# Patient Record
Sex: MALE | Race: BLACK OR AFRICAN AMERICAN | NOT HISPANIC OR LATINO
[De-identification: names, ages, dates, MRNs, and addresses within clinical notes are randomized per-mention and may not be internally consistent; named-entity substitution may affect disease eponyms.]

---

## 2017-06-14 ENCOUNTER — APPOINTMENT (OUTPATIENT)
Dept: NEUROLOGY | Facility: CLINIC | Age: 67
End: 2017-06-14

## 2017-09-24 ENCOUNTER — INPATIENT (INPATIENT)
Facility: HOSPITAL | Age: 67
LOS: 2 days | Discharge: HOME HEALTH | DRG: 299 | End: 2017-09-27
Attending: HOSPITALIST | Admitting: HOSPITALIST
Payer: MEDICARE

## 2017-09-24 VITALS
OXYGEN SATURATION: 95 % | WEIGHT: 201.06 LBS | RESPIRATION RATE: 24 BRPM | HEART RATE: 93 BPM | SYSTOLIC BLOOD PRESSURE: 130 MMHG | TEMPERATURE: 99 F | DIASTOLIC BLOOD PRESSURE: 87 MMHG

## 2017-09-24 DIAGNOSIS — M51.34 OTHER INTERVERTEBRAL DISC DEGENERATION, THORACIC REGION: Chronic | ICD-10-CM

## 2017-09-24 DIAGNOSIS — R63.8 OTHER SYMPTOMS AND SIGNS CONCERNING FOOD AND FLUID INTAKE: ICD-10-CM

## 2017-09-24 DIAGNOSIS — I10 ESSENTIAL (PRIMARY) HYPERTENSION: ICD-10-CM

## 2017-09-24 DIAGNOSIS — I26.99 OTHER PULMONARY EMBOLISM WITHOUT ACUTE COR PULMONALE: ICD-10-CM

## 2017-09-24 DIAGNOSIS — Z98.890 OTHER SPECIFIED POSTPROCEDURAL STATES: Chronic | ICD-10-CM

## 2017-09-24 DIAGNOSIS — M50.30 OTHER CERVICAL DISC DEGENERATION, UNSPECIFIED CERVICAL REGION: ICD-10-CM

## 2017-09-24 DIAGNOSIS — Z29.9 ENCOUNTER FOR PROPHYLACTIC MEASURES, UNSPECIFIED: ICD-10-CM

## 2017-09-24 DIAGNOSIS — M51.34 OTHER INTERVERTEBRAL DISC DEGENERATION, THORACIC REGION: ICD-10-CM

## 2017-09-24 LAB
ALBUMIN SERPL ELPH-MCNC: 4.3 G/DL — SIGNIFICANT CHANGE UP (ref 3.3–5)
ALP SERPL-CCNC: 52 U/L — SIGNIFICANT CHANGE UP (ref 40–120)
ALT FLD-CCNC: 15 U/L — SIGNIFICANT CHANGE UP (ref 10–45)
ANION GAP SERPL CALC-SCNC: 13 MMOL/L — SIGNIFICANT CHANGE UP (ref 5–17)
APTT BLD: 26.9 SEC — LOW (ref 27.5–37.4)
AST SERPL-CCNC: 24 U/L — SIGNIFICANT CHANGE UP (ref 10–40)
BASOPHILS NFR BLD AUTO: 0.4 % — SIGNIFICANT CHANGE UP (ref 0–2)
BILIRUB SERPL-MCNC: 1.5 MG/DL — HIGH (ref 0.2–1.2)
BUN SERPL-MCNC: 15 MG/DL — SIGNIFICANT CHANGE UP (ref 7–23)
CALCIUM SERPL-MCNC: 9.7 MG/DL — SIGNIFICANT CHANGE UP (ref 8.4–10.5)
CHLORIDE SERPL-SCNC: 95 MMOL/L — LOW (ref 96–108)
CK MB CFR SERPL CALC: 1 NG/ML — SIGNIFICANT CHANGE UP (ref 0–6.7)
CO2 SERPL-SCNC: 28 MMOL/L — SIGNIFICANT CHANGE UP (ref 22–31)
CREAT SERPL-MCNC: 1.24 MG/DL — SIGNIFICANT CHANGE UP (ref 0.5–1.3)
EOSINOPHIL NFR BLD AUTO: 0.6 % — SIGNIFICANT CHANGE UP (ref 0–6)
GLUCOSE SERPL-MCNC: 100 MG/DL — HIGH (ref 70–99)
HCT VFR BLD CALC: 37.2 % — LOW (ref 39–50)
HGB BLD-MCNC: 12.5 G/DL — LOW (ref 13–17)
INR BLD: 1.43 — HIGH (ref 0.88–1.16)
INR BLD: 1.59 — HIGH (ref 0.88–1.16)
LYMPHOCYTES # BLD AUTO: 12.6 % — LOW (ref 13–44)
MCHC RBC-ENTMCNC: 30 PG — SIGNIFICANT CHANGE UP (ref 27–34)
MCHC RBC-ENTMCNC: 33.6 G/DL — SIGNIFICANT CHANGE UP (ref 32–36)
MCV RBC AUTO: 89.2 FL — SIGNIFICANT CHANGE UP (ref 80–100)
MONOCYTES NFR BLD AUTO: 16.4 % — HIGH (ref 2–14)
NEUTROPHILS NFR BLD AUTO: 70 % — SIGNIFICANT CHANGE UP (ref 43–77)
PLATELET # BLD AUTO: 131 K/UL — LOW (ref 150–400)
POTASSIUM SERPL-MCNC: 5.2 MMOL/L — SIGNIFICANT CHANGE UP (ref 3.5–5.3)
POTASSIUM SERPL-SCNC: 5.2 MMOL/L — SIGNIFICANT CHANGE UP (ref 3.5–5.3)
PROT SERPL-MCNC: 8.8 G/DL — HIGH (ref 6–8.3)
PROTHROM AB SERPL-ACNC: 16 SEC — HIGH (ref 9.8–12.7)
PROTHROM AB SERPL-ACNC: 17.8 SEC — HIGH (ref 9.8–12.7)
RBC # BLD: 4.17 M/UL — LOW (ref 4.2–5.8)
RBC # FLD: 12.1 % — SIGNIFICANT CHANGE UP (ref 10.3–16.9)
SODIUM SERPL-SCNC: 136 MMOL/L — SIGNIFICANT CHANGE UP (ref 135–145)
TROPONIN T SERPL-MCNC: <0.01 NG/ML — SIGNIFICANT CHANGE UP (ref 0–0.01)
WBC # BLD: 10.2 K/UL — SIGNIFICANT CHANGE UP (ref 3.8–10.5)
WBC # FLD AUTO: 10.2 K/UL — SIGNIFICANT CHANGE UP (ref 3.8–10.5)

## 2017-09-24 PROCEDURE — 71020: CPT | Mod: 26

## 2017-09-24 PROCEDURE — 71275 CT ANGIOGRAPHY CHEST: CPT | Mod: 26

## 2017-09-24 PROCEDURE — 93010 ELECTROCARDIOGRAM REPORT: CPT

## 2017-09-24 PROCEDURE — 99223 1ST HOSP IP/OBS HIGH 75: CPT | Mod: GC

## 2017-09-24 PROCEDURE — 99285 EMERGENCY DEPT VISIT HI MDM: CPT | Mod: 25

## 2017-09-24 RX ORDER — OXYCODONE HYDROCHLORIDE 5 MG/1
5 TABLET ORAL EVERY 6 HOURS
Qty: 0 | Refills: 0 | Status: DISCONTINUED | OUTPATIENT
Start: 2017-09-24 | End: 2017-09-24

## 2017-09-24 RX ORDER — OXYCODONE HYDROCHLORIDE 5 MG/1
15 TABLET ORAL EVERY 6 HOURS
Qty: 0 | Refills: 0 | Status: DISCONTINUED | OUTPATIENT
Start: 2017-09-24 | End: 2017-09-26

## 2017-09-24 RX ORDER — LISINOPRIL 2.5 MG/1
10 TABLET ORAL DAILY
Qty: 0 | Refills: 0 | Status: DISCONTINUED | OUTPATIENT
Start: 2017-09-25 | End: 2017-09-27

## 2017-09-24 RX ORDER — GABAPENTIN 400 MG/1
300 CAPSULE ORAL THREE TIMES A DAY
Qty: 0 | Refills: 0 | Status: DISCONTINUED | OUTPATIENT
Start: 2017-09-24 | End: 2017-09-27

## 2017-09-24 RX ORDER — SODIUM CHLORIDE 9 MG/ML
1000 INJECTION INTRAMUSCULAR; INTRAVENOUS; SUBCUTANEOUS ONCE
Qty: 0 | Refills: 0 | Status: COMPLETED | OUTPATIENT
Start: 2017-09-24 | End: 2017-09-24

## 2017-09-24 RX ORDER — INFLUENZA VIRUS VACCINE 15; 15; 15; 15 UG/.5ML; UG/.5ML; UG/.5ML; UG/.5ML
0.5 SUSPENSION INTRAMUSCULAR ONCE
Qty: 0 | Refills: 0 | Status: COMPLETED | OUTPATIENT
Start: 2017-09-24 | End: 2017-09-27

## 2017-09-24 RX ORDER — HEPARIN SODIUM 5000 [USP'U]/ML
INJECTION INTRAVENOUS; SUBCUTANEOUS
Qty: 25000 | Refills: 0 | Status: DISCONTINUED | OUTPATIENT
Start: 2017-09-24 | End: 2017-09-25

## 2017-09-24 RX ORDER — HEPARIN SODIUM 5000 [USP'U]/ML
7500 INJECTION INTRAVENOUS; SUBCUTANEOUS ONCE
Qty: 0 | Refills: 0 | Status: COMPLETED | OUTPATIENT
Start: 2017-09-24 | End: 2017-09-24

## 2017-09-24 RX ORDER — INFLUENZA VIRUS VACCINE 15; 15; 15; 15 UG/.5ML; UG/.5ML; UG/.5ML; UG/.5ML
0.5 SUSPENSION INTRAMUSCULAR ONCE
Qty: 0 | Refills: 0 | Status: DISCONTINUED | OUTPATIENT
Start: 2017-09-24 | End: 2017-09-24

## 2017-09-24 RX ORDER — ACETAMINOPHEN 500 MG
975 TABLET ORAL ONCE
Qty: 0 | Refills: 0 | Status: COMPLETED | OUTPATIENT
Start: 2017-09-24 | End: 2017-09-24

## 2017-09-24 RX ORDER — MORPHINE SULFATE 50 MG/1
15 CAPSULE, EXTENDED RELEASE ORAL EVERY 12 HOURS
Qty: 0 | Refills: 0 | Status: DISCONTINUED | OUTPATIENT
Start: 2017-09-24 | End: 2017-09-27

## 2017-09-24 RX ORDER — MORPHINE SULFATE 50 MG/1
4 CAPSULE, EXTENDED RELEASE ORAL ONCE
Qty: 0 | Refills: 0 | Status: DISCONTINUED | OUTPATIENT
Start: 2017-09-24 | End: 2017-09-24

## 2017-09-24 RX ADMIN — HEPARIN SODIUM 7500 UNIT(S): 5000 INJECTION INTRAVENOUS; SUBCUTANEOUS at 17:39

## 2017-09-24 RX ADMIN — OXYCODONE HYDROCHLORIDE 5 MILLIGRAM(S): 5 TABLET ORAL at 21:29

## 2017-09-24 RX ADMIN — OXYCODONE HYDROCHLORIDE 5 MILLIGRAM(S): 5 TABLET ORAL at 22:49

## 2017-09-24 RX ADMIN — MORPHINE SULFATE 4 MILLIGRAM(S): 50 CAPSULE, EXTENDED RELEASE ORAL at 16:03

## 2017-09-24 RX ADMIN — MORPHINE SULFATE 4 MILLIGRAM(S): 50 CAPSULE, EXTENDED RELEASE ORAL at 15:33

## 2017-09-24 RX ADMIN — SODIUM CHLORIDE 2000 MILLILITER(S): 9 INJECTION INTRAMUSCULAR; INTRAVENOUS; SUBCUTANEOUS at 15:33

## 2017-09-24 RX ADMIN — HEPARIN SODIUM 1700 UNIT(S)/HR: 5000 INJECTION INTRAVENOUS; SUBCUTANEOUS at 17:39

## 2017-09-24 RX ADMIN — GABAPENTIN 300 MILLIGRAM(S): 400 CAPSULE ORAL at 21:29

## 2017-09-24 RX ADMIN — Medication 975 MILLIGRAM(S): at 17:39

## 2017-09-24 NOTE — ED PROVIDER NOTE - PROGRESS NOTE DETAILS
pt with PE in lobar branch of RLL and subsegmental branch to LLL. Repeat VS HR 82, O2 sat 96%. Will start heparin drip.

## 2017-09-24 NOTE — H&P ADULT - ASSESSMENT
68 y/o M w/PMH of DDD of spine s/p cervical and thoracic spinal surgeries c/o R sided pleuritic CP since last night found to have PE to lobar branch to the right lower lobe and a subsegmental branch to the left lower lobe on CTA. 66 y/o M w/PMH of DDD of spine s/p cervical and lumbar spinal surgeries c/o R sided pleuritic CP since last night found to have PE to lobar branch to the right lower lobe and a subsegmental branch to the left lower lobe on CTA.

## 2017-09-24 NOTE — ED PROVIDER NOTE - OBJECTIVE STATEMENT
68 yo M with pmh of degenerative disc disease of spine s/p cervical and thoracic spinal surgeries c/o R sided CP since last night. Pt states pain is sharp, worse with deep breaths. Associated with dry cough, sob, palpitations and dizziness. Denies fever, chills, sweats, n/v, HA. Denies recent travel or recent injury. Non-smoker. Pt states he took his morphine and oxycodone for pain with no relief.

## 2017-09-24 NOTE — ED PROVIDER NOTE - MEDICAL DECISION MAKING DETAILS
66 yo M with pmh of degenerative disc disease of spine s/p cervical and thoracic spinal surgeries c/o R sided pleuritic CP since last night associated with sob and palpitations. O2 sat 95%, HR 120s, r/o PE.

## 2017-09-24 NOTE — H&P ADULT - ATTENDING COMMENTS
pt seen and examined; reviewed vs, labs , ekg, cxr  pt a/f acute PE at right lower lobe and a subsegmental branch to the left lower lobe; PE occuring in setting of decreased mobility 2/2 lower back pain; pt uses a walker , hx of c spine and lower back surgery   PE findings as above except pt w/ neck stiffness in setting of c spine surgery; pt also w/ mild R basilar crackles; pt also w/ 4/5 left lower motor strength and 3/5 right lower motor  a/p:   1. PE: on heparin gtt, follow up  ECHO, lower ext dopplers, PERT team recs  2. DJD spine: checked istop, restarted pt's Morphine ER and increased dose of oxycodone IR  3. monitor CBC given anemia and thrombocytopenia

## 2017-09-24 NOTE — H&P ADULT - HISTORY OF PRESENT ILLNESS
66 y/o M w/PMH of DDD of spine s/p cervical and thoracic spinal surgeries c/o R sided CP since last night. Pt states pain is sharp, worse with deep breaths and associated with dry cough, sob, palpitations, and dizziness. Denies fever, chills, sweats, n/v, HA. Denies recent travel or recent injury. Non-smoker. Pt states he took his morphine and oxycodone for pain with no relief. 66 y/o M w/PMH of DDD of spine s/p cervical and thoracic spinal surgeries c/o R sided CP since last night. Pt states pain is sharp, worse with deep breaths and associated with dry cough, sob, palpitations, and dizziness. Denies fever, chills, sweats, n/v, HA. Denies recent travel or recent injury. Non-smoker. Pt states he took his morphine and oxycodone for pain with no relief.    In ED, pt afebrile w/, /91, RR 18, O2 96%on RA. CXR neg, EKG NSR w/o ST seg or T wave abnormalities, trop neg, BNP 31. CTA s/f  lobar branch to the right lower lobe and a subsegmental branch to the left lower lobe on CTA. 68 y/o M w/PMH of HTN, DDD of spine s/p cervical and thoracic spinal surgeries (last 2015) c/o R sided CP since last night. Pt states pain is 7/10 sharp, worse with deep breaths and associated with dry cough, sob, palpitations, and dizziness. Denies fever, chills, sweats, n/v, HA. Denies recent travel or recent injury. Non-smoker. Pt states he took his morphine and oxycodone for pain with no relief. Pt noted that he is much less active since spine surgery with intermittent calf pain and leg swelling; however, LE are not currently swollen.     In ED, pt afebrile w/, /91, RR 18, O2 96%on RA. CXR neg, EKG NSR w/o ST seg or T wave abnormalities, trop neg, BNP 31. CTA s/f  lobar branch to the right lower lobe and a subsegmental branch to the left lower lobe on CTA.

## 2017-09-24 NOTE — H&P ADULT - PROBLEM SELECTOR PLAN 1
Pt w/recent spine surgery presented with pleuritic chest pain, found to have PE of lobar branch to the right lower lobe and a subsegmental branch to the left lower lobe on CTA. EKG w/no ST segment or T wave abnormalities, trop neg, BNP 31  -s/p heparin bolus 7500U in ED; now on heparin gtt 17U/hr  -f/u PTT q4-6h  -f/u echo in AM to assess for right heart strain  -f/u LE DUPLEX in AM

## 2017-09-24 NOTE — H&P ADULT - NSHPPHYSICALEXAM_GEN_ALL_CORE
VITAL SIGNS:  T(C): 36.7 (09-24-17 @ 19:32), Max: 37.5 (09-24-17 @ 15:29)  T(F): 98 (09-24-17 @ 19:32), Max: 99.5 (09-24-17 @ 15:29)  HR: 78 (09-24-17 @ 19:32) (72 - 119)  BP: 139/87 (09-24-17 @ 19:32) (115/71 - 143/91)  BP(mean): --  RR: 17 (09-24-17 @ 19:32) (17 - 24)  SpO2: 98% (09-24-17 @ 19:32) (95% - 99%)  Wt(kg): --    PHYSICAL EXAM:    Constitutional: WDWN resting comfortably in bed; NAD  Head: NC/AT  Eyes: PERRL, EOMI, anicteric sclera  ENT: no nasal discharge; uvula midline, no oropharyngeal erythema or exudates; MMM  Neck: supple; no JVD or thyromegaly  Respiratory: CTA B/L; no W/R/R, no retractions  Cardiac: +S1/S2; RRR; no M/R/G; PMI non-displaced  Gastrointestinal: soft, NT/ND; no rebound or guarding; +BSx4  Genitourinary: No rayo  Back: spine midline, no bony tenderness or step-offs; no CVAT B/L  Extremities: WWP, no clubbing or cyanosis; no peripheral edema; calf tenderness b/l  Musculoskeletal: NROM x4; no joint swelling, tenderness or erythema  Vascular: 2+ distal pulses B/L  Dermatologic: skin warm, dry and intact; no rashes, wounds, or scars  Lymphatic: no submandibular or cervical LAD  Neurologic: AAOx3; CNII-XII grossly intact; no focal deficits  Psychiatric: affect and characteristics of appearance, verbalizations, behaviors are appropriate

## 2017-09-24 NOTE — ED PROVIDER NOTE - ATTENDING CONTRIBUTION TO CARE
66 y/o m with pmh of spinal surgeries on morphine and oxycodone with right pleuritic sharp chest pain - no smoking hx, pe - lungs clear, cxr clear, concern for PE secondary to tachycardia and hypoxia - will obtain cta chest and trop and bnp.

## 2017-09-24 NOTE — H&P ADULT - PROBLEM SELECTOR PLAN 5
F: No IVF  E: Replete PRN  N: DASH diet -monitor cbc, check iron studies, vit b12 , folate if worsening

## 2017-09-24 NOTE — ED PROVIDER NOTE - PHYSICAL EXAMINATION
CONSTITUTIONAL: Well-appearing; well-nourished; in no apparent distress.   HEAD: Normocephalic; atraumatic.   EYES: PERRL; EOM intact; conjunctiva and sclera clear  ENT: normal nose; no rhinorrhea; normal pharynx with no erythema or lesions.   NECK: Supple; non-tender; no LAD  CARDIOVASCULAR: +tachycardic, Normal S1, S2; no murmurs, rubs, or gallops.   RESPIRATORY: breath sounds clear and equal bilaterally; no wheezes, rhonchi, or rales, +CP reproduced with deep breaths  GI: Soft; non-distended; non-tender; no palpable organomegaly.   MSK: FROM at all extremities, normal tone   EXT: No cyanosis or edema; N/V intact  SKIN: Normal for age and race; warm; dry; good turgor; no apparent lesions or rash.   NEURO: A & O x 3; face symmetric; grossly unremarkable.   PSYCHOLOGICAL: The patient’s mood and manner are appropriate.

## 2017-09-24 NOTE — ED ADULT NURSE NOTE - OBJECTIVE STATEMENT
Patient alert and oriented x 3 came for sudden onset of rt sternal chest pain like a sharp pain  radiating to RUQ abdomen associated with dizziness like room spinning  , sob and palpitations started at 10pm last night while resting . Pain stated is worse with movement . History of degenerated intervertebral disc . Not in distress , seen and examined by PETTY mota ,safety maintained . Will continue to monitor .

## 2017-09-24 NOTE — ED PROVIDER NOTE - PSH
Degeneration of intervertebral disc of thoracic region    H/O cervical spine surgery    History of foot surgery

## 2017-09-24 NOTE — H&P ADULT - NSHPLABSRESULTS_GEN_ALL_CORE
LABS:                         12.5   10.2  )-----------( 131      ( 24 Sep 2017 15:13 )             37.2     09-24    136  |  95<L>  |  15  ----------------------------<  100<H>  5.2   |  28  |  1.24    Ca    9.7      24 Sep 2017 15:13    TPro  8.8<H>  /  Alb  4.3  /  TBili  1.5<H>  /  DBili  x   /  AST  24  /  ALT  15  /  AlkPhos  52  09-24    PT/INR - ( 24 Sep 2017 15:13 )   PT: 16.0 sec;   INR: 1.43          PTT - ( 24 Sep 2017 15:13 )  PTT:26.9 sec    CARDIAC MARKERS ( 24 Sep 2017 15:13 )  x     / <0.01 ng/mL / 57 U/L / x     / 1.0 ng/mL      Serum Pro-Brain Natriuretic Peptide: 31 pg/mL (09-24 @ 15:13)        RADIOLOGY, EKG & ADDITIONAL TESTS: Reviewed.

## 2017-09-24 NOTE — H&P ADULT - PROBLEM SELECTOR PLAN 3
Home pain regimen:  -gabapentin 300mg PO TID  -morphine 10mg PO BID PRN  -Dilaudid 5mg PO q6h PRN Home pain regimen:  -gabapentin 300mg PO TID  -morphine 10mg PO BID PRN  -f/u PT  -Dilaudid 5mg PO q6h PRN Home pain regimen:  -c/w gabapentin 300mg PO TID  -c/w oxycodone 5mg PO q6h PRN  -hold morphine 10mg PO BID PRN  -f/u PT

## 2017-09-25 DIAGNOSIS — M51.36 OTHER INTERVERTEBRAL DISC DEGENERATION, LUMBAR REGION: ICD-10-CM

## 2017-09-25 DIAGNOSIS — D64.9 ANEMIA, UNSPECIFIED: ICD-10-CM

## 2017-09-25 DIAGNOSIS — D69.6 THROMBOCYTOPENIA, UNSPECIFIED: ICD-10-CM

## 2017-09-25 LAB
ANION GAP SERPL CALC-SCNC: 10 MMOL/L — SIGNIFICANT CHANGE UP (ref 5–17)
APTT BLD: 173.7 SEC — CRITICAL HIGH (ref 27.5–37.4)
APTT BLD: 38.6 SEC — HIGH (ref 27.5–37.4)
APTT BLD: 91.2 SEC — HIGH (ref 27.5–37.4)
BUN SERPL-MCNC: 14 MG/DL — SIGNIFICANT CHANGE UP (ref 7–23)
CALCIUM SERPL-MCNC: 9 MG/DL — SIGNIFICANT CHANGE UP (ref 8.4–10.5)
CHLORIDE SERPL-SCNC: 100 MMOL/L — SIGNIFICANT CHANGE UP (ref 96–108)
CO2 SERPL-SCNC: 27 MMOL/L — SIGNIFICANT CHANGE UP (ref 22–31)
CREAT SERPL-MCNC: 1 MG/DL — SIGNIFICANT CHANGE UP (ref 0.5–1.3)
GLUCOSE SERPL-MCNC: 100 MG/DL — HIGH (ref 70–99)
HCT VFR BLD CALC: 34.5 % — LOW (ref 39–50)
HGB BLD-MCNC: 11.3 G/DL — LOW (ref 13–17)
INR BLD: 1.51 — HIGH (ref 0.88–1.16)
MAGNESIUM SERPL-MCNC: 2.2 MG/DL — SIGNIFICANT CHANGE UP (ref 1.6–2.6)
MCHC RBC-ENTMCNC: 29.4 PG — SIGNIFICANT CHANGE UP (ref 27–34)
MCHC RBC-ENTMCNC: 32.8 G/DL — SIGNIFICANT CHANGE UP (ref 32–36)
MCV RBC AUTO: 89.8 FL — SIGNIFICANT CHANGE UP (ref 80–100)
PLATELET # BLD AUTO: 112 K/UL — LOW (ref 150–400)
POTASSIUM SERPL-MCNC: 4.4 MMOL/L — SIGNIFICANT CHANGE UP (ref 3.5–5.3)
POTASSIUM SERPL-SCNC: 4.4 MMOL/L — SIGNIFICANT CHANGE UP (ref 3.5–5.3)
PROTHROM AB SERPL-ACNC: 16.9 SEC — HIGH (ref 9.8–12.7)
RBC # BLD: 3.84 M/UL — LOW (ref 4.2–5.8)
RBC # FLD: 12 % — SIGNIFICANT CHANGE UP (ref 10.3–16.9)
SODIUM SERPL-SCNC: 137 MMOL/L — SIGNIFICANT CHANGE UP (ref 135–145)
WBC # BLD: 7.6 K/UL — SIGNIFICANT CHANGE UP (ref 3.8–10.5)
WBC # FLD AUTO: 7.6 K/UL — SIGNIFICANT CHANGE UP (ref 3.8–10.5)

## 2017-09-25 PROCEDURE — 93306 TTE W/DOPPLER COMPLETE: CPT | Mod: 26

## 2017-09-25 PROCEDURE — 93970 EXTREMITY STUDY: CPT | Mod: 26

## 2017-09-25 PROCEDURE — 99233 SBSQ HOSP IP/OBS HIGH 50: CPT | Mod: GC

## 2017-09-25 RX ORDER — POLYETHYLENE GLYCOL 3350 17 G/17G
17 POWDER, FOR SOLUTION ORAL ONCE
Qty: 0 | Refills: 0 | Status: COMPLETED | OUTPATIENT
Start: 2017-09-25 | End: 2017-09-25

## 2017-09-25 RX ORDER — APIXABAN 2.5 MG/1
10 TABLET, FILM COATED ORAL EVERY 12 HOURS
Qty: 0 | Refills: 0 | Status: DISCONTINUED | OUTPATIENT
Start: 2017-09-25 | End: 2017-09-27

## 2017-09-25 RX ORDER — LIDOCAINE 4 G/100G
1 CREAM TOPICAL DAILY
Qty: 0 | Refills: 0 | Status: DISCONTINUED | OUTPATIENT
Start: 2017-09-25 | End: 2017-09-27

## 2017-09-25 RX ORDER — APIXABAN 2.5 MG/1
2 TABLET, FILM COATED ORAL
Qty: 24 | Refills: 0 | OUTPATIENT
Start: 2017-09-25 | End: 2017-10-01

## 2017-09-25 RX ORDER — HYDROMORPHONE HYDROCHLORIDE 2 MG/ML
2 INJECTION INTRAMUSCULAR; INTRAVENOUS; SUBCUTANEOUS
Qty: 0 | Refills: 0 | Status: DISCONTINUED | OUTPATIENT
Start: 2017-09-25 | End: 2017-09-27

## 2017-09-25 RX ORDER — APIXABAN 2.5 MG/1
1 TABLET, FILM COATED ORAL
Qty: 60 | Refills: 0 | OUTPATIENT
Start: 2017-09-25 | End: 2017-10-25

## 2017-09-25 RX ORDER — HEPARIN SODIUM 5000 [USP'U]/ML
1400 INJECTION INTRAVENOUS; SUBCUTANEOUS
Qty: 25000 | Refills: 0 | Status: DISCONTINUED | OUTPATIENT
Start: 2017-09-25 | End: 2017-09-25

## 2017-09-25 RX ORDER — SENNA PLUS 8.6 MG/1
2 TABLET ORAL AT BEDTIME
Qty: 0 | Refills: 0 | Status: DISCONTINUED | OUTPATIENT
Start: 2017-09-25 | End: 2017-09-27

## 2017-09-25 RX ADMIN — SENNA PLUS 2 TABLET(S): 8.6 TABLET ORAL at 21:50

## 2017-09-25 RX ADMIN — APIXABAN 10 MILLIGRAM(S): 2.5 TABLET, FILM COATED ORAL at 10:16

## 2017-09-25 RX ADMIN — POLYETHYLENE GLYCOL 3350 17 GRAM(S): 17 POWDER, FOR SOLUTION ORAL at 10:17

## 2017-09-25 RX ADMIN — MORPHINE SULFATE 15 MILLIGRAM(S): 50 CAPSULE, EXTENDED RELEASE ORAL at 06:32

## 2017-09-25 RX ADMIN — MORPHINE SULFATE 15 MILLIGRAM(S): 50 CAPSULE, EXTENDED RELEASE ORAL at 18:18

## 2017-09-25 RX ADMIN — OXYCODONE HYDROCHLORIDE 15 MILLIGRAM(S): 5 TABLET ORAL at 00:26

## 2017-09-25 RX ADMIN — HYDROMORPHONE HYDROCHLORIDE 2 MILLIGRAM(S): 2 INJECTION INTRAMUSCULAR; INTRAVENOUS; SUBCUTANEOUS at 11:10

## 2017-09-25 RX ADMIN — LIDOCAINE 1 PATCH: 4 CREAM TOPICAL at 12:34

## 2017-09-25 RX ADMIN — GABAPENTIN 300 MILLIGRAM(S): 400 CAPSULE ORAL at 04:45

## 2017-09-25 RX ADMIN — MORPHINE SULFATE 15 MILLIGRAM(S): 50 CAPSULE, EXTENDED RELEASE ORAL at 04:44

## 2017-09-25 RX ADMIN — HYDROMORPHONE HYDROCHLORIDE 2 MILLIGRAM(S): 2 INJECTION INTRAMUSCULAR; INTRAVENOUS; SUBCUTANEOUS at 10:56

## 2017-09-25 RX ADMIN — OXYCODONE HYDROCHLORIDE 15 MILLIGRAM(S): 5 TABLET ORAL at 04:27

## 2017-09-25 RX ADMIN — APIXABAN 10 MILLIGRAM(S): 2.5 TABLET, FILM COATED ORAL at 18:19

## 2017-09-25 RX ADMIN — HEPARIN SODIUM 14 UNIT(S)/HR: 5000 INJECTION INTRAVENOUS; SUBCUTANEOUS at 01:30

## 2017-09-25 RX ADMIN — GABAPENTIN 300 MILLIGRAM(S): 400 CAPSULE ORAL at 14:46

## 2017-09-25 RX ADMIN — GABAPENTIN 300 MILLIGRAM(S): 400 CAPSULE ORAL at 21:50

## 2017-09-25 NOTE — PROGRESS NOTE ADULT - ATTENDING COMMENTS
pt seen and examined by me. case d/w housestaff. agree with VS, PE, assessment and plan as above with following additives    1- Acute pulmonary embolism- bilateral- unprovoked  f/u echo to r/o RV dilation as suggested on Ct scan  patient HD stable  c/w A/C

## 2017-09-25 NOTE — PROGRESS NOTE ADULT - PROBLEM SELECTOR PLAN 3
Home pain regimen:  -c/w gabapentin 300mg PO TID  -c/w oxycodone IR 15mg PO q6h PRN  -hold morphine 12mg PO BID PRN  -f/u PT Home pain regimen:  -c/w gabapentin 300mg PO TID  -c/w oxycodone IR 15mg PO q6h PRN  -c/w morphine 15mg PO BID PRN  -c/w Dilaudid 2mg IVP q3h PRN for severe pain  -f/u PT

## 2017-09-26 ENCOUNTER — TRANSCRIPTION ENCOUNTER (OUTPATIENT)
Age: 67
End: 2017-09-26

## 2017-09-26 LAB
ANION GAP SERPL CALC-SCNC: 12 MMOL/L — SIGNIFICANT CHANGE UP (ref 5–17)
APTT BLD: 32.2 SEC — SIGNIFICANT CHANGE UP (ref 27.5–37.4)
BUN SERPL-MCNC: 18 MG/DL — SIGNIFICANT CHANGE UP (ref 7–23)
CALCIUM SERPL-MCNC: 9.6 MG/DL — SIGNIFICANT CHANGE UP (ref 8.4–10.5)
CHLORIDE SERPL-SCNC: 99 MMOL/L — SIGNIFICANT CHANGE UP (ref 96–108)
CO2 SERPL-SCNC: 25 MMOL/L — SIGNIFICANT CHANGE UP (ref 22–31)
CREAT SERPL-MCNC: 1.15 MG/DL — SIGNIFICANT CHANGE UP (ref 0.5–1.3)
GLUCOSE SERPL-MCNC: 107 MG/DL — HIGH (ref 70–99)
HCT VFR BLD CALC: 36 % — LOW (ref 39–50)
HGB BLD-MCNC: 12.2 G/DL — LOW (ref 13–17)
INR BLD: 1.91 — HIGH (ref 0.88–1.16)
MAGNESIUM SERPL-MCNC: 2.2 MG/DL — SIGNIFICANT CHANGE UP (ref 1.6–2.6)
MCHC RBC-ENTMCNC: 30.2 PG — SIGNIFICANT CHANGE UP (ref 27–34)
MCHC RBC-ENTMCNC: 33.9 G/DL — SIGNIFICANT CHANGE UP (ref 32–36)
MCV RBC AUTO: 89.1 FL — SIGNIFICANT CHANGE UP (ref 80–100)
PHOSPHATE SERPL-MCNC: 3 MG/DL — SIGNIFICANT CHANGE UP (ref 2.5–4.5)
PLATELET # BLD AUTO: 136 K/UL — LOW (ref 150–400)
POTASSIUM SERPL-MCNC: 4.5 MMOL/L — SIGNIFICANT CHANGE UP (ref 3.5–5.3)
POTASSIUM SERPL-SCNC: 4.5 MMOL/L — SIGNIFICANT CHANGE UP (ref 3.5–5.3)
PROTHROM AB SERPL-ACNC: 21.5 SEC — HIGH (ref 9.8–12.7)
RBC # BLD: 4.04 M/UL — LOW (ref 4.2–5.8)
RBC # FLD: 11.8 % — SIGNIFICANT CHANGE UP (ref 10.3–16.9)
SODIUM SERPL-SCNC: 136 MMOL/L — SIGNIFICANT CHANGE UP (ref 135–145)
WBC # BLD: 11 K/UL — HIGH (ref 3.8–10.5)
WBC # FLD AUTO: 11 K/UL — HIGH (ref 3.8–10.5)

## 2017-09-26 PROCEDURE — 99233 SBSQ HOSP IP/OBS HIGH 50: CPT | Mod: GC

## 2017-09-26 RX ORDER — OXYCODONE HYDROCHLORIDE 5 MG/1
5 TABLET ORAL
Qty: 0 | Refills: 0 | COMMUNITY
Start: 2017-09-26

## 2017-09-26 RX ORDER — OXYCODONE HYDROCHLORIDE 5 MG/1
25 TABLET ORAL EVERY 6 HOURS
Qty: 0 | Refills: 0 | Status: DISCONTINUED | OUTPATIENT
Start: 2017-09-26 | End: 2017-09-27

## 2017-09-26 RX ORDER — OXYCODONE HYDROCHLORIDE 5 MG/1
1 TABLET ORAL
Qty: 0 | Refills: 0 | COMMUNITY

## 2017-09-26 RX ORDER — APIXABAN 2.5 MG/1
2 TABLET, FILM COATED ORAL
Qty: 35 | Refills: 0 | OUTPATIENT
Start: 2017-09-26

## 2017-09-26 RX ORDER — OXYCODONE HYDROCHLORIDE 5 MG/1
1 TABLET ORAL
Qty: 0 | Refills: 0 | COMMUNITY
Start: 2017-09-26

## 2017-09-26 RX ORDER — MORPHINE SULFATE 50 MG/1
1 CAPSULE, EXTENDED RELEASE ORAL
Qty: 0 | Refills: 0 | COMMUNITY
Start: 2017-09-26

## 2017-09-26 RX ORDER — MORPHINE SULFATE 50 MG/1
0 CAPSULE, EXTENDED RELEASE ORAL
Qty: 0 | Refills: 0 | COMMUNITY

## 2017-09-26 RX ADMIN — MORPHINE SULFATE 15 MILLIGRAM(S): 50 CAPSULE, EXTENDED RELEASE ORAL at 06:37

## 2017-09-26 RX ADMIN — APIXABAN 10 MILLIGRAM(S): 2.5 TABLET, FILM COATED ORAL at 06:07

## 2017-09-26 RX ADMIN — GABAPENTIN 300 MILLIGRAM(S): 400 CAPSULE ORAL at 13:39

## 2017-09-26 RX ADMIN — OXYCODONE HYDROCHLORIDE 15 MILLIGRAM(S): 5 TABLET ORAL at 11:20

## 2017-09-26 RX ADMIN — LIDOCAINE 1 PATCH: 4 CREAM TOPICAL at 13:39

## 2017-09-26 RX ADMIN — LIDOCAINE 1 PATCH: 4 CREAM TOPICAL at 00:00

## 2017-09-26 RX ADMIN — APIXABAN 10 MILLIGRAM(S): 2.5 TABLET, FILM COATED ORAL at 17:44

## 2017-09-26 RX ADMIN — LISINOPRIL 10 MILLIGRAM(S): 2.5 TABLET ORAL at 06:07

## 2017-09-26 RX ADMIN — MORPHINE SULFATE 15 MILLIGRAM(S): 50 CAPSULE, EXTENDED RELEASE ORAL at 17:44

## 2017-09-26 RX ADMIN — GABAPENTIN 300 MILLIGRAM(S): 400 CAPSULE ORAL at 06:07

## 2017-09-26 RX ADMIN — OXYCODONE HYDROCHLORIDE 15 MILLIGRAM(S): 5 TABLET ORAL at 10:11

## 2017-09-26 RX ADMIN — GABAPENTIN 300 MILLIGRAM(S): 400 CAPSULE ORAL at 21:31

## 2017-09-26 RX ADMIN — MORPHINE SULFATE 15 MILLIGRAM(S): 50 CAPSULE, EXTENDED RELEASE ORAL at 06:07

## 2017-09-26 RX ADMIN — MORPHINE SULFATE 15 MILLIGRAM(S): 50 CAPSULE, EXTENDED RELEASE ORAL at 18:55

## 2017-09-26 RX ADMIN — SENNA PLUS 2 TABLET(S): 8.6 TABLET ORAL at 21:31

## 2017-09-26 NOTE — DISCHARGE NOTE ADULT - CARE PLAN
Principal Discharge DX:	Other acute pulmonary embolism  Goal:	Determine cause of chest pain and provide appropriate treatment  Instructions for follow-up, activity and diet:	You presented to the hospital with right sided chest pain that was worse with breathing, which was concerning for a pulmonary embolism. We ordered a CT angiogram to assess the blood vessels in your lungs, which was significant for blood clots. We started you on IV anticoagulation medication to treat the blood clots and ordered an echocardiogram and a lower extremity ultrasound to look for any source of clots. The echocardiogram of you heart was normal; however, the lower extremity ultrasound was significant for deep vein blood clots, which could have been the source of your pulmonary embolism. Please continue the anticoagulation medication as prescribed and follow up with your primary care doctor 1-2 weeks upon discharge.  Secondary Diagnosis:	Degenerative disc disease, lumbar  Instructions for follow-up, activity and diet:	We continued you on your home pain regimen during your hospitalization.  Secondary Diagnosis:	Degenerative disc disease, cervical  Instructions for follow-up, activity and diet:	We continued you on your home pain regimen during your hospitalization.  Secondary Diagnosis:	HTN (hypertension)  Instructions for follow-up, activity and diet:	We continued you on your home medications during your hospitalization.  Secondary Diagnosis:	DVT (deep venous thrombosis)  Instructions for follow-up, activity and diet:	You were found to have blood clots in your leg, which may be due to your increase immobility since your spine surgery in 2015. Please continue physical therapy and anticoagulation medication as prescribed.  Secondary Diagnosis:	Nutrition, metabolism, and development symptoms  Instructions for follow-up, activity and diet:	Please continue a heart healthy diet and appreciate physical therapy recommendations.  Secondary Diagnosis:	Prophylactic measure  Instructions for follow-up, activity and diet:	Please continue medications as prescribed and attend all follow up appointments.

## 2017-09-26 NOTE — PHYSICAL THERAPY INITIAL EVALUATION ADULT - IMPAIRMENTS FOUND, PT EVAL
muscle strength/aerobic capacity/endurance/gait, locomotion, and balance/ergonomics and body mechanics

## 2017-09-26 NOTE — DISCHARGE NOTE ADULT - PROVIDER TOKENS
FREE:[LAST:[Smith],FIRST:[Marzena],PHONE:[phone]],FAX:[phone]    -],ADDRESS:[[address] [place] [address]]]

## 2017-09-26 NOTE — PHYSICAL THERAPY INITIAL EVALUATION ADULT - ADDITIONAL COMMENTS
Pt owns an elevator apartment but is renting it out to live with his aunt who lives in 5th floor walk up. Pt leave his rollator in the car and walks up the 5 flights with bilateral handrails. Pt states he mostly stays in the apartment with her and cooks. Pt says he was able to walk multiple blocks and climb stairs before hospital admission and sat on the rollator when he had to.

## 2017-09-26 NOTE — PROGRESS NOTE ADULT - PROBLEM SELECTOR PLAN 6
eliquis    FULL CODE  DISPO: To home pending PT evaluation eliquis    FULL CODE  DISPO: To home pending optimization of pain regimen and improvement of mobility as pt refusing KEVIN and lives in 5 floor walk up

## 2017-09-26 NOTE — DISCHARGE NOTE ADULT - ADDITIONAL INSTRUCTIONS
Please set up an appointment with Dr. Marzena Mtz for 1-2 weeks after discharge. Please contact Dr. Marzena Mtz's office at [phone] to set up a follow up appointment Oct 2nd, 3rd, or 4th for continued prescription of anticoagulation medication.

## 2017-09-26 NOTE — DISCHARGE NOTE ADULT - PATIENT PORTAL LINK FT
“You can access the Follow[facility] Patient Portal, offered by [facility], by registering with the following website: [email or web address]”

## 2017-09-26 NOTE — DISCHARGE NOTE ADULT - PLAN OF CARE
Determine cause of chest pain and provide appropriate treatment You presented to the hospital with right sided chest pain that was worse with breathing, which was concerning for a pulmonary embolism. We ordered a CT angiogram to assess the blood vessels in your lungs, which was significant for blood clots. We started you on IV anticoagulation medication to treat the blood clots and ordered an echocardiogram and a lower extremity ultrasound to look for any source of clots. The echocardiogram of you heart was normal; however, the lower extremity ultrasound was significant for deep vein blood clots, which could have been the source of your pulmonary embolism. Please continue the anticoagulation medication as prescribed and follow up with your primary care doctor 1-2 weeks upon discharge. We continued you on your home pain regimen during your hospitalization. We continued you on your home medications during your hospitalization. You were found to have blood clots in your leg, which may be due to your increase immobility since your spine surgery in 2015. Please continue physical therapy and anticoagulation medication as prescribed. Please continue a heart healthy diet and appreciate physical therapy recommendations. Please continue medications as prescribed and attend all follow up appointments.

## 2017-09-26 NOTE — DISCHARGE NOTE ADULT - MEDICATION SUMMARY - MEDICATIONS TO TAKE
I will START or STAY ON the medications listed below when I get home from the hospital:    morphine 15 mg/12 hr oral tablet, extended release  -- 1 tab(s) by mouth every 12 hours  -- Indication: For Degeneration of intervertebral disc of thoracic region    oxyCODONE 5 mg oral tablet  -- 5 tab(s) by mouth every 6 hours, As needed, Moderate Pain (4 - 6)  -- Indication: For Degeneration of intervertebral disc of thoracic region    lisinopril 10 mg oral tablet  -- 1 tab(s) by mouth once a day  -- Indication: For HTN (hypertension)    apixaban 5 mg oral tablet  -- For the first 5 days, take 2 tablets every 12 hours. Then take 1 tablet every 12 hours.  -- Indication: For Other acute pulmonary embolism    gabapentin 300 mg oral tablet  -- orally 3 times a day  -- Indication: For Degeneration of intervertebral disc of thoracic region

## 2017-09-26 NOTE — PHYSICAL THERAPY INITIAL EVALUATION ADULT - IMPAIRED TRANSFERS: SIT/STAND, REHAB EVAL
Endurance, NBOS, unsteady, right sided chest pain 7-8/10 RN Yvonne aware./decreased strength/impaired balance/pain

## 2017-09-26 NOTE — PHYSICAL THERAPY INITIAL EVALUATION ADULT - PATIENT/FAMILY/SIGNIFICANT OTHER GOALS STATEMENT, PT EVAL
Pt has been to rehab and says he doesn't want to go back, he is currently living in 5th floor walk up with his sick aunt and wants to go back to taking care of her.

## 2017-09-26 NOTE — PHYSICAL THERAPY INITIAL EVALUATION ADULT - PERTINENT HX OF CURRENT PROBLEM, REHAB EVAL
As per chart: 66 y/o M w/PMH of HTN, DDD of spine s/p cervical and thoracic spinal surgeries (last 2015) c/o R sided CP since last night. Pt states pain is 7/10 sharp, worse with deep breaths and associated with dry cough, sob, palpitations, and dizziness. Denies fever, chills, sweats, n/v, HA. Denies recent travel or recent injury. Non-smoker.

## 2017-09-26 NOTE — DISCHARGE NOTE ADULT - SECONDARY DIAGNOSIS.
Degenerative disc disease, lumbar Degenerative disc disease, cervical HTN (hypertension) DVT (deep venous thrombosis) Nutrition, metabolism, and development symptoms Prophylactic measure

## 2017-09-26 NOTE — DISCHARGE NOTE ADULT - HOSPITAL COURSE
66 y/o M w/PMH of HTN, DDD of spine s/p cervical and thoracic spinal surgeries (last 2015) c/o R sided pleuritic CP since last night. In ED, pt afebrile w/, /91, RR 18, O2 96% on RA. CXR neg, EKG NSR w/o ST seg or T wave abnormalities, trop neg, BNP 31. CTA s/f  lobar branch to the right lower lobe and a subsegmental branch to the left lower lobe on CTA. Pt given heparin bolus 7500U and started on gtt. Pt also started on home pain regimen for DDD: morphine, oxycodone IR, and gabapentin with Dilaudid added for PRN breakthrough pain. Pt started on Eliquis loading dose and d/c'd heparin gtt. Echo was not s/f right heart strain, and LE DUPLEX was s/f for left femoral vein thrombosis. PT evaluated and recommended XXXXXX. Upon discharge, pt afebrile and hemodynamically stable with pain well controlled. 68 y/o M w/PMH of HTN, DDD of spine s/p cervical and thoracic spinal surgeries (last 2015) c/o R sided pleuritic CP since last night. In ED, pt afebrile w/, /91, RR 18, O2 96% on RA. CXR neg, EKG NSR w/o ST seg or T wave abnormalities, trop neg, BNP 31. CTA s/f  lobar branch to the right lower lobe and a subsegmental branch to the left lower lobe on CTA. Pt given heparin bolus 7500U and started on gtt. Pt also started on home pain regimen for DDD: morphine, oxycodone IR, and gabapentin with Dilaudid added for PRN breakthrough pain. Pt started on Eliquis loading dose and d/c'd heparin gtt. Echo was not s/f right heart strain, and LE DUPLEX was s/f for left femoral vein thrombosis. PT evaluated and recommended KEVIN or home w/home PT. Upon discharge, pt afebrile and hemodynamically stable with pain well controlled. 68 y/o M w/PMH of HTN, DDD of spine s/p cervical and thoracic spinal surgeries (last 2015) c/o R sided pleuritic CP since last night. In ED, pt afebrile w/, /91, RR 18, O2 96% on RA. CXR neg, EKG NSR w/o ST seg or T wave abnormalities, trop neg, BNP 31. CTA s/f  lobar branch to the right lower lobe and a subsegmental branch to the left lower lobe on CTA. Pt given heparin bolus 7500U and started on gtt. Pt also started on home pain regimen for DDD: morphine, oxycodone IR, and gabapentin with Dilaudid added for PRN breakthrough pain. Oxycodone IR increased to 25mg. Pt started on Eliquis loading dose and d/c'd heparin gtt. Echo was not s/f right heart strain, and LE DUPLEX was s/f for left femoral vein thrombosis. PT evaluated and recommended KEVIN or home w/home PT. Upon discharge, pt afebrile and hemodynamically stable with pain well controlled.

## 2017-09-26 NOTE — PHYSICAL THERAPY INITIAL EVALUATION ADULT - GAIT DEVIATIONS NOTED, PT EVAL
forward flexed posture, NBOS, slightly unsteady, reporting fatigue and chest pain/increased time in double stance

## 2017-09-26 NOTE — PROGRESS NOTE ADULT - PROBLEM SELECTOR PLAN 3
Home pain regimen:  -c/w gabapentin 300mg PO TID  -c/w oxycodone IR 15mg PO q6h PRN  -c/w morphine 15mg PO BID PRN  -c/w Dilaudid 2mg IVP q3h added for PRN breakthrough/severe pain  -f/u PT Home pain regimen:  -c/w gabapentin 300mg PO TID  -c/w oxycodone IR 15mg PO q6h PRN  -c/w morphine 15mg PO BID PRN  -c/w Dilaudid 2mg IVP q3h added for PRN breakthrough/severe pain  -PT recommended KEVIN; pt lives in 5 floor walk up, likely unsafe for discharge until pain regimen is optimized Home pain regimen:  -c/w gabapentin 300mg PO TID  -increased to oxycodone IR 25mg PO q6h PRN  -c/w morphine 15mg PO BID PRN  -c/w Dilaudid 2mg IVP q3h added for PRN breakthrough/severe pain  -PT recommended KEVIN; pt lives in 5 floor walk up, likely unsafe for discharge until pain regimen is optimized

## 2017-09-27 VITALS
SYSTOLIC BLOOD PRESSURE: 115 MMHG | DIASTOLIC BLOOD PRESSURE: 73 MMHG | OXYGEN SATURATION: 97 % | TEMPERATURE: 98 F | HEART RATE: 89 BPM | RESPIRATION RATE: 15 BRPM

## 2017-09-27 PROCEDURE — 99239 HOSP IP/OBS DSCHRG MGMT >30: CPT

## 2017-09-27 RX ORDER — OXYCODONE HYDROCHLORIDE 5 MG/1
5 TABLET ORAL
Qty: 100 | Refills: 0 | OUTPATIENT
Start: 2017-09-27 | End: 2017-10-02

## 2017-09-27 RX ADMIN — MORPHINE SULFATE 15 MILLIGRAM(S): 50 CAPSULE, EXTENDED RELEASE ORAL at 17:06

## 2017-09-27 RX ADMIN — LIDOCAINE 1 PATCH: 4 CREAM TOPICAL at 14:36

## 2017-09-27 RX ADMIN — APIXABAN 10 MILLIGRAM(S): 2.5 TABLET, FILM COATED ORAL at 06:02

## 2017-09-27 RX ADMIN — OXYCODONE HYDROCHLORIDE 25 MILLIGRAM(S): 5 TABLET ORAL at 10:30

## 2017-09-27 RX ADMIN — OXYCODONE HYDROCHLORIDE 25 MILLIGRAM(S): 5 TABLET ORAL at 11:30

## 2017-09-27 RX ADMIN — GABAPENTIN 300 MILLIGRAM(S): 400 CAPSULE ORAL at 06:02

## 2017-09-27 RX ADMIN — INFLUENZA VIRUS VACCINE 0.5 MILLILITER(S): 15; 15; 15; 15 SUSPENSION INTRAMUSCULAR at 16:32

## 2017-09-27 RX ADMIN — MORPHINE SULFATE 15 MILLIGRAM(S): 50 CAPSULE, EXTENDED RELEASE ORAL at 06:02

## 2017-09-27 RX ADMIN — GABAPENTIN 300 MILLIGRAM(S): 400 CAPSULE ORAL at 14:36

## 2017-09-27 RX ADMIN — APIXABAN 10 MILLIGRAM(S): 2.5 TABLET, FILM COATED ORAL at 17:06

## 2017-09-27 RX ADMIN — LISINOPRIL 10 MILLIGRAM(S): 2.5 TABLET ORAL at 06:02

## 2017-09-27 RX ADMIN — MORPHINE SULFATE 15 MILLIGRAM(S): 50 CAPSULE, EXTENDED RELEASE ORAL at 18:10

## 2017-09-27 RX ADMIN — HYDROMORPHONE HYDROCHLORIDE 2 MILLIGRAM(S): 2 INJECTION INTRAMUSCULAR; INTRAVENOUS; SUBCUTANEOUS at 15:10

## 2017-09-27 RX ADMIN — LIDOCAINE 1 PATCH: 4 CREAM TOPICAL at 00:42

## 2017-09-27 RX ADMIN — MORPHINE SULFATE 15 MILLIGRAM(S): 50 CAPSULE, EXTENDED RELEASE ORAL at 06:32

## 2017-09-27 RX ADMIN — HYDROMORPHONE HYDROCHLORIDE 2 MILLIGRAM(S): 2 INJECTION INTRAMUSCULAR; INTRAVENOUS; SUBCUTANEOUS at 14:39

## 2017-09-27 NOTE — PROGRESS NOTE ADULT - PROBLEM SELECTOR PROBLEM 4
Degenerative disc disease, thoracic

## 2017-09-27 NOTE — PROGRESS NOTE ADULT - SUBJECTIVE AND OBJECTIVE BOX
OVERNIGHT EVENTS: MIRIAM    SUBJECTIVE / INTERVAL HPI: Patient seen and examined at bedside. Pt had no complaints this AM. Pt denied worsening CP, SOB, palpitations, f/c/n/v/d, cough, abdominal pain, worsening LE pain or edema.    VITAL SIGNS:  Vital Signs Last 24 Hrs  T(C): 36.6 (25 Sep 2017 04:41), Max: 37.5 (24 Sep 2017 15:29)  T(F): 97.8 (25 Sep 2017 04:41), Max: 99.5 (24 Sep 2017 15:29)  HR: 55 (25 Sep 2017 04:41) (55 - 119)  BP: 104/65 (25 Sep 2017 04:41) (104/65 - 143/91)  BP(mean): --  RR: 17 (25 Sep 2017 04:41) (17 - 24)  SpO2: 98% (25 Sep 2017 04:41) (95% - 99%)    PHYSICAL EXAM:  Constitutional: WDWN resting comfortably in bed; NAD  Head: NC/AT  Eyes: PERRL, EOMI, anicteric sclera  ENT: no nasal discharge; uvula midline, no oropharyngeal erythema or exudates; MMM  Neck: supple; no JVD or thyromegaly  Respiratory: CTA B/L; no W/R/R, no retractions  Cardiac: +S1/S2; RRR; no M/R/G; PMI non-displaced  Gastrointestinal: soft, NT/ND; no rebound or guarding; +BSx4  Genitourinary: No rayo  Back: spine midline, no bony tenderness or step-offs; no CVAT B/L  Extremities: WWP, no clubbing or cyanosis; no peripheral edema; calf tenderness b/l  Musculoskeletal: NROM x4; no joint swelling, tenderness or erythema  Vascular: 2+ distal pulses B/L  Dermatologic: skin warm, dry and intact; no rashes, wounds, or scars  Lymphatic: no submandibular or cervical LAD  Neurologic: AAOx3; CNII-XII grossly intact; no focal deficits    MEDICATIONS:  MEDICATIONS  (STANDING):  influenza  Vaccine (HIGH DOSE) 0.5 milliLiter(s) IntraMuscular once  lisinopril 10 milliGRAM(s) Oral daily  gabapentin 300 milliGRAM(s) Oral three times a day  morphine ER Tablet 15 milliGRAM(s) Oral every 12 hours  lidocaine   Patch 1 Patch Transdermal daily  apixaban 10 milliGRAM(s) Oral every 12 hours  senna 2 Tablet(s) Oral at bedtime  polyethylene glycol 3350 17 Gram(s) Oral once    MEDICATIONS  (PRN):  oxyCODONE    IR 15 milliGRAM(s) Oral every 6 hours PRN Moderate Pain (4 - 6)  HYDROmorphone  Injectable 2 milliGRAM(s) IV Push every 3 hours PRN Severe Pain (7 - 10)      ALLERGIES:  Allergies    No Known Allergies    Intolerances        LABS:                        11.3   7.6   )-----------( 112      ( 25 Sep 2017 06:07 )             34.5     09-25    137  |  100  |  14  ----------------------------<  100<H>  4.4   |  27  |  1.00    Ca    9.0      25 Sep 2017 06:05  Mg     2.2     09-25    TPro  8.8<H>  /  Alb  4.3  /  TBili  1.5<H>  /  DBili  x   /  AST  24  /  ALT  15  /  AlkPhos  52  09-24    PT/INR - ( 25 Sep 2017 06:08 )   PT: 16.9 sec;   INR: 1.51          PTT - ( 25 Sep 2017 06:08 )  PTT:91.2 sec    CAPILLARY BLOOD GLUCOSE          RADIOLOGY & ADDITIONAL TESTS: Reviewed.
OVERNIGHT EVENTS: MIRIAM    SUBJECTIVE / INTERVAL HPI: Patient seen and examined at bedside. Pt had no complaints this AM. Pt reported chest/back pain slightly improved. Pt denied SOB, palpitations, cough, f/c/n/v/d, abdominal pain.    VITAL SIGNS:  Vital Signs Last 24 Hrs  T(C): 36.8 (27 Sep 2017 05:31), Max: 37.6 (26 Sep 2017 08:37)  T(F): 98.3 (27 Sep 2017 05:31), Max: 99.7 (26 Sep 2017 08:37)  HR: 76 (27 Sep 2017 05:31) (75 - 90)  BP: 120/80 (27 Sep 2017 05:31) (98/62 - 128/84)  BP(mean): --  RR: 16 (27 Sep 2017 05:31) (12 - 18)  SpO2: 97% (27 Sep 2017 05:31) (97% - 98%)    PHYSICAL EXAM:  Constitutional: WDWN resting comfortably in bed; NAD  Eyes: PERRL, EOMI, anicteric sclera  ENT: no nasal discharge; uvula midline, no oropharyngeal erythema or exudates; MMM  Neck: supple; no JVD or thyromegaly  Respiratory: CTA B/L; no W/R/R, no retractions  Cardiac: +S1/S2; RRR; no M/R/G; PMI non-displaced  Gastrointestinal: soft, NT/ND; no rebound or guarding; +BSx4  Back: no CVAT B/L  Extremities: WWP, no clubbing or cyanosis; no peripheral edema; calf tenderness b/l  Musculoskeletal: NROM x4; no joint swelling, tenderness or erythema  Vascular: 2+ distal pulses B/L  Dermatologic: skin warm, dry and intact; no rashes, wounds, or scars  Lymphatic: no submandibular or cervical LAD  Neurologic: AAOx3; CNII-XII grossly intact; no focal deficits      MEDICATIONS:  MEDICATIONS  (STANDING):  influenza  Vaccine (HIGH DOSE) 0.5 milliLiter(s) IntraMuscular once  lisinopril 10 milliGRAM(s) Oral daily  gabapentin 300 milliGRAM(s) Oral three times a day  morphine ER Tablet 15 milliGRAM(s) Oral every 12 hours  lidocaine   Patch 1 Patch Transdermal daily  apixaban 10 milliGRAM(s) Oral every 12 hours  senna 2 Tablet(s) Oral at bedtime    MEDICATIONS  (PRN):  HYDROmorphone  Injectable 2 milliGRAM(s) IV Push every 3 hours PRN Severe Pain (7 - 10)  oxyCODONE    IR 25 milliGRAM(s) Oral every 6 hours PRN Moderate Pain (4 - 6)      ALLERGIES:  Allergies    No Known Allergies    Intolerances        LABS:                        12.2   11.0  )-----------( 136      ( 26 Sep 2017 06:33 )             36.0     09-26    136  |  99  |  18  ----------------------------<  107<H>  4.5   |  25  |  1.15    Ca    9.6      26 Sep 2017 06:32  Phos  3.0     09-26  Mg     2.2     09-26      PT/INR - ( 26 Sep 2017 06:33 )   PT: 21.5 sec;   INR: 1.91          PTT - ( 26 Sep 2017 06:33 )  PTT:32.2 sec    CAPILLARY BLOOD GLUCOSE          RADIOLOGY & ADDITIONAL TESTS: Reviewed.
OVERNIGHT EVENTS: MIRIAM    SUBJECTIVE / INTERVAL HPI: Patient seen and examined at bedside. Pt reported that R sided pleuritic chest pain is only slightly improved. Otherwise, pt denied any SOB, palpitations, cough, f/c/n/v/d, abdominal pain, urinary symptoms.    VITAL SIGNS:  Vital Signs Last 24 Hrs  T(C): 37 (26 Sep 2017 05:35), Max: 37 (26 Sep 2017 05:35)  T(F): 98.6 (26 Sep 2017 05:35), Max: 98.6 (26 Sep 2017 05:35)  HR: 90 (26 Sep 2017 05:35) (68 - 100)  BP: 135/87 (26 Sep 2017 05:35) (121/79 - 135/87)  BP(mean): --  RR: 135 (26 Sep 2017 05:35) (17 - 135)  SpO2: 87% (26 Sep 2017 05:35) (87% - 97%)    PHYSICAL EXAM:  Constitutional: WDWN resting comfortably in bed; NAD  Head: NC/AT  Eyes: PERRL, EOMI, anicteric sclera  ENT: no nasal discharge; uvula midline, no oropharyngeal erythema or exudates; MMM  Neck: supple; no JVD or thyromegaly  Respiratory: CTA B/L; no W/R/R, no retractions  Cardiac: +S1/S2; RRR; no M/R/G; PMI non-displaced  Gastrointestinal: soft, NT/ND; no rebound or guarding; +BSx4  Genitourinary: No raoy  Back: spine midline, no bony tenderness or step-offs; no CVAT B/L  Extremities: WWP, no clubbing or cyanosis; no peripheral edema; calf tenderness b/l  Musculoskeletal: NROM x4; no joint swelling, tenderness or erythema  Vascular: 2+ distal pulses B/L  Dermatologic: skin warm, dry and intact; no rashes, wounds, or scars  Lymphatic: no submandibular or cervical LAD  Neurologic: AAOx3; CNII-XII grossly intact; no focal deficits    MEDICATIONS:  MEDICATIONS  (STANDING):  influenza  Vaccine (HIGH DOSE) 0.5 milliLiter(s) IntraMuscular once  lisinopril 10 milliGRAM(s) Oral daily  gabapentin 300 milliGRAM(s) Oral three times a day  morphine ER Tablet 15 milliGRAM(s) Oral every 12 hours  lidocaine   Patch 1 Patch Transdermal daily  apixaban 10 milliGRAM(s) Oral every 12 hours  senna 2 Tablet(s) Oral at bedtime    MEDICATIONS  (PRN):  oxyCODONE    IR 15 milliGRAM(s) Oral every 6 hours PRN Moderate Pain (4 - 6)  HYDROmorphone  Injectable 2 milliGRAM(s) IV Push every 3 hours PRN Severe Pain (7 - 10)      ALLERGIES:  Allergies    No Known Allergies    Intolerances        LABS:                        12.2   11.0  )-----------( 136      ( 26 Sep 2017 06:33 )             36.0     09-26    136  |  99  |  18  ----------------------------<  107<H>  4.5   |  25  |  1.15    Ca    9.6      26 Sep 2017 06:32  Phos  3.0     09-26  Mg     2.2     09-26    TPro  8.8<H>  /  Alb  4.3  /  TBili  1.5<H>  /  DBili  x   /  AST  24  /  ALT  15  /  AlkPhos  52  09-24    PT/INR - ( 26 Sep 2017 06:33 )   PT: 21.5 sec;   INR: 1.91          PTT - ( 26 Sep 2017 06:33 )  PTT:32.2 sec    CAPILLARY BLOOD GLUCOSE          RADIOLOGY & ADDITIONAL TESTS: Reviewed.

## 2017-09-27 NOTE — PROGRESS NOTE ADULT - PROBLEM SELECTOR PLAN 6
eliquis    FULL CODE  DISPO: To home pending optimization of pain regimen and improvement of mobility as pt refusing KEVIN and lives in 5 floor walk up

## 2017-09-27 NOTE — PROGRESS NOTE ADULT - ASSESSMENT
66 y/o M w/PMH of DDD of spine s/p cervical and thoracic spinal surgeries c/o R sided pleuritic CP since last night found to have PE to lobar branch to the right lower lobe and a subsegmental branch to the left lower lobe on CTA.
66 y/o M w/PMH of DDD of spine s/p cervical and thoracic spinal surgeries c/o R sided pleuritic CP since last night found to have PE to lobar branch to the right lower lobe and a subsegmental branch to the left lower lobe on CTA.
68 y/o M w/PMH of DDD of spine s/p cervical and thoracic spinal surgeries c/o R sided pleuritic CP since last night found to have PE to lobar branch to the right lower lobe and a subsegmental branch to the left lower lobe on CTA.

## 2017-09-27 NOTE — PROGRESS NOTE ADULT - PROBLEM SELECTOR PLAN 3
Home pain regimen:  -c/w gabapentin 300mg PO TID  -increased to oxycodone IR 25mg PO q6h PRN  -c/w morphine 15mg PO BID PRN  -c/w Dilaudid 2mg IVP q3h added for PRN breakthrough/severe pain  -PT recommended KEVIN; pt lives in 5 floor walk up, likely unsafe for discharge until pain regimen is optimized

## 2017-09-27 NOTE — PROGRESS NOTE ADULT - PROBLEM SELECTOR PLAN 5
F: No IVF  E: Replete PRN  N: DASH diet

## 2017-09-27 NOTE — PROGRESS NOTE ADULT - PROBLEM SELECTOR PLAN 1
Pt w/recent spine surgery presented with pleuritic chest pain, found to have PE of lobar branch to the right lower lobe and a subsegmental branch to the left lower lobe on CTA. EKG w/no ST segment or T wave abnormalities, trop neg, BNP 31  -s/p heparin bolus 7500U in ED; now on heparin gtt  -started Eliquis 10mg BID  -f/u PTT q4-6h  -f/u echo in AM to assess for right heart strain  -f/u LE DUPLEX in AM
Pt w/recent spine surgery presented with pleuritic chest pain, found to have PE of lobar branch to the right lower lobe and a subsegmental branch to the left lower lobe on CTA. EKG w/no ST segment or T wave abnormalities, trop neg, BNP 31  -d/c'd heparin gtt  -c/w Eliquis 10mg BID for 7 days (9/25-10/3) and change to 5mg BID for maintenance dose  -Echo not s/f right heart strain  -LE DUPLEX s/f thrombus in left femoral vein from its proximal to distal   portion, as well as thrombus in left intramuscular calf vein.
Pt w/recent spine surgery presented with pleuritic chest pain, found to have PE of lobar branch to the right lower lobe and a subsegmental branch to the left lower lobe on CTA. EKG w/no ST segment or T wave abnormalities, trop neg, BNP 31  -d/c'd heparin gtt  -c/w Eliquis 10mg BID for 7 days and change to 5mg BID for maintenance dose  -Echo not s/f right heart strain  -LE DUPLEX s/f thrombus in left femoral vein from its proximal to distal   portion, as well as thrombus in left intramuscular calf vein.

## 2017-10-03 DIAGNOSIS — M51.36 OTHER INTERVERTEBRAL DISC DEGENERATION, LUMBAR REGION: ICD-10-CM

## 2017-10-03 DIAGNOSIS — R07.1 CHEST PAIN ON BREATHING: ICD-10-CM

## 2017-10-03 DIAGNOSIS — D64.9 ANEMIA, UNSPECIFIED: ICD-10-CM

## 2017-10-03 DIAGNOSIS — D69.6 THROMBOCYTOPENIA, UNSPECIFIED: ICD-10-CM

## 2017-10-03 DIAGNOSIS — I26.99 OTHER PULMONARY EMBOLISM WITHOUT ACUTE COR PULMONALE: ICD-10-CM

## 2017-10-03 DIAGNOSIS — R07.9 CHEST PAIN, UNSPECIFIED: ICD-10-CM

## 2017-10-03 DIAGNOSIS — M50.30 OTHER CERVICAL DISC DEGENERATION, UNSPECIFIED CERVICAL REGION: ICD-10-CM

## 2017-10-03 DIAGNOSIS — I82.412 ACUTE EMBOLISM AND THROMBOSIS OF LEFT FEMORAL VEIN: ICD-10-CM

## 2017-10-03 DIAGNOSIS — I10 ESSENTIAL (PRIMARY) HYPERTENSION: ICD-10-CM

## 2017-10-05 NOTE — ED ADULT NURSE NOTE - CAS EDN DISCHARGE ASSESSMENT
Chief Complaint   Patient presents with   • Sore Throat     Congestion       HISTORY OF PRESENT ILLNESS:   Patient is a 54-year-old male who presents with a history of sore throat for the past 3 days. He states that he feels mild pressure in his ears but denies any ear pain and difficulty hearing. He denies any drainage from his ears. He states that he did have fever 2 days ago on the 102 but is had no subsequent fevers. He denies any sinus pain, nasal congestion or cough. Patient states that his daughter had a sore throat last week which resolved after a couple days.   Patient states that he had a orthovisc knee injection 2 days ago with Dr. Brenner his orthopedic specialist from orthopedic [facility]. He states that he has had swelling to the left knee and pain since that time. He had contacted RN at orthopedic [facility] who recommended that he use ice and elevate the leg that this can be common after the third of 3 sequential injections. He states that he has had improvement over the past couple days but is concerned because persistent swelling. He states his pain is similar to pain he has had in the past. He denies any numbness or tingling.     Allergies as of 10/05/2017   • (No Known Allergies)       Current Outpatient Prescriptions   Medication Sig Dispense Refill   • Omega-3 Fatty Acids (FISH OIL PO)      • aspirin 81 MG tablet Take 81 mg by mouth daily.     • Glucosamine-Chondroit-Vit C-Mn (GLUCOSAMINE 1500 COMPLEX) Cap      • Probiotic Cap      • Multiple Vitamins-Minerals (MULTIVITAMIN ADULT) Tab      • PARoxetine HCl (PAXIL PO) Take 20 mg by mouth daily.        No current facility-administered medications for this visit.        Social History     Social History   • Marital status:      Spouse name: N/A   • Number of children: N/A   • Years of education: N/A     Occupational History   • Not on file.     Social History Main Topics   • Smoking status: Never Smoker   • Smokeless tobacco: Never Used   •  Alcohol use Yes      Comment: social   • Drug use: No   • Sexual activity: Not on file     Other Topics Concern   • Not on file     Social History Narrative   • No narrative on file       There is no problem list on file for this patient.      Past Medical History:   Diagnosis Date   • Anxiety    • Gastroesophageal reflux disease        REVIEW OF SYSTEMS:   All systems reviewed per Smart Chart and negative.    PHYSICAL EXAM:   Visit Vitals  /76   Pulse 75   Temp 97.2 °F (36.2 °C) (Tympanic)   Resp 12   Ht 6' 4\" (1.93 m) Comment: ~height per patient   Wt 102 kg   SpO2 98%   BMI 27.37 kg/m²     GENERAL:  Patient is a 54 year old male  who presents in NAD. Patient is well developed, well nourished, alert and oriented x 3.  EXAM: Patient is nontoxic in appearance and appears well-hydrated.  HEENT exam: Head is normocephalic, atraumatic. Eyes: Pupils equal, round, react to light and accommodate. Conjunctivae normal in appearance. Extraocular movements are intact. Nares patent. There is no sinus tenderness to palpation or percussion. Posterior pharynx with erythema noted. No exudates noted. Few small palpable anterior cervical nodes present, no posterior cervical adenopathy present. No supraclavicular adenopathy appreciated. Tympanic membranes are clear bilaterally with no effusion or erythema present.  Lungs are clear to auscultation anterior and posteriorly. No rales, rhonchi, or wheezing noted.  Heart: Regular rate and rhythm, no murmur noted.   Extremities: Patient with swelling of the left knee noted compared to the right. This is nontender to palpation. No erythema noted. This is not warm to touch. Patient complains of pain with flexion beyond 90° but states that that is actually improved from the past couple days. Patient with no calf tenderness noted. He has full range of motion of the left ankle and toes of left foot. Sensory exam intact to distal toe tips of both feet. He has good posterior tibial pulses  equal bilaterally.   Skin: Skin is warm and dry with no lesions or rashes noted.    Summary of Urgent Care Course:  Patient had a rapid strep performed which was negative. Results were discussed with patient.  Throat culture will be sent per patient's request.  Discussed with patient that there was no evidence of cellulitis to the left knee and recommended that he follow-up with his orthopedic specialist to discuss persistent symptoms.    DIAGNOSIS:   1. Sorethroat    2. Viral pharyngitis    3. Left knee pain        PLAN:  Rest, drink plenty of fluids.  Tylenol/Ibuprofen as needed for fever/pain.  May try salt water gargles or Cepacol throat spray.  Throat culture is sent. We will call you with the test results within 2 days.    Follow up with Primary MD in the next 2-3 days if no improvement of sore throat -sooner if worse.  Recommend contacting your orthopedic specialist today to discuss persistent left knee pain and swelling. Watch for any increased left knee pain/swelling, redness, fever, etc.  Return or go to the ER with any worsening symptoms, problems, concerns.    Supervising physician Dr. Catarino Adams     Alert and oriented to person, place and time

## 2017-10-19 PROCEDURE — 71046 X-RAY EXAM CHEST 2 VIEWS: CPT

## 2017-10-19 PROCEDURE — 93005 ELECTROCARDIOGRAM TRACING: CPT

## 2017-10-19 PROCEDURE — 86901 BLOOD TYPING SEROLOGIC RH(D): CPT

## 2017-10-19 PROCEDURE — 80053 COMPREHEN METABOLIC PANEL: CPT

## 2017-10-19 PROCEDURE — 86900 BLOOD TYPING SEROLOGIC ABO: CPT

## 2017-10-19 PROCEDURE — 82550 ASSAY OF CK (CPK): CPT

## 2017-10-19 PROCEDURE — 85025 COMPLETE CBC W/AUTO DIFF WBC: CPT

## 2017-10-19 PROCEDURE — 90662 IIV NO PRSV INCREASED AG IM: CPT

## 2017-10-19 PROCEDURE — 85730 THROMBOPLASTIN TIME PARTIAL: CPT

## 2017-10-19 PROCEDURE — 71275 CT ANGIOGRAPHY CHEST: CPT

## 2017-10-19 PROCEDURE — 97162 PT EVAL MOD COMPLEX 30 MIN: CPT

## 2017-10-19 PROCEDURE — 96374 THER/PROPH/DIAG INJ IV PUSH: CPT | Mod: XU

## 2017-10-19 PROCEDURE — 83735 ASSAY OF MAGNESIUM: CPT

## 2017-10-19 PROCEDURE — 85027 COMPLETE CBC AUTOMATED: CPT

## 2017-10-19 PROCEDURE — 83880 ASSAY OF NATRIURETIC PEPTIDE: CPT

## 2017-10-19 PROCEDURE — 82553 CREATINE MB FRACTION: CPT

## 2017-10-19 PROCEDURE — 85610 PROTHROMBIN TIME: CPT

## 2017-10-19 PROCEDURE — 93306 TTE W/DOPPLER COMPLETE: CPT

## 2017-10-19 PROCEDURE — 84100 ASSAY OF PHOSPHORUS: CPT

## 2017-10-19 PROCEDURE — 97116 GAIT TRAINING THERAPY: CPT

## 2017-10-19 PROCEDURE — 86850 RBC ANTIBODY SCREEN: CPT

## 2017-10-19 PROCEDURE — 93970 EXTREMITY STUDY: CPT

## 2017-10-19 PROCEDURE — 99285 EMERGENCY DEPT VISIT HI MDM: CPT | Mod: 25

## 2017-10-19 PROCEDURE — 80048 BASIC METABOLIC PNL TOTAL CA: CPT

## 2017-10-19 PROCEDURE — 84484 ASSAY OF TROPONIN QUANT: CPT

## 2017-10-19 PROCEDURE — 36415 COLL VENOUS BLD VENIPUNCTURE: CPT

## 2017-12-20 PROBLEM — I10 ESSENTIAL (PRIMARY) HYPERTENSION: Chronic | Status: ACTIVE | Noted: 2017-09-24

## 2018-01-12 ENCOUNTER — APPOINTMENT (OUTPATIENT)
Dept: NEUROLOGY | Facility: CLINIC | Age: 68
End: 2018-01-12
Payer: MEDICARE

## 2018-01-12 VITALS
WEIGHT: 197 LBS | BODY MASS INDEX: 25.28 KG/M2 | HEIGHT: 74 IN | TEMPERATURE: 98.1 F | HEART RATE: 77 BPM | DIASTOLIC BLOOD PRESSURE: 92 MMHG | OXYGEN SATURATION: 96 % | SYSTOLIC BLOOD PRESSURE: 144 MMHG

## 2018-01-12 PROCEDURE — 99215 OFFICE O/P EST HI 40 MIN: CPT

## 2018-02-22 ENCOUNTER — EMERGENCY (EMERGENCY)
Facility: HOSPITAL | Age: 68
LOS: 1 days | Discharge: HOME | End: 2018-02-22
Attending: EMERGENCY MEDICINE | Admitting: EMERGENCY MEDICINE
Payer: MEDICARE

## 2018-02-22 VITALS
RESPIRATION RATE: 16 BRPM | HEART RATE: 67 BPM | TEMPERATURE: 98 F | SYSTOLIC BLOOD PRESSURE: 173 MMHG | DIASTOLIC BLOOD PRESSURE: 93 MMHG | OXYGEN SATURATION: 98 %

## 2018-02-22 DIAGNOSIS — M51.34 OTHER INTERVERTEBRAL DISC DEGENERATION, THORACIC REGION: Chronic | ICD-10-CM

## 2018-02-22 DIAGNOSIS — R21 RASH AND OTHER NONSPECIFIC SKIN ERUPTION: ICD-10-CM

## 2018-02-22 DIAGNOSIS — Z79.891 LONG TERM (CURRENT) USE OF OPIATE ANALGESIC: ICD-10-CM

## 2018-02-22 DIAGNOSIS — Z98.890 OTHER SPECIFIED POSTPROCEDURAL STATES: Chronic | ICD-10-CM

## 2018-02-22 DIAGNOSIS — Z79.899 OTHER LONG TERM (CURRENT) DRUG THERAPY: ICD-10-CM

## 2018-02-22 DIAGNOSIS — L53.9 ERYTHEMATOUS CONDITION, UNSPECIFIED: ICD-10-CM

## 2018-02-22 DIAGNOSIS — L29.9 PRURITUS, UNSPECIFIED: ICD-10-CM

## 2018-02-22 PROCEDURE — 99283 EMERGENCY DEPT VISIT LOW MDM: CPT

## 2018-02-22 RX ORDER — VALACYCLOVIR 500 MG/1
1 TABLET, FILM COATED ORAL
Qty: 21 | Refills: 0 | OUTPATIENT
Start: 2018-02-22 | End: 2018-02-28

## 2018-02-22 RX ORDER — CEPHALEXIN 500 MG
1 CAPSULE ORAL
Qty: 14 | Refills: 0 | OUTPATIENT
Start: 2018-02-22 | End: 2018-02-28

## 2018-02-22 NOTE — ED PROVIDER NOTE - OBJECTIVE STATEMENT
66 yo male in the Er due to rash on his left forehead. Pt reports he noted it yesterday. Pt is concerned that rash could be due to the new pain medications that he  was prescribed, because  its a patches.   pt denies any forehead injury, denies h/o shingles in the past.   Pt reports that rash is slightly itchy.   No rash anywhere else on his body

## 2018-02-22 NOTE — ED ADULT NURSE NOTE - OBJECTIVE STATEMENT
pt was recently started on Buprenorphine ( January 23) pt c/o left forehead rash that started 2 days ago , pt denies any difficulty breathing or any difficulty swallowing will continue to monitor

## 2018-02-22 NOTE — ED ADULT TRIAGE NOTE - CHIEF COMPLAINT QUOTE
C/o of redness & swelling to L forehead, itchy.  States "I think it is a reaction to the new medicine I'm on".  On Buprenorphine transdermal.

## 2018-02-22 NOTE — ED PROVIDER NOTE - SKIN, MLM
Skin normal color for race, warm, dry and intact. No evidence of rash.  localized erythematous rash to the left forehead, slightly warm to touch, no lesions/blisters/vesicles/excoriations,

## 2018-02-22 NOTE — ED PROVIDER NOTE - MEDICAL DECISION MAKING DETAILS
68 yo male with localized erythematous rash on his left forehead x 2 days, was concerned that its the side effects of medications. no lesions/vesicles/sores/excoriations noted, rash appears to be unilateral, consistent with dermatomal  Unclear etiology of rash, but beginning of zoster suspected, vs cellulitis. Pt has an appointment with his PMD tomorrow morning. will d/c with Rx of Valtrex and keflex until he f/u with his PMD. 68 yo male with localized erythematous rash on his left forehead x 2 days, was concerned that its the side effects of medications. no lesions/vesicles/sores/excoriations noted, rash appears to be unilateral, consistent with dermatomal  distribution. Unclear etiology of rash, but beginning of zoster suspected, vs cellulitis. Pt has an appointment with his PMD tomorrow morning. will d/c with Rx of Valtrex and keflex until he f/u with his PMD.

## 2018-03-01 ENCOUNTER — EMERGENCY (EMERGENCY)
Facility: HOSPITAL | Age: 68
LOS: 1 days | Discharge: HOME | End: 2018-03-01
Attending: EMERGENCY MEDICINE | Admitting: EMERGENCY MEDICINE
Payer: MEDICARE

## 2018-03-01 VITALS
DIASTOLIC BLOOD PRESSURE: 95 MMHG | OXYGEN SATURATION: 99 % | RESPIRATION RATE: 17 BRPM | HEART RATE: 57 BPM | TEMPERATURE: 97 F | SYSTOLIC BLOOD PRESSURE: 151 MMHG

## 2018-03-01 VITALS
SYSTOLIC BLOOD PRESSURE: 145 MMHG | TEMPERATURE: 97 F | DIASTOLIC BLOOD PRESSURE: 91 MMHG | OXYGEN SATURATION: 100 % | RESPIRATION RATE: 16 BRPM | HEART RATE: 75 BPM

## 2018-03-01 DIAGNOSIS — Z79.899 OTHER LONG TERM (CURRENT) DRUG THERAPY: ICD-10-CM

## 2018-03-01 DIAGNOSIS — H53.149 VISUAL DISCOMFORT, UNSPECIFIED: ICD-10-CM

## 2018-03-01 DIAGNOSIS — M51.34 OTHER INTERVERTEBRAL DISC DEGENERATION, THORACIC REGION: Chronic | ICD-10-CM

## 2018-03-01 DIAGNOSIS — Z79.891 LONG TERM (CURRENT) USE OF OPIATE ANALGESIC: ICD-10-CM

## 2018-03-01 DIAGNOSIS — Z79.2 LONG TERM (CURRENT) USE OF ANTIBIOTICS: ICD-10-CM

## 2018-03-01 DIAGNOSIS — Z98.890 OTHER SPECIFIED POSTPROCEDURAL STATES: Chronic | ICD-10-CM

## 2018-03-01 DIAGNOSIS — R51 HEADACHE: ICD-10-CM

## 2018-03-01 DIAGNOSIS — I10 ESSENTIAL (PRIMARY) HYPERTENSION: ICD-10-CM

## 2018-03-01 LAB
ALBUMIN SERPL ELPH-MCNC: 4.3 G/DL — SIGNIFICANT CHANGE UP (ref 3.3–5)
ALP SERPL-CCNC: 44 U/L — SIGNIFICANT CHANGE UP (ref 40–120)
ALT FLD-CCNC: 12 U/L — SIGNIFICANT CHANGE UP (ref 10–45)
ANION GAP SERPL CALC-SCNC: 12 MMOL/L — SIGNIFICANT CHANGE UP (ref 5–17)
APTT BLD: 28.2 SEC — SIGNIFICANT CHANGE UP (ref 27.5–37.4)
AST SERPL-CCNC: 20 U/L — SIGNIFICANT CHANGE UP (ref 10–40)
BASOPHILS NFR BLD AUTO: 0.7 % — SIGNIFICANT CHANGE UP (ref 0–2)
BILIRUB SERPL-MCNC: 0.8 MG/DL — SIGNIFICANT CHANGE UP (ref 0.2–1.2)
BUN SERPL-MCNC: 8 MG/DL — SIGNIFICANT CHANGE UP (ref 7–23)
CALCIUM SERPL-MCNC: 9.3 MG/DL — SIGNIFICANT CHANGE UP (ref 8.4–10.5)
CHLORIDE SERPL-SCNC: 105 MMOL/L — SIGNIFICANT CHANGE UP (ref 96–108)
CO2 SERPL-SCNC: 26 MMOL/L — SIGNIFICANT CHANGE UP (ref 22–31)
CREAT SERPL-MCNC: 1.22 MG/DL — SIGNIFICANT CHANGE UP (ref 0.5–1.3)
EOSINOPHIL NFR BLD AUTO: 3.3 % — SIGNIFICANT CHANGE UP (ref 0–6)
ERYTHROCYTE [SEDIMENTATION RATE] IN BLOOD: 17 MM/HR — SIGNIFICANT CHANGE UP
GLUCOSE SERPL-MCNC: 96 MG/DL — SIGNIFICANT CHANGE UP (ref 70–99)
HCT VFR BLD CALC: 34.4 % — LOW (ref 39–50)
HGB BLD-MCNC: 11.7 G/DL — LOW (ref 13–17)
INR BLD: 1.73 — HIGH (ref 0.88–1.16)
LYMPHOCYTES # BLD AUTO: 33.7 % — SIGNIFICANT CHANGE UP (ref 13–44)
MCHC RBC-ENTMCNC: 31 PG — SIGNIFICANT CHANGE UP (ref 27–34)
MCHC RBC-ENTMCNC: 34 G/DL — SIGNIFICANT CHANGE UP (ref 32–36)
MCV RBC AUTO: 91.2 FL — SIGNIFICANT CHANGE UP (ref 80–100)
MONOCYTES NFR BLD AUTO: 10.5 % — SIGNIFICANT CHANGE UP (ref 2–14)
NEUTROPHILS NFR BLD AUTO: 51.8 % — SIGNIFICANT CHANGE UP (ref 43–77)
PLATELET # BLD AUTO: 134 K/UL — LOW (ref 150–400)
POTASSIUM SERPL-MCNC: 3.8 MMOL/L — SIGNIFICANT CHANGE UP (ref 3.5–5.3)
POTASSIUM SERPL-SCNC: 3.8 MMOL/L — SIGNIFICANT CHANGE UP (ref 3.5–5.3)
PROT SERPL-MCNC: 7.6 G/DL — SIGNIFICANT CHANGE UP (ref 6–8.3)
PROTHROM AB SERPL-ACNC: 19.4 SEC — HIGH (ref 9.8–12.7)
RBC # BLD: 3.77 M/UL — LOW (ref 4.2–5.8)
RBC # FLD: 12.2 % — SIGNIFICANT CHANGE UP (ref 10.3–16.9)
SODIUM SERPL-SCNC: 143 MMOL/L — SIGNIFICANT CHANGE UP (ref 135–145)
WBC # BLD: 4.2 K/UL — SIGNIFICANT CHANGE UP (ref 3.8–10.5)
WBC # FLD AUTO: 4.2 K/UL — SIGNIFICANT CHANGE UP (ref 3.8–10.5)

## 2018-03-01 PROCEDURE — 99284 EMERGENCY DEPT VISIT MOD MDM: CPT

## 2018-03-01 PROCEDURE — 85025 COMPLETE CBC W/AUTO DIFF WBC: CPT

## 2018-03-01 PROCEDURE — 36415 COLL VENOUS BLD VENIPUNCTURE: CPT

## 2018-03-01 PROCEDURE — 85652 RBC SED RATE AUTOMATED: CPT

## 2018-03-01 PROCEDURE — 99284 EMERGENCY DEPT VISIT MOD MDM: CPT | Mod: 25

## 2018-03-01 PROCEDURE — 70450 CT HEAD/BRAIN W/O DYE: CPT

## 2018-03-01 PROCEDURE — 80053 COMPREHEN METABOLIC PANEL: CPT

## 2018-03-01 PROCEDURE — 85730 THROMBOPLASTIN TIME PARTIAL: CPT

## 2018-03-01 PROCEDURE — 96374 THER/PROPH/DIAG INJ IV PUSH: CPT

## 2018-03-01 PROCEDURE — 85610 PROTHROMBIN TIME: CPT

## 2018-03-01 PROCEDURE — 70450 CT HEAD/BRAIN W/O DYE: CPT | Mod: 26

## 2018-03-01 PROCEDURE — 96375 TX/PRO/DX INJ NEW DRUG ADDON: CPT

## 2018-03-01 RX ORDER — ACETAMINOPHEN 500 MG
1000 TABLET ORAL ONCE
Qty: 0 | Refills: 0 | Status: COMPLETED | OUTPATIENT
Start: 2018-03-01 | End: 2018-03-01

## 2018-03-01 RX ORDER — METOCLOPRAMIDE HCL 10 MG
10 TABLET ORAL ONCE
Qty: 0 | Refills: 0 | Status: COMPLETED | OUTPATIENT
Start: 2018-03-01 | End: 2018-03-01

## 2018-03-01 RX ORDER — DIPHENHYDRAMINE HCL 50 MG
25 CAPSULE ORAL ONCE
Qty: 0 | Refills: 0 | Status: COMPLETED | OUTPATIENT
Start: 2018-03-01 | End: 2018-03-01

## 2018-03-01 RX ORDER — LISINOPRIL 2.5 MG/1
10 TABLET ORAL ONCE
Qty: 0 | Refills: 0 | Status: COMPLETED | OUTPATIENT
Start: 2018-03-01 | End: 2018-03-01

## 2018-03-01 RX ORDER — MAGNESIUM SULFATE 500 MG/ML
1 VIAL (ML) INJECTION ONCE
Qty: 0 | Refills: 0 | Status: COMPLETED | OUTPATIENT
Start: 2018-03-01 | End: 2018-03-01

## 2018-03-01 RX ORDER — SODIUM CHLORIDE 9 MG/ML
1000 INJECTION INTRAMUSCULAR; INTRAVENOUS; SUBCUTANEOUS ONCE
Qty: 0 | Refills: 0 | Status: COMPLETED | OUTPATIENT
Start: 2018-03-01 | End: 2018-03-01

## 2018-03-01 RX ORDER — METOCLOPRAMIDE HCL 10 MG
10 TABLET ORAL ONCE
Qty: 0 | Refills: 0 | Status: DISCONTINUED | OUTPATIENT
Start: 2018-03-01 | End: 2018-03-01

## 2018-03-01 RX ORDER — KETOROLAC TROMETHAMINE 30 MG/ML
30 SYRINGE (ML) INJECTION ONCE
Qty: 0 | Refills: 0 | Status: DISCONTINUED | OUTPATIENT
Start: 2018-03-01 | End: 2018-03-01

## 2018-03-01 RX ADMIN — SODIUM CHLORIDE 1000 MILLILITER(S): 9 INJECTION INTRAMUSCULAR; INTRAVENOUS; SUBCUTANEOUS at 08:46

## 2018-03-01 RX ADMIN — Medication 125 MILLIGRAM(S): at 09:31

## 2018-03-01 RX ADMIN — Medication 1000 MILLIGRAM(S): at 08:45

## 2018-03-01 RX ADMIN — Medication 10 MILLIGRAM(S): at 06:27

## 2018-03-01 RX ADMIN — Medication 25 MILLIGRAM(S): at 06:26

## 2018-03-01 RX ADMIN — Medication 100 GRAM(S): at 08:48

## 2018-03-01 RX ADMIN — Medication 30 MILLIGRAM(S): at 09:31

## 2018-03-01 RX ADMIN — LISINOPRIL 10 MILLIGRAM(S): 2.5 TABLET ORAL at 08:45

## 2018-03-01 NOTE — ED PROVIDER NOTE - OBJECTIVE STATEMENT
67M hx PE (on eliquis), htn, c/o R sided HA. pt states HA ongoing for a few days.  +photophobia. tried aleve without relief. no vomiting. no dizziness. no fevers. pt states used to have migraines years ago.  no numbness/weakness. states was recently put on keflex/valtrex for facial rash.  pt uses butran patches, has pain .

## 2018-03-01 NOTE — ED ADULT NURSE NOTE - CAS DISCH CONDITION
Care Coordinator Progress Note     Admission Date/Time:  1/8/2018  Attending MD:  Taylor Martínez MD     Data  Chart reviewed, discussed with interdisciplinary team.   Patient was admitted for:    Fever  Fever, unspecified fever cause  Infection due to Candida glabrata  S/P intestinal transplant (H)  Status post small bowel transplant (H)  Acute endocarditis due to other organism.    Concerns with insurance coverage for discharge needs: None.  Current Living Situation: Patient lives with family.  Support System: Supportive and Involved  Services Involved: Home Infusion  Transportation: Family or Friend will provide    Coordination of Care and Referrals: Confirmed discharge plan with MD team. Updated [facility] of discharge plans. Orders for TPN faxed this AM. Confirmed plan with [facility].     Assessment  Ready for discharge from care coordinator standpoint     Plan  Anticipated Discharge Date:  01/12/18  Anticipated Discharge Plan:  Home    Kaycee Arteaga RN           Stable

## 2018-03-01 NOTE — ED ADULT TRIAGE NOTE - CHIEF COMPLAINT QUOTE
" I was here last week for migraine, Advil is not helping , can't sleep due to migraine" pain constant, throbbing pain  pt on Eliquis. denies any  other complain

## 2018-03-01 NOTE — ED PROVIDER NOTE - PROGRESS NOTE DETAILS
Feels better, no longer photophobic, neurological exam reassuring. Was treated as possible migraines vs temporal arteritis. Will discharge with vascular follow up and with prednisone burst. Return precautions discussed. Stable for discharge.

## 2018-03-01 NOTE — ED PROVIDER NOTE - MEDICAL DECISION MAKING DETAILS
R sided HA ongoing for few days, no focal neuro deficits, no sudden onset, doubt SAH, TTP R temple  -check labs, CT head, reglan/benadryl

## 2018-03-04 ENCOUNTER — EMERGENCY (EMERGENCY)
Facility: HOSPITAL | Age: 68
LOS: 1 days | Discharge: HOME | End: 2018-03-04
Attending: EMERGENCY MEDICINE | Admitting: EMERGENCY MEDICINE
Payer: MEDICARE

## 2018-03-04 VITALS
SYSTOLIC BLOOD PRESSURE: 161 MMHG | TEMPERATURE: 97 F | OXYGEN SATURATION: 99 % | HEART RATE: 65 BPM | DIASTOLIC BLOOD PRESSURE: 87 MMHG | HEIGHT: 74 IN | RESPIRATION RATE: 20 BRPM | WEIGHT: 207.01 LBS

## 2018-03-04 DIAGNOSIS — Z79.2 LONG TERM (CURRENT) USE OF ANTIBIOTICS: ICD-10-CM

## 2018-03-04 DIAGNOSIS — Z79.899 OTHER LONG TERM (CURRENT) DRUG THERAPY: ICD-10-CM

## 2018-03-04 DIAGNOSIS — Z98.890 OTHER SPECIFIED POSTPROCEDURAL STATES: Chronic | ICD-10-CM

## 2018-03-04 DIAGNOSIS — M51.34 OTHER INTERVERTEBRAL DISC DEGENERATION, THORACIC REGION: Chronic | ICD-10-CM

## 2018-03-04 DIAGNOSIS — Z79.891 LONG TERM (CURRENT) USE OF OPIATE ANALGESIC: ICD-10-CM

## 2018-03-04 DIAGNOSIS — R51 HEADACHE: ICD-10-CM

## 2018-03-04 DIAGNOSIS — I10 ESSENTIAL (PRIMARY) HYPERTENSION: ICD-10-CM

## 2018-03-04 DIAGNOSIS — G43.909 MIGRAINE, UNSPECIFIED, NOT INTRACTABLE, WITHOUT STATUS MIGRAINOSUS: ICD-10-CM

## 2018-03-04 PROCEDURE — 99284 EMERGENCY DEPT VISIT MOD MDM: CPT | Mod: GC

## 2018-03-04 PROCEDURE — 96372 THER/PROPH/DIAG INJ SC/IM: CPT

## 2018-03-04 PROCEDURE — 99283 EMERGENCY DEPT VISIT LOW MDM: CPT | Mod: 25

## 2018-03-04 RX ORDER — OXYCODONE AND ACETAMINOPHEN 5; 325 MG/1; MG/1
1 TABLET ORAL ONCE
Qty: 0 | Refills: 0 | Status: DISCONTINUED | OUTPATIENT
Start: 2018-03-04 | End: 2018-03-04

## 2018-03-04 RX ORDER — KETOROLAC TROMETHAMINE 30 MG/ML
30 SYRINGE (ML) INJECTION ONCE
Qty: 0 | Refills: 0 | Status: DISCONTINUED | OUTPATIENT
Start: 2018-03-04 | End: 2018-03-04

## 2018-03-04 RX ADMIN — OXYCODONE AND ACETAMINOPHEN 1 TABLET(S): 5; 325 TABLET ORAL at 16:09

## 2018-03-04 RX ADMIN — Medication 30 MILLIGRAM(S): at 16:09

## 2018-03-04 RX ADMIN — OXYCODONE AND ACETAMINOPHEN 1 TABLET(S): 5; 325 TABLET ORAL at 15:33

## 2018-03-04 RX ADMIN — Medication 30 MILLIGRAM(S): at 15:33

## 2018-03-04 NOTE — ED ADULT TRIAGE NOTE - CHIEF COMPLAINT QUOTE
Patient c/o migraine headache with some blurry vision since thursday . Was seen in the ER last thursday for the same reason . Medicine was prescribed with no relief . Denies any nausea nor vomiting . History of glaucoma .

## 2018-03-04 NOTE — ED PROVIDER NOTE - ATTENDING CONTRIBUTION TO CARE
66yo M hx of PE on Eliquis, HTN, DJD s/p spine surgery, here w/ R sided HA x1 week, seen for same on March 1st, started on prednisone. Originates near R temple, 10/10 pain but talking without distress, discussing career with resident. aleve not helping. endorses photophobia. no neck pain. was recently treated for shingles which has resolved, no rash on exam today. no other symptoms. no fevers/chills. no focal neurological symptoms. no confusion. VSS, non toxic appearing. PERRLA (no objective photophobia when light shone in eyes), EOMI and does not precipitate pain, CN exam normal, no tenderness over temples. neck supple, no decreased ROM or meningismus. exam is reassuring, had labs on prior visit that was normal. ?post herpetic neuralgia - on gabapentin. is seeing pain management this week and his other doctors. states blurry vision of unclear onset, glaucoma chronic and being treated. does not seem to correlate, and on visual confrontation it was normal. will check visual acuity. do not think warrants re-imaging or repeat labs at this time. will give toradol/reglan

## 2018-03-04 NOTE — ED PROVIDER NOTE - PHYSICAL EXAMINATION
General: NAD, comfortable, non-toxic appearing  HEENT: NCAT, PERRL, clear conjunctiva, no scleral icterus  Neck: supple, no JVD  Respiratory: CTA b/l, no wheezing, rhonchi, rales  Cardiovascular: RRR, normal S1S2, no M/R/G  Neuro: AOx3, sensory/motor intact

## 2018-03-04 NOTE — ED PROVIDER NOTE - OBJECTIVE STATEMENT
66 y/o M with PMHx of PE (on Eliquis), HTN, DDD of spine s/p cervical and thoracic spinal surgeries (last 2015), presents with R sided HA x 1 week. Was seen here 3/1/18 for similar symptoms. Complains of right sided throbbing HA originating near temple, 10/10, non-radiating. No aggravating/alleviating factors. Takes Aleve with minimal relief. Was given prednisone 40mg but has not helped symptoms. States he has had some blurry vision in right eye of unknown duration. Otherwise denies fever, chills, lightheadedness, CP, palpitations, SOB, cough, URI symptoms, N/V/D/C, abdominal pain, dysuria, hematuria, changes in bowel habits, LE edema.

## 2018-03-04 NOTE — ED PROVIDER NOTE - MEDICAL DECISION MAKING DETAILS
Toradol given. Pain improved. Librado-pen reading of 20. Safe for d/c to home. Follow up with pain management this week.

## 2018-03-08 ENCOUNTER — OUTPATIENT (OUTPATIENT)
Dept: OUTPATIENT SERVICES | Facility: HOSPITAL | Age: 68
LOS: 1 days | End: 2018-03-08
Payer: MEDICARE

## 2018-03-08 ENCOUNTER — APPOINTMENT (OUTPATIENT)
Dept: VASCULAR SURGERY | Facility: CLINIC | Age: 68
End: 2018-03-08
Payer: MEDICARE

## 2018-03-08 VITALS
HEART RATE: 65 BPM | OXYGEN SATURATION: 93 % | BODY MASS INDEX: 26.31 KG/M2 | DIASTOLIC BLOOD PRESSURE: 91 MMHG | HEIGHT: 74 IN | WEIGHT: 205 LBS | SYSTOLIC BLOOD PRESSURE: 154 MMHG

## 2018-03-08 DIAGNOSIS — Z98.890 OTHER SPECIFIED POSTPROCEDURAL STATES: Chronic | ICD-10-CM

## 2018-03-08 DIAGNOSIS — M51.34 OTHER INTERVERTEBRAL DISC DEGENERATION, THORACIC REGION: Chronic | ICD-10-CM

## 2018-03-08 DIAGNOSIS — I27.0 PRIMARY PULMONARY HYPERTENSION: ICD-10-CM

## 2018-03-08 PROCEDURE — 93880 EXTRACRANIAL BILAT STUDY: CPT

## 2018-03-08 PROCEDURE — 93306 TTE W/DOPPLER COMPLETE: CPT | Mod: 26

## 2018-03-08 PROCEDURE — 93306 TTE W/DOPPLER COMPLETE: CPT

## 2018-03-08 PROCEDURE — 99204 OFFICE O/P NEW MOD 45 MIN: CPT | Mod: 25

## 2018-04-04 ENCOUNTER — OUTPATIENT (OUTPATIENT)
Dept: OUTPATIENT SERVICES | Facility: HOSPITAL | Age: 68
LOS: 1 days | End: 2018-04-04
Payer: MEDICARE

## 2018-04-04 ENCOUNTER — APPOINTMENT (OUTPATIENT)
Dept: NEUROLOGY | Facility: CLINIC | Age: 68
End: 2018-04-04
Payer: MEDICARE

## 2018-04-04 ENCOUNTER — LABORATORY RESULT (OUTPATIENT)
Age: 68
End: 2018-04-04

## 2018-04-04 ENCOUNTER — APPOINTMENT (OUTPATIENT)
Dept: CT IMAGING | Facility: HOSPITAL | Age: 68
End: 2018-04-04

## 2018-04-04 VITALS
HEART RATE: 65 BPM | DIASTOLIC BLOOD PRESSURE: 80 MMHG | BODY MASS INDEX: 26.56 KG/M2 | HEIGHT: 74 IN | WEIGHT: 207 LBS | TEMPERATURE: 98 F | OXYGEN SATURATION: 98 % | SYSTOLIC BLOOD PRESSURE: 149 MMHG

## 2018-04-04 DIAGNOSIS — M51.34 OTHER INTERVERTEBRAL DISC DEGENERATION, THORACIC REGION: Chronic | ICD-10-CM

## 2018-04-04 DIAGNOSIS — Z98.890 OTHER SPECIFIED POSTPROCEDURAL STATES: Chronic | ICD-10-CM

## 2018-04-04 DIAGNOSIS — Z00.00 ENCOUNTER FOR GENERAL ADULT MEDICAL EXAMINATION W/OUT ABNORMAL FINDINGS: ICD-10-CM

## 2018-04-04 PROCEDURE — 70496 CT ANGIOGRAPHY HEAD: CPT | Mod: 26

## 2018-04-04 PROCEDURE — 99215 OFFICE O/P EST HI 40 MIN: CPT

## 2018-04-04 PROCEDURE — 70496 CT ANGIOGRAPHY HEAD: CPT

## 2018-04-04 RX ORDER — FOLIC ACID 1 MG/1
1 TABLET ORAL
Refills: 0 | Status: ACTIVE | COMMUNITY

## 2018-04-05 LAB
ANION GAP SERPL CALC-SCNC: 14 MMOL/L
BUN SERPL-MCNC: 15 MG/DL
C3 SERPL-MCNC: 105 MG/DL
C4 SERPL-MCNC: 12 MG/DL
CALCIUM SERPL-MCNC: 9.6 MG/DL
CHLORIDE SERPL-SCNC: 100 MMOL/L
CO2 SERPL-SCNC: 25 MMOL/L
CREAT SERPL-MCNC: 1.15 MG/DL
CRP SERPL-MCNC: <0.2 MG/DL
ERYTHROCYTE [SEDIMENTATION RATE] IN BLOOD BY WESTERGREN METHOD: 12 MM/HR
GLUCOSE SERPL-MCNC: 130 MG/DL
HSV 1+2 IGG SER IA-IMP: NEGATIVE
HSV 1+2 IGG SER IA-IMP: POSITIVE
HSV1 IGG SER QL: <0.01 INDEX
HSV2 IGG SER QL: 11.7 INDEX
MPO AB + PR3 PNL SER: NORMAL
POTASSIUM SERPL-SCNC: 3.4 MMOL/L
SODIUM SERPL-SCNC: 139 MMOL/L
VZV AB TITR SER: POSITIVE
VZV IGG SER IF-ACNC: >4000 INDEX

## 2018-04-06 LAB
ANA SER IF-ACNC: NEGATIVE
CH50 SERPL-MCNC: 33 U/ML
HSV1 IGM SER QL: NORMAL TITER
HSV2 AB FLD-ACNC: NORMAL TITER

## 2018-04-11 LAB
CRYOGLOB SERPL-MCNC: NEGATIVE
VZV IGM SER IF-ACNC: <0.91 INDEX

## 2018-04-13 ENCOUNTER — INPATIENT (INPATIENT)
Facility: HOSPITAL | Age: 68
LOS: 2 days | Discharge: HOME | DRG: 301 | End: 2018-04-16
Attending: INTERNAL MEDICINE | Admitting: INTERNAL MEDICINE
Payer: MEDICARE

## 2018-04-13 VITALS
HEART RATE: 62 BPM | WEIGHT: 210.1 LBS | OXYGEN SATURATION: 98 % | RESPIRATION RATE: 18 BRPM | TEMPERATURE: 98 F | DIASTOLIC BLOOD PRESSURE: 88 MMHG | HEIGHT: 74 IN | SYSTOLIC BLOOD PRESSURE: 146 MMHG

## 2018-04-13 DIAGNOSIS — Z98.890 OTHER SPECIFIED POSTPROCEDURAL STATES: Chronic | ICD-10-CM

## 2018-04-13 DIAGNOSIS — M51.34 OTHER INTERVERTEBRAL DISC DEGENERATION, THORACIC REGION: Chronic | ICD-10-CM

## 2018-04-13 LAB
ALBUMIN SERPL ELPH-MCNC: 4.4 G/DL — SIGNIFICANT CHANGE UP (ref 3.3–5)
ALP SERPL-CCNC: 47 U/L — SIGNIFICANT CHANGE UP (ref 40–120)
ALT FLD-CCNC: 19 U/L — SIGNIFICANT CHANGE UP (ref 10–45)
ANION GAP SERPL CALC-SCNC: 8 MMOL/L — SIGNIFICANT CHANGE UP (ref 5–17)
APTT BLD: 24.9 SEC — LOW (ref 27.5–37.4)
AST SERPL-CCNC: 17 U/L — SIGNIFICANT CHANGE UP (ref 10–40)
BILIRUB SERPL-MCNC: 1.1 MG/DL — SIGNIFICANT CHANGE UP (ref 0.2–1.2)
BUN SERPL-MCNC: 14 MG/DL — SIGNIFICANT CHANGE UP (ref 7–23)
CALCIUM SERPL-MCNC: 9.6 MG/DL — SIGNIFICANT CHANGE UP (ref 8.4–10.5)
CHLORIDE SERPL-SCNC: 100 MMOL/L — SIGNIFICANT CHANGE UP (ref 96–108)
CK MB CFR SERPL CALC: 1.7 NG/ML — SIGNIFICANT CHANGE UP (ref 0–6.7)
CK SERPL-CCNC: 82 U/L — SIGNIFICANT CHANGE UP (ref 30–200)
CO2 SERPL-SCNC: 34 MMOL/L — HIGH (ref 22–31)
CREAT SERPL-MCNC: 1.01 MG/DL — SIGNIFICANT CHANGE UP (ref 0.5–1.3)
GLUCOSE SERPL-MCNC: 97 MG/DL — SIGNIFICANT CHANGE UP (ref 70–99)
HCT VFR BLD CALC: 38.5 % — LOW (ref 39–50)
HGB BLD-MCNC: 12.8 G/DL — LOW (ref 13–17)
INR BLD: 1.22 — HIGH (ref 0.88–1.16)
LYMPHOCYTES # BLD AUTO: 9.6 % — LOW (ref 13–44)
MCHC RBC-ENTMCNC: 31.2 PG — SIGNIFICANT CHANGE UP (ref 27–34)
MCHC RBC-ENTMCNC: 33.2 G/DL — SIGNIFICANT CHANGE UP (ref 32–36)
MCV RBC AUTO: 93.9 FL — SIGNIFICANT CHANGE UP (ref 80–100)
MONOCYTES NFR BLD AUTO: 4.2 % — SIGNIFICANT CHANGE UP (ref 2–14)
NEUTROPHILS NFR BLD AUTO: 86.2 % — HIGH (ref 43–77)
NT-PROBNP SERPL-SCNC: 101 PG/ML — SIGNIFICANT CHANGE UP (ref 0–300)
PLATELET # BLD AUTO: 130 K/UL — LOW (ref 150–400)
POTASSIUM SERPL-MCNC: 4.3 MMOL/L — SIGNIFICANT CHANGE UP (ref 3.5–5.3)
POTASSIUM SERPL-SCNC: 4.3 MMOL/L — SIGNIFICANT CHANGE UP (ref 3.5–5.3)
PROT SERPL-MCNC: 7.4 G/DL — SIGNIFICANT CHANGE UP (ref 6–8.3)
PROTHROM AB SERPL-ACNC: 13.6 SEC — HIGH (ref 9.8–12.7)
RBC # BLD: 4.1 M/UL — LOW (ref 4.2–5.8)
RBC # FLD: 13.1 % — SIGNIFICANT CHANGE UP (ref 10.3–16.9)
SODIUM SERPL-SCNC: 142 MMOL/L — SIGNIFICANT CHANGE UP (ref 135–145)
TROPONIN T SERPL-MCNC: <0.01 NG/ML — SIGNIFICANT CHANGE UP (ref 0–0.01)
WBC # BLD: 7.9 K/UL — SIGNIFICANT CHANGE UP (ref 3.8–10.5)
WBC # FLD AUTO: 7.9 K/UL — SIGNIFICANT CHANGE UP (ref 3.8–10.5)

## 2018-04-13 PROCEDURE — 99285 EMERGENCY DEPT VISIT HI MDM: CPT

## 2018-04-13 PROCEDURE — 99222 1ST HOSP IP/OBS MODERATE 55: CPT | Mod: GC

## 2018-04-13 PROCEDURE — 70450 CT HEAD/BRAIN W/O DYE: CPT | Mod: 26

## 2018-04-13 PROCEDURE — 93970 EXTREMITY STUDY: CPT | Mod: 26

## 2018-04-13 PROCEDURE — 71045 X-RAY EXAM CHEST 1 VIEW: CPT | Mod: 26

## 2018-04-13 RX ORDER — ACETAMINOPHEN 500 MG
975 TABLET ORAL ONCE
Qty: 0 | Refills: 0 | Status: COMPLETED | OUTPATIENT
Start: 2018-04-13 | End: 2018-04-13

## 2018-04-13 RX ADMIN — Medication 975 MILLIGRAM(S): at 22:12

## 2018-04-13 NOTE — ED PROVIDER NOTE - OBJECTIVE STATEMENT
68 yo male with persistent HAs on eliquis for PE diagnosed in 2017 as well as worsening bilateral lower ext edema L>R here mostly secondary to L lower ext pain and swelling. Pt denies chest pain however states that he is mildly dyspneic over the last few weeks.

## 2018-04-13 NOTE — ED ADULT NURSE NOTE - CHPI ED SYMPTOMS NEG
no numbness/no blurred vision/no weakness/no loss of consciousness/no nausea/no change in level of consciousness/no confusion/no dizziness/no vomiting/no fever

## 2018-04-13 NOTE — ED PROVIDER NOTE - MEDICAL DECISION MAKING DETAILS
66yo male with bilateral lower ext edema, R>L as well as HA on eliquis. Pt states that he is currently being treated for his headaches with steroids. Has only mild SOB, but no elevation in cardiac enzymes. DVT study repeated as there is concern for possibly recurrent DVT. Will admit for repeat study and increased anticoagulation if positive.

## 2018-04-13 NOTE — ED ADULT TRIAGE NOTE - CHIEF COMPLAINT QUOTE
Patient c/o migraine headache for few months , also with bilateral leg and foot pain with swelling since yesterday . Denies any sob when walking . Denies any blurry vision , nausea nor vomiting .

## 2018-04-13 NOTE — ED ADULT NURSE NOTE - OBJECTIVE STATEMENT
Pt reports intermittent headache "for a while" and "for a few days" has had bilateral lower leg swelling and pain to bilateral lower extremities. Pt denies blurred vision, weakness, chest pain, SOB, dizziness or any other symptoms. Pt denies numbness, tingling.

## 2018-04-14 DIAGNOSIS — Z29.9 ENCOUNTER FOR PROPHYLACTIC MEASURES, UNSPECIFIED: ICD-10-CM

## 2018-04-14 DIAGNOSIS — R51 HEADACHE: ICD-10-CM

## 2018-04-14 DIAGNOSIS — R60.0 LOCALIZED EDEMA: ICD-10-CM

## 2018-04-14 DIAGNOSIS — I82.409 ACUTE EMBOLISM AND THROMBOSIS OF UNSPECIFIED DEEP VEINS OF UNSPECIFIED LOWER EXTREMITY: ICD-10-CM

## 2018-04-14 DIAGNOSIS — R63.8 OTHER SYMPTOMS AND SIGNS CONCERNING FOOD AND FLUID INTAKE: ICD-10-CM

## 2018-04-14 DIAGNOSIS — N40.0 BENIGN PROSTATIC HYPERPLASIA WITHOUT LOWER URINARY TRACT SYMPTOMS: ICD-10-CM

## 2018-04-14 DIAGNOSIS — I10 ESSENTIAL (PRIMARY) HYPERTENSION: ICD-10-CM

## 2018-04-14 LAB
ANION GAP SERPL CALC-SCNC: 6 MMOL/L — SIGNIFICANT CHANGE UP (ref 5–17)
BASOPHILS NFR BLD AUTO: 0.1 % — SIGNIFICANT CHANGE UP (ref 0–2)
BUN SERPL-MCNC: 19 MG/DL — SIGNIFICANT CHANGE UP (ref 7–23)
CALCIUM SERPL-MCNC: 9.6 MG/DL — SIGNIFICANT CHANGE UP (ref 8.4–10.5)
CHLORIDE SERPL-SCNC: 100 MMOL/L — SIGNIFICANT CHANGE UP (ref 96–108)
CO2 SERPL-SCNC: 37 MMOL/L — HIGH (ref 22–31)
CREAT SERPL-MCNC: 0.96 MG/DL — SIGNIFICANT CHANGE UP (ref 0.5–1.3)
EOSINOPHIL NFR BLD AUTO: 0.8 % — SIGNIFICANT CHANGE UP (ref 0–6)
GLUCOSE SERPL-MCNC: 107 MG/DL — HIGH (ref 70–99)
HCT VFR BLD CALC: 40.1 % — SIGNIFICANT CHANGE UP (ref 39–50)
HGB BLD-MCNC: 13.4 G/DL — SIGNIFICANT CHANGE UP (ref 13–17)
LYMPHOCYTES # BLD AUTO: 28.9 % — SIGNIFICANT CHANGE UP (ref 13–44)
MAGNESIUM SERPL-MCNC: 2.2 MG/DL — SIGNIFICANT CHANGE UP (ref 1.6–2.6)
MCHC RBC-ENTMCNC: 31.5 PG — SIGNIFICANT CHANGE UP (ref 27–34)
MCHC RBC-ENTMCNC: 33.4 G/DL — SIGNIFICANT CHANGE UP (ref 32–36)
MCV RBC AUTO: 94.4 FL — SIGNIFICANT CHANGE UP (ref 80–100)
MONOCYTES NFR BLD AUTO: 8.5 % — SIGNIFICANT CHANGE UP (ref 2–14)
NEUTROPHILS NFR BLD AUTO: 61.7 % — SIGNIFICANT CHANGE UP (ref 43–77)
PLATELET # BLD AUTO: 121 K/UL — LOW (ref 150–400)
POTASSIUM SERPL-MCNC: 4.9 MMOL/L — SIGNIFICANT CHANGE UP (ref 3.5–5.3)
POTASSIUM SERPL-SCNC: 4.9 MMOL/L — SIGNIFICANT CHANGE UP (ref 3.5–5.3)
RBC # BLD: 4.25 M/UL — SIGNIFICANT CHANGE UP (ref 4.2–5.8)
RBC # FLD: 13.2 % — SIGNIFICANT CHANGE UP (ref 10.3–16.9)
SODIUM SERPL-SCNC: 143 MMOL/L — SIGNIFICANT CHANGE UP (ref 135–145)
WBC # BLD: 7.7 K/UL — SIGNIFICANT CHANGE UP (ref 3.8–10.5)
WBC # FLD AUTO: 7.7 K/UL — SIGNIFICANT CHANGE UP (ref 3.8–10.5)

## 2018-04-14 RX ORDER — FOLIC ACID 0.8 MG
1 TABLET ORAL DAILY
Qty: 0 | Refills: 0 | Status: DISCONTINUED | OUTPATIENT
Start: 2018-04-14 | End: 2018-04-16

## 2018-04-14 RX ORDER — GABAPENTIN 400 MG/1
300 CAPSULE ORAL THREE TIMES A DAY
Qty: 0 | Refills: 0 | Status: DISCONTINUED | OUTPATIENT
Start: 2018-04-14 | End: 2018-04-16

## 2018-04-14 RX ORDER — TAMSULOSIN HYDROCHLORIDE 0.4 MG/1
0.8 CAPSULE ORAL AT BEDTIME
Qty: 0 | Refills: 0 | Status: DISCONTINUED | OUTPATIENT
Start: 2018-04-14 | End: 2018-04-16

## 2018-04-14 RX ORDER — ENOXAPARIN SODIUM 100 MG/ML
100 INJECTION SUBCUTANEOUS DAILY
Qty: 0 | Refills: 0 | Status: DISCONTINUED | OUTPATIENT
Start: 2018-04-14 | End: 2018-04-15

## 2018-04-14 RX ORDER — LISINOPRIL 2.5 MG/1
10 TABLET ORAL DAILY
Qty: 0 | Refills: 0 | Status: DISCONTINUED | OUTPATIENT
Start: 2018-04-14 | End: 2018-04-16

## 2018-04-14 RX ORDER — ACETAMINOPHEN 500 MG
650 TABLET ORAL ONCE
Qty: 0 | Refills: 0 | Status: COMPLETED | OUTPATIENT
Start: 2018-04-14 | End: 2018-04-14

## 2018-04-14 RX ORDER — ACETAMINOPHEN 500 MG
650 TABLET ORAL EVERY 6 HOURS
Qty: 0 | Refills: 0 | Status: DISCONTINUED | OUTPATIENT
Start: 2018-04-14 | End: 2018-04-16

## 2018-04-14 RX ORDER — PYRIDOXINE HCL (VITAMIN B6) 100 MG
50 TABLET ORAL DAILY
Qty: 0 | Refills: 0 | Status: DISCONTINUED | OUTPATIENT
Start: 2018-04-14 | End: 2018-04-16

## 2018-04-14 RX ORDER — FUROSEMIDE 40 MG
20 TABLET ORAL ONCE
Qty: 0 | Refills: 0 | Status: COMPLETED | OUTPATIENT
Start: 2018-04-14 | End: 2018-04-14

## 2018-04-14 RX ORDER — PREGABALIN 225 MG/1
1000 CAPSULE ORAL DAILY
Qty: 0 | Refills: 0 | Status: DISCONTINUED | OUTPATIENT
Start: 2018-04-14 | End: 2018-04-16

## 2018-04-14 RX ORDER — APIXABAN 2.5 MG/1
1 TABLET, FILM COATED ORAL
Qty: 0 | Refills: 0 | COMMUNITY

## 2018-04-14 RX ADMIN — Medication 1 MILLIGRAM(S): at 11:58

## 2018-04-14 RX ADMIN — Medication 650 MILLIGRAM(S): at 18:22

## 2018-04-14 RX ADMIN — TAMSULOSIN HYDROCHLORIDE 0.8 MILLIGRAM(S): 0.4 CAPSULE ORAL at 21:22

## 2018-04-14 RX ADMIN — GABAPENTIN 300 MILLIGRAM(S): 400 CAPSULE ORAL at 21:22

## 2018-04-14 RX ADMIN — ENOXAPARIN SODIUM 100 MILLIGRAM(S): 100 INJECTION SUBCUTANEOUS at 12:00

## 2018-04-14 RX ADMIN — GABAPENTIN 300 MILLIGRAM(S): 400 CAPSULE ORAL at 14:20

## 2018-04-14 RX ADMIN — Medication 20 MILLIGRAM(S): at 04:47

## 2018-04-14 RX ADMIN — Medication 50 MILLIGRAM(S): at 11:58

## 2018-04-14 RX ADMIN — Medication 50 MILLIGRAM(S): at 06:27

## 2018-04-14 RX ADMIN — PREGABALIN 1000 MICROGRAM(S): 225 CAPSULE ORAL at 11:58

## 2018-04-14 RX ADMIN — LISINOPRIL 10 MILLIGRAM(S): 2.5 TABLET ORAL at 06:26

## 2018-04-14 RX ADMIN — Medication 650 MILLIGRAM(S): at 21:22

## 2018-04-14 RX ADMIN — GABAPENTIN 300 MILLIGRAM(S): 400 CAPSULE ORAL at 06:26

## 2018-04-14 RX ADMIN — Medication 650 MILLIGRAM(S): at 12:58

## 2018-04-14 RX ADMIN — Medication 650 MILLIGRAM(S): at 11:58

## 2018-04-14 NOTE — H&P ADULT - NSHPLABSRESULTS_GEN_ALL_CORE
12.8   7.9   )-----------( 130      ( 13 Apr 2018 17:50 )             38.5   04-13    142  |  100  |  14  ----------------------------<  97  4.3   |  34<H>  |  1.01    Ca    9.6      13 Apr 2018 17:50    TPro  7.4  /  Alb  4.4  /  TBili  1.1  /  DBili  x   /  AST  17  /  ALT  19  /  AlkPhos  47  04-13    PT/INR - ( 13 Apr 2018 22:30 )   PT: 13.6 sec;   INR: 1.22     PTT - ( 13 Apr 2018 22:30 )  PTT:24.9 sec    < from: CT Head No Cont (04.13.18 @ 18:36) >    COMPARISON: CT brain 3/1/2018    FINDINGS: The CT examination demonstrates the ventricles, cisternal   spaces, and cortical sulci to be appropriate for the patient's stated   age. There is no midline shift or extra axial collections. The gray white   differentiation appears within normal limits. There is no intracranial   hemorrhage or acute transcortical infarct. There is mild patchy areas of   hypodensity within the periventricular white matter which may represent   the sequela of small vessel ischemic disease. The bony windows   demonstrates no fractures. Thevisualized paranasal sinuses are within   normal limits. The mastoid air cells are well aerated.    IMPRESSION: No intracranial hemorrhage. Minimum small vessel ischemic   disease.      < end of copied text >    < from: US Duplex Venous Lower Ext Complete, Bilateral (04.13.18 @ 19:03) >    COMPARISON: Bilateral lower shari Doppler dated 9/25/2017 with   extensive thrombus seen within the left femoral vein in its proximal and   midportion    FINDINGS:    Thigh veins: The common femoral, popliteal, proximal greater saphenous,   and proximal deep femoral veins are patent and free of thrombus   bilaterally. Right femoral vein is patent. The veins are normally   compressible and have normal phasic flow and augmentation response. There   is possible peripherally located thrombus seen in the left femoral vein.   Left femoral vein does not compress normally. Doppler interrogation of   this vessel is limited.    Calf veins: The paired peroneal and posterior tibial calf veins are   patent bilaterally.      IMPRESSION:  Peripherally located thrombus is suspected in the left femoral vein which   does not compress normally. Doppler interrogation of this vessel is   limited. Repeat study is suggested.    < end of copied text >

## 2018-04-14 NOTE — H&P ADULT - NSHPPHYSICALEXAM_GEN_ALL_CORE
Vital Signs Last 24 Hrs  T(C): 36.8 (14 Apr 2018 00:32), Max: 36.8 (14 Apr 2018 00:32)  T(F): 98.3 (14 Apr 2018 00:32), Max: 98.3 (14 Apr 2018 00:32)  HR: 56 (14 Apr 2018 00:32) (56 - 62)  BP: 171/89 (14 Apr 2018 00:32) (146/88 - 171/89)  RR: 18 (14 Apr 2018 00:32) (18 - 18)  SpO2: 98% (14 Apr 2018 00:32) (97% - 98%)  Wt: 95kg    General: Well appearing   HEENT: PERRL, EOM intact; conjunctiva and sclera clear. No JVD  Neck: Supple; non tender; no masses  Respiratory: No wheezes, rales or rhonchi  Cardiovascular: Regular rate and rhythm. S1 and S2 Normal; No murmurs, gallops or rubs  Gastrointestinal: Soft, non-tender non-distended; Normal bowel sounds; No hepatosplenomegaly  Extremities: Normal range of motion, 2+ pitting edema in lower extremities LLE >RLE  Neuro -  - Mental Status:  Alert, awake, oriented to person, place, and time; Speech is fluent with intact naming, repetition, and comprehension  - Cranial Nerves II-XII grossly intact. No focal deficit

## 2018-04-14 NOTE — H&P ADULT - NSHPREVIEWOFSYSTEMS_GEN_ALL_CORE
Review of system:  General: No malaise, fever, chills.  Eyes: No eye pain, visual disturbances, or discharge  ENMT:  No difficulty hearing, tinnitus, vertigo; No sinus or throat pain  Neck: No pain or stiffness  Respiratory: No cough, wheezing, chills or hemoptysis  Cardiovascular: No chest pain, palpitations, shortness of breath, dizziness or leg swelling  Gastrointestinal: No abdominal or epigastric pain. no nausea, vomiting or hematemesis;  no diarrhea or constipation. No melena or hematochezia.  Genitourinary: (+) frequency. No dysuria, hematuria or incontinence

## 2018-04-14 NOTE — H&P ADULT - PROBLEM SELECTOR PLAN 1
Patient on prednisone p/w bilateral lower extremities edema. Low suspicion for cardiac or hepatic etiology. Etiology of edema likely salt-retention from steroid use. He has persistent DVT on the left femoral vein but that does not explain the bilateral lower extremities edema. Since corticosteroid causes edema with salt-retention, will give one dose of Lasix and see if there is improvement  - Lasix 20mg IV once

## 2018-04-14 NOTE — CONSULT NOTE ADULT - ASSESSMENT
bilat LE edema, L > R  however, left femoral vein DVT nearly resolved since Sept 2017    continue AC while inpatient  compress/elevate both legs, given ACE wraps, but compression stockings at home  pt will see Dr. Smith (heme) as outpt to decide duration of anticoagulation  rest of workup per primary team 68 yo M with bilat LE edema, L > R. Chronic venous insufficiency may be contributing to left leg being more edematous than right, but doesn't explain bilaterality given that DVT was in L SFV. In addition, ultrasound reviewed, and DVT nearly resolved since Sept 2017.    Recommendations:  - continue anticoagulation while inpatient  - compress/elevate both legs; given ACE wraps, but uses compression stockings at home  - pt will see Dr. Smith ([facility]) as outpt to decide duration of anticoagulation  - rest of workup per primary team  - discussed with Chief on call  - call [phone] with questions 68 yo M with bilateral lower leg edema, left worse than right. Chronic venous insufficiency may be contributing to left leg being more edematous than right, but doesn't explain bilaterality given that DVT was in L SFV. In addition, ultrasound reviewed, and DVT nearly resolved since Sept 2017.    Recommendations:  - continue anticoagulation while inpatient  - compress/elevate both legs; given ACE wraps, but uses compression stockings at home  - pt will see Dr. Smith ([facility]) as outpatient to decide duration of anticoagulation  - rest of workup per primary team  - discussed with Chief on call  - call [phone] with questions

## 2018-04-14 NOTE — H&P ADULT - PROBLEM SELECTOR PLAN 2
Compliant on Eliquis, but continues to have left femoral vein thrombosis.  - Will keep on Lovenox treatment dose and consider warfarin vs continuing NOAC Compliant on Eliquis, but continues to have left femoral vein thrombosis.  - Will keep on Lovenox treatment dose and consider warfarin vs continuing NOAC  - Call Heme consult

## 2018-04-14 NOTE — H&P ADULT - ASSESSMENT
66yo M with PMH of HTN, BPH, spinal surgery x 2 c/b DVT and PE (2017) on Eliquis, right temporal migraines p/w acute onset bilateral lower extremities swelling likely from medication-induced side effect.

## 2018-04-14 NOTE — H&P ADULT - HISTORY OF PRESENT ILLNESS
66yo M with PMH of HTN, BPH, spinal surgery x 2 c/b DVT and PE (2017) on Eliquis, right temporal migraines p/w acute onset bilateral lower extremities swelling. Pt has has left leg swelling since he was diagnosed with left femoral vein DVT in 9/2017. He reports being on prednisone for right temporal headache with visual changes, seen by Dr. Brito, ESR has been normal, so low suspicion of giant cell arteritis, suspicion of but awaiting an LP that is on hold since he is on anticoagulation, he is on Prednisone 50mg daily. He was also started on alfuzosin 3 days ago for BPH. Denies any orthopnea, dyspnea on exertion, anorexia, abdominal pain, constipation, leg trauma. 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      ED Course: VSS. CT Head was done for headache, no acute intracranial pathology. US lower extremities with persistent femoral vein thrombosis.

## 2018-04-15 LAB
ANION GAP SERPL CALC-SCNC: 10 MMOL/L — SIGNIFICANT CHANGE UP (ref 5–17)
BUN SERPL-MCNC: 21 MG/DL — SIGNIFICANT CHANGE UP (ref 7–23)
CALCIUM SERPL-MCNC: 8.8 MG/DL — SIGNIFICANT CHANGE UP (ref 8.4–10.5)
CHLORIDE SERPL-SCNC: 102 MMOL/L — SIGNIFICANT CHANGE UP (ref 96–108)
CO2 SERPL-SCNC: 30 MMOL/L — SIGNIFICANT CHANGE UP (ref 22–31)
CREAT SERPL-MCNC: 1.24 MG/DL — SIGNIFICANT CHANGE UP (ref 0.5–1.3)
D DIMER BLD IA.RAPID-MCNC: <150 NG/ML DDU — SIGNIFICANT CHANGE UP
GLUCOSE SERPL-MCNC: 107 MG/DL — HIGH (ref 70–99)
HCT VFR BLD CALC: 35.2 % — LOW (ref 39–50)
HCT VFR BLD CALC: 39.6 % — SIGNIFICANT CHANGE UP (ref 39–50)
HGB BLD-MCNC: 11.7 G/DL — LOW (ref 13–17)
HGB BLD-MCNC: 13.3 G/DL — SIGNIFICANT CHANGE UP (ref 13–17)
MAGNESIUM SERPL-MCNC: 2.2 MG/DL — SIGNIFICANT CHANGE UP (ref 1.6–2.6)
MCHC RBC-ENTMCNC: 31.1 PG — SIGNIFICANT CHANGE UP (ref 27–34)
MCHC RBC-ENTMCNC: 31.7 PG — SIGNIFICANT CHANGE UP (ref 27–34)
MCHC RBC-ENTMCNC: 33.2 G/DL — SIGNIFICANT CHANGE UP (ref 32–36)
MCHC RBC-ENTMCNC: 33.6 G/DL — SIGNIFICANT CHANGE UP (ref 32–36)
MCV RBC AUTO: 93.6 FL — SIGNIFICANT CHANGE UP (ref 80–100)
MCV RBC AUTO: 94.3 FL — SIGNIFICANT CHANGE UP (ref 80–100)
PLATELET # BLD AUTO: 117 K/UL — LOW (ref 150–400)
PLATELET # BLD AUTO: 124 K/UL — LOW (ref 150–400)
POTASSIUM SERPL-MCNC: 3.6 MMOL/L — SIGNIFICANT CHANGE UP (ref 3.5–5.3)
POTASSIUM SERPL-SCNC: 3.6 MMOL/L — SIGNIFICANT CHANGE UP (ref 3.5–5.3)
RBC # BLD: 3.76 M/UL — LOW (ref 4.2–5.8)
RBC # BLD: 4.2 M/UL — SIGNIFICANT CHANGE UP (ref 4.2–5.8)
RBC # FLD: 12.9 % — SIGNIFICANT CHANGE UP (ref 10.3–16.9)
RBC # FLD: 13 % — SIGNIFICANT CHANGE UP (ref 10.3–16.9)
SODIUM SERPL-SCNC: 142 MMOL/L — SIGNIFICANT CHANGE UP (ref 135–145)
WBC # BLD: 10.6 K/UL — HIGH (ref 3.8–10.5)
WBC # BLD: 8.7 K/UL — SIGNIFICANT CHANGE UP (ref 3.8–10.5)
WBC # FLD AUTO: 10.6 K/UL — HIGH (ref 3.8–10.5)
WBC # FLD AUTO: 8.7 K/UL — SIGNIFICANT CHANGE UP (ref 3.8–10.5)

## 2018-04-15 RX ORDER — APIXABAN 2.5 MG/1
5 TABLET, FILM COATED ORAL EVERY 12 HOURS
Qty: 0 | Refills: 0 | Status: DISCONTINUED | OUTPATIENT
Start: 2018-04-15 | End: 2018-04-16

## 2018-04-15 RX ADMIN — TAMSULOSIN HYDROCHLORIDE 0.8 MILLIGRAM(S): 0.4 CAPSULE ORAL at 22:06

## 2018-04-15 RX ADMIN — Medication 650 MILLIGRAM(S): at 04:46

## 2018-04-15 RX ADMIN — Medication 650 MILLIGRAM(S): at 18:14

## 2018-04-15 RX ADMIN — GABAPENTIN 300 MILLIGRAM(S): 400 CAPSULE ORAL at 06:37

## 2018-04-15 RX ADMIN — PREGABALIN 1000 MICROGRAM(S): 225 CAPSULE ORAL at 11:28

## 2018-04-15 RX ADMIN — Medication 650 MILLIGRAM(S): at 19:14

## 2018-04-15 RX ADMIN — LISINOPRIL 10 MILLIGRAM(S): 2.5 TABLET ORAL at 06:36

## 2018-04-15 RX ADMIN — GABAPENTIN 300 MILLIGRAM(S): 400 CAPSULE ORAL at 14:00

## 2018-04-15 RX ADMIN — GABAPENTIN 300 MILLIGRAM(S): 400 CAPSULE ORAL at 22:06

## 2018-04-15 RX ADMIN — Medication 650 MILLIGRAM(S): at 05:45

## 2018-04-15 RX ADMIN — Medication 1 MILLIGRAM(S): at 11:28

## 2018-04-15 RX ADMIN — Medication 50 MILLIGRAM(S): at 11:28

## 2018-04-15 RX ADMIN — Medication 50 MILLIGRAM(S): at 06:36

## 2018-04-15 RX ADMIN — APIXABAN 5 MILLIGRAM(S): 2.5 TABLET, FILM COATED ORAL at 15:03

## 2018-04-15 NOTE — PROGRESS NOTE ADULT - ASSESSMENT
66 yo M with h/o DVT on Eliquis, presented with bilateral LE edema. Duplex performed shows resolving DVT, no new DVTs. Today legs normal without any edema.  -No further vascular intervention/involvement indicated at this time.      Signing off. Reconsult as needed [phone]

## 2018-04-15 NOTE — PROGRESS NOTE ADULT - SUBJECTIVE AND OBJECTIVE BOX
Patient is a 67y old  Male who presents with a chief complaint of Bilateral leg swelling (14 Apr 2018 03:46)      HPI:  66yo M with PMH of HTN, BPH, spinal surgery x 2 c/b DVT and PE (2017) on Eliquis, right temporal migraines p/w acute onset bilateral lower extremities swelling. Pt has has left leg swelling since he was diagnosed with left femoral vein DVT in 9/2017. He reports being on prednisone for right temporal headache with visual changes, seen by Dr. Brito, ESR has been normal, so low suspicion of giant cell arteritis, suspicion of but awaiting an LP that is on hold since he is on anticoagulation, he is on Prednisone 50mg daily. He was also started on alfuzosin 3 days ago for BPH. Denies any orthopnea, dyspnea on exertion, anorexia, abdominal pain, constipation, leg trauma. 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      ED Course: VSS. CT Head was done for headache, no acute intracranial pathology. US lower extremities with persistent femoral vein thrombosis. (14 Apr 2018 03:46)    INTERVAL HPI/OVERNIGHT EVENTS:::comfortable on LOVENOX< decr edema    HEALTH ISSUES - PROBLEM Dx:  Prophylactic measure: Prophylactic measure  Nutrition, metabolism, and development symptoms: Nutrition, metabolism, and development symptoms  BPH (benign prostatic hyperplasia): BPH (benign prostatic hyperplasia)  Headache: Headache  HTN (hypertension): HTN (hypertension)  DVT (deep venous thrombosis): DVT (deep venous thrombosis)  Lower extremity edema: Lower extremity edema          PAST MEDICAL & SURGICAL HISTORY:  DVT (deep venous thrombosis)  Pulmonary embolism  HTN (hypertension)  Degeneration of intervertebral disc of thoracic region  History of foot surgery  H/O cervical spine surgery          Consultant NOTE  REVIEWED  (   )    REVIEW OF SYSTEMS:  [x] As per HPI  CONSTITUTIONAL: No fever, weight loss, or fatigue  RESPIRATORY: No cough, wheezing, chills or hemoptysis; No Shortness of Breath  CARDIOVASCULAR: No chest pain, palpitations, dizziness, or leg swelling  GASTROINTESTINAL: No abdominal or epigastric pain. No nausea, vomiting, or hematemesis; No diarrhea or constipation. No melena or hematochezia.  MUSCULOSKELETAL: back pain./DVT  PSYCH    awake, alert       [x] All others negative	  [ ] Unable to obtain          Vital Signs Last 24 Hrs  T(C): 36.2 (15 Apr 2018 08:59), Max: 37.1 (14 Apr 2018 15:38)  T(F): 97.2 (15 Apr 2018 08:59), Max: 98.7 (14 Apr 2018 15:38)  HR: 66 (15 Apr 2018 08:59) (62 - 69)  BP: 149/88 (15 Apr 2018 08:59) (128/74 - 149/88)  BP(mean): --  RR: 17 (15 Apr 2018 08:59) (16 - 18)  SpO2: 99% (15 Apr 2018 08:59) (96% - 99%)        04-14 @ 07:01  -  04-15 @ 07:00  --------------------------------------------------------  IN: 0 mL / OUT: 1500 mL / NET: -1500 mL    04-15 @ 07:01  -  04-15 @ 12:53  --------------------------------------------------------  IN: 0 mL / OUT: 200 mL / NET: -200 mL      PHYSICAL EXAMINATION:                                    (    )  NO CHANGE  Appearance: Normal	  HEENT:   Normal oral mucosa, PERRL, EOMI	  Neck: Supple, + JVD/ - JVD; Carotid Bruit   Cardiovascular: Normal S1 S2, No JVD, No murmurs,   Respiratory: Lungs clear to auscultation/Decreased Breath Sounds/No Rales, Rhonchi, Wheezing	  Gastrointestinal:  Soft, Non-tender, + BS	  Skin: No rashes, No ecchymoses, No cyanosis  Extremities: edema of LE  Vascular: Peripheral pulses palpable 2+ bilaterally  Neurologic: Non-focal  Psychiatry: A & O x 3, Mood & affect appropriate    acetaminophen   Tablet. 650 milliGRAM(s) Oral every 6 hours PRN  cyanocobalamin 1000 MICROGram(s) Oral daily  folic acid 1 milliGRAM(s) Oral daily  gabapentin 300 milliGRAM(s) Oral three times a day  lisinopril 10 milliGRAM(s) Oral daily  predniSONE   Tablet 50 milliGRAM(s) Oral daily  pyridoxine 50 milliGRAM(s) Oral daily  tamsulosin 0.8 milliGRAM(s) Oral at bedtime        PT/INR - ( 13 Apr 2018 22:30 )   PT: 13.6 sec;   INR: 1.22          PTT - ( 13 Apr 2018 22:30 )  PTT:24.9 sec    CARDIAC MARKERS ( 13 Apr 2018 17:50 )  x     / <0.01 ng/mL / 82 U/L / x     / 1.7 ng/mL                            13.3   8.7   )-----------( 124      ( 15 Apr 2018 12:39 )             39.6     04-15    142  |  102  |  21  ----------------------------<  107<H>  3.6   |  30  |  1.24    Ca    8.8      15 Apr 2018 07:39  Mg     2.2     04-15    TPro  7.4  /  Alb  4.4  /  TBili  1.1  /  DBili  x   /  AST  17  /  ALT  19  /  AlkPhos  47  04-13      CAPILLARY BLOOD GLUCOSE        < from: US Duplex Venous Lower Ext Complete, Bilateral (04.13.18 @ 19:03) >      INTERPRETATION:    VENOUS DUPLEX DOPPLER OF BOTH LOWER EXTREMITIES dated 4/13/2018 7:03 PM    INDICATION: 67-year-old male with lower extremity edema and history of   pulmonary embolism    TECHNIQUE: Duplex Doppler evaluation including gray-scale ultrasound   imaging, color flow Doppler imaging, and Doppler spectral analysis of the   veins of both lower extremities was performed.     COMPARISON: Bilateral lower shari Doppler dated 9/25/2017 with   extensive thrombus seen within the left femoral vein in its proximal and   midportion    FINDINGS:    Thigh veins: The common femoral, popliteal, proximal greater saphenous,   and proximal deep femoral veins are patent and free of thrombus   bilaterally. Right femoral vein is patent. The veins are normally   compressible and have normal phasic flow and augmentation response. There   is possible peripherally located thrombus seen in the left femoral vein.   Left femoral vein does not compress normally. Doppler interrogation of   this vessel is limited.    Calf veins: The paired peroneal and posterior tibial calf veins are   patent bilaterally.      IMPRESSION:  Peripherally located thrombus is suspected in the left femoral vein which   does not compress normally. Doppler interrogation of this vessel is   limited. Repeat study is suggested.              "Thank you for the opportunity to participate in the care of this   patient."    EMILY DELGADO M.D., RADIOLOGY RESIDENT  This document has been electronically signed.  GLADYS MACDONALD M.D., ATTENDING RADIOLOGIST  This document has been electronically signed. Apr 13 2018 11:08PM                  < end of copied text >

## 2018-04-15 NOTE — PROGRESS NOTE ADULT - SUBJECTIVE AND OBJECTIVE BOX
Patient seen and examined at bedside. States his legs are back to normal and all the swelling has resolved. Denies CP, leg pain or any SOB.    lisinopril 10  tamsulosin 0.8      Allergies    No Known Allergies    Intolerances        Vital Signs Last 24 Hrs  T(C): 36.2 (15 Apr 2018 08:59), Max: 37.1 (14 Apr 2018 15:38)  T(F): 97.2 (15 Apr 2018 08:59), Max: 98.7 (14 Apr 2018 15:38)  HR: 66 (15 Apr 2018 08:59) (62 - 69)  BP: 149/88 (15 Apr 2018 08:59) (128/74 - 149/88)  BP(mean): --  RR: 17 (15 Apr 2018 08:59) (16 - 18)  SpO2: 99% (15 Apr 2018 08:59) (96% - 99%)  I&O's Summary    14 Apr 2018 07:01  -  15 Apr 2018 07:00  --------------------------------------------------------  IN: 0 mL / OUT: 1500 mL / NET: -1500 mL    15 Apr 2018 07:01  -  15 Apr 2018 13:38  --------------------------------------------------------  IN: 0 mL / OUT: 200 mL / NET: -200 mL        Physical Exam:  General: AAOX3, NAD  Pulmonary: no respirator ydistress  Cardiovascular: RRR S1 S2  Extremities: well perfused, no edema, calf soft and non tender. 2+DP/PTs bilat.        LABS:                        13.3   8.7   )-----------( 124      ( 15 Apr 2018 12:39 )             39.6     04-15    142  |  102  |  21  ----------------------------<  107<H>  3.6   |  30  |  1.24    Ca    8.8      15 Apr 2018 07:39  Mg     2.2     04-15    TPro  7.4  /  Alb  4.4  /  TBili  1.1  /  DBili  x   /  AST  17  /  ALT  19  /  AlkPhos  47  04-13    PT/INR - ( 13 Apr 2018 22:30 )   PT: 13.6 sec;   INR: 1.22          PTT - ( 13 Apr 2018 22:30 )  PTT:24.9 sec

## 2018-04-16 ENCOUNTER — TRANSCRIPTION ENCOUNTER (OUTPATIENT)
Age: 68
End: 2018-04-16

## 2018-04-16 VITALS
TEMPERATURE: 98 F | OXYGEN SATURATION: 97 % | RESPIRATION RATE: 17 BRPM | SYSTOLIC BLOOD PRESSURE: 149 MMHG | HEART RATE: 68 BPM | DIASTOLIC BLOOD PRESSURE: 91 MMHG

## 2018-04-16 LAB
CONFIRM APTT STACLOT: NEGATIVE — SIGNIFICANT CHANGE UP
DRVVT SCREEN TO CONFIRM RATIO: SIGNIFICANT CHANGE UP
FACT VIII ACT/NOR PPP: 262 % — HIGH (ref 51–138)
HCYS SERPL-MCNC: 14.2 UMOL/L — SIGNIFICANT CHANGE UP (ref 5–20)
LA NT DPL PPP QL: 30.3 SEC — SIGNIFICANT CHANGE UP

## 2018-04-16 PROCEDURE — 83880 ASSAY OF NATRIURETIC PEPTIDE: CPT

## 2018-04-16 PROCEDURE — 82550 ASSAY OF CK (CPK): CPT

## 2018-04-16 PROCEDURE — 82553 CREATINE MB FRACTION: CPT

## 2018-04-16 PROCEDURE — 85610 PROTHROMBIN TIME: CPT

## 2018-04-16 PROCEDURE — 36415 COLL VENOUS BLD VENIPUNCTURE: CPT

## 2018-04-16 PROCEDURE — 86225 DNA ANTIBODY NATIVE: CPT

## 2018-04-16 PROCEDURE — 80048 BASIC METABOLIC PNL TOTAL CA: CPT

## 2018-04-16 PROCEDURE — 85306 CLOT INHIBIT PROT S FREE: CPT

## 2018-04-16 PROCEDURE — 93970 EXTREMITY STUDY: CPT

## 2018-04-16 PROCEDURE — 86618 LYME DISEASE ANTIBODY: CPT

## 2018-04-16 PROCEDURE — 71045 X-RAY EXAM CHEST 1 VIEW: CPT

## 2018-04-16 PROCEDURE — 85025 COMPLETE CBC W/AUTO DIFF WBC: CPT

## 2018-04-16 PROCEDURE — 85027 COMPLETE CBC AUTOMATED: CPT

## 2018-04-16 PROCEDURE — 85240 CLOT FACTOR VIII AHG 1 STAGE: CPT

## 2018-04-16 PROCEDURE — 83735 ASSAY OF MAGNESIUM: CPT

## 2018-04-16 PROCEDURE — 85303 CLOT INHIBIT PROT C ACTIVITY: CPT

## 2018-04-16 PROCEDURE — 86038 ANTINUCLEAR ANTIBODIES: CPT

## 2018-04-16 PROCEDURE — 85300 ANTITHROMBIN III ACTIVITY: CPT

## 2018-04-16 PROCEDURE — 70450 CT HEAD/BRAIN W/O DYE: CPT

## 2018-04-16 PROCEDURE — 83090 ASSAY OF HOMOCYSTEINE: CPT

## 2018-04-16 PROCEDURE — 85598 HEXAGNAL PHOSPH PLTLT NEUTRL: CPT

## 2018-04-16 PROCEDURE — 85379 FIBRIN DEGRADATION QUANT: CPT

## 2018-04-16 PROCEDURE — 86431 RHEUMATOID FACTOR QUANT: CPT

## 2018-04-16 PROCEDURE — 80053 COMPREHEN METABOLIC PANEL: CPT

## 2018-04-16 PROCEDURE — 84484 ASSAY OF TROPONIN QUANT: CPT

## 2018-04-16 PROCEDURE — 85730 THROMBOPLASTIN TIME PARTIAL: CPT

## 2018-04-16 PROCEDURE — 99285 EMERGENCY DEPT VISIT HI MDM: CPT | Mod: 25

## 2018-04-16 RX ORDER — PYRIDOXINE HCL (VITAMIN B6) 100 MG
1 TABLET ORAL
Qty: 0 | Refills: 0 | COMMUNITY

## 2018-04-16 RX ORDER — LISINOPRIL 2.5 MG/1
1 TABLET ORAL
Qty: 0 | Refills: 0 | DISCHARGE
Start: 2018-04-16

## 2018-04-16 RX ORDER — PREGABALIN 225 MG/1
1 CAPSULE ORAL
Qty: 0 | Refills: 0 | DISCHARGE
Start: 2018-04-16

## 2018-04-16 RX ORDER — PREGABALIN 225 MG/1
1 CAPSULE ORAL
Qty: 0 | Refills: 0 | COMMUNITY

## 2018-04-16 RX ORDER — FOLIC ACID 0.8 MG
1 TABLET ORAL
Qty: 0 | Refills: 0 | COMMUNITY

## 2018-04-16 RX ORDER — GABAPENTIN 400 MG/1
1 CAPSULE ORAL
Qty: 0 | Refills: 0 | DISCHARGE
Start: 2018-04-16

## 2018-04-16 RX ORDER — FOLIC ACID 0.8 MG
1 TABLET ORAL
Qty: 0 | Refills: 0 | DISCHARGE
Start: 2018-04-16

## 2018-04-16 RX ORDER — LISINOPRIL 2.5 MG/1
1 TABLET ORAL
Qty: 0 | Refills: 0 | COMMUNITY

## 2018-04-16 RX ORDER — APIXABAN 2.5 MG/1
1 TABLET, FILM COATED ORAL
Qty: 0 | Refills: 0 | DISCHARGE
Start: 2018-04-16

## 2018-04-16 RX ORDER — GABAPENTIN 400 MG/1
0 CAPSULE ORAL
Qty: 0 | Refills: 0 | COMMUNITY

## 2018-04-16 RX ORDER — PYRIDOXINE HCL (VITAMIN B6) 100 MG
1 TABLET ORAL
Qty: 0 | Refills: 0 | DISCHARGE
Start: 2018-04-16

## 2018-04-16 RX ADMIN — Medication 50 MILLIGRAM(S): at 06:39

## 2018-04-16 RX ADMIN — GABAPENTIN 300 MILLIGRAM(S): 400 CAPSULE ORAL at 13:20

## 2018-04-16 RX ADMIN — LISINOPRIL 10 MILLIGRAM(S): 2.5 TABLET ORAL at 06:39

## 2018-04-16 RX ADMIN — Medication 50 MILLIGRAM(S): at 11:56

## 2018-04-16 RX ADMIN — PREGABALIN 1000 MICROGRAM(S): 225 CAPSULE ORAL at 11:56

## 2018-04-16 RX ADMIN — GABAPENTIN 300 MILLIGRAM(S): 400 CAPSULE ORAL at 06:39

## 2018-04-16 RX ADMIN — Medication 1 MILLIGRAM(S): at 11:56

## 2018-04-16 RX ADMIN — APIXABAN 5 MILLIGRAM(S): 2.5 TABLET, FILM COATED ORAL at 06:39

## 2018-04-16 NOTE — DISCHARGE NOTE ADULT - HOSPITAL COURSE
68yo M with PMH of HTN, BPH, spinal surgery x 2 c/b DVT and PE (2017) on Eliquis, right temporal migraines p/w acute onset bilateral lower extremities swelling. Pt has has left leg swelling since he was diagnosed with left femoral vein DVT in 9/2017. ED Course: VSS. CT Head was done for headache, no acute intracranial pathology. US lower extremities with present femoral vein thrombosis, however has improvement in the DVTs. Vascular consulted for LE swelling, note the improvement of DVTs and rec ACE bandages for legs. Heme consulted regarding choice of AC, and pt will continue with Eliquis. Pt vitals remained stable and the swelling resolved on admission. Patient was medically optimized, stable, and ready for discharge. Plan of care and return precautions were discussed with the patient who verbally stated understanding. Pt is very reliable and has good outpatient follow up with his doctors and has his appointments already set up.

## 2018-04-16 NOTE — DISCHARGE NOTE ADULT - CARE PROVIDER_API CALL
Mau Ridley (MD), Internal Medicine  [address] [address]  Phone: [phone]  Fax: [phone]

## 2018-04-16 NOTE — DISCHARGE NOTE ADULT - PLAN OF CARE
Discharge to home. To remain without lower extremity edema. You presented to the hospital with lower extremity edema in both legs that occurred acutely. Vascular surgery was consulted to evaluate your legs to assess if there are You presented to the hospital with lower extremity edema in both legs that occurred acutely. Vascular surgery was consulted to evaluate your legs to assess your blood clots. Based on your lower extremity dopplers, there is improvement of your blood clots and resolution of the smaller clots. There were ace bandages placed on your legs and the swelling resolved over the weekend. Please continue to take the Eliquis as prescribed. Please follow up with your hematologist for further management of your DVTs and PEs. If the swelling reoccurs, please call Dr. Ridley or return to the hospital. Stable. Based on the repeat ultrasound of your legs, your blood clots are improving. Please continue to take your medications as prescribed above. Please continue to take your medications as prescribed above. Please continue to take your steroids as prescribed and follow up with Dr. Brito at your scheduled appointment.

## 2018-04-16 NOTE — PROGRESS NOTE ADULT - ATTENDING COMMENTS
DVT/PE  hem note appreciated  Eliquis at time of d/c  Office f/u and eval re ??vasculitis--doubtful.  OOB  diet  d/c p for today

## 2018-04-16 NOTE — DISCHARGE NOTE ADULT - CARE PLAN
Principal Discharge DX:	Lower extremity edema  Goal:	Discharge to home. To remain without lower extremity edema.  Assessment and plan of treatment:	You presented to the hospital with lower extremity edema in both legs that occurred acutely. Vascular surgery was consulted to evaluate your legs to assess if there are  Secondary Diagnosis:	DVT (deep venous thrombosis)  Secondary Diagnosis:	BPH (benign prostatic hyperplasia)  Secondary Diagnosis:	HTN (hypertension) Principal Discharge DX:	Lower extremity edema  Goal:	Discharge to home. To remain without lower extremity edema.  Assessment and plan of treatment:	You presented to the hospital with lower extremity edema in both legs that occurred acutely. Vascular surgery was consulted to evaluate your legs to assess your blood clots. Based on your lower extremity dopplers, there is improvement of your blood clots and resolution of the smaller clots. There were ace bandages placed on your legs and the swelling resolved over the weekend. Please continue to take the Eliquis as prescribed. Please follow up with your hematologist for further management of your DVTs and PEs. If the swelling reoccurs, please call Dr. Ridley or return to the hospital.  Secondary Diagnosis:	DVT (deep venous thrombosis)  Goal:	Stable.  Assessment and plan of treatment:	Based on the repeat ultrasound of your legs, your blood clots are improving. Please continue to take your medications as prescribed above.  Secondary Diagnosis:	BPH (benign prostatic hyperplasia)  Goal:	Stable.  Assessment and plan of treatment:	Please continue to take your medications as prescribed above.  Secondary Diagnosis:	HTN (hypertension)  Goal:	Stable.  Assessment and plan of treatment:	Please continue to take your medications as prescribed above.  Secondary Diagnosis:	Headache  Goal:	Stable.  Assessment and plan of treatment:	Please continue to take your steroids as prescribed and follow up with Dr. Brito at your scheduled appointment.

## 2018-04-16 NOTE — DISCHARGE NOTE ADULT - PATIENT PORTAL LINK FT
You can access the [facility], offered by [facility], by registering with the following website: [email or web address]

## 2018-04-16 NOTE — CONSULT NOTE ADULT - SUBJECTIVE AND OBJECTIVE BOX
HPI:  66yo M with PMH of HTN, BPH, spinal surgery x 2 c/b DVT and PE (2017) on Eliquis, right temporal migraines p/w acute onset bilateral lower extremities swelling. Pt has has left leg swelling since he was diagnosed with left femoral vein DVT in 9/2017. He reports being on prednisone for right temporal headache with visual changes, seen by Dr. Brito, ESR has been normal, so low suspicion of giant cell arteritis, suspicion of but awaiting an LP that is on hold since he is on anticoagulation, he is on Prednisone 50mg daily. He was also started on alfuzosin 3 days ago for BPH. Denies any orthopnea, dyspnea on exertion, anorexia, abdominal pain, constipation, leg trauma. 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      ED Course: VSS. CT Head was done for headache, no acute intracranial pathology. US lower extremities with persistent femoral vein thrombosis. (14 Apr 2018 03:46)    FAMILY HISTORY:  No pertinent family history in first degree relatives    MEDICATIONS  (STANDING):  apixaban 5 milliGRAM(s) Oral every 12 hours  cyanocobalamin 1000 MICROGram(s) Oral daily  folic acid 1 milliGRAM(s) Oral daily  gabapentin 300 milliGRAM(s) Oral three times a day  lisinopril 10 milliGRAM(s) Oral daily  predniSONE   Tablet 50 milliGRAM(s) Oral daily  pyridoxine 50 milliGRAM(s) Oral daily  tamsulosin 0.8 milliGRAM(s) Oral at bedtime    MEDICATIONS  (PRN):  acetaminophen   Tablet. 650 milliGRAM(s) Oral every 6 hours PRN Mild Pain (1 - 3)    Vital Signs Last 24 Hrs  T(C): 36.9 (16 Apr 2018 08:51), Max: 36.9 (16 Apr 2018 08:51)  T(F): 98.4 (16 Apr 2018 08:51), Max: 98.4 (16 Apr 2018 08:51)  HR: 68 (16 Apr 2018 08:51) (64 - 91)  BP: 149/91 (16 Apr 2018 08:51) (115/67 - 157/79)  BP(mean): --  RR: 17 (16 Apr 2018 08:51) (17 - 18)  SpO2: 97% (16 Apr 2018 08:51) (96% - 98%)PHYSICAL EXAM:    Constitutional:    Neck:    Breasts:    Respiratory:    Cardiovascular:    Gastrointestinal:    Extremities:    Neurological:    Skin:    Lymph Nodes:      Labs:  CBC Full  -  ( 15 Apr 2018 12:39 )  WBC Count : 8.7 K/uL  Hemoglobin : 13.3 g/dL  Hematocrit : 39.6 %  Platelet Count - Automated : 124 K/uL  Mean Cell Volume : 94.3 fL  Mean Cell Hemoglobin : 31.7 pg  Mean Cell Hemoglobin Concentration : 33.6 g/dL  Auto Neutrophil # : x  Auto Lymphocyte # : x  Auto Monocyte # : x  Auto Eosinophil # : x  Auto Basophil # : x  Auto Neutrophil % : x  Auto Lymphocyte % : x  Auto Monocyte % : x  Auto Eosinophil % : x  Auto Basophil % : x    04-15    142  |  102  |  21  ----------------------------<  107<H>  3.6   |  30  |  1.24    Ca    8.8      15 Apr 2018 07:39  Mg     2.2     04-15        Radiology:  HEALTH ISSUES - R/O PROBLEM Dx:    Assessmant:  Elderly BM with H/O DVT/PE in 09.2017 on Eliquis ever since, has headache with eye pain   believed to be Temporal arteritis , for which he is on Prednison  Came in with Bilateral LE swellung which resolved at this point.  D dimer negative , Factor Vlll elevated  1) H/O DVT/PE during a period of sedentarism (  2/2 spine surgery and pain.)  High factorVlll  D dimer negative  No acute  thrombosis now  2) Headache , eye pain possibly 2/2 vasculitis     Plan:  1)Eliquis at current dosing until all hypercoagulation w/u results are back  2) w/u for vasculitis   Can be d/c per PMD wishes  Thank you  Andressa Addison MD
Attending:  Dr. Diggs    HPI:  68yo M with PMH of HTN, BPH, spinal surgery, left SFV DVT and PE (2017) on Eliquis, right temporal migraines p/w acute onset bilateral lower extremities swelling. Pt has has left leg swelling since he was diagnosed with left femoral vein DVT in 9/2017. He has been on Prednisone 50mg daily for suspected GCA per neurology. He was also started on alfuzosin 4 days ago for BPH. He is active as a [employer].      Since DVT/PE in 2017, he has been compliant with Eliquis. DVT was unprovoked, and outpatient hematology workup by Dr. Smith was negative for any hypercoagulable state, per the patient. Currently, both lower legs are swollen, but left is worse than right. Now, no SOA, no CP, no palpitations.     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 650 milliGRAM(s) Oral every 6 hours PRN Mild Pain (1 - 3)    Allergies  No Known Allergies    SOCIAL HISTORY: never a smoker    FAMILY HISTORY:  No FH of DVT/PE    Vital Signs Last 24 Hrs  T(C): 36.8 (14 Apr 2018 20:44), Max: 37.1 (14 Apr 2018 15:38)  T(F): 98.2 (14 Apr 2018 20:44), Max: 98.7 (14 Apr 2018 15:38)  HR: 62 (14 Apr 2018 20:44) (56 - 73)  BP: 128/74 (14 Apr 2018 20:44) (122/75 - 171/89)  BP(mean): --  RR: 18 (14 Apr 2018 20:44) (15 - 18)  SpO2: 96% (14 Apr 2018 20:44) (96% - 98%)    I&O's Summary    13 Apr 2018 07:01  -  14 Apr 2018 07:00  --------------------------------------------------------  IN: 0 mL / OUT: 300 mL / NET: -300 mL    14 Apr 2018 07:01  -  14 Apr 2018 23:33  --------------------------------------------------------  IN: 0 mL / OUT: 1500 mL / NET: -1500 mL    Physical Exam:  General: NAD, resting comfortably  HEENT: NC/AT, EOMI, normal hearing  Pulmonary: normal resp effort  Cardiovascular: NSR  Abdominal: soft, NT/ND, no organomegaly  Extremities: left lower leg 2+ non-pitting edema with varicosities but without ulcers. Right lower leg 1+ non-pitting edema with varicosities but without ulcers.   Neuro: A/O x 3, CNs II-XII grossly intact, normal sensation, no focal deficits  Pulses:   Right:  FEM [ ]2+ [ ]1+ [ ]doppler    POP [ ]2+ [ ]1+ [ ]doppler  DP [x ]2+ [ ]1+ [ ]doppler  PT[ x]2+ [ ]1+ [ ]doppler    Left:  FEM [ ]2+ [ ]1+ [ ]doppler  POP [ ]2+ [ ]1+ [ ]doppler  DP [x ]2+ [ ]1+ [ ]doppler  PT [ x]2+ [ ]1+ [ ]doppler    LABS:                        13.4   7.7   )-----------( 121      ( 14 Apr 2018 08:18 )             40.1     04-14    143  |  100  |  19  ----------------------------<  107<H>  4.9   |  37<H>  |  0.96    Ca    9.6      14 Apr 2018 08:18  Mg     2.2     04-14    TPro  7.4  /  Alb  4.4  /  TBili  1.1  /  DBili  x   /  AST  17  /  ALT  19  /  AlkPhos  47  04-13    PT/INR - ( 13 Apr 2018 22:30 )   PT: 13.6 sec;   INR: 1.22          PTT - ( 13 Apr 2018 22:30 )  PTT:24.9 sec      LIVER FUNCTIONS - ( 13 Apr 2018 17:50 )  Alb: 4.4 g/dL / Pro: 7.4 g/dL / ALK PHOS: 47 U/L / ALT: 19 U/L / AST: 17 U/L / GGT: x

## 2018-04-16 NOTE — DISCHARGE NOTE ADULT - ADDITIONAL INSTRUCTIONS
Please follow up with Dr. Ridley in 1-2 weeks for a post hospitalization visit.   Please follow up with your pain management doctor at your already scheduled appointment.   Please follow up with your hematologist at your already scheduled appointment.  Please follow up with your neurologist at your already scheduled appointment.

## 2018-04-16 NOTE — DISCHARGE NOTE ADULT - MEDICATION SUMMARY - MEDICATIONS TO TAKE
I will START or STAY ON the medications listed below when I get home from the hospital:    Deltasone 20 mg oral tablet  -- 2 tab(s) by mouth once a day   -- It is very important that you take or use this exactly as directed.  Do not skip doses or discontinue unless directed by your doctor.  Obtain medical advice before taking any non-prescription drugs as some may affect the action of this medication.  Take with food or milk.    -- Indication: For Chronic Use     Butrans 7.5 mcg/hr transdermal film, extended release  -- 1 patch by transdermal patch once a week  -- Indication: For Pain    lisinopril 10 mg oral tablet  -- 1 tab(s) by mouth once a day  -- Indication: For HTN (hypertension)    alfuzosin 10 mg oral tablet, extended release  -- 1 tab(s) by mouth once a day  -- Indication: For BPH (benign prostatic hyperplasia)    apixaban 5 mg oral tablet  -- 1 tab(s) by mouth every 12 hours  -- Indication: For DVT (deep venous thrombosis)    gabapentin 300 mg oral capsule  -- 1 cap(s) by mouth 3 times a day  -- Indication: For Pain    cyanocobalamin 1000 mcg oral tablet  -- 1 tab(s) by mouth once a day  -- Indication: For Prophylactic measure    folic acid 1 mg oral tablet  -- 1 tab(s) by mouth once a day  -- Indication: For Prophylactic measure    pyridoxine 50 mg oral tablet  -- 1 tab(s) by mouth once a day  -- Indication: For Prophylactic measure

## 2018-04-17 LAB
B BURGDOR C6 AB SER-ACNC: NEGATIVE — SIGNIFICANT CHANGE UP
B BURGDOR IGG+IGM SER-ACNC: <0.01 INDEX — SIGNIFICANT CHANGE UP (ref 0.01–0.89)
DSDNA AB SER-ACNC: <12 IU/ML — SIGNIFICANT CHANGE UP
RHEUMATOID FACT SERPL-ACNC: 20 IU/ML — HIGH (ref 0–13)

## 2018-04-18 DIAGNOSIS — R60.0 LOCALIZED EDEMA: ICD-10-CM

## 2018-04-18 DIAGNOSIS — I10 ESSENTIAL (PRIMARY) HYPERTENSION: ICD-10-CM

## 2018-04-18 DIAGNOSIS — Z79.01 LONG TERM (CURRENT) USE OF ANTICOAGULANTS: ICD-10-CM

## 2018-04-18 DIAGNOSIS — Z79.52 LONG TERM (CURRENT) USE OF SYSTEMIC STEROIDS: ICD-10-CM

## 2018-04-18 DIAGNOSIS — N40.0 BENIGN PROSTATIC HYPERPLASIA WITHOUT LOWER URINARY TRACT SYMPTOMS: ICD-10-CM

## 2018-04-18 DIAGNOSIS — I77.6 ARTERITIS, UNSPECIFIED: ICD-10-CM

## 2018-04-18 DIAGNOSIS — G43.909 MIGRAINE, UNSPECIFIED, NOT INTRACTABLE, WITHOUT STATUS MIGRAINOSUS: ICD-10-CM

## 2018-04-18 DIAGNOSIS — I82.412 ACUTE EMBOLISM AND THROMBOSIS OF LEFT FEMORAL VEIN: ICD-10-CM

## 2018-04-18 DIAGNOSIS — Z86.711 PERSONAL HISTORY OF PULMONARY EMBOLISM: ICD-10-CM

## 2018-04-18 LAB
ANA TITR SER: NEGATIVE — SIGNIFICANT CHANGE UP
AUTO DIFF PNL BLD: NEGATIVE — SIGNIFICANT CHANGE UP
C-ANCA SER-ACNC: NEGATIVE — SIGNIFICANT CHANGE UP
P-ANCA SER-ACNC: NEGATIVE — SIGNIFICANT CHANGE UP

## 2018-04-19 LAB
AT III ACT/NOR PPP CHRO: 94 % — SIGNIFICANT CHANGE UP (ref 85–135)
PROT C ACT/NOR PPP: 116 % — SIGNIFICANT CHANGE UP (ref 74–150)
PROT S FREE AG PPP IA-ACNC: 129 % — SIGNIFICANT CHANGE UP (ref 67–141)

## 2018-05-03 ENCOUNTER — EMERGENCY (EMERGENCY)
Facility: HOSPITAL | Age: 68
LOS: 1 days | Discharge: HOME | End: 2018-05-03
Attending: EMERGENCY MEDICINE | Admitting: EMERGENCY MEDICINE
Payer: MEDICARE

## 2018-05-03 VITALS
RESPIRATION RATE: 18 BRPM | WEIGHT: 207.01 LBS | OXYGEN SATURATION: 96 % | TEMPERATURE: 98 F | SYSTOLIC BLOOD PRESSURE: 140 MMHG | DIASTOLIC BLOOD PRESSURE: 81 MMHG | HEART RATE: 66 BPM

## 2018-05-03 VITALS
TEMPERATURE: 97 F | RESPIRATION RATE: 18 BRPM | SYSTOLIC BLOOD PRESSURE: 136 MMHG | HEART RATE: 52 BPM | OXYGEN SATURATION: 97 % | DIASTOLIC BLOOD PRESSURE: 86 MMHG

## 2018-05-03 DIAGNOSIS — I10 ESSENTIAL (PRIMARY) HYPERTENSION: ICD-10-CM

## 2018-05-03 DIAGNOSIS — I82.402 ACUTE EMBOLISM AND THROMBOSIS OF UNSPECIFIED DEEP VEINS OF LEFT LOWER EXTREMITY: ICD-10-CM

## 2018-05-03 DIAGNOSIS — Z79.899 OTHER LONG TERM (CURRENT) DRUG THERAPY: ICD-10-CM

## 2018-05-03 DIAGNOSIS — M51.34 OTHER INTERVERTEBRAL DISC DEGENERATION, THORACIC REGION: Chronic | ICD-10-CM

## 2018-05-03 DIAGNOSIS — Z98.890 OTHER SPECIFIED POSTPROCEDURAL STATES: Chronic | ICD-10-CM

## 2018-05-03 DIAGNOSIS — M79.89 OTHER SPECIFIED SOFT TISSUE DISORDERS: ICD-10-CM

## 2018-05-03 DIAGNOSIS — R51 HEADACHE: ICD-10-CM

## 2018-05-03 DIAGNOSIS — Z79.52 LONG TERM (CURRENT) USE OF SYSTEMIC STEROIDS: ICD-10-CM

## 2018-05-03 LAB
ALBUMIN SERPL ELPH-MCNC: 4.2 G/DL — SIGNIFICANT CHANGE UP (ref 3.3–5)
ALP SERPL-CCNC: 39 U/L — LOW (ref 40–120)
ALT FLD-CCNC: 20 U/L — SIGNIFICANT CHANGE UP (ref 10–45)
ANION GAP SERPL CALC-SCNC: 12 MMOL/L — SIGNIFICANT CHANGE UP (ref 5–17)
APTT BLD: 26.7 SEC — LOW (ref 27.5–37.4)
AST SERPL-CCNC: 15 U/L — SIGNIFICANT CHANGE UP (ref 10–40)
BASOPHILS NFR BLD AUTO: 0.2 % — SIGNIFICANT CHANGE UP (ref 0–2)
BILIRUB SERPL-MCNC: 0.7 MG/DL — SIGNIFICANT CHANGE UP (ref 0.2–1.2)
BUN SERPL-MCNC: 16 MG/DL — SIGNIFICANT CHANGE UP (ref 7–23)
CALCIUM SERPL-MCNC: 9.3 MG/DL — SIGNIFICANT CHANGE UP (ref 8.4–10.5)
CHLORIDE SERPL-SCNC: 103 MMOL/L — SIGNIFICANT CHANGE UP (ref 96–108)
CK MB CFR SERPL CALC: 2.5 NG/ML — SIGNIFICANT CHANGE UP (ref 0–6.7)
CO2 SERPL-SCNC: 26 MMOL/L — SIGNIFICANT CHANGE UP (ref 22–31)
CREAT SERPL-MCNC: 1.14 MG/DL — SIGNIFICANT CHANGE UP (ref 0.5–1.3)
CRP SERPL-MCNC: 0.07 MG/DL — SIGNIFICANT CHANGE UP (ref 0–0.4)
EOSINOPHIL NFR BLD AUTO: 0.5 % — SIGNIFICANT CHANGE UP (ref 0–6)
ERYTHROCYTE [SEDIMENTATION RATE] IN BLOOD: 6 MM/HR — SIGNIFICANT CHANGE UP
GLUCOSE SERPL-MCNC: 125 MG/DL — HIGH (ref 70–99)
HCT VFR BLD CALC: 37 % — LOW (ref 39–50)
HGB BLD-MCNC: 12.4 G/DL — LOW (ref 13–17)
INR BLD: 1.32 — HIGH (ref 0.88–1.16)
LYMPHOCYTES # BLD AUTO: 14.4 % — SIGNIFICANT CHANGE UP (ref 13–44)
MCHC RBC-ENTMCNC: 31.1 PG — SIGNIFICANT CHANGE UP (ref 27–34)
MCHC RBC-ENTMCNC: 33.5 G/DL — SIGNIFICANT CHANGE UP (ref 32–36)
MCV RBC AUTO: 92.7 FL — SIGNIFICANT CHANGE UP (ref 80–100)
MONOCYTES NFR BLD AUTO: 6 % — SIGNIFICANT CHANGE UP (ref 2–14)
NEUTROPHILS NFR BLD AUTO: 78.9 % — HIGH (ref 43–77)
PLATELET # BLD AUTO: 99 K/UL — LOW (ref 150–400)
POTASSIUM SERPL-MCNC: 3.7 MMOL/L — SIGNIFICANT CHANGE UP (ref 3.5–5.3)
POTASSIUM SERPL-SCNC: 3.7 MMOL/L — SIGNIFICANT CHANGE UP (ref 3.5–5.3)
PROT SERPL-MCNC: 6.9 G/DL — SIGNIFICANT CHANGE UP (ref 6–8.3)
PROTHROM AB SERPL-ACNC: 14.7 SEC — HIGH (ref 9.8–12.7)
RBC # BLD: 3.99 M/UL — LOW (ref 4.2–5.8)
RBC # FLD: 12.5 % — SIGNIFICANT CHANGE UP (ref 10.3–16.9)
SODIUM SERPL-SCNC: 141 MMOL/L — SIGNIFICANT CHANGE UP (ref 135–145)
TROPONIN T SERPL-MCNC: <0.01 NG/ML — SIGNIFICANT CHANGE UP (ref 0–0.01)
WBC # BLD: 6.3 K/UL — SIGNIFICANT CHANGE UP (ref 3.8–10.5)
WBC # FLD AUTO: 6.3 K/UL — SIGNIFICANT CHANGE UP (ref 3.8–10.5)

## 2018-05-03 PROCEDURE — 82550 ASSAY OF CK (CPK): CPT

## 2018-05-03 PROCEDURE — 70450 CT HEAD/BRAIN W/O DYE: CPT | Mod: 26

## 2018-05-03 PROCEDURE — 99284 EMERGENCY DEPT VISIT MOD MDM: CPT

## 2018-05-03 PROCEDURE — 85610 PROTHROMBIN TIME: CPT

## 2018-05-03 PROCEDURE — 84484 ASSAY OF TROPONIN QUANT: CPT

## 2018-05-03 PROCEDURE — 36415 COLL VENOUS BLD VENIPUNCTURE: CPT

## 2018-05-03 PROCEDURE — 70450 CT HEAD/BRAIN W/O DYE: CPT

## 2018-05-03 PROCEDURE — 93970 EXTREMITY STUDY: CPT | Mod: 26

## 2018-05-03 PROCEDURE — 96374 THER/PROPH/DIAG INJ IV PUSH: CPT

## 2018-05-03 PROCEDURE — 85652 RBC SED RATE AUTOMATED: CPT

## 2018-05-03 PROCEDURE — 86140 C-REACTIVE PROTEIN: CPT

## 2018-05-03 PROCEDURE — 93970 EXTREMITY STUDY: CPT

## 2018-05-03 PROCEDURE — 85025 COMPLETE CBC W/AUTO DIFF WBC: CPT

## 2018-05-03 PROCEDURE — 99284 EMERGENCY DEPT VISIT MOD MDM: CPT | Mod: 25

## 2018-05-03 PROCEDURE — 82553 CREATINE MB FRACTION: CPT

## 2018-05-03 PROCEDURE — 85730 THROMBOPLASTIN TIME PARTIAL: CPT

## 2018-05-03 PROCEDURE — 80053 COMPREHEN METABOLIC PANEL: CPT

## 2018-05-03 RX ORDER — ACETAMINOPHEN 500 MG
1000 TABLET ORAL ONCE
Qty: 0 | Refills: 0 | Status: COMPLETED | OUTPATIENT
Start: 2018-05-03 | End: 2018-05-03

## 2018-05-03 RX ADMIN — Medication 400 MILLIGRAM(S): at 08:44

## 2018-05-03 NOTE — ED PROVIDER NOTE - MEDICAL DECISION MAKING DETAILS
pt w/multiple chronic complaints - LE edema, unchanged from baseline, has known dvt - on eliquis, ha - hx of migraines and unchanged, on prednisone and has a neurologist, ct neg, also rash - likely allergic but limited. labs baseline, all imaging unchanged, all discussed w/pmd - pt to f/u as outpatient

## 2018-05-03 NOTE — ED ADULT NURSE NOTE - CHPI ED SYMPTOMS NEG
no change in level of consciousness/no nausea/no confusion/no blurred vision/no weakness/no vomiting/no loss of consciousness/no dizziness/no fever/no numbness

## 2018-05-03 NOTE — ED PROVIDER NOTE - OBJECTIVE STATEMENT
The pt is a 66 y/o M, who presents to ED w/multiple chronic c/o -- 1. ha x months, hx of ha - this is his typical h/a variant, has not taken any pain meds, is on prednisone 30 mg qd, was worked up / ruled out for temporal arteritis on last admission (few wks ago - has been having visual changes, but has not f/u w/optho), 2. leg swelling - hx of DVT on eliquis, no acute changes in swelling since being d/c'd from hosp; 3 . rash - states comes and goes, itchy at times, has not taken any antihistamines.  Denies fevers, chills, cp, sob, n/v/d, abd pain, falls, neck pain or stiffness, any acute or worsening symptoms

## 2018-05-03 NOTE — ED ADULT TRIAGE NOTE - CHIEF COMPLAINT QUOTE
pt complaining of migraine head ache x months denies N/V reports photophobia. Also with b/l LE swelling x 2 days hx of DVT PE. Additionally seeking care r/t rash on chest area noticed a few days ago

## 2018-05-03 NOTE — ED PROVIDER NOTE - ATTENDING CONTRIBUTION TO CARE
Pt is a 66yo m, who p/w ha and b/l leg swelling. Was admitted to [facility] 2 wsk ago for same, was ruled out for temporal arteritis, currently on prednisone for migraines. Also with dvt to lle on eliquis with persistent leg swelling. No cp, sob, palp, fever, chills, neck pain, n/t/w, photophobia, visual changes... Afebrile. HDS. WNWD m in nad. + s1, s2, rrr. Lungs cta b/l. Abd soft, nt/nd. + mild edema of b/l lower ext, L > R. 2+ dp/pt pulses b/l. + maculopaular rash to torso and upper ext. No vesicles/ hives. CT head neg for acute bleed/ infarct. Dopplers of lower ext showing known lle dvt. Pt is a 66yo m, who p/w ha and b/l leg swelling. Was admitted to [facility] 2 wsk ago for same, was ruled out for temporal arteritis, currently on prednisone for migraines. Also with dvt to lle on eliquis with persistent leg swelling. No cp, sob, palp, fever, chills, neck pain, n/t/w, photophobia, visual changes... Afebrile. HDS. WNWD m in nad. + s1, s2, rrr. Lungs cta b/l. Abd soft, nt/nd. + mild edema of b/l lower ext, L > R. 2+ dp/pt pulses b/l. + maculopaular rash to torso and upper ext. No vesicles/ hives. CT head neg for acute bleed/ infarct. Dopplers of lower ext showing known lle dvt. Rash likely allergic. Case d/w Dr. Ridley; will dc home and pt to f/u with him for re-evaluation.

## 2018-05-03 NOTE — ED PROVIDER NOTE - CARE PLAN
Principal Discharge DX:	Peripheral edema  Secondary Diagnosis:	Headache  Secondary Diagnosis:	DVT (deep venous thrombosis)

## 2018-05-03 NOTE — ED PROVIDER NOTE - MUSCULOSKELETAL, MLM
LE: + swelling L slightly > R, + light touch, pedal pulses 2+ b/l, + calf tend on L, FROM, ambulatory w/o assistance

## 2018-05-03 NOTE — ED ADULT NURSE NOTE - OBJECTIVE STATEMENT
The pt is a 66 y/o male who came in c/o migraine headache for a few days. Pt reports photophobia, denies dizziness. Pt states has hx of migraines. Pt also c/o LE swelling, has hx of PE and DVT on eloquis.

## 2018-05-24 ENCOUNTER — OUTPATIENT (OUTPATIENT)
Dept: OUTPATIENT SERVICES | Facility: HOSPITAL | Age: 68
LOS: 1 days | End: 2018-05-24
Payer: MEDICARE

## 2018-05-24 ENCOUNTER — APPOINTMENT (OUTPATIENT)
Dept: NEUROLOGY | Facility: CLINIC | Age: 68
End: 2018-05-24
Payer: MEDICARE

## 2018-05-24 VITALS
HEART RATE: 77 BPM | OXYGEN SATURATION: 99 % | HEIGHT: 74 IN | SYSTOLIC BLOOD PRESSURE: 131 MMHG | BODY MASS INDEX: 26.56 KG/M2 | WEIGHT: 207 LBS | DIASTOLIC BLOOD PRESSURE: 77 MMHG

## 2018-05-24 DIAGNOSIS — Z87.898 PERSONAL HISTORY OF OTHER SPECIFIED CONDITIONS: ICD-10-CM

## 2018-05-24 DIAGNOSIS — M51.34 OTHER INTERVERTEBRAL DISC DEGENERATION, THORACIC REGION: Chronic | ICD-10-CM

## 2018-05-24 DIAGNOSIS — Z86.79 PERSONAL HISTORY OF OTHER DISEASES OF THE CIRCULATORY SYSTEM: ICD-10-CM

## 2018-05-24 DIAGNOSIS — Z98.890 OTHER SPECIFIED POSTPROCEDURAL STATES: Chronic | ICD-10-CM

## 2018-05-24 DIAGNOSIS — R51 HEADACHE: ICD-10-CM

## 2018-05-24 DIAGNOSIS — R06.9 UNSPECIFIED ABNORMALITIES OF BREATHING: ICD-10-CM

## 2018-05-24 DIAGNOSIS — M50.90 CERVICAL DISC DISORDER, UNSPECIFIED, UNSPECIFIED CERVICAL REGION: ICD-10-CM

## 2018-05-24 PROCEDURE — 64405 NJX AA&/STRD GR OCPL NRV: CPT | Mod: 50

## 2018-05-24 PROCEDURE — 99214 OFFICE O/P EST MOD 30 MIN: CPT | Mod: 25

## 2018-05-24 PROCEDURE — 64450 NJX AA&/STRD OTHER PN/BRANCH: CPT | Mod: 59

## 2018-05-24 PROCEDURE — 72040 X-RAY EXAM NECK SPINE 2-3 VW: CPT

## 2018-05-24 PROCEDURE — 72040 X-RAY EXAM NECK SPINE 2-3 VW: CPT | Mod: 26

## 2018-06-05 ENCOUNTER — OUTPATIENT (OUTPATIENT)
Dept: OUTPATIENT SERVICES | Facility: HOSPITAL | Age: 68
LOS: 1 days | End: 2018-06-05
Payer: MEDICARE

## 2018-06-05 DIAGNOSIS — M51.34 OTHER INTERVERTEBRAL DISC DEGENERATION, THORACIC REGION: Chronic | ICD-10-CM

## 2018-06-05 DIAGNOSIS — Z98.890 OTHER SPECIFIED POSTPROCEDURAL STATES: Chronic | ICD-10-CM

## 2018-06-05 DIAGNOSIS — I26.99 OTHER PULMONARY EMBOLISM WITHOUT ACUTE COR PULMONALE: ICD-10-CM

## 2018-06-05 PROCEDURE — 93306 TTE W/DOPPLER COMPLETE: CPT | Mod: 26

## 2018-06-05 PROCEDURE — 93306 TTE W/DOPPLER COMPLETE: CPT

## 2018-07-25 ENCOUNTER — APPOINTMENT (OUTPATIENT)
Dept: OPHTHALMOLOGY | Facility: CLINIC | Age: 68
End: 2018-07-25
Payer: MEDICARE

## 2018-07-25 DIAGNOSIS — H53.9 UNSPECIFIED VISUAL DISTURBANCE: ICD-10-CM

## 2018-07-25 DIAGNOSIS — H40.003 PREGLAUCOMA, UNSPECIFIED, BILATERAL: ICD-10-CM

## 2018-07-25 PROBLEM — I26.99 OTHER PULMONARY EMBOLISM WITHOUT ACUTE COR PULMONALE: Chronic | Status: ACTIVE | Noted: 2018-03-01

## 2018-07-25 PROCEDURE — 92133 CPTRZD OPH DX IMG PST SGM ON: CPT

## 2018-07-25 PROCEDURE — 92083 EXTENDED VISUAL FIELD XM: CPT

## 2018-07-25 PROCEDURE — 99204 OFFICE O/P NEW MOD 45 MIN: CPT

## 2018-08-01 ENCOUNTER — APPOINTMENT (OUTPATIENT)
Dept: NEUROLOGY | Facility: CLINIC | Age: 68
End: 2018-08-01
Payer: MEDICARE

## 2018-08-01 VITALS
DIASTOLIC BLOOD PRESSURE: 86 MMHG | BODY MASS INDEX: 26.95 KG/M2 | OXYGEN SATURATION: 96 % | HEIGHT: 74 IN | WEIGHT: 210 LBS | SYSTOLIC BLOOD PRESSURE: 134 MMHG | HEART RATE: 60 BPM

## 2018-08-01 DIAGNOSIS — M54.81 OCCIPITAL NEURALGIA: ICD-10-CM

## 2018-08-01 PROCEDURE — 99214 OFFICE O/P EST MOD 30 MIN: CPT

## 2018-08-06 ENCOUNTER — APPOINTMENT (OUTPATIENT)
Dept: OPHTHALMOLOGY | Facility: CLINIC | Age: 68
End: 2018-08-06
Payer: MEDICARE

## 2018-08-06 PROCEDURE — 92250 FUNDUS PHOTOGRAPHY W/I&R: CPT

## 2018-08-06 PROCEDURE — 76514 ECHO EXAM OF EYE THICKNESS: CPT

## 2018-08-06 PROCEDURE — 92014 COMPRE OPH EXAM EST PT 1/>: CPT

## 2018-08-06 PROCEDURE — 92020 GONIOSCOPY: CPT

## 2018-08-06 RX ORDER — DORZOLAMIDE HYDROCHLORIDE TIMOLOL MALEATE 20; 5 MG/ML; MG/ML
22.3-6.8 SOLUTION/ DROPS OPHTHALMIC
Qty: 1 | Refills: 5 | Status: ACTIVE | COMMUNITY
Start: 2018-08-06 | End: 1900-01-01

## 2018-08-11 NOTE — PROGRESS NOTE ADULT - PROBLEM SELECTOR PROBLEM 3
Patient
Degenerative disc disease, cervical

## 2018-09-02 ENCOUNTER — EMERGENCY (EMERGENCY)
Facility: HOSPITAL | Age: 68
LOS: 1 days | Discharge: HOME | End: 2018-09-02
Attending: EMERGENCY MEDICINE | Admitting: EMERGENCY MEDICINE
Payer: MEDICARE

## 2018-09-02 VITALS
SYSTOLIC BLOOD PRESSURE: 136 MMHG | HEART RATE: 53 BPM | OXYGEN SATURATION: 96 % | TEMPERATURE: 100 F | RESPIRATION RATE: 16 BRPM | DIASTOLIC BLOOD PRESSURE: 80 MMHG

## 2018-09-02 VITALS
HEART RATE: 58 BPM | OXYGEN SATURATION: 97 % | SYSTOLIC BLOOD PRESSURE: 122 MMHG | DIASTOLIC BLOOD PRESSURE: 69 MMHG | TEMPERATURE: 98 F | RESPIRATION RATE: 16 BRPM | WEIGHT: 210.1 LBS

## 2018-09-02 DIAGNOSIS — Z98.890 OTHER SPECIFIED POSTPROCEDURAL STATES: Chronic | ICD-10-CM

## 2018-09-02 DIAGNOSIS — G43.909 MIGRAINE, UNSPECIFIED, NOT INTRACTABLE, WITHOUT STATUS MIGRAINOSUS: ICD-10-CM

## 2018-09-02 DIAGNOSIS — M51.34 OTHER INTERVERTEBRAL DISC DEGENERATION, THORACIC REGION: Chronic | ICD-10-CM

## 2018-09-02 DIAGNOSIS — Z79.899 OTHER LONG TERM (CURRENT) DRUG THERAPY: ICD-10-CM

## 2018-09-02 DIAGNOSIS — I10 ESSENTIAL (PRIMARY) HYPERTENSION: ICD-10-CM

## 2018-09-02 PROCEDURE — 96375 TX/PRO/DX INJ NEW DRUG ADDON: CPT

## 2018-09-02 PROCEDURE — 96374 THER/PROPH/DIAG INJ IV PUSH: CPT

## 2018-09-02 PROCEDURE — 99284 EMERGENCY DEPT VISIT MOD MDM: CPT

## 2018-09-02 PROCEDURE — 99284 EMERGENCY DEPT VISIT MOD MDM: CPT | Mod: 25

## 2018-09-02 RX ORDER — METOCLOPRAMIDE HCL 10 MG
10 TABLET ORAL ONCE
Qty: 0 | Refills: 0 | Status: COMPLETED | OUTPATIENT
Start: 2018-09-02 | End: 2018-09-02

## 2018-09-02 RX ORDER — SODIUM CHLORIDE 9 MG/ML
1000 INJECTION INTRAMUSCULAR; INTRAVENOUS; SUBCUTANEOUS ONCE
Qty: 0 | Refills: 0 | Status: DISCONTINUED | OUTPATIENT
Start: 2018-09-02 | End: 2018-09-06

## 2018-09-02 RX ORDER — KETOROLAC TROMETHAMINE 30 MG/ML
15 SYRINGE (ML) INJECTION ONCE
Qty: 0 | Refills: 0 | Status: DISCONTINUED | OUTPATIENT
Start: 2018-09-02 | End: 2018-09-02

## 2018-09-02 RX ORDER — DIPHENHYDRAMINE HCL 50 MG
25 CAPSULE ORAL ONCE
Qty: 0 | Refills: 0 | Status: COMPLETED | OUTPATIENT
Start: 2018-09-02 | End: 2018-09-02

## 2018-09-02 RX ADMIN — Medication 10 MILLIGRAM(S): at 17:44

## 2018-09-02 RX ADMIN — Medication 25 MILLIGRAM(S): at 17:43

## 2018-09-02 RX ADMIN — Medication 15 MILLIGRAM(S): at 17:43

## 2018-09-02 NOTE — ED ADULT NURSE NOTE - OBJECTIVE STATEMENT
Pt to ER w/ report of episode of acute migraine HA accompanied by dizziness.  Pt denies cp/sob/f/c/n/v.  No neuro deficits noted.  Breathing unlabored, skin warm and dry. IV access established, labs drawn and sent. Will continue to monitor.

## 2018-09-02 NOTE — ED ADULT TRIAGE NOTE - CHIEF COMPLAINT QUOTE
Patient c/o of migraine, hx of migraines.  Patient also c/o of dizziness, states "sometimes this happens with my migraines".

## 2018-09-02 NOTE — ED PROVIDER NOTE - MEDICAL DECISION MAKING DETAILS
recurrent migraine similar to prior.  no trauma or focal neuro deficit.  treated symptomatically with reglan/toradol/benadryl with resolution.  plan f/u with neuro next week

## 2018-09-02 NOTE — ED ADULT TRIAGE NOTE - OTHER COMPLAINTS
C/o of R hip pain, states "I'm supposed to have surgery because I have herniated discs. However I'm on lovenox, I can't have surgery because of my blood clots"

## 2018-09-02 NOTE — ED PROVIDER NOTE - OBJECTIVE STATEMENT
history of headaches/migraine here with recurrent headache for past 3 days.  Frontal, non radiating.  Similar to prior headaches. Gradual onset.  Has not taken anything for symptoms.  Denies trauma, fever/chills, vision changes.

## 2018-09-02 NOTE — ED ADULT NURSE NOTE - NSIMPLEMENTINTERV_GEN_ALL_ED
Implemented All Universal Safety Interventions:  [place] to call system. Call bell, personal items and telephone within reach. Instruct patient to call for assistance. Room bathroom lighting operational. Non-slip footwear when patient is off stretcher. Physically safe environment: no spills, clutter or unnecessary equipment. Stretcher in lowest position, wheels locked, appropriate side rails in place.

## 2018-09-27 ENCOUNTER — APPOINTMENT (OUTPATIENT)
Dept: VASCULAR SURGERY | Facility: CLINIC | Age: 68
End: 2018-09-27
Payer: MEDICARE

## 2018-09-27 VITALS — HEART RATE: 59 BPM | SYSTOLIC BLOOD PRESSURE: 123 MMHG | DIASTOLIC BLOOD PRESSURE: 70 MMHG | OXYGEN SATURATION: 60 %

## 2018-09-27 VITALS — BODY MASS INDEX: 26.95 KG/M2 | WEIGHT: 210 LBS | HEIGHT: 74 IN

## 2018-09-27 PROCEDURE — 99213 OFFICE O/P EST LOW 20 MIN: CPT

## 2018-09-27 PROCEDURE — 93970 EXTREMITY STUDY: CPT

## 2018-10-05 ENCOUNTER — OTHER (OUTPATIENT)
Age: 68
End: 2018-10-05

## 2018-10-09 ENCOUNTER — APPOINTMENT (OUTPATIENT)
Dept: NEUROLOGY | Facility: CLINIC | Age: 68
End: 2018-10-09

## 2018-10-10 ENCOUNTER — APPOINTMENT (OUTPATIENT)
Dept: NEUROLOGY | Facility: CLINIC | Age: 68
End: 2018-10-10
Payer: MEDICARE

## 2018-10-10 VITALS
BODY MASS INDEX: 26.56 KG/M2 | WEIGHT: 207 LBS | DIASTOLIC BLOOD PRESSURE: 69 MMHG | TEMPERATURE: 97.9 F | SYSTOLIC BLOOD PRESSURE: 110 MMHG | HEIGHT: 74 IN | OXYGEN SATURATION: 95 % | HEART RATE: 61 BPM

## 2018-10-10 PROCEDURE — 95885 MUSC TST DONE W/NERV TST LIM: CPT | Mod: 59

## 2018-10-10 PROCEDURE — 95886 MUSC TEST DONE W/N TEST COMP: CPT | Mod: 59

## 2018-10-10 PROCEDURE — 95910 NRV CNDJ TEST 7-8 STUDIES: CPT

## 2018-10-15 ENCOUNTER — APPOINTMENT (OUTPATIENT)
Dept: OPHTHALMOLOGY | Facility: CLINIC | Age: 68
End: 2018-10-15
Payer: MEDICARE

## 2018-10-15 PROCEDURE — 92083 EXTENDED VISUAL FIELD XM: CPT

## 2018-10-15 PROCEDURE — ZZZZZ: CPT

## 2018-10-15 PROCEDURE — 92012 INTRM OPH EXAM EST PATIENT: CPT

## 2018-11-09 ENCOUNTER — APPOINTMENT (OUTPATIENT)
Dept: NEUROLOGY | Facility: CLINIC | Age: 68
End: 2018-11-09
Payer: MEDICARE

## 2018-11-09 VITALS
SYSTOLIC BLOOD PRESSURE: 151 MMHG | WEIGHT: 207 LBS | TEMPERATURE: 98.7 F | DIASTOLIC BLOOD PRESSURE: 87 MMHG | HEART RATE: 71 BPM | BODY MASS INDEX: 26.56 KG/M2 | OXYGEN SATURATION: 91 % | HEIGHT: 74 IN

## 2018-11-09 PROCEDURE — 99214 OFFICE O/P EST MOD 30 MIN: CPT

## 2018-11-29 ENCOUNTER — APPOINTMENT (OUTPATIENT)
Dept: NEUROLOGY | Facility: CLINIC | Age: 68
End: 2018-11-29

## 2018-12-12 ENCOUNTER — RX RENEWAL (OUTPATIENT)
Age: 68
End: 2018-12-12

## 2018-12-12 RX ORDER — LATANOPROST/PF 0.005 %
0.01 DROPS OPHTHALMIC (EYE)
Qty: 2.5 | Refills: 3 | Status: ACTIVE | COMMUNITY
Start: 2018-08-06 | End: 1900-01-01

## 2018-12-17 ENCOUNTER — APPOINTMENT (OUTPATIENT)
Dept: OPHTHALMOLOGY | Facility: CLINIC | Age: 68
End: 2018-12-17
Payer: MEDICARE

## 2018-12-17 PROCEDURE — 92012 INTRM OPH EXAM EST PATIENT: CPT

## 2018-12-28 NOTE — ED ADULT NURSE NOTE - ISOLATION TYPE:
History  Chief Complaint   Patient presents with    Fever - 9 weeks to 74 years     Patient presents to the ED with c/o fever, chills, dizziness with near syncope after getting an infusion today  Patient is a 70 y/o F with a h/o lung cancer currently getting chemo that presents to the ED with fevers, chills, weakness that started today  Patient states she had chemo today and went home around 4PM and states when she went home she felt "jittery" so she came to the ER  She has a chronic cough  She also has had dysuria for a couple weeks  She denies sick contacts  She did receive a flu vaccine this year  History provided by:  Patient  Fever - 9 weeks to 74 years   Temp source:  Subjective  Severity:  Moderate  Onset quality:  Sudden  Duration:  4 hours  Timing:  Constant  Progression:  Unchanged  Chronicity:  New  Relieved by:  Nothing  Worsened by:  Nothing  Ineffective treatments:  None tried  Associated symptoms: chills, cough, dysuria and nausea    Associated symptoms: no chest pain, no confusion, no diarrhea, no rash and no vomiting    Risk factors: immunosuppression    Risk factors: no sick contacts        Prior to Admission Medications   Prescriptions Last Dose Informant Patient Reported? Taking? FLUZONE HIGH-DOSE 0 5 ML ADRIANNA  Self Yes No   Methylcobalamin (B12-ACTIVE PO)  Self Yes No   Sig: Take by mouth daily     Omega-3 Fatty Acids (FISH OIL PO)  Self Yes No   Sig: Take 1 capsule by mouth daily     albuterol (VENTOLIN HFA) 90 mcg/act inhaler  Self No No   Sig: Inhale 2 puffs every 6 (six) hours as needed for wheezing or shortness of breath   aspirin (ECOTRIN) 325 mg EC tablet  Self Yes No   Sig: Take 325 mg by mouth daily     atorvastatin (LIPITOR) 40 mg tablet  Self No No   Sig: Take 1 tablet (40 mg total) by mouth daily   Patient taking differently: Take 40 mg by mouth daily in the early morning     co-enzyme Q-10 30 MG capsule  Self Yes No   Sig: Take 30 mg by mouth daily     gabapentin (NEURONTIN) 100 mg capsule   No No   Sig: Take 2 capsules (200 mg total) by mouth 3 (three) times a day   levothyroxine 100 mcg tablet  Self No No   Sig: Take 1 tablet (100 mcg total) by mouth daily   metoprolol succinate (TOPROL-XL) 50 mg 24 hr tablet  Self No No   Sig: Take 1 tablet (50 mg total) by mouth 2 (two) times a day   predniSONE 10 mg tablet   Yes No      Facility-Administered Medications: None       Past Medical History:   Diagnosis Date    Aortic aneurysm ([place] [address] )     Arterial embolism of right leg (HCC)     ASCVD (arteriosclerotic cardiovascular disease)     Atheroscler of native artery of right leg with intermit claudication ([place] [address] )     Back problem     Blood type B-     Cancer ([place] [address] )     lung CA    Cataract     [facility] obstructive [facility]) [facility])     Coronary artery disease     CVA (cerebral vascular accident) ([place] [address] ) 2012    Depression     Disease of thyroid gland     History of cataract     Hyperlipidemia     Hypertension     Lung mass     Mediastinal adenopathy     Muscle pain     Prediabetes     PVD (peripheral vascular disease) ([place] [address] )     Thoracic aortic aneurysm (HCC)     Transient insomnia        Past Surgical History:   Procedure Laterality Date    CAROTID STENT Left     NM COLONOSCOPY FLX DX W/COLLJ SPEC WHEN PFRMD N/A 9/27/2017    Procedure: EGD AND COLONOSCOPY;  Surgeon: Kathy Gee MD;  Location: [facility];  Service: Gastroenterology    NM EDG US EXAM SURGICAL ALTER STOM DUODENUM/JEJUNUM N/A 8/1/2018    Procedure: LINEAR ENDOSCOPIC U/S;  Surgeon: Ijeoma Anna MD;  Location: [facility];   Service: Gastroenterology    TUBAL LIGATION         Family History   Problem Relation Age of Onset    Hypertension Mother     Heart disease Mother     Diabetes Father     Liver disease Father         hepatic failure     Breast cancer Daughter     Substance Abuse Neg Hx     Mental illness Neg Hx      I have reviewed and agree with the history as documented  Social History   Substance Use Topics    Smoking status: Former Smoker     Packs/day: 0 50     Years: 45 00     Quit date: 3/6/2018    Smokeless tobacco: Never Used    Alcohol use No        Review of Systems   Constitutional: Positive for chills and fever  HENT: Negative  Respiratory: Positive for cough  Cardiovascular: Negative for chest pain and leg swelling  Gastrointestinal: Positive for nausea  Negative for abdominal pain, diarrhea and vomiting  Genitourinary: Positive for dysuria  Musculoskeletal: Negative for back pain and neck pain  Skin: Negative for color change and rash  Neurological: Negative for dizziness, weakness and numbness  Psychiatric/Behavioral: Negative for confusion  All other systems reviewed and are negative  Physical Exam  Physical Exam   Constitutional: She appears well-developed and well-nourished  She is cooperative  She does not appear ill  No distress  HENT:   Head: Normocephalic and atraumatic  Nose: Nose normal    Mouth/Throat: Oropharynx is clear and moist and mucous membranes are normal    Eyes: Conjunctivae are normal    Neck: Normal range of motion  Neck supple  Cardiovascular: Regular rhythm and normal heart sounds  Tachycardia present  Pulmonary/Chest: Effort normal  She has decreased breath sounds  Abdominal: Soft  Normal appearance and bowel sounds are normal  There is tenderness in the suprapubic area  There is no rigidity, no rebound and no guarding  Musculoskeletal: Normal range of motion  She exhibits no edema  Neurological: She is alert  She has normal strength  She is not disoriented  No sensory deficit  Skin: Skin is warm and dry  No rash noted  She is not diaphoretic  There is pallor  Nursing note and vitals reviewed        Vital Signs  ED Triage Vitals [12/27/18 2001]   Temperature Pulse Respirations Blood Pressure SpO2   (!) 100 6 °F (38 1 °C) (!) 106 20 97/51 92 %      Temp Source Heart Rate Source Patient Position - Orthostatic VS BP Location FiO2 (%)   Tympanic -- Sitting Right arm --      Pain Score       4           Vitals:    12/27/18 2130 12/27/18 2145 12/27/18 2200 12/27/18 2215   BP:    108/56   Pulse: 89 87 87 88   Patient Position - Orthostatic VS:           Visual Acuity  Visual Acuity      Most Recent Value   L Pupil Size (mm)  3   R Pupil Size (mm)  3          ED Medications  Medications   sodium chloride (PF) 0 9 % injection 3 mL (not administered)   cefepime (MAXIPIME) 2 g/50 mL dextrose IVPB (not administered)   vancomycin (VANCOCIN) IVPB (premix) 750 mg (750 mg Intravenous New Bag 12/27/18 2220)   sodium chloride 0 9 % bolus 1,000 mL (0 mL Intravenous Stopped 12/27/18 2207)   acetaminophen (TYLENOL) tablet 650 mg (650 mg Oral Given 12/27/18 2045)       Diagnostic Studies  Results Reviewed     Procedure Component Value Units Date/Time    UA w Reflex to Microscopic w Reflex to Culture [[id number]] Collected:  12/27/18 2206    Lab Status:  No result Specimen:  Urine from Urine, Clean Catch     Lactic acid x2 [[id number]]  (Normal) Collected:  12/27/18 2055    Lab Status:  Final result Specimen:  Blood from Arm, Right Updated:  12/27/18 2145     LACTIC ACID 1 2 mmol/L     Narrative:         Result may be elevated if tourniquet was used during collection      Comprehensive metabolic panel [[id number]]  (Abnormal) Collected:  12/27/18 2046    Lab Status:  Final result Specimen:  Blood from Line, Venous Updated:  12/27/18 2132     Sodium 134 (L) mmol/L      Potassium 4 5 mmol/L      Chloride 99 (L) mmol/L      CO2 27 mmol/L      ANION GAP 8 mmol/L      BUN 35 (H) mg/dL      Creatinine 2 30 (H) mg/dL      Glucose 118 mg/dL      Calcium 8 5 mg/dL      AST 17 U/L      ALT 22 U/L      Alkaline Phosphatase 99 U/L      Total Protein 6 7 g/dL      Albumin 2 9 (L) g/dL      Total Bilirubin 0 40 mg/dL      eGFR 21 ml/min/1 73sq m     Narrative:         National Kidney Disease Education Program recommendations are as follows:  GFR calculation is accurate only with a steady state creatinine  Chronic Kidney disease less than 60 ml/min/1 73 sq  meters  Kidney failure less than 15 ml/min/1 73 sq  meters  Protime-INR [[id number]]  (Normal) Collected:  12/27/18 2046    Lab Status:  Final result Specimen:  Blood from Line, Venous Updated:  12/27/18 2130     Protime 13 8 seconds      INR 1 12    APTT [[id number]]  (Normal) Collected:  12/27/18 2046    Lab Status:  Final result Specimen:  Blood from Line, Venous Updated:  12/27/18 2130     PTT 32 seconds     CBC and differential [[id number]]  (Abnormal) Collected:  12/27/18 2046    Lab Status:  Final result Specimen:  Blood from Line, Venous Updated:  12/27/18 2116     WBC 11 32 (H) Thousand/uL      RBC 3 17 (L) Million/uL      Hemoglobin 10 5 (L) g/dL      Hematocrit 32 9 (L) %       (H) fL      MCH 33 1 pg      MCHC 31 9 g/dL      RDW 14 3 %      MPV 11 1 fL      Platelets 681 Thousands/uL      nRBC 0 /100 WBCs      Neutrophils Relative 74 %      Immat GRANS % 1 %      Lymphocytes Relative 10 (L) %      Monocytes Relative 14 (H) %      Eosinophils Relative 1 %      Basophils Relative 0 %      Neutrophils Absolute 8 47 (H) Thousands/µL      Immature Grans Absolute 0 06 Thousand/uL      Lymphocytes Absolute 1 07 Thousands/µL      Monocytes Absolute 1 56 (H) Thousand/µL      Eosinophils Absolute 0 13 Thousand/µL      Basophils Absolute 0 03 Thousands/µL     Procalcitonin [[id number]] Collected:  12/27/18 2055    Lab Status: In process Specimen:  Blood from Arm, Right Updated:  12/27/18 2113    Influenza A/B and RSV by PCR [[id number]] Collected:  12/27/18 2049    Lab Status: In process Specimen:  Nasopharyngeal from Nasopharyngeal Swab Updated:  12/27/18 2113    Blood culture #2 [[id number]] Collected:  12/27/18 2055    Lab Status: In process Specimen:  Blood from Arm, Left Updated:  12/27/18 2113    Blood culture #1 [[id number]] Collected:  12/27/18 2055    Lab Status:   In process Specimen:  Blood from Arm, Right Updated:  12/27/18 2113    Lactic acid x2 [[id number]]     Lab Status:  No result Specimen:  Blood                  XR chest pa & lateral   Final Result by Aria Herman DO (12/27 2215)      No evidence of pneumonia  Left upper lobe periaortic opacity described on previous CT imaging is not well visualized by plain film  Follow-up should be based on previous CT recommendations  6 5 cm descending thoracic aorta aneurysm  Workstation performed: [id number]                    Procedures  ECG 12 Lead Documentation  Date/Time: 12/27/2018 9:40 PM  Performed by: Nicole Bolden by: Naye Escalera     Indications / Diagnosis:  Tachycardia  Patient location:  ED  Previous ECG:     Previous ECG:  Compared to current    Similarity:  No change  Rate:     ECG rate:  101    ECG rate assessment: tachycardic    Rhythm:     Rhythm: sinus tachycardia    QRS:     QRS axis:  Normal  Conduction:     Conduction: normal    ST segments:     ST segments:  Normal  T waves:     T waves: normal             Phone Contacts  ED Phone Contact    ED Course  ED Course as of Dec 27 2224   Thu Dec 27, 2018   2220 SPoke with patient about thoracic aneurysm  She states she is aware of aneurysm and Dr Olesya Polanco states she cannot go for surgery until her cancer clears  Initial Sepsis Screening     Row Name 12/27/18 2159                Is the patient's history suggestive of a new or worsening infection? (!)  Yes (Proceed)  -CD        Suspected source of infection suspect infection, source unknown  -CD        Are two or more of the following signs & symptoms of infection both present and new to the patient?  No  -CD        Indicate SIRS criteria Tachycardia > 90 bpm  -CD        If the answer is yes to both questions, suspicion of sepsis is present          If severe sepsis is present AND tissue hypoperfusion perists in the hour after fluid resuscitation or lactate > 4, the patient meets criteria for SEPTIC SHOCK          Are any of the following organ dysfunction criteria present within 6 hours of suspected infection and SIRS criteria that are NOT considered to be chronic conditions?         Organ dysfunction          Date of presentation of severe sepsis          Time of presentation of severe sepsis          Tissue hypoperfusion persists in the hour after crystalloid fluid administration, evidenced, by either:          Was hypotension present within one hour of the conclusion of crystalloid fluid administration?         Date of presentation of septic shock          Time of presentation of septic shock            User Key  (r) = Recorded By, (t) = Taken By, (c) = Cosigned By    [address] Name Provider Type    CD Sukumar Dominguez PA-C Physician Assistant                  MDM  Number of Diagnoses or Management Options  NYDIA (acute kidney injury) [facility]): new and requires workup  Fever: new and requires workup  Diagnosis management comments: Patient with h/o lung cancer and fever, will check labs and CXR to r/o pneumonia, UTI, flu swab pending  Patient with h/o thoracic aneurysm, patient denies chest pain or back pain  SHe is being follow by Dr Baljit Snyder for her aneurysms          Amount and/or Complexity of Data Reviewed  Clinical lab tests: ordered and reviewed  Tests in the radiology section of CPT®: ordered and reviewed  Discuss the patient with other providers: yes (Chaparro Kohler PA-C  )    Patient Progress  Patient progress: stable    CritCare Time    Disposition  Final diagnoses:   [facility]   Fever     Time reflects when diagnosis was documented in both MDM as applicable and the Disposition within this note     Time User Action Codes Description Comment    12/27/2018 10:13 PM Luli Gusman Add [N17 9] NYDIA (acute kidney injury) ([facility] [facility] )     12/27/2018 10:13 PM Luli Gusman Add [R50 9] Fever       ED Disposition     ED Disposition Condition Comment    Admit  Case was discussed with Kemi Eldridge PA-C and the patient's admission status was agreed to be Admission Status: inpatient status to the service of Dr Main Lopez   Follow-up Information    None         Patient's Medications   Discharge Prescriptions    No medications on file     No discharge procedures on file      ED Provider  Electronically Signed by           Deonna Cox PA-C  12/27/18 9683 None

## 2019-02-08 ENCOUNTER — APPOINTMENT (OUTPATIENT)
Dept: NEUROLOGY | Facility: CLINIC | Age: 69
End: 2019-02-08

## 2019-03-18 ENCOUNTER — APPOINTMENT (OUTPATIENT)
Dept: OPHTHALMOLOGY | Facility: CLINIC | Age: 69
End: 2019-03-18
Payer: MEDICARE

## 2019-03-18 DIAGNOSIS — H25.813 COMBINED FORMS OF AGE-RELATED CATARACT, BILATERAL: ICD-10-CM

## 2019-03-18 DIAGNOSIS — H40.1132 PRIMARY OPEN-ANGLE GLAUCOMA, BILATERAL, MODERATE STAGE: ICD-10-CM

## 2019-03-18 PROCEDURE — 92083 EXTENDED VISUAL FIELD XM: CPT

## 2019-03-18 PROCEDURE — 92133 CPTRZD OPH DX IMG PST SGM ON: CPT

## 2019-03-18 PROCEDURE — 92012 INTRM OPH EXAM EST PATIENT: CPT

## 2019-04-12 ENCOUNTER — APPOINTMENT (OUTPATIENT)
Dept: NEUROLOGY | Facility: CLINIC | Age: 69
End: 2019-04-12
Payer: MEDICARE

## 2019-04-12 VITALS
TEMPERATURE: 97.7 F | OXYGEN SATURATION: 97 % | HEIGHT: 74 IN | BODY MASS INDEX: 25.67 KG/M2 | DIASTOLIC BLOOD PRESSURE: 81 MMHG | HEART RATE: 66 BPM | SYSTOLIC BLOOD PRESSURE: 129 MMHG | WEIGHT: 200 LBS

## 2019-04-12 PROCEDURE — 99214 OFFICE O/P EST MOD 30 MIN: CPT

## 2019-04-12 NOTE — PHYSICAL EXAM
[FreeTextEntry1] : Right hip flexion 3, knee flex/ext 3 to 4-, ankle dorsilfexion 4\par LLE 5/5\par Reflexes: patellar absent on right, 2+ L; achilles 1+ b/l\par Gait: drags right foot with walker

## 2019-04-12 NOTE — ASSESSMENT
[FreeTextEntry1] : Right leg weakness seems worse than prior to surgery\par Will repeat EMG/NCS, get records from Dr. Raman (spine surgeon)\par Continue PT

## 2019-04-12 NOTE — HISTORY OF PRESENT ILLNESS
[FreeTextEntry1] : Last seen 11/2018, had surgery 1/2019. No significant improvement in right leg strength so far. Having some back pain but not much.

## 2019-04-30 ENCOUNTER — APPOINTMENT (OUTPATIENT)
Dept: NEUROLOGY | Facility: CLINIC | Age: 69
End: 2019-04-30
Payer: MEDICARE

## 2019-04-30 VITALS
HEIGHT: 74 IN | BODY MASS INDEX: 26.44 KG/M2 | WEIGHT: 206 LBS | DIASTOLIC BLOOD PRESSURE: 93 MMHG | TEMPERATURE: 97.9 F | HEART RATE: 73 BPM | OXYGEN SATURATION: 97 % | SYSTOLIC BLOOD PRESSURE: 149 MMHG

## 2019-04-30 PROCEDURE — 99214 OFFICE O/P EST MOD 30 MIN: CPT

## 2019-04-30 PROCEDURE — 95885 MUSC TST DONE W/NERV TST LIM: CPT | Mod: 59

## 2019-04-30 PROCEDURE — 95909 NRV CNDJ TST 5-6 STUDIES: CPT

## 2019-04-30 PROCEDURE — 95887 MUSC TST DONE W/N TST NONEXT: CPT | Mod: 59

## 2019-04-30 PROCEDURE — 95886 MUSC TEST DONE W/N TEST COMP: CPT

## 2019-04-30 NOTE — HISTORY OF PRESENT ILLNESS
[FreeTextEntry1] : Right leg is still weak and he has noticed some atrophy in the thigh. \par He no longer has pain in the back or right leg since surgery\par He is off opiates completely\par He is taking gabapentin still which helps\par He denies diabetes\par He has had 3 spine surgeries in the past - cervical in 2006, lumbar in 2015, and lumbar again 2018

## 2019-04-30 NOTE — ASSESSMENT
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 neuropathy plus plexopathy vs. neuropathy plus motor neuron disease \par EMG not completely consistent with motor neuron disease although still possible \par Will check blood tests to look for causes of neuropathy\par Return in 4 weeks for re-evaluation\par \par See separate procedure note for full results of study.

## 2019-04-30 NOTE — PROCEDURE
[FreeTextEntry1] : Nerve Conduction and Electromyography Report [FreeTextEntry3] : Electro Physiologic Findings:\par \par Limb temperature was monitored and maintained at approximately 30 – 34° C in the lower extremities, and 32 – 36° C in the upper extremities.\par \par The right superficial fibular sensory response was absent. The right radial sensory amplitude was normal with very slight slowing of velocity. The right median distal motor latency was borderline, with normal CMAP amplitude and motor velocity. The right ulnar distal motor latency was mildly prolonged, with borderline CMAP amplitude; the velocity in the forearm was normal while across the elbow it was borderline. The right fibular CMAP amplitude was low with mildly reduced motor velocity and possible, but not definite, conduction block. The right tibial CMAP amplitude was very low with mildly prolonged distal motor latency. The right median and ulnar F-wave latencies were prolonged. \par \par Needle electromyography was performed on select right upper and bilateral lower extremity appendicular muscles as well as the bilateral mid-thoracic paraspinals. There was severe spontaneous activity in the right gastrocnemius, vastus lateralis, and iliopsoas; moderate in the right tibialis anterior, adductor angy, and left gastrocnemius; and mild in the right first dorsal interosseous and left tibialis anterior. There was also mild chronic denervation in the right FDI. The other muscles tested were unremarkable. \par \par Clinical Electrophysiological Impression: \par \par This electrodiagnostic study demonstrated evidence of a predominantly axonal sensorimotor polyneuropathy with some mild demyelinating features, mostly affecting the lower extremities but with some mild abnormalities in the right upper extremity as well. There was a significant amount of spontaneous activity in the legs, more on the right than the left, which likely represents active denervation, although many of these muscles did not have a clearly neurogenic firing pattern. This may be due to asymmetric neuropathy vs. superimposed polyradiculopathy or plexopathy vs. developing motor neuron degeneration. The findings have progressed from the prior study in October 2018.

## 2019-04-30 NOTE — PHYSICAL EXAM
[FreeTextEntry1] : Right hip flexion 3, knee flex/ext 3 to 4-, ankle dorsilfexion 4\par LLE 5/5\par Reflexes: patellar absent on right, 2+ L; achilles absent b/l\par Gait: drags right foot with walker [General Appearance - Alert] : alert [General Appearance - In No Acute Distress] : in no acute distress [Oriented To Time, Place, And Person] : oriented to person, place, and time [Impaired Insight] : insight and judgment were intact [Affect] : the affect was normal [Concentration Intact] : normal concentrating ability [Fluency] : fluency intact [Comprehension] : comprehension intact [Past History] : adequate knowledge of personal past history [Vocabulary] : adequate range of vocabulary [Sensation Tactile Decrease] : light touch was intact [Sensation Pain / Temperature Decrease] : pain and temperature was intact [Edema] : there was no peripheral edema

## 2019-05-01 LAB
ALBUMIN SERPL ELPH-MCNC: 4.7 G/DL
ALP BLD-CCNC: 55 U/L
ALT SERPL-CCNC: 10 U/L
ANA SER IF-ACNC: NEGATIVE
ANION GAP SERPL CALC-SCNC: 12 MMOL/L
AST SERPL-CCNC: 12 U/L
BASOPHILS # BLD AUTO: 0.04 K/UL
BASOPHILS NFR BLD AUTO: 1.1 %
BILIRUB SERPL-MCNC: 0.8 MG/DL
BUN SERPL-MCNC: 10 MG/DL
CALCIUM SERPL-MCNC: 9.6 MG/DL
CHLORIDE SERPL-SCNC: 104 MMOL/L
CK SERPL-CCNC: 103 U/L
CO2 SERPL-SCNC: 28 MMOL/L
CREAT SERPL-MCNC: 1.12 MG/DL
ENA RNP AB SER IA-ACNC: 0.2 AL
ENA SM AB SER IA-ACNC: <0.2 AL
ENA SS-A AB SER IA-ACNC: <0.2 AL
ENA SS-B AB SER IA-ACNC: <0.2 AL
EOSINOPHIL # BLD AUTO: 0.18 K/UL
EOSINOPHIL NFR BLD AUTO: 4.7 %
ERYTHROCYTE [SEDIMENTATION RATE] IN BLOOD BY WESTERGREN METHOD: 20 MM/HR
ESTIMATED AVERAGE GLUCOSE: 94 MG/DL
FOLATE SERPL-MCNC: 16.4 NG/ML
GLUCOSE SERPL-MCNC: 82 MG/DL
HBA1C MFR BLD HPLC: 4.9 %
HCT VFR BLD CALC: 42 %
HGB BLD-MCNC: 13.3 G/DL
HIV1+2 AB SPEC QL IA.RAPID: NONREACTIVE
IMM GRANULOCYTES NFR BLD AUTO: 0 %
LYMPHOCYTES # BLD AUTO: 1.18 K/UL
LYMPHOCYTES NFR BLD AUTO: 31.1 %
MAN DIFF?: NORMAL
MCHC RBC-ENTMCNC: 30.3 PG
MCHC RBC-ENTMCNC: 31.7 GM/DL
MCV RBC AUTO: 95.7 FL
MONOCYTES # BLD AUTO: 0.28 K/UL
MONOCYTES NFR BLD AUTO: 7.4 %
NEUTROPHILS # BLD AUTO: 2.12 K/UL
NEUTROPHILS NFR BLD AUTO: 55.7 %
PLATELET # BLD AUTO: 149 K/UL
POTASSIUM SERPL-SCNC: 4 MMOL/L
PROT SERPL-MCNC: 7.5 G/DL
RBC # BLD: 4.39 M/UL
RBC # FLD: 11.6 %
RHEUMATOID FACT SER QL: 17 IU/ML
SODIUM SERPL-SCNC: 144 MMOL/L
TSH SERPL-ACNC: 1.26 UIU/ML
VIT B12 SERPL-MCNC: 782 PG/ML
WBC # FLD AUTO: 3.8 K/UL

## 2019-05-02 LAB
ACE BLD-CCNC: 32 U/L
ALDOLASE SERPL-CCNC: 4.6 U/L

## 2019-05-03 LAB
ALBUMIN MFR SERPL ELPH: 57.9 %
ALBUMIN SERPL-MCNC: 4.3 G/DL
ALBUMIN/GLOB SERPL: 1.4 RATIO
ALPHA1 GLOB MFR SERPL ELPH: 3.2 %
ALPHA1 GLOB SERPL ELPH-MCNC: 0.2 G/DL
ALPHA2 GLOB MFR SERPL ELPH: 10.7 %
ALPHA2 GLOB SERPL ELPH-MCNC: 0.8 G/DL
B-GLOBULIN MFR SERPL ELPH: 11.5 %
B-GLOBULIN SERPL ELPH-MCNC: 0.9 G/DL
GAMMA GLOB FLD ELPH-MCNC: 1.2 G/DL
GAMMA GLOB MFR SERPL ELPH: 16.7 %
HOMOCYSTEINE LEVEL: 10.1 UMOL/L
HTLV I+II AB SER QL: NORMAL
INTERPRETATION SERPL IEP-IMP: NORMAL
M PROTEIN SPEC IFE-MCNC: NORMAL
METHYLMALONIC ACID LEVEL: 62 NMOL/L
PROT SERPL-MCNC: 7.4 G/DL
PROT SERPL-MCNC: 7.4 G/DL

## 2019-05-15 LAB
ASIALO-GM1 ANTIBODIES, IGG/IGM: 8 IV
GD1A ANTIBODIES, IGG/IGM: 12 IV
GD1B ANTIBODIES, IGG/IGM: 14 IV
GM1 ANTIBODIES, IGG/IGM: 14 IV
GM2 ANTIBODIES, IGG/IGM: 9 IV
GQ1B ANTIBODIES, IGG/IGM: 63 IV
MAG AB SER QL: NEGATIVE
VGKC AB SER-SCNC: 1 PMOL/L

## 2019-05-24 ENCOUNTER — EMERGENCY (EMERGENCY)
Facility: HOSPITAL | Age: 69
LOS: 1 days | Discharge: HOME | End: 2019-05-24
Attending: EMERGENCY MEDICINE | Admitting: EMERGENCY MEDICINE
Payer: MEDICARE

## 2019-05-24 VITALS
RESPIRATION RATE: 18 BRPM | HEART RATE: 66 BPM | WEIGHT: 205.03 LBS | DIASTOLIC BLOOD PRESSURE: 81 MMHG | OXYGEN SATURATION: 98 % | TEMPERATURE: 98 F | SYSTOLIC BLOOD PRESSURE: 131 MMHG | HEIGHT: 74 IN

## 2019-05-24 VITALS
OXYGEN SATURATION: 97 % | SYSTOLIC BLOOD PRESSURE: 153 MMHG | RESPIRATION RATE: 18 BRPM | HEART RATE: 63 BPM | TEMPERATURE: 98 F | DIASTOLIC BLOOD PRESSURE: 91 MMHG
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
## 2019-05-24 DIAGNOSIS — M51.34 OTHER INTERVERTEBRAL DISC DEGENERATION, THORACIC REGION: Chronic | ICD-10-CM
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
## 2019-05-24 DIAGNOSIS — Z98.890 OTHER SPECIFIED POSTPROCEDURAL STATES: Chronic | ICD-10-CM

## 2019-05-24 DIAGNOSIS — Z79.899 OTHER LONG TERM (CURRENT) DRUG THERAPY: ICD-10-CM

## 2019-05-24 DIAGNOSIS — R07.89 OTHER CHEST PAIN: ICD-10-CM

## 2019-05-24 DIAGNOSIS — S70.01XA CONTUSION OF RIGHT HIP, INITIAL ENCOUNTER: ICD-10-CM

## 2019-05-24 DIAGNOSIS — S80.01XA CONTUSION OF RIGHT KNEE, INITIAL ENCOUNTER: ICD-10-CM

## 2019-05-24 DIAGNOSIS — M25.561 PAIN IN RIGHT KNEE: ICD-10-CM
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
## 2019-05-24 LAB
ALBUMIN SERPL ELPH-MCNC: 4.1 G/DL — SIGNIFICANT CHANGE UP (ref 3.3–5)
ALP SERPL-CCNC: 47 U/L — SIGNIFICANT CHANGE UP (ref 40–120)
ALT FLD-CCNC: 9 U/L — LOW (ref 10–45)
ANION GAP SERPL CALC-SCNC: 11 MMOL/L — SIGNIFICANT CHANGE UP (ref 5–17)
APTT BLD: 29.7 SEC — SIGNIFICANT CHANGE UP (ref 27.5–36.3)
AST SERPL-CCNC: 14 U/L — SIGNIFICANT CHANGE UP (ref 10–40)
BASOPHILS # BLD AUTO: 0.03 K/UL — SIGNIFICANT CHANGE UP (ref 0–0.2)
BASOPHILS NFR BLD AUTO: 0.9 % — SIGNIFICANT CHANGE UP (ref 0–2)
BILIRUB SERPL-MCNC: 1 MG/DL — SIGNIFICANT CHANGE UP (ref 0.2–1.2)
BUN SERPL-MCNC: 10 MG/DL — SIGNIFICANT CHANGE UP (ref 7–23)
CALCIUM SERPL-MCNC: 9.1 MG/DL — SIGNIFICANT CHANGE UP (ref 8.4–10.5)
CHLORIDE SERPL-SCNC: 105 MMOL/L — SIGNIFICANT CHANGE UP (ref 96–108)
CO2 SERPL-SCNC: 26 MMOL/L — SIGNIFICANT CHANGE UP (ref 22–31)
CREAT SERPL-MCNC: 1.01 MG/DL — SIGNIFICANT CHANGE UP (ref 0.5–1.3)
D DIMER BLD IA.RAPID-MCNC: 239 NG/ML DDU — HIGH
EOSINOPHIL # BLD AUTO: 0.2 K/UL — SIGNIFICANT CHANGE UP (ref 0–0.5)
EOSINOPHIL NFR BLD AUTO: 5.8 % — SIGNIFICANT CHANGE UP (ref 0–6)
GLUCOSE SERPL-MCNC: 93 MG/DL — SIGNIFICANT CHANGE UP (ref 70–99)
HCT VFR BLD CALC: 35.7 % — LOW (ref 39–50)
HGB BLD-MCNC: 11.9 G/DL — LOW (ref 13–17)
IMM GRANULOCYTES NFR BLD AUTO: 0.3 % — SIGNIFICANT CHANGE UP (ref 0–1.5)
INR BLD: 1.31 — HIGH (ref 0.88–1.16)
LYMPHOCYTES # BLD AUTO: 1.04 K/UL — SIGNIFICANT CHANGE UP (ref 1–3.3)
LYMPHOCYTES # BLD AUTO: 30.1 % — SIGNIFICANT CHANGE UP (ref 13–44)
MCHC RBC-ENTMCNC: 30.7 PG — SIGNIFICANT CHANGE UP (ref 27–34)
MCHC RBC-ENTMCNC: 33.3 GM/DL — SIGNIFICANT CHANGE UP (ref 32–36)
MCV RBC AUTO: 92.2 FL — SIGNIFICANT CHANGE UP (ref 80–100)
MONOCYTES # BLD AUTO: 0.38 K/UL — SIGNIFICANT CHANGE UP (ref 0–0.9)
MONOCYTES NFR BLD AUTO: 11 % — SIGNIFICANT CHANGE UP (ref 2–14)
NEUTROPHILS # BLD AUTO: 1.8 K/UL — SIGNIFICANT CHANGE UP (ref 1.8–7.4)
NEUTROPHILS NFR BLD AUTO: 51.9 % — SIGNIFICANT CHANGE UP (ref 43–77)
NRBC # BLD: 0 /100 WBCS — SIGNIFICANT CHANGE UP (ref 0–0)
PLATELET # BLD AUTO: 115 K/UL — LOW (ref 150–400)
POTASSIUM SERPL-MCNC: 4 MMOL/L — SIGNIFICANT CHANGE UP (ref 3.5–5.3)
POTASSIUM SERPL-SCNC: 4 MMOL/L — SIGNIFICANT CHANGE UP (ref 3.5–5.3)
PROT SERPL-MCNC: 7.2 G/DL — SIGNIFICANT CHANGE UP (ref 6–8.3)
PROTHROM AB SERPL-ACNC: 14.9 SEC — HIGH (ref 10–12.9)
RBC # BLD: 3.87 M/UL — LOW (ref 4.2–5.8)
RBC # FLD: 11.6 % — SIGNIFICANT CHANGE UP (ref 10.3–14.5)
SODIUM SERPL-SCNC: 142 MMOL/L — SIGNIFICANT CHANGE UP (ref 135–145)
TROPONIN T SERPL-MCNC: <0.01 NG/ML — SIGNIFICANT CHANGE UP (ref 0–0.01)
WBC # BLD: 3.46 K/UL — LOW (ref 3.8–10.5)
WBC # FLD AUTO: 3.46 K/UL — LOW (ref 3.8–10.5)

## 2019-05-24 PROCEDURE — 71275 CT ANGIOGRAPHY CHEST: CPT

## 2019-05-24 PROCEDURE — 85730 THROMBOPLASTIN TIME PARTIAL: CPT

## 2019-05-24 PROCEDURE — 96374 THER/PROPH/DIAG INJ IV PUSH: CPT | Mod: XU

## 2019-05-24 PROCEDURE — 73502 X-RAY EXAM HIP UNI 2-3 VIEWS: CPT

## 2019-05-24 PROCEDURE — 71275 CT ANGIOGRAPHY CHEST: CPT | Mod: 26

## 2019-05-24 PROCEDURE — 71045 X-RAY EXAM CHEST 1 VIEW: CPT | Mod: 26

## 2019-05-24 PROCEDURE — 36415 COLL VENOUS BLD VENIPUNCTURE: CPT

## 2019-05-24 PROCEDURE — 85025 COMPLETE CBC W/AUTO DIFF WBC: CPT

## 2019-05-24 PROCEDURE — 84484 ASSAY OF TROPONIN QUANT: CPT

## 2019-05-24 PROCEDURE — 73502 X-RAY EXAM HIP UNI 2-3 VIEWS: CPT | Mod: 26,RT

## 2019-05-24 PROCEDURE — 93005 ELECTROCARDIOGRAM TRACING: CPT

## 2019-05-24 PROCEDURE — 93010 ELECTROCARDIOGRAM REPORT: CPT

## 2019-05-24 PROCEDURE — 99284 EMERGENCY DEPT VISIT MOD MDM: CPT | Mod: 25

## 2019-05-24 PROCEDURE — 73562 X-RAY EXAM OF KNEE 3: CPT

## 2019-05-24 PROCEDURE — 85379 FIBRIN DEGRADATION QUANT: CPT

## 2019-05-24 PROCEDURE — 73562 X-RAY EXAM OF KNEE 3: CPT | Mod: 26,RT

## 2019-05-24 PROCEDURE — 85610 PROTHROMBIN TIME: CPT

## 2019-05-24 PROCEDURE — 80053 COMPREHEN METABOLIC PANEL: CPT

## 2019-05-24 PROCEDURE — 99285 EMERGENCY DEPT VISIT HI MDM: CPT | Mod: 25

## 2019-05-24 PROCEDURE — 71045 X-RAY EXAM CHEST 1 VIEW: CPT

## 2019-05-24 RX ORDER — KETOROLAC TROMETHAMINE 30 MG/ML
15 SYRINGE (ML) INJECTION ONCE
Refills: 0 | Status: DISCONTINUED | OUTPATIENT
Start: 2019-05-24 | End: 2019-05-24

## 2019-05-24 RX ADMIN — Medication 15 MILLIGRAM(S): at 08:43

## 2019-05-24 NOTE — ED PROVIDER NOTE - CLINICAL SUMMARY MEDICAL DECISION MAKING FREE TEXT BOX
Impression: s/p fall w/ r hip, knee and chest pain ever since, worse when standing/ ambulating. NVI. Afebrile, hds. EKG non-ischemic. CXR neg for i/e/chf. Xrays of r hip/pelvis and knee neg for acute fx/ dislocation. Trop neg. D-dimer mildly elevated. CTA chest done and neg for pe, + cm and possible pulm htn. ED evaluation and management discussed with the patient in detail.  Pt feeling better with medication. Close PMD follow up encouraged.  Strict ED return instructions discussed in detail and patient given the opportunity to ask any questions about their discharge diagnosis and instructions. Patient verbalized understanding.

## 2019-05-24 NOTE — ED PROVIDER NOTE - OBJECTIVE STATEMENT
Pt is a 68yo m, h/o spine surg for spinal stenosis, pe, htn, bph, who presents s/p fall 3d ago. Pt landed on buttocks, no head trauma/ loc. Pt was helped up by neighbor. Has been ambulatory since, however c/o pain to r knee/ hip, and r chest. Pt concerned about re-occurrence of pe as cp feels similar to when dx'd w/ pe, sharp, constant. No sob, palp, leg edema. No f/c, cough, congestion. No abd pain, vomiting, diarrhea. No acute back pain/ neck pain. No n/t/w of ext, saddle anesthesia.

## 2019-05-24 NOTE — ED PROVIDER NOTE - NSFOLLOWUPINSTRUCTIONS_ED_ALL_ED_FT
Take your pain medication as usual. Supplement with advil/ motrin as needed for pain. Apply ice to your hip/ knee. Follow up with your primary care physician for re-evaluation and further work up for possible PULMONARY HYPERTENSION. Return to er for any new or worsening symptoms (worsening chest pain, shortness of breath, dizziness...).    Contusion    A contusion is a deep bruise. Contusions are the result of a blunt injury to tissues and muscle fibers under the skin. The skin overlying the contusion may turn blue, purple, or yellow. Symptoms also include pain and swelling in the injured area.    SEEK IMMEDIATE MEDICAL CARE IF YOU HAVE ANY OF THE FOLLOWING SYMPTOMS: severe pain, numbness, tingling, pain, weakness, or skin color/temperature change in any part of your body distal to the injury.    Chest Pain    Chest pain can be caused by many different conditions which may or may not be dangerous. Causes include heartburn, lung infections, heart attack, blood clot in lungs, skin infections, strain or damage to muscle, cartilage, or bones, etc. In addition to a history and physical examination, an electrocardiogram (ECG) or other lab tests may have been performed to determine the cause of your chest pain. Follow up with your primary care provider or with a cardiologist as instructed.     SEEK IMMEDIATE MEDICAL CARE IF YOU HAVE ANY OF THE FOLLOWING SYMPTOMS: worsening chest pain, coughing up blood, unexplained back/neck/jaw pain, severe abdominal pain, dizziness or lightheadedness, fainting, shortness of breath, sweaty or clammy skin, vomiting, or racing heart beat. These symptoms may represent a serious problem that is an emergency. Do not wait to see if the symptoms will go away. Get medical help right away. Call 911 and do not drive yourself to the hospital.

## 2019-05-24 NOTE — ED PROVIDER NOTE - PHYSICAL EXAMINATION
VITAL SIGNS: I have reviewed nursing notes and confirm.  CONSTITUTIONAL: Well-developed; well-nourished; in no acute distress.   SKIN:  warm and dry, no acute rash.   HEAD:  normocephalic, atraumatic.  EYES: EOM intact; conjunctiva and sclera clear.  ENT: No nasal discharge; airway clear.   NECK: Supple; non tender.  CARD: S1, S2 normal; no murmurs, gallops, or rubs. Regular rate and rhythm.   RESP:  Clear to auscultation b/l, no wheezes, rales or rhonchi.  ABD: Normal bowel sounds; soft; non-distended; non-tender; no guarding/ rebound.  BACK: No spinal tenderness. No cvat.  EXT: Pelvis stable to rocking. No ttp to hip jts. No shortening/ rotation of lower ext. Normal ROM. + mild ttp to r knee jt, no deformity/ swelling. FROM at knee jt. No ttp elsewhere along leg/ ankle/ foot. No clubbing, cyanosis or edema. No calf tenderness/ cords. 2+ pulses to b/l ue/le.  NEURO: Alert, oriented, motor/ sensation intact and equal b/l.   PSYCH: Cooperative, mood and affect appropriate.

## 2019-05-24 NOTE — ED ADULT TRIAGE NOTE - CHIEF COMPLAINT QUOTE
I fell from a stool last Tues , since has been having pain to right  chest , right hip , right leg and right knee

## 2019-05-24 NOTE — ED ADULT NURSE REASSESSMENT NOTE - NS ED NURSE REASSESS COMMENT FT1
Pt received from night JOSELUIS Rodriguez. Pt resting comfortably in NAD. VSS. Will continue to monitor.

## 2019-05-24 NOTE — ED PROVIDER NOTE - CARE PLAN
Principal Discharge DX:	Contusion of right knee, initial encounter  Secondary Diagnosis:	Contusion of right hip, initial encounter  Secondary Diagnosis:	Chest pain, unspecified type

## 2019-05-28 ENCOUNTER — APPOINTMENT (OUTPATIENT)
Dept: NEUROLOGY | Facility: CLINIC | Age: 69
End: 2019-05-28
Payer: MEDICARE

## 2019-05-28 VITALS
WEIGHT: 206 LBS | DIASTOLIC BLOOD PRESSURE: 84 MMHG | SYSTOLIC BLOOD PRESSURE: 132 MMHG | TEMPERATURE: 98.3 F | OXYGEN SATURATION: 96 % | HEART RATE: 70 BPM | HEIGHT: 60 IN | BODY MASS INDEX: 40.44 KG/M2

## 2019-05-28 DIAGNOSIS — K92.1 MELENA: ICD-10-CM

## 2019-05-28 PROCEDURE — 99215 OFFICE O/P EST HI 40 MIN: CPT

## 2019-05-28 RX ORDER — TAPENTADOL HYDROCHLORIDE 100 MG/1
100 TABLET, FILM COATED, EXTENDED RELEASE ORAL
Qty: 60 | Refills: 0 | Status: ACTIVE | COMMUNITY
Start: 2019-05-15

## 2019-05-28 RX ORDER — ENOXAPARIN SODIUM 150 MG/ML
150 INJECTION SUBCUTANEOUS
Refills: 0 | Status: DISCONTINUED | COMMUNITY
End: 2019-05-28

## 2019-05-28 NOTE — ASSESSMENT
[FreeTextEntry1] : Refer to GI for blood in stool\par Will arrange for LP through IR to look for evidence of nerve root inflammation \par Also arrange for nerve and muscle biopsy - right sural and right gastrocnemius\par TTR testing for hereditary amyloidosis

## 2019-05-28 NOTE — HISTORY OF PRESENT ILLNESS
[FreeTextEntry1] : He had a few falls since last visit, including one this weekend from a stool. He hurt his right knee which got swollen. He went to the ER, had CT chest due to some chest pain to r/o PE which was negative, although there was some cardiomegaly. D dimer was only mildly elevated. \par Labs at last visit - RF mildly elevated, Gq1b also mildly elevated - both likely false positive as do not fit with symptoms

## 2019-05-28 NOTE — PHYSICAL EXAM
[General Appearance - Alert] : alert [General Appearance - In No Acute Distress] : in no acute distress [Oriented To Time, Place, And Person] : oriented to person, place, and time [Impaired Insight] : insight and judgment were intact [Affect] : the affect was normal [Concentration Intact] : normal concentrating ability [Fluency] : fluency intact [Comprehension] : comprehension intact [Past History] : adequate knowledge of personal past history [Vocabulary] : adequate range of vocabulary [Sensation Tactile Decrease] : light touch was intact [Sensation Pain / Temperature Decrease] : pain and temperature was intact [Edema] : there was no peripheral edema [FreeTextEntry1] : Right hip flexion 3, knee flex/ext 3 to 4-, ankle dorsilfexion 4\par LLE 5/5\par Reflexes: patellar absent on right, 2+ L; achilles 1+ b/l\par Gait: drags right foot with walker

## 2019-05-30 ENCOUNTER — RX RENEWAL (OUTPATIENT)
Age: 69
End: 2019-05-30

## 2019-06-07 ENCOUNTER — OUTPATIENT (OUTPATIENT)
Dept: OUTPATIENT SERVICES | Facility: HOSPITAL | Age: 69
LOS: 1 days | End: 2019-06-07
Payer: MEDICARE

## 2019-06-07 ENCOUNTER — APPOINTMENT (OUTPATIENT)
Dept: INTERVENTIONAL RADIOLOGY/VASCULAR | Facility: HOSPITAL | Age: 69
End: 2019-06-07
Payer: MEDICARE

## 2019-06-07 DIAGNOSIS — Z98.890 OTHER SPECIFIED POSTPROCEDURAL STATES: Chronic | ICD-10-CM

## 2019-06-07 DIAGNOSIS — M51.34 OTHER INTERVERTEBRAL DISC DEGENERATION, THORACIC REGION: Chronic | ICD-10-CM

## 2019-06-07 LAB
APPEARANCE CSF: CLEAR — SIGNIFICANT CHANGE UP
APPEARANCE SPUN FLD: COLORLESS — SIGNIFICANT CHANGE UP
COLOR CSF: SIGNIFICANT CHANGE UP
CSF COMMENTS: SIGNIFICANT CHANGE UP
CSF PCR RESULT: SIGNIFICANT CHANGE UP
GLUCOSE CSF-MCNC: 65 MG/DL — SIGNIFICANT CHANGE UP (ref 40–70)
LYMPHOCYTES # CSF: 2 % — LOW (ref 40–80)
MONOS+MACROS NFR CSF: 0 % — LOW (ref 15–45)
NEUTROPHILS # CSF: 0 % — SIGNIFICANT CHANGE UP (ref 0–6)
NRBC NFR CSF: 1 /UL — SIGNIFICANT CHANGE UP (ref 0–5)
PROT CSF-MCNC: 38 MG/DL — SIGNIFICANT CHANGE UP (ref 15–45)
RBC # CSF: 2 /UL — HIGH (ref 0–0)
TUBE TYPE: SIGNIFICANT CHANGE UP

## 2019-06-07 PROCEDURE — 77003 FLUOROGUIDE FOR SPINE INJECT: CPT | Mod: 26

## 2019-06-07 PROCEDURE — 62270 DX LMBR SPI PNXR: CPT

## 2019-06-08 LAB
PROT SERPL-MCNC: 7.7 G/DL — SIGNIFICANT CHANGE UP (ref 6–8.3)
PROT SERPL-MCNC: 7.7 G/DL — SIGNIFICANT CHANGE UP (ref 6–8.3)

## 2019-06-10 LAB
ALBUMIN CSF-MCNC: 25.2 MG/DL — HIGH (ref 14–25)
ALBUMIN SERPL ELPH-MCNC: 4020 MG/DL — SIGNIFICANT CHANGE UP (ref 3500–5200)
IGG CSF-MCNC: 4.1 MG/DL — HIGH
IGG FLD-MCNC: 1380 MG/DL — SIGNIFICANT CHANGE UP (ref 610–1660)
IGG SYNTH RATE SER+CSF CALC-MRATE: -3.9 MG/DAY — SIGNIFICANT CHANGE UP
IGG/ALB CLEAR SER+CSF-RTO: 0.5 — SIGNIFICANT CHANGE UP
IGG/ALB CSF: 0.16 RATIO — SIGNIFICANT CHANGE UP
IGG/ALB SER: 0.34 RATIO — SIGNIFICANT CHANGE UP
MBP CSF-MCNC: <2 MCG/L — LOW (ref 2–4)

## 2019-06-10 PROCEDURE — 86341 ISLET CELL ANTIBODY: CPT

## 2019-06-10 PROCEDURE — 62270 DX LMBR SPI PNXR: CPT

## 2019-06-10 PROCEDURE — 83519 RIA NONANTIBODY: CPT

## 2019-06-10 PROCEDURE — 89051 BODY FLUID CELL COUNT: CPT

## 2019-06-10 PROCEDURE — 84157 ASSAY OF PROTEIN OTHER: CPT

## 2019-06-10 PROCEDURE — 87483 CNS DNA AMP PROBE TYPE 12-25: CPT

## 2019-06-10 PROCEDURE — 84165 PROTEIN E-PHORESIS SERUM: CPT

## 2019-06-10 PROCEDURE — 83916 OLIGOCLONAL BANDS: CPT

## 2019-06-10 PROCEDURE — 86592 SYPHILIS TEST NON-TREP QUAL: CPT

## 2019-06-10 PROCEDURE — 77003 FLUOROGUIDE FOR SPINE INJECT: CPT

## 2019-06-10 PROCEDURE — 83873 ASSAY OF CSF PROTEIN: CPT

## 2019-06-10 PROCEDURE — 82945 GLUCOSE OTHER FLUID: CPT

## 2019-06-10 PROCEDURE — 86255 FLUORESCENT ANTIBODY SCREEN: CPT

## 2019-06-10 PROCEDURE — 84155 ASSAY OF PROTEIN SERUM: CPT

## 2019-06-10 PROCEDURE — 86334 IMMUNOFIX E-PHORESIS SERUM: CPT

## 2019-06-11 LAB
% ALBUMIN: 58.3 % — SIGNIFICANT CHANGE UP
% ALPHA 1: 3.6 % — SIGNIFICANT CHANGE UP
% ALPHA 2: 10.4 % — SIGNIFICANT CHANGE UP
% BETA: 10.9 % — SIGNIFICANT CHANGE UP
% GAMMA: 16.8 % — SIGNIFICANT CHANGE UP
ALBUMIN SERPL ELPH-MCNC: 4.5 G/DL — SIGNIFICANT CHANGE UP (ref 3.6–5.5)
ALBUMIN/GLOB SERPL ELPH: 1.4 RATIO — SIGNIFICANT CHANGE UP
ALPHA1 GLOB SERPL ELPH-MCNC: 0.3 G/DL — SIGNIFICANT CHANGE UP (ref 0.1–0.4)
ALPHA2 GLOB SERPL ELPH-MCNC: 0.8 G/DL — SIGNIFICANT CHANGE UP (ref 0.5–1)
B-GLOBULIN SERPL ELPH-MCNC: 0.8 G/DL — SIGNIFICANT CHANGE UP (ref 0.5–1)
GAMMA GLOBULIN: 1.3 G/DL — SIGNIFICANT CHANGE UP (ref 0.6–1.6)
INTERPRETATION SERPL IFE-IMP: SIGNIFICANT CHANGE UP
PROT PATTERN SERPL ELPH-IMP: SIGNIFICANT CHANGE UP

## 2019-06-13 LAB — OLIGOCLONAL BANDS CSF ELPH-IMP: SIGNIFICANT CHANGE UP

## 2019-06-14 LAB — MISCELLANEOUS TEST NAME: SIGNIFICANT CHANGE UP

## 2019-06-15 LAB
AMPA-R AB CBA, CSF: NEGATIVE — SIGNIFICANT CHANGE UP
AMPHIPHYSIN AB TITR CSF: NEGATIVE TITER — SIGNIFICANT CHANGE UP
CASPR2-IGG CBA, CSF: NEGATIVE — SIGNIFICANT CHANGE UP
CV2 IGG TITR CSF: NEGATIVE TITER — SIGNIFICANT CHANGE UP
GABA-B-R AB CBA, CSF: NEGATIVE — SIGNIFICANT CHANGE UP
GAD65 AB CSF-SCNC: 0 NMOL/L — SIGNIFICANT CHANGE UP
GLIAL NUC TYPE 1 AB TITR CSF: NEGATIVE TITER — SIGNIFICANT CHANGE UP
HU1 AB TITR CSF IF: NEGATIVE TITER — SIGNIFICANT CHANGE UP
HU2 AB TITR CSF IF: NEGATIVE TITER — SIGNIFICANT CHANGE UP
HU3 AB TITR CSF: NEGATIVE TITER — SIGNIFICANT CHANGE UP
IMMUNOLOGIST REVIEW: SIGNIFICANT CHANGE UP
LGI1-IGG CBA, CSF: NEGATIVE — SIGNIFICANT CHANGE UP
NMDA-R AB CBA, CSF: NEGATIVE — SIGNIFICANT CHANGE UP
PCA-TR AB TITR CSF: NEGATIVE TITER — SIGNIFICANT CHANGE UP
PURKINJE CELL CYTOPLASMIC AB TYPE 2: NEGATIVE TITER — SIGNIFICANT CHANGE UP
PURKINJE CELLS AB TITR CSF IF: NEGATIVE TITER — SIGNIFICANT CHANGE UP
REFLEX ADDED: SIGNIFICANT CHANGE UP
VDRL CSF-TITR: NEGATIVE — SIGNIFICANT CHANGE UP
VGKC AB CSF-SCNC: 0 NMOL/L — SIGNIFICANT CHANGE UP (ref 0–0.02)

## 2019-06-17 ENCOUNTER — APPOINTMENT (OUTPATIENT)
Dept: OPHTHALMOLOGY | Facility: CLINIC | Age: 69
End: 2019-06-17
Payer: MEDICARE

## 2019-06-17 ENCOUNTER — NON-APPOINTMENT (OUTPATIENT)
Age: 69
End: 2019-06-17

## 2019-06-17 PROCEDURE — 92020 GONIOSCOPY: CPT

## 2019-06-17 PROCEDURE — 92250 FUNDUS PHOTOGRAPHY W/I&R: CPT

## 2019-06-17 PROCEDURE — 92083 EXTENDED VISUAL FIELD XM: CPT

## 2019-06-17 PROCEDURE — 92014 COMPRE OPH EXAM EST PT 1/>: CPT

## 2019-06-25 ENCOUNTER — APPOINTMENT (OUTPATIENT)
Dept: HEART AND VASCULAR | Facility: CLINIC | Age: 69
End: 2019-06-25
Payer: MEDICARE

## 2019-06-25 VITALS
SYSTOLIC BLOOD PRESSURE: 112 MMHG | WEIGHT: 205 LBS | OXYGEN SATURATION: 97 % | HEART RATE: 65 BPM | HEIGHT: 70.25 IN | BODY MASS INDEX: 29.35 KG/M2 | RESPIRATION RATE: 14 BRPM | DIASTOLIC BLOOD PRESSURE: 74 MMHG | TEMPERATURE: 97.8 F

## 2019-06-25 DIAGNOSIS — Z82.0 FAMILY HISTORY OF EPILEPSY AND OTHER DISEASES OF THE NERVOUS SYSTEM: ICD-10-CM

## 2019-06-25 DIAGNOSIS — Z80.3 FAMILY HISTORY OF MALIGNANT NEOPLASM OF BREAST: ICD-10-CM

## 2019-06-25 DIAGNOSIS — Z86.711 PERSONAL HISTORY OF PULMONARY EMBOLISM: ICD-10-CM

## 2019-06-25 PROCEDURE — 36415 COLL VENOUS BLD VENIPUNCTURE: CPT

## 2019-06-25 PROCEDURE — 93000 ELECTROCARDIOGRAM COMPLETE: CPT

## 2019-06-25 PROCEDURE — 99204 OFFICE O/P NEW MOD 45 MIN: CPT

## 2019-06-25 RX ORDER — PREGABALIN 75 MG/1
75 CAPSULE ORAL
Refills: 0 | Status: DISCONTINUED | COMMUNITY
End: 2019-06-25

## 2019-06-25 RX ORDER — FUROSEMIDE 40 MG/1
40 TABLET ORAL
Refills: 0 | Status: DISCONTINUED | COMMUNITY
End: 2019-06-25

## 2019-06-25 RX ORDER — BUPRENORPHINE 10 UG/H
10 PATCH, EXTENDED RELEASE TRANSDERMAL
Refills: 0 | Status: DISCONTINUED | COMMUNITY
End: 2019-06-25

## 2019-06-25 RX ORDER — GABAPENTIN 300 MG
300 TABLET ORAL
Refills: 0 | Status: DISCONTINUED | COMMUNITY
End: 2019-06-25

## 2019-06-25 NOTE — HISTORY OF PRESENT ILLNESS
[FreeTextEntry1] : 69 year male who comes to meet a new PCP. He had spine surgery at [facility]. He needed repeat surgery after an accident. He is followed by Dr Keegan Ridley of  and Dr Epi Shelton of Pulmonary. He reports concern about neuropathy. He went to ER at [facility] and was told of an enlarged heart. He was on anticoagulant for DVT in 2017 but is now only on ASA. He had blood in his stool and went to ER at [facility] a couple of weeks ago but I do not see records in [facility]. He was seen for falls and PE ruled out for right sided chest pain but Cardiomegaly noted. He is on Nycenta from Dr Yap of pain management. He is unsure who performed his last colonoscopy

## 2019-06-25 NOTE — PHYSICAL EXAM
[Normal Appearance] : normal appearance [Well Groomed] : well groomed [General Appearance - Well Developed] : well developed [No Deformities] : no deformities [General Appearance - Well Nourished] : well nourished [Normal Conjunctiva] : the conjunctiva exhibited no abnormalities [Heart Sounds] : normal S1 and S2 [General Appearance - In No Acute Distress] : no acute distress [Respiration, Rhythm And Depth] : normal respiratory rhythm and effort [] : no respiratory distress [Exaggerated Use Of Accessory Muscles For Inspiration] : no accessory muscle use [Auscultation Breath Sounds / Voice Sounds] : lungs were clear to auscultation bilaterally [Abdomen Soft] : soft [FreeTextEntry1] : walker [Affect] : the affect was normal [Oriented To Time, Place, And Person] : oriented to person, place, and time [Skin Turgor] : normal skin turgor [Mood] : the mood was normal [No Anxiety] : not feeling anxious

## 2019-06-25 NOTE — DISCUSSION/SUMMARY
[FreeTextEntry1] : Anemia, Cardiomegaly, Hx of recent GI bleeding--Labs for CBC and iron studies and stool cards. Echocardiogram. We discussed meeting GI in future.

## 2019-06-26 LAB
BASOPHILS # BLD AUTO: 0.05 K/UL
BASOPHILS NFR BLD AUTO: 1.1 %
EOSINOPHIL # BLD AUTO: 0.18 K/UL
EOSINOPHIL NFR BLD AUTO: 3.9 %
HCT VFR BLD CALC: 41.4 %
HGB BLD-MCNC: 13.3 G/DL
IMM GRANULOCYTES NFR BLD AUTO: 0.2 %
IRON SATN MFR SERPL: 40 %
IRON SERPL-MCNC: 128 UG/DL
LYMPHOCYTES # BLD AUTO: 1.75 K/UL
LYMPHOCYTES NFR BLD AUTO: 38 %
MAN DIFF?: NORMAL
MCHC RBC-ENTMCNC: 30.7 PG
MCHC RBC-ENTMCNC: 32.1 GM/DL
MCV RBC AUTO: 95.6 FL
MONOCYTES # BLD AUTO: 0.48 K/UL
MONOCYTES NFR BLD AUTO: 10.4 %
NEUTROPHILS # BLD AUTO: 2.13 K/UL
NEUTROPHILS NFR BLD AUTO: 46.4 %
PLATELET # BLD AUTO: 150 K/UL
RBC # BLD: 4.33 M/UL
RBC # FLD: 12 %
TIBC SERPL-MCNC: 321 UG/DL
UIBC SERPL-MCNC: 193 UG/DL
WBC # FLD AUTO: 4.6 K/UL

## 2019-06-28 ENCOUNTER — APPOINTMENT (OUTPATIENT)
Dept: NEUROSURGERY | Facility: CLINIC | Age: 69
End: 2019-06-28
Payer: MEDICARE

## 2019-06-28 VITALS
HEIGHT: 73 IN | BODY MASS INDEX: 27.17 KG/M2 | OXYGEN SATURATION: 97 % | DIASTOLIC BLOOD PRESSURE: 78 MMHG | SYSTOLIC BLOOD PRESSURE: 121 MMHG | RESPIRATION RATE: 16 BRPM | HEART RATE: 60 BPM | WEIGHT: 205 LBS

## 2019-06-28 PROCEDURE — 99204 OFFICE O/P NEW MOD 45 MIN: CPT

## 2019-06-28 RX ORDER — BRIMONIDINE TARTRATE 2 MG/MG
0.2 SOLUTION/ DROPS OPHTHALMIC
Qty: 1 | Refills: 3 | Status: DISCONTINUED | COMMUNITY
Start: 2018-10-15 | End: 2019-06-28

## 2019-06-28 RX ORDER — LACTOBACILLUS ACIDOPHILUS/PECT 30 MG-20MG
TABLET ORAL
Refills: 0 | Status: DISCONTINUED | COMMUNITY
End: 2019-06-28

## 2019-06-28 RX ORDER — ALFUZOSIN HYDROCHLORIDE 10 MG/1
10 TABLET, EXTENDED RELEASE ORAL
Refills: 0 | Status: DISCONTINUED | COMMUNITY
End: 2019-06-28

## 2019-07-01 ENCOUNTER — APPOINTMENT (OUTPATIENT)
Dept: HEART AND VASCULAR | Facility: CLINIC | Age: 69
End: 2019-07-01
Payer: MEDICARE

## 2019-07-01 VITALS
RESPIRATION RATE: 16 BRPM | SYSTOLIC BLOOD PRESSURE: 104 MMHG | WEIGHT: 205 LBS | OXYGEN SATURATION: 97 % | HEART RATE: 69 BPM | TEMPERATURE: 97.7 F | BODY MASS INDEX: 27.17 KG/M2 | HEIGHT: 73 IN | DIASTOLIC BLOOD PRESSURE: 66 MMHG

## 2019-07-01 DIAGNOSIS — I34.0 NONRHEUMATIC MITRAL (VALVE) INSUFFICIENCY: ICD-10-CM

## 2019-07-01 DIAGNOSIS — Z01.818 ENCOUNTER FOR OTHER PREPROCEDURAL EXAMINATION: ICD-10-CM

## 2019-07-01 PROCEDURE — 93306 TTE W/DOPPLER COMPLETE: CPT

## 2019-07-01 PROCEDURE — 99214 OFFICE O/P EST MOD 30 MIN: CPT

## 2019-07-01 NOTE — DISCUSSION/SUMMARY
[FreeTextEntry1] : At the time of the patient's visit an Echocardiogram was performed to evaluate his Cardiomegaly seen on CT. At the time of the visit the results were reviewed with patient \par \par We discussed meeting with Rheumatology and GI at [facility] post-op. I informed the patient that pending results of preop labs, I did not find a medical or cardiac contraindication to the proposed muscle and nerve biopsy at this time

## 2019-07-01 NOTE — REVIEW OF SYSTEMS
[Urinary Frequency] : urinary frequency [Joint Pain] : joint pain [Tingling (Paresthesia)] : tingling [Negative] : Gastrointestinal

## 2019-07-01 NOTE — HISTORY OF PRESENT ILLNESS
[FreeTextEntry1] : 69 year male who plans to have a muscle and nerve biopsy on July 25, 2019 with Dr Greene. We reviewed that he had a CXR at [facility] in May 2019.

## 2019-07-02 ENCOUNTER — LABORATORY RESULT (OUTPATIENT)
Age: 69
End: 2019-07-02

## 2019-07-02 ENCOUNTER — APPOINTMENT (OUTPATIENT)
Dept: NEUROLOGY | Facility: CLINIC | Age: 69
End: 2019-07-02
Payer: MEDICARE

## 2019-07-02 VITALS
WEIGHT: 201 LBS | TEMPERATURE: 97.9 F | DIASTOLIC BLOOD PRESSURE: 79 MMHG | OXYGEN SATURATION: 99 % | HEART RATE: 64 BPM | SYSTOLIC BLOOD PRESSURE: 129 MMHG

## 2019-07-02 PROCEDURE — 99214 OFFICE O/P EST MOD 30 MIN: CPT

## 2019-07-02 PROCEDURE — 76882 US LMTD JT/FCL EVL NVASC XTR: CPT | Mod: RT,59

## 2019-07-02 PROCEDURE — 76881 US COMPL JOINT R-T W/IMG: CPT

## 2019-07-03 LAB
25(OH)D3 SERPL-MCNC: 11.8 NG/ML
ALBUMIN SERPL ELPH-MCNC: 4.7 G/DL
ALP BLD-CCNC: 56 U/L
ALT SERPL-CCNC: 10 U/L
ANION GAP SERPL CALC-SCNC: 11 MMOL/L
APTT BLD: 27.9 SEC
AST SERPL-CCNC: 15 U/L
BASOPHILS # BLD AUTO: 0.04 K/UL
BASOPHILS NFR BLD AUTO: 0.9 %
BILIRUB SERPL-MCNC: 0.9 MG/DL
BUN SERPL-MCNC: 9 MG/DL
CALCIUM SERPL-MCNC: 9.5 MG/DL
CHLORIDE SERPL-SCNC: 106 MMOL/L
CHOLEST SERPL-MCNC: 152 MG/DL
CHOLEST/HDLC SERPL: 3.7 RATIO
CO2 SERPL-SCNC: 26 MMOL/L
CREAT SERPL-MCNC: 1.15 MG/DL
EOSINOPHIL # BLD AUTO: 0.3 K/UL
EOSINOPHIL NFR BLD AUTO: 7 %
ESTIMATED AVERAGE GLUCOSE: 94 MG/DL
GLUCOSE SERPL-MCNC: 87 MG/DL
HBA1C MFR BLD HPLC: 4.9 %
HCT VFR BLD CALC: 39.9 %
HDLC SERPL-MCNC: 41 MG/DL
HGB BLD-MCNC: 12.9 G/DL
IMM GRANULOCYTES NFR BLD AUTO: 0.2 %
INR PPP: 1.09 RATIO
LDLC SERPL CALC-MCNC: 90 MG/DL
LYMPHOCYTES # BLD AUTO: 1.49 K/UL
LYMPHOCYTES NFR BLD AUTO: 34.8 %
MAN DIFF?: NORMAL
MCHC RBC-ENTMCNC: 30.4 PG
MCHC RBC-ENTMCNC: 32.3 GM/DL
MCV RBC AUTO: 94.1 FL
MONOCYTES # BLD AUTO: 0.35 K/UL
MONOCYTES NFR BLD AUTO: 8.2 %
NEUTROPHILS # BLD AUTO: 2.09 K/UL
NEUTROPHILS NFR BLD AUTO: 48.9 %
PLATELET # BLD AUTO: 123 K/UL
POTASSIUM SERPL-SCNC: 3.7 MMOL/L
PROT SERPL-MCNC: 7.4 G/DL
PT BLD: 12.4 SEC
RBC # BLD: 4.24 M/UL
RBC # FLD: 12.2 %
SODIUM SERPL-SCNC: 143 MMOL/L
TRIGL SERPL-MCNC: 103 MG/DL
TSH SERPL-ACNC: 1.63 UIU/ML
WBC # FLD AUTO: 4.28 K/UL

## 2019-07-04 NOTE — HISTORY OF PRESENT ILLNESS
[FreeTextEntry1] : Leg strength is unchanged\par He saw Dr. Smith who referred him to GI and rheumatology (RF mildly positive)\par He is scheduled for nerve and muscle biopsy at the end of july. \par Still using walker

## 2019-07-04 NOTE — PROCEDURE
[FreeTextEntry3] : Neuromuscular Ultrasound was performed on the right ulnar, median, and common fibular nerves, using an BioMedFlexLab Gamma with a linear high-frequency (12-19Hz) transducer probe. \par \par Right Ulnar Nerve	Size (Normal)	Echogenicity	Fascicular Bergton\par Wrist	6 (<8 mm2)	Normal	Normal\par Below Elbow	11 (<9 mm2)	Normal	Normal\par At Medial Epicondyle	12 (<10 mm2)	Hypo	Normal\par Above Elbow	8 (<9 mm2)	Hypo	Normal\par Axilla 	11 (<9 mm2)	Normal	Normal\par \par \par Right Median Nerve 	Size (Normal)	Echogenicity	Fascicular Pattern\par Wrist	9 (<14 mm2)	Hypo	Normal\par Forearm	7 (<10  mm2)	Normal	Normal\par Elbow	10(<13 mm2)	Normal	Normal\par Axilla 	9 (<12 mm2)	Normal	Normal\par \par  \par Fibular Nerve	Right (mm2)	Echogenicity	Fascicular Bergton\par At Fib Head	17 (< 18 mm2)	Normal	Normal\par Pop Fossa	10 (< 20 mm2)	Normal	Normal\par \par Impression:\par \par The right ulnar nerve was mildly enlarged at multiple points along its course, with a hypoechoic appearance. The other nerves tested were normal in size and appearance. \par \par The right gastrocnemius and tibilias anterior were hyperechoic, with loss of muscle architecture. The right biceps and forearm flexor muscles were relatively normal. There were no visible fasciculations in any of the muscles tested.  [FreeTextEntry1] : Neuromuscular Ultrasound

## 2019-07-04 NOTE — PROCEDURE
[FreeTextEntry1] : Neuromuscular Ultrasound [FreeTextEntry3] : Neuromuscular Ultrasound was performed on the right ulnar, median, and common fibular nerves, using an [employer]Lab Gamma with a linear high-frequency (12-19Hz) transducer probe. \par \par Right Ulnar Nerve	Size (Normal)	Echogenicity	Fascicular Avery\par Wrist	6 (<8 mm2)	Normal	Normal\par Below Elbow	11 (<9 mm2)	Normal	Normal\par At Medial Epicondyle	12 (<10 mm2)	Hypo	Normal\par Above Elbow	8 (<9 mm2)	Hypo	Normal\par Axilla 	11 (<9 mm2)	Normal	Normal\par \par \par Right Median Nerve 	Size (Normal)	Echogenicity	Fascicular Pattern\par Wrist	9 (<14 mm2)	Hypo	Normal\par Forearm	7 (<10  mm2)	Normal	Normal\par Elbow	10(<13 mm2)	Normal	Normal\par Axilla 	9 (<12 mm2)	Normal	Normal\par \par  \par Fibular Nerve	Right (mm2)	Echogenicity	Fascicular Avery\par At Fib Head	17 (< 18 mm2)	Normal	Normal\par Pop Fossa	10 (< 20 mm2)	Normal	Normal\par \par Impression:\par \par The right ulnar nerve was mildly enlarged at multiple points along its course, with a hypoechoic appearance. The other nerves tested were normal in size and appearance. \par \par The right gastrocnemius and tibilias anterior were hyperechoic, with loss of muscle architecture. The right biceps and forearm flexor muscles were relatively normal. There were no visible fasciculations in any of the muscles tested.

## 2019-07-04 NOTE — ASSESSMENT
[FreeTextEntry1] : Strength may be somewhat better\par Neuromuscular ultrasound performed  today showed some mild enlargement of ulnar nerve at multiple sites, but not very significant, not consistent with CIDP\par Median and common fibular nerves were normal\par Tibialis anterior and gastrocnemius muscles with abnormal signal possibly from denervation; muscles in RUE normal, no fasciculations to suggest motor neuron disease.\par \par f/u in about 1 month, after muscle and nerve biopsy. 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

## 2019-07-04 NOTE — PHYSICAL EXAM
[FreeTextEntry1] : Right hip flexion 4-, knee flex/ext 4-, ankle dorsilfexion 4\par LLE 5/5\par Reflexes: patellar absent on right, 2+ L; achilles 1+ b/l\par Gait: drags right foot with walker [General Appearance - Alert] : alert [Oriented To Time, Place, And Person] : oriented to person, place, and time [General Appearance - In No Acute Distress] : in no acute distress [Impaired Insight] : insight and judgment were intact [Affect] : the affect was normal [Fluency] : fluency intact [Concentration Intact] : normal concentrating ability [Comprehension] : comprehension intact [Past History] : adequate knowledge of personal past history [Vocabulary] : adequate range of vocabulary [Sensation Tactile Decrease] : light touch was intact [Sensation Pain / Temperature Decrease] : pain and temperature was intact [Edema] : there was no peripheral edema

## 2019-07-15 NOTE — PHYSICAL EXAM
[General Appearance - Alert] : alert [FreeTextEntry1] : Right hip flexion 4-, knee flex/ext 4-, ankle dorsilfexion 4\par LLE 5/5\par Reflexes: patellar absent on right, 2+ L; achilles 1+ b/l\par Gait: drags right foot with walker [Impaired Insight] : insight and judgment were intact [General Appearance - In No Acute Distress] : in no acute distress [Oriented To Time, Place, And Person] : oriented to person, place, and time [Affect] : the affect was normal [Fluency] : fluency intact [Concentration Intact] : normal concentrating ability [Comprehension] : comprehension intact [Vocabulary] : adequate range of vocabulary [Past History] : adequate knowledge of personal past history [Sensation Pain / Temperature Decrease] : pain and temperature was intact [Sensation Tactile Decrease] : light touch was intact [Edema] : there was no peripheral edema Retention Suture Bite Size: 3 mm

## 2019-07-18 NOTE — PHYSICAL EXAM
[General Appearance - Alert] : alert [General Appearance - In No Acute Distress] : in no acute distress [Person] : oriented to person [Place] : oriented to place [Time] : oriented to time [Cranial Nerves Optic (II)] : visual acuity intact bilaterally,  pupils equal round and reactive to light [Cranial Nerves Oculomotor (III)] : extraocular motion intact [Cranial Nerves Trigeminal (V)] : facial sensation intact symmetrically [Cranial Nerves Facial (VII)] : face symmetrical [Cranial Nerves Vestibulocochlear (VIII)] : hearing was intact bilaterally [Cranial Nerves Glossopharyngeal (IX)] : tongue and palate midline [Cranial Nerves Accessory (XI - Cranial And Spinal)] : head turning and shoulder shrug symmetric [Cranial Nerves Hypoglossal (XII)] : there was no tongue deviation with protrusion [3] : S1 ankle flexors 3/5  [5] : S1 ankle flexors 5/5 [Sclera] : the sclera and conjunctiva were normal [PERRL With Normal Accommodation] : pupils were equal in size, round, reactive to light, with normal accommodation [Neck Appearance] : the appearance of the neck was normal [Abnormal Walk] : normal gait [Musculoskeletal - Swelling] : no joint swelling seen

## 2019-07-18 NOTE — ASSESSMENT
[FreeTextEntry1] : My impression is that the patient suffers from progressive lower extremity weakness.  The differential diagnosis is  inflammatory vs peripheral nerve disorder. I had a long discussion with the patient regarding the role of right sural and right gastrocnemius biopsy.  The patient was extensively educated about the nature of his disease process. I have explained the alternatives, risks and benefits to the patient and she understands and agrees to proceed.  This risk of the procedure including but not limited to pain, infection, seizure, stroke, recurrence, residual disease, neurovascular injury, heart attack, pulmonary embolism, blindness, weakness, paralysis and death have been carefully explained to the patient who clearly understands and agrees to proceed.\par

## 2019-07-18 NOTE — REASON FOR VISIT
[New Patient Visit] : a new patient visit [Referred By: _________] : Patient was referred by PARAS [FreeTextEntry1] : progressive leg weakness

## 2019-07-18 NOTE — DATA REVIEWED
[de-identified] : I have reviewed the most recent MRI which shows stable diffuse lumbar degenerative disease\par \par \par \par

## 2019-07-18 NOTE — HISTORY OF PRESENT ILLNESS
[de-identified] : 69 year old male with progressive  lower extremity weakness, s/p lumbar decompression in 1/2019. Improved back and radiating leg pain after surgery but continues to  have lower extremity weakness and atrophy. Follows with Dr. Brito where extensive workup has been thus far unrevealing.

## 2019-07-20 ENCOUNTER — EMERGENCY (EMERGENCY)
Facility: HOSPITAL | Age: 69
LOS: 1 days | Discharge: HOME | End: 2019-07-20
Attending: EMERGENCY MEDICINE | Admitting: EMERGENCY MEDICINE
Payer: MEDICARE

## 2019-07-20 VITALS
OXYGEN SATURATION: 100 % | SYSTOLIC BLOOD PRESSURE: 129 MMHG | DIASTOLIC BLOOD PRESSURE: 77 MMHG | HEART RATE: 62 BPM | TEMPERATURE: 98 F | RESPIRATION RATE: 18 BRPM

## 2019-07-20 VITALS
TEMPERATURE: 98 F | OXYGEN SATURATION: 98 % | SYSTOLIC BLOOD PRESSURE: 145 MMHG | RESPIRATION RATE: 18 BRPM | DIASTOLIC BLOOD PRESSURE: 88 MMHG | WEIGHT: 201.06 LBS | HEART RATE: 61 BPM

## 2019-07-20 DIAGNOSIS — Z98.890 OTHER SPECIFIED POSTPROCEDURAL STATES: Chronic | ICD-10-CM

## 2019-07-20 DIAGNOSIS — Z79.899 OTHER LONG TERM (CURRENT) DRUG THERAPY: ICD-10-CM

## 2019-07-20 DIAGNOSIS — M51.34 OTHER INTERVERTEBRAL DISC DEGENERATION, THORACIC REGION: Chronic | ICD-10-CM

## 2019-07-20 DIAGNOSIS — R07.89 OTHER CHEST PAIN: ICD-10-CM

## 2019-07-20 LAB
ALBUMIN SERPL ELPH-MCNC: 4.3 G/DL — SIGNIFICANT CHANGE UP (ref 3.3–5)
ALP SERPL-CCNC: 50 U/L — SIGNIFICANT CHANGE UP (ref 40–120)
ALT FLD-CCNC: 10 U/L — SIGNIFICANT CHANGE UP (ref 10–45)
ANION GAP SERPL CALC-SCNC: 7 MMOL/L — SIGNIFICANT CHANGE UP (ref 5–17)
APTT BLD: 23.7 SEC — LOW (ref 27.5–36.3)
AST SERPL-CCNC: 17 U/L — SIGNIFICANT CHANGE UP (ref 10–40)
BASOPHILS # BLD AUTO: 0.05 K/UL — SIGNIFICANT CHANGE UP (ref 0–0.2)
BASOPHILS NFR BLD AUTO: 1.2 % — SIGNIFICANT CHANGE UP (ref 0–2)
BILIRUB SERPL-MCNC: 1.4 MG/DL — HIGH (ref 0.2–1.2)
BUN SERPL-MCNC: 12 MG/DL — SIGNIFICANT CHANGE UP (ref 7–23)
CALCIUM SERPL-MCNC: 9.4 MG/DL — SIGNIFICANT CHANGE UP (ref 8.4–10.5)
CHLORIDE SERPL-SCNC: 106 MMOL/L — SIGNIFICANT CHANGE UP (ref 96–108)
CO2 SERPL-SCNC: 28 MMOL/L — SIGNIFICANT CHANGE UP (ref 22–31)
CREAT SERPL-MCNC: 1.09 MG/DL — SIGNIFICANT CHANGE UP (ref 0.5–1.3)
EOSINOPHIL # BLD AUTO: 0.22 K/UL — SIGNIFICANT CHANGE UP (ref 0–0.5)
EOSINOPHIL NFR BLD AUTO: 5.1 % — SIGNIFICANT CHANGE UP (ref 0–6)
GLUCOSE SERPL-MCNC: 90 MG/DL — SIGNIFICANT CHANGE UP (ref 70–99)
HCT VFR BLD CALC: 38.3 % — LOW (ref 39–50)
HGB BLD-MCNC: 12.8 G/DL — LOW (ref 13–17)
IMM GRANULOCYTES NFR BLD AUTO: 0.2 % — SIGNIFICANT CHANGE UP (ref 0–1.5)
INR BLD: 1.27 — HIGH (ref 0.88–1.16)
LYMPHOCYTES # BLD AUTO: 1.37 K/UL — SIGNIFICANT CHANGE UP (ref 1–3.3)
LYMPHOCYTES # BLD AUTO: 31.9 % — SIGNIFICANT CHANGE UP (ref 13–44)
MCHC RBC-ENTMCNC: 30.9 PG — SIGNIFICANT CHANGE UP (ref 27–34)
MCHC RBC-ENTMCNC: 33.4 GM/DL — SIGNIFICANT CHANGE UP (ref 32–36)
MCV RBC AUTO: 92.5 FL — SIGNIFICANT CHANGE UP (ref 80–100)
MONOCYTES # BLD AUTO: 0.41 K/UL — SIGNIFICANT CHANGE UP (ref 0–0.9)
MONOCYTES NFR BLD AUTO: 9.5 % — SIGNIFICANT CHANGE UP (ref 2–14)
NEUTROPHILS # BLD AUTO: 2.24 K/UL — SIGNIFICANT CHANGE UP (ref 1.8–7.4)
NEUTROPHILS NFR BLD AUTO: 52.1 % — SIGNIFICANT CHANGE UP (ref 43–77)
NRBC # BLD: 0 /100 WBCS — SIGNIFICANT CHANGE UP (ref 0–0)
PLATELET # BLD AUTO: 121 K/UL — LOW (ref 150–400)
POTASSIUM SERPL-MCNC: 4.4 MMOL/L — SIGNIFICANT CHANGE UP (ref 3.5–5.3)
POTASSIUM SERPL-SCNC: 4.4 MMOL/L — SIGNIFICANT CHANGE UP (ref 3.5–5.3)
PROT SERPL-MCNC: 7.7 G/DL — SIGNIFICANT CHANGE UP (ref 6–8.3)
PROTHROM AB SERPL-ACNC: 14.5 SEC — HIGH (ref 10–12.9)
RBC # BLD: 4.14 M/UL — LOW (ref 4.2–5.8)
RBC # FLD: 11.8 % — SIGNIFICANT CHANGE UP (ref 10.3–14.5)
SODIUM SERPL-SCNC: 141 MMOL/L — SIGNIFICANT CHANGE UP (ref 135–145)
TROPONIN T SERPL-MCNC: <0.01 NG/ML — SIGNIFICANT CHANGE UP (ref 0–0.01)
TROPONIN T SERPL-MCNC: <0.01 NG/ML — SIGNIFICANT CHANGE UP (ref 0–0.01)
WBC # BLD: 4.3 K/UL — SIGNIFICANT CHANGE UP (ref 3.8–10.5)
WBC # FLD AUTO: 4.3 K/UL — SIGNIFICANT CHANGE UP (ref 3.8–10.5)

## 2019-07-20 PROCEDURE — 93010 ELECTROCARDIOGRAM REPORT: CPT

## 2019-07-20 PROCEDURE — 85610 PROTHROMBIN TIME: CPT

## 2019-07-20 PROCEDURE — 71275 CT ANGIOGRAPHY CHEST: CPT | Mod: 26

## 2019-07-20 PROCEDURE — 71275 CT ANGIOGRAPHY CHEST: CPT

## 2019-07-20 PROCEDURE — 85730 THROMBOPLASTIN TIME PARTIAL: CPT

## 2019-07-20 PROCEDURE — 84484 ASSAY OF TROPONIN QUANT: CPT

## 2019-07-20 PROCEDURE — 93005 ELECTROCARDIOGRAM TRACING: CPT

## 2019-07-20 PROCEDURE — 96374 THER/PROPH/DIAG INJ IV PUSH: CPT | Mod: XU

## 2019-07-20 PROCEDURE — 99284 EMERGENCY DEPT VISIT MOD MDM: CPT | Mod: 25

## 2019-07-20 PROCEDURE — 80053 COMPREHEN METABOLIC PANEL: CPT

## 2019-07-20 PROCEDURE — 99284 EMERGENCY DEPT VISIT MOD MDM: CPT

## 2019-07-20 PROCEDURE — 85025 COMPLETE CBC W/AUTO DIFF WBC: CPT

## 2019-07-20 PROCEDURE — 36415 COLL VENOUS BLD VENIPUNCTURE: CPT

## 2019-07-20 RX ORDER — KETOROLAC TROMETHAMINE 30 MG/ML
15 SYRINGE (ML) INJECTION ONCE
Refills: 0 | Status: DISCONTINUED | OUTPATIENT
Start: 2019-07-20 | End: 2019-07-20

## 2019-07-20 RX ADMIN — Medication 15 MILLIGRAM(S): at 19:34

## 2019-07-20 RX ADMIN — Medication 15 MILLIGRAM(S): at 16:54

## 2019-07-20 NOTE — ED ADULT NURSE NOTE - OTHER COMPLAINTS
Left lung pain onset Thursday. He states pain worsens when he lifts his left arm. PMH Spine surgery in January and PE. He currently is not taking blood thinners. He denies long travel or cigarette use. Patient voice horse x 1 week. He denies SOB, cough or congestion.

## 2019-07-20 NOTE — ED ADULT TRIAGE NOTE - CHIEF COMPLAINT QUOTE
Patient states "I have left lung pain and when I have that my hematologist tells me to come into the ER." Patient states "I have lung pain and when I have that my hematologist tells me to come into the ER."

## 2019-07-20 NOTE — ED ADULT NURSE NOTE - CHIEF COMPLAINT QUOTE
Patient states "I have left lung pain and when I have that my hematologist tells me to come into the ER."

## 2019-07-20 NOTE — ED PROVIDER NOTE - OBJECTIVE STATEMENT
69 year old male with past medical history of PE and DVT, taken off of anticoagulation several months ago presents to ED with concern for right sided chest wall discomfort over the past few days.  Patient notes pain is made worse with deep inspiration and movement of right arm/shoulder.  Patient notes his hematologist took him off anticoagulation several months ago, stating "they thought I didn't need it anymore."  Patient denies associated fever, chills, shortness of breath, extremity pain or swelling, abdominal pain, nausea, emesis, changes to bowel movements, or any additional acute complaints or concerns at this time.

## 2019-07-20 NOTE — ED PROVIDER NOTE - CLINICAL SUMMARY MEDICAL DECISION MAKING FREE TEXT BOX
Patient in ED w concern for right sided chest wall pain, worse with movement, palpation and deep inspiration over the past several days.  Patient with non ischemic EKG.  Hx of DVT and PE noted - CTA chest completed and without evidence of PE, or other acute process.  Trop negative x 2.  Attempt is made to contact patient's PCP without any call back.  Patient is aware of all results as well as recommendation for prompt PCP follow up in 1-2 days for re evaluation.  Patient is instructed to return to ED immediately should symptoms worsen or if he has any concern prior to this recommended follow up.  Patient is aware of plan and verbalizes his understanding.

## 2019-07-20 NOTE — ED ADULT TRIAGE NOTE - OTHER COMPLAINTS
Left lung pain onset Thursday. He states pain worsens when he lifts his left arm. PMH Spine surgery in January and PE. He currently is not taking blood thinners. He denies long travel or cigarette use. Patient voice horse x 1 week. He denies SOB, cough or congestion. Right lung pain onset Thursday. He states pain worsens when he lifts his left arm. PMH Spine surgery in January and PE. He currently is not taking blood thinners. He denies long travel or cigarette use. Patient voice horse x 1 week. He denies SOB, cough or congestion. Right lung pain onset Thursday. He states pain worsens when he lifts his right arm. PMH Spine surgery in January and PE. He currently is not taking blood thinners. He denies long travel or cigarette use. Patient voice horse x 1 week. He denies SOB, cough or congestion.

## 2019-07-20 NOTE — ED PROVIDER NOTE - CROS ED CARDIOVAS ALL NEG
Problem: Goal Outcome Summary  Goal: Goal Outcome Summary  1. Pt will have fevers well controlled  2. Pt will be hemodynamically stable   Outcome: No Change  A&Ox4.No fever.Sats high 90's on 2 L NC.Dyspnea on exertion.Bradycardic, atenolol was decreased to 25 mg tab from 50 mg and digoxin level will be drawn in the morning. Bs in the 200's-300's, covered per sliding scale. Denied pain. Denied nausea, good appetite.Hgb 9, plt 22. K was 3.1, replaced and recheck came back 3.2, pt received 40 meq potassium tab, needs 20 meq at 8 pm and recheck at 10 pm. Mag 1.7, replacement is being infused. Phos 2.3, pt needs replacement.Lasix drip is infusing at 5mg/hr, large output from brand cath.Incontinence of loose stool x3. Up in room with PT, pt is sitting on a chair currently.Continous chemo is infusing via purple lumen of PICC, blood return was checked q 4 hrs. was at bed side, very supportive of pt. Continue with plan of care.    Problem: Chemotherapy Effects (Adult)  Goal: Signs and Symptoms of Listed Potential Problems Will be Absent or Manageable (Chemotherapy Effects)  Signs and symptoms of listed potential problems will be absent or manageable by discharge/transition of care (reference Chemotherapy Effects (Adult) CPG).     07/29/17 1542   Chemotherapy Effects   Problems Assessed (Chemotherapy Effects) all   Problems Present (Chemotherapy Effects) diarrhea;fatigue;other (see comments)            negative...

## 2019-07-20 NOTE — ED ADULT NURSE NOTE - OBJECTIVE STATEMENT
AOX4 +ambulates with a walker complaining of right sided chest pain radiating to the arm and shortness of breath. Patient states hx of PE not on any blood thinners and symptoms started x thursday. Patient denies any numbness or tingling, long haul flights edema fevers or chills.

## 2019-07-20 NOTE — ED PROVIDER NOTE - MUSCULOSKELETAL, MLM
No reproducible chest wall pain with palpation, + increased chest wall pain on right with abduction of right shoulder.  Spine appears normal, range of motion is not limited, no muscle or joint tenderness

## 2019-07-20 NOTE — ED PROVIDER NOTE - NSFOLLOWUPINSTRUCTIONS_ED_ALL_ED_FT
Please follow up with your primary physician in 1-2 days for re evaluation.  Please return to ER immediately should your symptoms worsen or if you have any concern prior to this recommended follow up.      CHEST WALL PAIN - AfterCare(R) Instructions(ER/ED)     Chest Wall Pain    WHAT YOU NEED TO KNOW:    Chest wall pain may be caused by problems with the muscles, cartilage, or bones of the chest wall. Chest wall pain may also be caused by pain that spreads to your chest from another part of your body. The pain may be aching, severe, dull, or sharp. It may come and go, or it may be constant. The pain may be worse when you move in certain ways, breathe deeply, or cough.     DISCHARGE INSTRUCTIONS:    Call 911 if:     You have any of the following signs of a heart attack:   Squeezing, pressure, or pain in your chest      You may also have any of the following:   Discomfort or pain in your back, neck, jaw, stomach, or arm      Shortness of breath      Nausea or vomiting      Lightheadedness or a sudden cold sweat        Return to the emergency department if:     You have severe pain.        Contact your healthcare provider if:     You develop a rash.       You have other new symptoms.      Your pain does not improve, even with treatment.      You have questions or concerns about your condition or care.     Medicines: You may need any of the following:     NSAIDs, such as ibuprofen, help decrease swelling, pain, and fever. This medicine is available with or without a doctor's order. NSAIDs can cause stomach bleeding or kidney problems in certain people. If you take blood thinner medicine, always ask your healthcare provider if NSAIDs are safe for you. Always read the medicine label and follow directions.      Acetaminophen decreases pain. It is available without a doctor's order. Ask how much to take and how often to take it. Follow directions. Acetaminophen can cause liver damage if not taken correctly.      A cream may be applied to your chest to decrease pain.       Take your medicine as directed. Contact your healthcare provider if you think your medicine is not helping or if you have side effects. Tell him of her if you are allergic to any medicine. Keep a list of the medicines, vitamins, and herbs you take. Include the amounts, and when and why you take them. Bring the list or the pill bottles to follow-up visits. Carry your medicine list with you in case of an emergency.    Follow up with your healthcare provider as directed: Write down your questions so you remember to ask them during your visits.     Self-care:     Rest as needed. Avoid activities that make your chest wall pain worse.      Apply heat on your chest for 20 to 30 minutes every 2 hours for as many days as directed. Heat helps decrease pain and muscle spasms.      Apply ice on your chest for 15 to 20 minutes every hour or as directed. Use an ice pack, or put crushed ice in a plastic bag. Cover it with a towel. Ice helps prevent tissue damage and decreases swelling and pain.         © Copyright [employer] 2019 All illustrations and images included in CareNotes are the copyrighted property of [employer] or [employer]      back to top                      © Copyright [employer] 2019

## 2019-07-24 VITALS
WEIGHT: 199.96 LBS | DIASTOLIC BLOOD PRESSURE: 82 MMHG | HEART RATE: 58 BPM | SYSTOLIC BLOOD PRESSURE: 142 MMHG | HEIGHT: 73 IN | OXYGEN SATURATION: 99 % | TEMPERATURE: 97 F | RESPIRATION RATE: 18 BRPM

## 2019-07-24 RX ORDER — TAPENTADOL HYDROCHLORIDE 50 MG/1
1 TABLET, FILM COATED ORAL
Qty: 0 | Refills: 0 | DISCHARGE

## 2019-07-24 RX ORDER — ALFUZOSIN HYDROCHLORIDE 10 MG/1
1 TABLET, EXTENDED RELEASE ORAL
Qty: 0 | Refills: 0 | DISCHARGE

## 2019-07-24 RX ORDER — BUPRENORPHINE 10 UG/H
1 PATCH, EXTENDED RELEASE TRANSDERMAL
Qty: 0 | Refills: 0 | DISCHARGE

## 2019-07-24 RX ORDER — LATANOPROST 0.05 MG/ML
1 SOLUTION/ DROPS OPHTHALMIC; TOPICAL
Qty: 0 | Refills: 0 | DISCHARGE

## 2019-07-24 RX ORDER — DORZOLAMIDE HYDROCHLORIDE TIMOLOL MALEATE 20; 5 MG/ML; MG/ML
1 SOLUTION/ DROPS OPHTHALMIC
Qty: 0 | Refills: 0 | DISCHARGE

## 2019-07-24 NOTE — PATIENT PROFILE ADULT - NSPROMUTINFOINDIVIDFT_GEN_A_NUR
Patient is a 66 year old female with unknown PMHx, per ED, patient found unresponsive, admitted to critical care for Metabolic Encephalopathy, Acute Renal Failure, Hyperkalemia, Thrombocytopenia, Rhabdomyolysis. -- resolving  clinically stable  Further follow up per plastic surgery ? further debridement //

## 2019-07-24 NOTE — H&P ADULT - NSHPPOAURINARYCATHETER_GEN_ALL_CORE
PLAN:   1.   Symptomatic therapy suggested: Continue current medications.  2.  Orders Placed This Encounter   Medications     norethindrone-ethinyl estradiol (NORTREL 1/35, 28,) 1-35 MG-MCG per tablet     Sig: Take 1 tablet by mouth daily     Dispense:  84 tablet     Refill:  4     3. Patient needs to follow up in if no improvement,or sooner if worsening of symptoms or other symptoms develop.  Follow up office visit in one year for annual health maintenance exam, sooner PRN.    Preventive Health Recommendations  Female Ages 40 to 49    Yearly exam:     See your health care provider every year in order to  1. Review health changes.   2. Discuss preventive care.    3. Review your medicines if your doctor prescribed any.      Get a Pap test every three years (unless you have an abnormal result and your provider advises testing more often).      If you get Pap tests with HPV test, you only need to test every 5 years, unless you have an abnormal result. You do not need a Pap test if your uterus was removed (hysterectomy) and you have not had cancer.      You should be tested each year for STDs (sexually transmitted diseases), if you're at risk.       Ask your doctor if you should have a mammogram.      Have a colonoscopy (test for colon cancer) if someone in your family has had colon cancer or polyps before age 50.       Have a cholesterol test every 5 years.       Have a diabetes test (fasting glucose) after age 45. If you are at risk for diabetes, you should have this test every 3 years.    Shots: Get a flu shot each year. Get a tetanus shot every 10 years.     Nutrition:     Eat at least 5 servings of fruits and vegetables each day.    Eat whole-grain bread, whole-wheat pasta and brown rice instead of white grains and rice.    Talk to your provider about Calcium and Vitamin D.     Lifestyle    Exercise at least 150 minutes a week (an average of 30 minutes a day, 5 days a week). This will help you control your weight  and prevent disease.    Limit alcohol to one drink per day.    No smoking.     Wear sunscreen to prevent skin cancer.    See your dentist every six months for an exam and cleaning.  It was a pleasure seeing you today at the [facility]. Thank you for allowing us to care for you today. We truly hope we provided you with the excellent service you deserve. Please let us know if there is anything else we can do for you so we can be sure you are leaving completley satisfied with your care experience.       General information about your clinic   Clinic Hours Lab Hours (Appointments are required)   Mon-Thurs: 7:30 AM - 7 PM Mon-Thurs: 7:30 AM - 7 PM   Fri: 7:30 AM - 5 PM Fri: 7:30 AM - 5 PM        After Hours Nurse Advise & Appts:  [facility]: [phone]  [facility] On Call: to make appointments anytime: [phone] On Call Physician: call [phone] and answering service will page the on call physician.        For urgent appointments, please call [phone] and ask for the triage nurse or your care team clinic nurse.  How to contact my care team:  MyChart: [email or web address]   Phone: [phone]   Fax: [phone]       [facility]:   Phone: [phone]  Hours: 8:00 AM - 6:00 PM  Medication Refills:  Call your pharmacy and they will forward the refill to us. Please allow 3 business days for your refills to be completed.       Normal or non-critical lab and imaging results will be communicated to you by MyChart, letter or phone within 7 days.  If you do not hear from us within 10 days, please call the clinic. If you have a critical or abnormal lab result, we will notify you by phone as soon as possible.       We now have PWIC (Pediatric Walk in Care)  Monday-Friday from 7:30-4. Simply walk in and be seen for your urgent needs like cough, fever, rash, diarrhea or vomiting, pink eye, UTI. No appointments needed. Ask one of the team for more  information      -Your Care Team:    Dr. Senait Shen - Internal Medicine/Pediatrics   Dr. uYe Wiggins - Family Medicine  Dr. Kirsten Iqbal - Pediatrics  Dr. Polly Tovar - Pediatrics  Suha Beavers CNP - Family Practice Nurse Practitioner     no

## 2019-07-24 NOTE — H&P ADULT - NSICDXPASTSURGICALHX_GEN_ALL_CORE_FT
PAST SURGICAL HISTORY:  Degeneration of intervertebral disc of thoracic region 2015    H/O cervical spine surgery with hardware    History of appendectomy     History of foot surgery right foot    History of lumbar laminectomy 1/2019    History of tonsillectomy

## 2019-07-24 NOTE — H&P ADULT - HISTORY OF PRESENT ILLNESS
69 year old male with progressive lower extremity weakness, s/p lumbar decompression in 1/2019. Improved back and radiating leg pain after surgery but continues to have lower extremity weakness and atrophy. Follows with Dr. Brito where extensive workup has been thus far unrevealing.       Right hip flexion 4-, knee flex/ext 4-, ankle dorsilfexion 4  LLE 5/5  Reflexes: patellar absent on right, 2+ L; achilles 1+ b/l  Gait: drags right foot with walker.   Constitutional: alert and in no acute distress.   Psychiatric: oriented to person, place, and time, insight and judgment were intact and the affect was normal.   Neurologic:   Attention: normal concentrating ability.   Language: fluency intact and comprehension intact.   Fund of knowledge: displays adequate knowledge of personal past history and adequate range of vocabulary.   Sensory exam: light touch was intact and pain and temperature was intact.    Vascular:. there was no peripheral edema.

## 2019-07-24 NOTE — H&P ADULT - NSICDXPASTMEDICALHX_GEN_ALL_CORE_FT
PAST MEDICAL HISTORY:  Cardiomegaly     DVT (deep venous thrombosis) left leg, 2018    Glaucoma     HTN (hypertension)     Mitral valve insufficiency     Neuropathy     Pulmonary embolism

## 2019-07-25 ENCOUNTER — RESULT REVIEW (OUTPATIENT)
Age: 69
End: 2019-07-25

## 2019-07-25 ENCOUNTER — OUTPATIENT (OUTPATIENT)
Dept: INPATIENT UNIT | Facility: HOSPITAL | Age: 69
LOS: 1 days | Discharge: HOME | End: 2019-07-25
Payer: MEDICARE

## 2019-07-25 VITALS
RESPIRATION RATE: 10 BRPM | DIASTOLIC BLOOD PRESSURE: 84 MMHG | OXYGEN SATURATION: 96 % | SYSTOLIC BLOOD PRESSURE: 142 MMHG | HEART RATE: 52 BPM

## 2019-07-25 DIAGNOSIS — Z98.890 OTHER SPECIFIED POSTPROCEDURAL STATES: Chronic | ICD-10-CM

## 2019-07-25 DIAGNOSIS — M51.34 OTHER INTERVERTEBRAL DISC DEGENERATION, THORACIC REGION: Chronic | ICD-10-CM

## 2019-07-25 DIAGNOSIS — Z90.89 ACQUIRED ABSENCE OF OTHER ORGANS: Chronic | ICD-10-CM

## 2019-07-25 DIAGNOSIS — Z90.49 ACQUIRED ABSENCE OF OTHER SPECIFIED PARTS OF DIGESTIVE TRACT: Chronic | ICD-10-CM

## 2019-07-25 PROCEDURE — 64795 BIOPSY OF NERVE: CPT | Mod: RT

## 2019-07-25 RX ORDER — SODIUM CHLORIDE 9 MG/ML
1000 INJECTION, SOLUTION INTRAVENOUS
Refills: 0 | Status: DISCONTINUED | OUTPATIENT
Start: 2019-07-25 | End: 2019-07-25

## 2019-07-25 RX ORDER — CEPHALEXIN 500 MG
500 CAPSULE ORAL
Refills: 0 | Status: DISCONTINUED | OUTPATIENT
Start: 2019-07-25 | End: 2019-07-25

## 2019-07-25 RX ORDER — ASPIRIN/CALCIUM CARB/MAGNESIUM 324 MG
1 TABLET ORAL
Qty: 0 | Refills: 0 | DISCHARGE

## 2019-07-25 RX ORDER — CEPHALEXIN 500 MG
1 CAPSULE ORAL
Qty: 28 | Refills: 0
Start: 2019-07-25 | End: 2019-07-31

## 2019-07-25 RX ORDER — OXYCODONE HYDROCHLORIDE 5 MG/1
5 TABLET ORAL EVERY 4 HOURS
Refills: 0 | Status: DISCONTINUED | OUTPATIENT
Start: 2019-07-25 | End: 2019-07-25

## 2019-07-25 RX ORDER — OXYCODONE HYDROCHLORIDE 5 MG/1
1 TABLET ORAL
Qty: 18 | Refills: 0
Start: 2019-07-25 | End: 2019-07-27

## 2019-07-25 NOTE — PACU DISCHARGE NOTE - COMMENTS
Vital signs stable verbalized adequate pain management. educated on medications, safety and follow up. discharged home stable. left unit accompanied by family member

## 2019-07-30 PROCEDURE — 20205 DEEP MUSCLE BIOPSY: CPT

## 2019-07-30 PROCEDURE — 88305 TISSUE EXAM BY PATHOLOGIST: CPT

## 2019-07-30 PROCEDURE — 64795 BIOPSY OF NERVE: CPT

## 2019-08-01 ENCOUNTER — APPOINTMENT (OUTPATIENT)
Dept: HEART AND VASCULAR | Facility: CLINIC | Age: 69
End: 2019-08-01
Payer: MEDICARE

## 2019-08-01 VITALS
DIASTOLIC BLOOD PRESSURE: 60 MMHG | HEART RATE: 69 BPM | WEIGHT: 200 LBS | HEIGHT: 73 IN | BODY MASS INDEX: 26.51 KG/M2 | SYSTOLIC BLOOD PRESSURE: 100 MMHG | OXYGEN SATURATION: 96 % | RESPIRATION RATE: 16 BRPM | TEMPERATURE: 97 F

## 2019-08-01 DIAGNOSIS — R07.89 OTHER CHEST PAIN: ICD-10-CM

## 2019-08-01 PROBLEM — G62.9 POLYNEUROPATHY, UNSPECIFIED: Chronic | Status: ACTIVE | Noted: 2019-07-24

## 2019-08-01 PROBLEM — I82.409 ACUTE EMBOLISM AND THROMBOSIS OF UNSPECIFIED DEEP VEINS OF UNSPECIFIED LOWER EXTREMITY: Chronic | Status: ACTIVE | Noted: 2018-04-14

## 2019-08-01 PROBLEM — I34.0 NONRHEUMATIC MITRAL (VALVE) INSUFFICIENCY: Chronic | Status: ACTIVE | Noted: 2019-07-24

## 2019-08-01 PROBLEM — H40.9 UNSPECIFIED GLAUCOMA: Chronic | Status: ACTIVE | Noted: 2019-07-24

## 2019-08-01 PROBLEM — I51.7 CARDIOMEGALY: Chronic | Status: ACTIVE | Noted: 2019-07-24

## 2019-08-01 PROCEDURE — 99214 OFFICE O/P EST MOD 30 MIN: CPT

## 2019-08-01 RX ORDER — LISINOPRIL 10 MG/1
10 TABLET ORAL DAILY
Qty: 15 | Refills: 0 | Status: ACTIVE | COMMUNITY

## 2019-08-01 NOTE — PHYSICAL EXAM
[Normal Appearance] : normal appearance [General Appearance - Well Developed] : well developed [Well Groomed] : well groomed [General Appearance - Well Nourished] : well nourished [No Deformities] : no deformities [General Appearance - In No Acute Distress] : no acute distress [Respiration, Rhythm And Depth] : normal respiratory rhythm and effort [Exaggerated Use Of Accessory Muscles For Inspiration] : no accessory muscle use [] : no respiratory distress [FreeTextEntry1] : right foot in boot [Skin Turgor] : normal skin turgor [Affect] : the affect was normal [Oriented To Time, Place, And Person] : oriented to person, place, and time [Mood] : the mood was normal [No Anxiety] : not feeling anxious

## 2019-08-01 NOTE — HISTORY OF PRESENT ILLNESS
[FreeTextEntry1] : 69 year male who was seen in ER on July 20 with right sided chest pain that was felt to be muscular. He had nerve biopy on July 25 and is awaiting results. He plans to see Dr Méndez August 9 to remove staples. He was told he could resume ASA 81 mg daily. He notes right ankle pain following his nerve biopsy. He comes with a wheeling walker and will see Dr Brito on August 14

## 2019-08-01 NOTE — DISCUSSION/SUMMARY
[FreeTextEntry1] : Hypertension--now with lowered BP and fatigue--Reduce to Lisinopril 5 mg daily until BP returns to baseline, then back on 10 mg

## 2019-08-02 LAB — SURGICAL PATHOLOGY STUDY: SIGNIFICANT CHANGE UP

## 2019-08-09 ENCOUNTER — APPOINTMENT (OUTPATIENT)
Dept: NEUROSURGERY | Facility: CLINIC | Age: 69
End: 2019-08-09
Payer: MEDICARE

## 2019-08-09 VITALS
TEMPERATURE: 97.9 F | HEIGHT: 73 IN | OXYGEN SATURATION: 98 % | BODY MASS INDEX: 26.51 KG/M2 | RESPIRATION RATE: 16 BRPM | WEIGHT: 200 LBS | HEART RATE: 72 BPM | SYSTOLIC BLOOD PRESSURE: 147 MMHG | DIASTOLIC BLOOD PRESSURE: 84 MMHG

## 2019-08-09 PROCEDURE — 99213 OFFICE O/P EST LOW 20 MIN: CPT

## 2019-08-09 NOTE — PHYSICAL EXAM
[General Appearance - Alert] : alert [General Appearance - In No Acute Distress] : in no acute distress [Person] : oriented to person [Place] : oriented to place [Time] : oriented to time [Cranial Nerves Optic (II)] : visual acuity intact bilaterally,  pupils equal round and reactive to light [Cranial Nerves Oculomotor (III)] : extraocular motion intact [Cranial Nerves Trigeminal (V)] : facial sensation intact symmetrically [Cranial Nerves Facial (VII)] : face symmetrical [Cranial Nerves Vestibulocochlear (VIII)] : hearing was intact bilaterally [Cranial Nerves Glossopharyngeal (IX)] : tongue and palate midline [Cranial Nerves Accessory (XI - Cranial And Spinal)] : head turning and shoulder shrug symmetric [Cranial Nerves Hypoglossal (XII)] : there was no tongue deviation with protrusion [3] : L4/5 ankle dorsiflexors 3/5 [5] : S1 ankle flexors 5/5 [Sclera] : the sclera and conjunctiva were normal [PERRL With Normal Accommodation] : pupils were equal in size, round, reactive to light, with normal accommodation [Neck Appearance] : the appearance of the neck was normal [Musculoskeletal - Swelling] : no joint swelling seen [Abnormal Walk] : normal gait [Longitudinal] : longitudinal [Healing Well] : healing well [No Drainage] : without drainage [FreeTextEntry1] : right ankle [Normal Skin] : normal [FreeTextEntry6] : mild scabbing debrided, all staples removed

## 2019-08-09 NOTE — REASON FOR VISIT
[Referred By: _________] : Patient was referred by PARAS [de-identified] : s/p right gastrocnemius and sural nerve biopsies [de-identified] : 7/25/19 [FreeTextEntry1] : progressive leg weakness

## 2019-08-09 NOTE — ASSESSMENT
[FreeTextEntry1] : He is recovering well from his biopsy. Wound healing well. Follows up with Dr. Brito next week. Counseled to continue to was with soap and water and keep incision clean, dry and open to air. Return for follow up as needed.

## 2019-08-14 ENCOUNTER — APPOINTMENT (OUTPATIENT)
Dept: NEUROLOGY | Facility: CLINIC | Age: 69
End: 2019-08-14
Payer: MEDICARE

## 2019-08-14 VITALS
HEART RATE: 62 BPM | DIASTOLIC BLOOD PRESSURE: 89 MMHG | TEMPERATURE: 99.2 F | HEIGHT: 73 IN | WEIGHT: 200 LBS | SYSTOLIC BLOOD PRESSURE: 150 MMHG | BODY MASS INDEX: 26.51 KG/M2 | OXYGEN SATURATION: 97 %

## 2019-08-14 PROCEDURE — 99215 OFFICE O/P EST HI 40 MIN: CPT

## 2019-08-14 NOTE — ED PROVIDER NOTE - CPE EDP RESP NORM
89 y m pmh of dementia, orthostatic hypotension, deafness, macular degeneration causing blindness   pw knee and hip pain after a fall  he had a presumed mechanical fall 5 days ago  presented form nursing home  got pan scanned at that time which was negative for acute injury  however today he says he has pain in his knee and right hip   was ambulatory yesterday without a walker  daughter just wants xrays and no other imaging 15.01 normal...

## 2019-08-15 NOTE — HISTORY OF PRESENT ILLNESS
[FreeTextEntry1] : He had right sural nerve and gastrocnemius biopsy performed which showed\par \par He is having significant pain at the biopsy site, also yellowish and brownish discharge from the site. \par Saw Katharina cueto who checked on biopsy site last week, said to clean with soap and water and keep it dry and uncovered. \par \par His strength has improved somewhat over the past couple of months. He has yet to start formal PT.

## 2019-08-15 NOTE — PHYSICAL EXAM
[General Appearance - Alert] : alert [General Appearance - In No Acute Distress] : in no acute distress [Oriented To Time, Place, And Person] : oriented to person, place, and time [Impaired Insight] : insight and judgment were intact [Affect] : the affect was normal [Concentration Intact] : normal concentrating ability [Fluency] : fluency intact [Comprehension] : comprehension intact [Past History] : adequate knowledge of personal past history [Vocabulary] : adequate range of vocabulary [Sensation Pain / Temperature Decrease] : pain and temperature was intact [Sensation Tactile Decrease] : light touch was intact [Edema] : there was no peripheral edema [FreeTextEntry1] : Right hip flexion 4-, knee flex/ext 4-, ankle dorsilfexion 4-\par LLE 5/5\par Reflexes: patellar absent on right, 2+ L; achilles 1+ b/l\par Gait: drags right foot with walker\par \par Greenish-yellow discharge from biopsy site [FreeTextEntry8] : can walk short distances with walker, right foot drags, balance is poor

## 2019-08-15 NOTE — ASSESSMENT
[FreeTextEntry1] : Strength is stable or mildly improved on exam. \par He does feel some improvement although still significant weakness\par Biopsy showed neurogenic changes in muscle, moderately severe large fiber degeneration in nerve, no clear inflammation or amyloid deposition, although preparation of specimen was suboptimal. 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 \par \par Return in 6-8 months for follow up

## 2019-08-21 ENCOUNTER — MOBILE ON CALL (OUTPATIENT)
Age: 69
End: 2019-08-21

## 2019-08-22 ENCOUNTER — INPATIENT (INPATIENT)
Facility: HOSPITAL | Age: 69
LOS: 5 days | Discharge: HOME HEALTH | DRG: 863 | End: 2019-08-28
Attending: NEUROLOGICAL SURGERY | Admitting: NEUROLOGICAL SURGERY
Payer: MEDICARE

## 2019-08-22 ENCOUNTER — APPOINTMENT (OUTPATIENT)
Dept: NEUROSURGERY | Facility: CLINIC | Age: 69
End: 2019-08-22
Payer: MEDICARE

## 2019-08-22 VITALS
RESPIRATION RATE: 16 BRPM | HEART RATE: 61 BPM | SYSTOLIC BLOOD PRESSURE: 126 MMHG | TEMPERATURE: 97 F | DIASTOLIC BLOOD PRESSURE: 79 MMHG | OXYGEN SATURATION: 95 %

## 2019-08-22 DIAGNOSIS — I10 ESSENTIAL (PRIMARY) HYPERTENSION: ICD-10-CM

## 2019-08-22 DIAGNOSIS — Z90.89 ACQUIRED ABSENCE OF OTHER ORGANS: Chronic | ICD-10-CM

## 2019-08-22 DIAGNOSIS — I82.412 ACUTE EMBOLISM AND THROMBOSIS OF LEFT FEMORAL VEIN: ICD-10-CM

## 2019-08-22 DIAGNOSIS — T81.31XA DISRUPTION OF EXTERNAL OPERATION (SURGICAL) WOUND, NOT ELSEWHERE CLASSIFIED, INITIAL ENCOUNTER: ICD-10-CM

## 2019-08-22 DIAGNOSIS — T81.49XA INFECTION FOLLOWING A PROCEDURE, OTHER SURGICAL SITE, INITIAL ENCOUNTER: ICD-10-CM

## 2019-08-22 DIAGNOSIS — G89.29 OTHER CHRONIC PAIN: ICD-10-CM

## 2019-08-22 DIAGNOSIS — Z98.890 OTHER SPECIFIED POSTPROCEDURAL STATES: Chronic | ICD-10-CM

## 2019-08-22 DIAGNOSIS — L02.415 CUTANEOUS ABSCESS OF RIGHT LOWER LIMB: ICD-10-CM

## 2019-08-22 DIAGNOSIS — Z86.711 PERSONAL HISTORY OF PULMONARY EMBOLISM: ICD-10-CM

## 2019-08-22 DIAGNOSIS — I82.512 CHRONIC EMBOLISM AND THROMBOSIS OF LEFT FEMORAL VEIN: ICD-10-CM

## 2019-08-22 DIAGNOSIS — I51.7 CARDIOMEGALY: ICD-10-CM

## 2019-08-22 DIAGNOSIS — B96.20 UNSPECIFIED ESCHERICHIA COLI [E. COLI] AS THE CAUSE OF DISEASES CLASSIFIED ELSEWHERE: ICD-10-CM
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
## 2019-08-22 DIAGNOSIS — T81.41XA INFECTION FOLLOWING A PROCEDURE, SUPERFICIAL INCISIONAL SURGICAL SITE, INITIAL ENCOUNTER: ICD-10-CM

## 2019-08-22 DIAGNOSIS — I87.8 OTHER SPECIFIED DISORDERS OF VEINS: ICD-10-CM

## 2019-08-22 DIAGNOSIS — M54.5 LOW BACK PAIN: ICD-10-CM
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
## 2019-08-22 DIAGNOSIS — G62.9 POLYNEUROPATHY, UNSPECIFIED: ICD-10-CM

## 2019-08-22 DIAGNOSIS — H40.9 UNSPECIFIED GLAUCOMA: ICD-10-CM

## 2019-08-22 DIAGNOSIS — B19.10 UNSPECIFIED VIRAL HEPATITIS B WITHOUT HEPATIC COMA: ICD-10-CM

## 2019-08-22 DIAGNOSIS — I34.0 NONRHEUMATIC MITRAL (VALVE) INSUFFICIENCY: ICD-10-CM

## 2019-08-22 DIAGNOSIS — L03.116 CELLULITIS OF LEFT LOWER LIMB: ICD-10-CM

## 2019-08-22 DIAGNOSIS — Z90.49 ACQUIRED ABSENCE OF OTHER SPECIFIED PARTS OF DIGESTIVE TRACT: Chronic | ICD-10-CM

## 2019-08-22 DIAGNOSIS — M51.34 OTHER INTERVERTEBRAL DISC DEGENERATION, THORACIC REGION: Chronic | ICD-10-CM

## 2019-08-22 DIAGNOSIS — Z98.890 OTHER SPECIFIED POSTPROCEDURAL STATES: ICD-10-CM

## 2019-08-22 DIAGNOSIS — B95.4 OTHER STREPTOCOCCUS AS THE CAUSE OF DISEASES CLASSIFIED ELSEWHERE: ICD-10-CM

## 2019-08-22 DIAGNOSIS — B95.2 ENTEROCOCCUS AS THE CAUSE OF DISEASES CLASSIFIED ELSEWHERE: ICD-10-CM

## 2019-08-22 DIAGNOSIS — R09.89 OTHER SPECIFIED SYMPTOMS AND SIGNS INVOLVING THE CIRCULATORY AND RESPIRATORY SYSTEMS: ICD-10-CM

## 2019-08-22 LAB
ALBUMIN SERPL ELPH-MCNC: 3.8 G/DL — SIGNIFICANT CHANGE UP (ref 3.3–5)
ALP SERPL-CCNC: 57 U/L — SIGNIFICANT CHANGE UP (ref 40–120)
ALT FLD-CCNC: 24 U/L — SIGNIFICANT CHANGE UP (ref 10–45)
ANION GAP SERPL CALC-SCNC: 11 MMOL/L — SIGNIFICANT CHANGE UP (ref 5–17)
APPEARANCE UR: CLEAR — SIGNIFICANT CHANGE UP
APTT BLD: 26 SEC — LOW (ref 27.5–36.3)
AST SERPL-CCNC: 24 U/L — SIGNIFICANT CHANGE UP (ref 10–40)
BASOPHILS # BLD AUTO: 0.02 K/UL — SIGNIFICANT CHANGE UP (ref 0–0.2)
BASOPHILS NFR BLD AUTO: 0.5 % — SIGNIFICANT CHANGE UP (ref 0–2)
BILIRUB SERPL-MCNC: 0.6 MG/DL — SIGNIFICANT CHANGE UP (ref 0.2–1.2)
BILIRUB UR-MCNC: NEGATIVE — SIGNIFICANT CHANGE UP
BLD GP AB SCN SERPL QL: NEGATIVE — SIGNIFICANT CHANGE UP
BUN SERPL-MCNC: 11 MG/DL — SIGNIFICANT CHANGE UP (ref 7–23)
CALCIUM SERPL-MCNC: 9.4 MG/DL — SIGNIFICANT CHANGE UP (ref 8.4–10.5)
CHLORIDE SERPL-SCNC: 105 MMOL/L — SIGNIFICANT CHANGE UP (ref 96–108)
CO2 SERPL-SCNC: 26 MMOL/L — SIGNIFICANT CHANGE UP (ref 22–31)
COLOR SPEC: YELLOW — SIGNIFICANT CHANGE UP
CREAT SERPL-MCNC: 1.2 MG/DL — SIGNIFICANT CHANGE UP (ref 0.5–1.3)
DIFF PNL FLD: ABNORMAL
EOSINOPHIL # BLD AUTO: 0.34 K/UL — SIGNIFICANT CHANGE UP (ref 0–0.5)
EOSINOPHIL NFR BLD AUTO: 8 % — HIGH (ref 0–6)
GLUCOSE SERPL-MCNC: 99 MG/DL — SIGNIFICANT CHANGE UP (ref 70–99)
GLUCOSE UR QL: NEGATIVE — SIGNIFICANT CHANGE UP
GRAM STN FLD: SIGNIFICANT CHANGE UP
HBA1C BLD-MCNC: 4.8 % — SIGNIFICANT CHANGE UP (ref 4–5.6)
HCT VFR BLD CALC: 35.7 % — LOW (ref 39–50)
HGB BLD-MCNC: 11.6 G/DL — LOW (ref 13–17)
IMM GRANULOCYTES NFR BLD AUTO: 0.2 % — SIGNIFICANT CHANGE UP (ref 0–1.5)
INR BLD: 1.27 — HIGH (ref 0.88–1.16)
KETONES UR-MCNC: NEGATIVE — SIGNIFICANT CHANGE UP
LEUKOCYTE ESTERASE UR-ACNC: NEGATIVE — SIGNIFICANT CHANGE UP
LYMPHOCYTES # BLD AUTO: 1.44 K/UL — SIGNIFICANT CHANGE UP (ref 1–3.3)
LYMPHOCYTES # BLD AUTO: 33.8 % — SIGNIFICANT CHANGE UP (ref 13–44)
MAGNESIUM SERPL-MCNC: 1.7 MG/DL — SIGNIFICANT CHANGE UP (ref 1.6–2.6)
MCHC RBC-ENTMCNC: 30.4 PG — SIGNIFICANT CHANGE UP (ref 27–34)
MCHC RBC-ENTMCNC: 32.5 GM/DL — SIGNIFICANT CHANGE UP (ref 32–36)
MCV RBC AUTO: 93.7 FL — SIGNIFICANT CHANGE UP (ref 80–100)
MONOCYTES # BLD AUTO: 0.5 K/UL — SIGNIFICANT CHANGE UP (ref 0–0.9)
MONOCYTES NFR BLD AUTO: 11.7 % — SIGNIFICANT CHANGE UP (ref 2–14)
NEUTROPHILS # BLD AUTO: 1.95 K/UL — SIGNIFICANT CHANGE UP (ref 1.8–7.4)
NEUTROPHILS NFR BLD AUTO: 45.8 % — SIGNIFICANT CHANGE UP (ref 43–77)
NITRITE UR-MCNC: NEGATIVE — SIGNIFICANT CHANGE UP
NRBC # BLD: 0 /100 WBCS — SIGNIFICANT CHANGE UP (ref 0–0)
PH UR: 6 — SIGNIFICANT CHANGE UP (ref 5–8)
PLATELET # BLD AUTO: 108 K/UL — LOW (ref 150–400)
POTASSIUM SERPL-MCNC: 3.9 MMOL/L — SIGNIFICANT CHANGE UP (ref 3.5–5.3)
POTASSIUM SERPL-SCNC: 3.9 MMOL/L — SIGNIFICANT CHANGE UP (ref 3.5–5.3)
PROT SERPL-MCNC: 7 G/DL — SIGNIFICANT CHANGE UP (ref 6–8.3)
PROT UR-MCNC: NEGATIVE MG/DL — SIGNIFICANT CHANGE UP
PROTHROM AB SERPL-ACNC: 14.4 SEC — HIGH (ref 10–12.9)
RBC # BLD: 3.81 M/UL — LOW (ref 4.2–5.8)
RBC # FLD: 11.9 % — SIGNIFICANT CHANGE UP (ref 10.3–14.5)
RH IG SCN BLD-IMP: POSITIVE — SIGNIFICANT CHANGE UP
SODIUM SERPL-SCNC: 142 MMOL/L — SIGNIFICANT CHANGE UP (ref 135–145)
SP GR SPEC: 1.01 — SIGNIFICANT CHANGE UP (ref 1–1.03)
SPECIMEN SOURCE: SIGNIFICANT CHANGE UP
TSH SERPL-MCNC: 0.39 UIU/ML — SIGNIFICANT CHANGE UP (ref 0.35–4.94)
UROBILINOGEN FLD QL: 2 E.U./DL
WBC # BLD: 4.26 K/UL — SIGNIFICANT CHANGE UP (ref 3.8–10.5)
WBC # FLD AUTO: 4.26 K/UL — SIGNIFICANT CHANGE UP (ref 3.8–10.5)

## 2019-08-22 PROCEDURE — 71045 X-RAY EXAM CHEST 1 VIEW: CPT | Mod: 26

## 2019-08-22 PROCEDURE — 93010 ELECTROCARDIOGRAM REPORT: CPT

## 2019-08-22 PROCEDURE — 99214 OFFICE O/P EST MOD 30 MIN: CPT

## 2019-08-22 RX ORDER — FOLIC ACID 0.8 MG
1 TABLET ORAL DAILY
Refills: 0 | Status: DISCONTINUED | OUTPATIENT
Start: 2019-08-22 | End: 2019-08-28

## 2019-08-22 RX ORDER — OXYCODONE HYDROCHLORIDE 5 MG/1
5 TABLET ORAL EVERY 6 HOURS
Refills: 0 | Status: DISCONTINUED | OUTPATIENT
Start: 2019-08-22 | End: 2019-08-28

## 2019-08-22 RX ORDER — VANCOMYCIN HCL 1 G
1000 VIAL (EA) INTRAVENOUS EVERY 12 HOURS
Refills: 0 | Status: DISCONTINUED | OUTPATIENT
Start: 2019-08-22 | End: 2019-08-26

## 2019-08-22 RX ORDER — PIPERACILLIN AND TAZOBACTAM 4; .5 G/20ML; G/20ML
3.38 INJECTION, POWDER, LYOPHILIZED, FOR SOLUTION INTRAVENOUS ONCE
Refills: 0 | Status: COMPLETED | OUTPATIENT
Start: 2019-08-22 | End: 2019-08-22

## 2019-08-22 RX ORDER — SENNA PLUS 8.6 MG/1
2 TABLET ORAL AT BEDTIME
Refills: 0 | Status: DISCONTINUED | OUTPATIENT
Start: 2019-08-22 | End: 2019-08-28

## 2019-08-22 RX ORDER — ACETAMINOPHEN 500 MG
650 TABLET ORAL EVERY 6 HOURS
Refills: 0 | Status: DISCONTINUED | OUTPATIENT
Start: 2019-08-22 | End: 2019-08-28

## 2019-08-22 RX ORDER — PIPERACILLIN AND TAZOBACTAM 4; .5 G/20ML; G/20ML
3.38 INJECTION, POWDER, LYOPHILIZED, FOR SOLUTION INTRAVENOUS EVERY 6 HOURS
Refills: 0 | Status: DISCONTINUED | OUTPATIENT
Start: 2019-08-22 | End: 2019-08-26

## 2019-08-22 RX ORDER — LATANOPROST 0.05 MG/ML
1 SOLUTION/ DROPS OPHTHALMIC; TOPICAL AT BEDTIME
Refills: 0 | Status: DISCONTINUED | OUTPATIENT
Start: 2019-08-22 | End: 2019-08-28

## 2019-08-22 RX ORDER — ENOXAPARIN SODIUM 100 MG/ML
40 INJECTION SUBCUTANEOUS AT BEDTIME
Refills: 0 | Status: DISCONTINUED | OUTPATIENT
Start: 2019-08-22 | End: 2019-08-27

## 2019-08-22 RX ORDER — LISINOPRIL 2.5 MG/1
10 TABLET ORAL DAILY
Refills: 0 | Status: DISCONTINUED | OUTPATIENT
Start: 2019-08-22 | End: 2019-08-28

## 2019-08-22 RX ORDER — GABAPENTIN 400 MG/1
400 CAPSULE ORAL THREE TIMES A DAY
Refills: 0 | Status: DISCONTINUED | OUTPATIENT
Start: 2019-08-22 | End: 2019-08-28

## 2019-08-22 RX ORDER — DORZOLAMIDE HYDROCHLORIDE TIMOLOL MALEATE 20; 5 MG/ML; MG/ML
1 SOLUTION/ DROPS OPHTHALMIC
Refills: 0 | Status: DISCONTINUED | OUTPATIENT
Start: 2019-08-22 | End: 2019-08-28

## 2019-08-22 RX ORDER — DOCUSATE SODIUM 100 MG
100 CAPSULE ORAL THREE TIMES A DAY
Refills: 0 | Status: DISCONTINUED | OUTPATIENT
Start: 2019-08-22 | End: 2019-08-28

## 2019-08-22 RX ORDER — ASPIRIN/CALCIUM CARB/MAGNESIUM 324 MG
81 TABLET ORAL DAILY
Refills: 0 | Status: DISCONTINUED | OUTPATIENT
Start: 2019-08-22 | End: 2019-08-28

## 2019-08-22 RX ORDER — PREGABALIN 225 MG/1
1000 CAPSULE ORAL DAILY
Refills: 0 | Status: DISCONTINUED | OUTPATIENT
Start: 2019-08-22 | End: 2019-08-28

## 2019-08-22 RX ADMIN — Medication 650 MILLIGRAM(S): at 19:34

## 2019-08-22 RX ADMIN — Medication 100 MILLIGRAM(S): at 21:23

## 2019-08-22 RX ADMIN — Medication 650 MILLIGRAM(S): at 20:45

## 2019-08-22 RX ADMIN — OXYCODONE HYDROCHLORIDE 5 MILLIGRAM(S): 5 TABLET ORAL at 19:34

## 2019-08-22 RX ADMIN — Medication 250 MILLIGRAM(S): at 22:28

## 2019-08-22 RX ADMIN — OXYCODONE HYDROCHLORIDE 5 MILLIGRAM(S): 5 TABLET ORAL at 20:45

## 2019-08-22 RX ADMIN — ENOXAPARIN SODIUM 40 MILLIGRAM(S): 100 INJECTION SUBCUTANEOUS at 21:24

## 2019-08-22 RX ADMIN — LATANOPROST 1 DROP(S): 0.05 SOLUTION/ DROPS OPHTHALMIC; TOPICAL at 21:25

## 2019-08-22 RX ADMIN — PIPERACILLIN AND TAZOBACTAM 200 GRAM(S): 4; .5 INJECTION, POWDER, LYOPHILIZED, FOR SOLUTION INTRAVENOUS at 21:24

## 2019-08-22 RX ADMIN — GABAPENTIN 400 MILLIGRAM(S): 400 CAPSULE ORAL at 21:23

## 2019-08-22 NOTE — ASSESSMENT
[FreeTextEntry1] : Proceed with direct admission to the hospital given wound dehiscence and cellulitis. Will likely need IV antibiotics and will have patient assessed by plastic surgery for wound closure. \par \par Dr. Henry present and agrees with plan.

## 2019-08-22 NOTE — PHYSICAL EXAM
[General Appearance - Alert] : alert [General Appearance - In No Acute Distress] : in no acute distress [Longitudinal] : longitudinal [No Drainage] : without drainage [Normal Skin] : normal [Place] : oriented to place [Person] : oriented to person [Time] : oriented to time [Cranial Nerves Optic (II)] : visual acuity intact bilaterally,  pupils equal round and reactive to light [Cranial Nerves Oculomotor (III)] : extraocular motion intact [Cranial Nerves Trigeminal (V)] : facial sensation intact symmetrically [Cranial Nerves Vestibulocochlear (VIII)] : hearing was intact bilaterally [Cranial Nerves Facial (VII)] : face symmetrical [Cranial Nerves Accessory (XI - Cranial And Spinal)] : head turning and shoulder shrug symmetric [Cranial Nerves Glossopharyngeal (IX)] : tongue and palate midline [Cranial Nerves Hypoglossal (XII)] : there was no tongue deviation with protrusion [3] : L4/5 ankle dorsiflexors 3/5 [5] : S1 ankle flexors 5/5 [PERRL With Normal Accommodation] : pupils were equal in size, round, reactive to light, with normal accommodation [Sclera] : the sclera and conjunctiva were normal [Neck Appearance] : the appearance of the neck was normal [Abnormal Walk] : normal gait [Musculoskeletal - Swelling] : no joint swelling seen [] :  [Mid-Portion] : mid-portion [FreeTextEntry1] : right ankle [FreeTextEntry6] : removed

## 2019-08-22 NOTE — PATIENT PROFILE ADULT - NSTRANSFERBELONGINGSDISPO_GEN_A_NUR
Pt refuses to given valuable to security. Pt has at the bedside. 3 [employer] credit cards, 1 [employer] card, 1 walker, house and car keys, 1 pair of black sneakers, 1 [employer] watch, $191./with patient

## 2019-08-22 NOTE — H&P ADULT - HISTORY OF PRESENT ILLNESS
69 year old male with progressive lower extremity weakness, s/p lumbar decompression in 1/2019. s/p sural nerve biopsy on 7/25 for lower extremity weakness and atrophy. patient returned for wound dehiscence.  Patient is afebrile and without leukocytosis. He confirms RLE weakness, R ankle pain, b/l LE swelling. He had echo done on previous admission with 58%. hx of mitral valve insufficiency. of note, he had DVT and PE in prior admissions. PMHx also includes HTN, glaucoma.

## 2019-08-22 NOTE — CHART NOTE - NSCHARTNOTEFT_GEN_A_CORE
Neurosurgical Indications for Screening Dopplers on Admission:       1) Known hypercoagulation disorder (h/o VTE, thrombophilia, HIT, etc.)   2) Admitted from prolonged stay from another institution (straight forward ER transfers not included)  3) Presenting with significant leg immobility   4) Presenting with signs and symptoms of VTE?    5) With significant critical illness, Including "found down" for unknown period of time in HPI  6) With significant neurotrauma (TBI, SCI / TLS spine fractures)   7) Who are comatose   8) With known malignancy (e.g. glioblastoma multiforme, meningioma, etc.). Excludes IA chemo 23hr observation stays  9) On hemodialysis   10) Who have received platelet transfusion or antithrombotic reversal agents recently   11) Who have had recent major orthopedic surgery          Screening dopplers indicated?   Y x  N _    DVT Prophylaxis:  x SCD's   x chemoprophylaxis

## 2019-08-22 NOTE — H&P ADULT - NSHPREVIEWOFSYSTEMS_GEN_ALL_CORE
REVIEW OF SYSTEMS:    CONSTITUTIONAL: No weakness, fevers or chills  EYES/ENT: No visual changes;  No vertigo or throat pain   NECK: No pain or stiffness  RESPIRATORY: No cough, wheezing, hemoptysis; No shortness of breath  CARDIOVASCULAR: No chest pain or palpitations  GASTROINTESTINAL: No abdominal or epigastric pain. No nausea, vomiting, or hematemesis; No diarrhea or constipation. No melena or hematochezia.  GENITOURINARY: No dysuria, frequency or hematuria  NEUROLOGICAL: No numbness; RLE  weakness  SKIN: No itching, burning, rashes, or lesions   All other review of systems is negative unless indicated above.

## 2019-08-22 NOTE — H&P ADULT - NSHPLABSRESULTS_GEN_ALL_CORE
CBC Full  -  ( 22 Aug 2019 18:37 )  WBC Count : 4.26 K/uL  RBC Count : 3.81 M/uL  Hemoglobin : 11.6 g/dL  Hematocrit : 35.7 %  Platelet Count - Automated : 108 K/uL  Mean Cell Volume : 93.7 fl  Mean Cell Hemoglobin : 30.4 pg  Mean Cell Hemoglobin Concentration : 32.5 gm/dL  Auto Neutrophil # : 1.95 K/uL  Auto Lymphocyte # : 1.44 K/uL  Auto Monocyte # : 0.50 K/uL  Auto Eosinophil # : 0.34 K/uL  Auto Basophil # : 0.02 K/uL  Auto Neutrophil % : 45.8 %  Auto Lymphocyte % : 33.8 %  Auto Monocyte % : 11.7 %  Auto Eosinophil % : 8.0 %  Auto Basophil % : 0.5 %

## 2019-08-22 NOTE — H&P ADULT - ASSESSMENT
68 Yo M with HTN, glaucoma, neuropathy, hx of DVT/PE presents with wound dehiscence s/p sural nerve biopsy on 7/25    Plan:   - neurocheck   - vitals   - pain control   - Bedside wound irrigation   - Venodynes and SQL for DVT ppx, baseline doppler   - OOB/Ambulate/PT/OT   - ISS   - encourage IS   - BCx, wound culture   - started vanc/zosyn   - f/u ESR, CRP   - ID French  consulted   - d/w Dr. Greene

## 2019-08-22 NOTE — HISTORY OF PRESENT ILLNESS
[de-identified] : 69 year old male with progressive  lower extremity weakness, s/p lumbar decompression in 1/2019. Improved back and radiating leg pain after surgery but continues to  have lower extremity weakness and atrophy. Follows with Dr. Brito where extensive workup has been thus far unrevealing. \par \par S/p gastroc muscle/sural nerve biopsy 7/25/19.\par \par Presents today with complaints of progressive pain at incision site, wound drainage and swelling. He was last sen for staple removal 2 weeks ago and wound was intact with some localized edema at that time, presumed to be dependent edema. He was counseled at that time notify for new new symptoms including drainage, fever, redness or progressing edema. He was then seen 8/14 by Dr. Brito followed by 8/20 by his hematologist who noted a poorly healing wound and started augmentin. He denies fevers or active wound drainage over the last 2-3 days. He denies fatigue or generalized malaise.

## 2019-08-22 NOTE — H&P ADULT - NSHPPHYSICALEXAM_GEN_ALL_CORE
AAOX3. Verbal function intact  Cranial Nerves: II-XII intact  Motor: 5/5 power in b/l UE; RLE: 3/5 HF and 4/5 HE; distally limited to pain, LLE: 5/5   Sensation: intact to touch in all extremities  Pronator Drift: none   Incision: R ankle wound dehisced, no active drainage, serous discharge on dressing

## 2019-08-23 LAB
ANION GAP SERPL CALC-SCNC: 10 MMOL/L — SIGNIFICANT CHANGE UP (ref 5–17)
BUN SERPL-MCNC: 11 MG/DL — SIGNIFICANT CHANGE UP (ref 7–23)
CALCIUM SERPL-MCNC: 9 MG/DL — SIGNIFICANT CHANGE UP (ref 8.4–10.5)
CHLORIDE SERPL-SCNC: 106 MMOL/L — SIGNIFICANT CHANGE UP (ref 96–108)
CO2 SERPL-SCNC: 26 MMOL/L — SIGNIFICANT CHANGE UP (ref 22–31)
CREAT SERPL-MCNC: 1.21 MG/DL — SIGNIFICANT CHANGE UP (ref 0.5–1.3)
CRP SERPL-MCNC: 0.35 MG/DL — SIGNIFICANT CHANGE UP (ref 0–0.4)
ERYTHROCYTE [SEDIMENTATION RATE] IN BLOOD: 15 MM/HR — SIGNIFICANT CHANGE UP
GLUCOSE SERPL-MCNC: 124 MG/DL — HIGH (ref 70–99)
HCT VFR BLD CALC: 34.5 % — LOW (ref 39–50)
HCV AB S/CO SERPL IA: 11.17 S/CO — SIGNIFICANT CHANGE UP
HCV AB SERPL-IMP: REACTIVE
HGB BLD-MCNC: 11.4 G/DL — LOW (ref 13–17)
MAGNESIUM SERPL-MCNC: 1.7 MG/DL — SIGNIFICANT CHANGE UP (ref 1.6–2.6)
MCHC RBC-ENTMCNC: 31.1 PG — SIGNIFICANT CHANGE UP (ref 27–34)
MCHC RBC-ENTMCNC: 33 GM/DL — SIGNIFICANT CHANGE UP (ref 32–36)
MCV RBC AUTO: 94.3 FL — SIGNIFICANT CHANGE UP (ref 80–100)
NRBC # BLD: 0 /100 WBCS — SIGNIFICANT CHANGE UP (ref 0–0)
PHOSPHATE SERPL-MCNC: 2.4 MG/DL — LOW (ref 2.5–4.5)
PLATELET # BLD AUTO: 100 K/UL — LOW (ref 150–400)
POTASSIUM SERPL-MCNC: 3.6 MMOL/L — SIGNIFICANT CHANGE UP (ref 3.5–5.3)
POTASSIUM SERPL-SCNC: 3.6 MMOL/L — SIGNIFICANT CHANGE UP (ref 3.5–5.3)
RBC # BLD: 3.66 M/UL — LOW (ref 4.2–5.8)
RBC # FLD: 11.9 % — SIGNIFICANT CHANGE UP (ref 10.3–14.5)
SODIUM SERPL-SCNC: 142 MMOL/L — SIGNIFICANT CHANGE UP (ref 135–145)
WBC # BLD: 3.33 K/UL — LOW (ref 3.8–10.5)
WBC # FLD AUTO: 3.33 K/UL — LOW (ref 3.8–10.5)

## 2019-08-23 PROCEDURE — 99232 SBSQ HOSP IP/OBS MODERATE 35: CPT

## 2019-08-23 PROCEDURE — 93970 EXTREMITY STUDY: CPT | Mod: 26

## 2019-08-23 PROCEDURE — 99222 1ST HOSP IP/OBS MODERATE 55: CPT | Mod: GC

## 2019-08-23 RX ORDER — ENOXAPARIN SODIUM 100 MG/ML
50 INJECTION SUBCUTANEOUS ONCE
Refills: 0 | Status: COMPLETED | OUTPATIENT
Start: 2019-08-23 | End: 2019-08-23

## 2019-08-23 RX ORDER — ACETAMINOPHEN 500 MG
1000 TABLET ORAL ONCE
Refills: 0 | Status: COMPLETED | OUTPATIENT
Start: 2019-08-23 | End: 2019-08-23

## 2019-08-23 RX ORDER — OXYCODONE HYDROCHLORIDE 5 MG/1
10 TABLET ORAL EVERY 6 HOURS
Refills: 0 | Status: DISCONTINUED | OUTPATIENT
Start: 2019-08-23 | End: 2019-08-28

## 2019-08-23 RX ADMIN — ENOXAPARIN SODIUM 40 MILLIGRAM(S): 100 INJECTION SUBCUTANEOUS at 22:00

## 2019-08-23 RX ADMIN — GABAPENTIN 400 MILLIGRAM(S): 400 CAPSULE ORAL at 21:14

## 2019-08-23 RX ADMIN — OXYCODONE HYDROCHLORIDE 5 MILLIGRAM(S): 5 TABLET ORAL at 21:38

## 2019-08-23 RX ADMIN — Medication 81 MILLIGRAM(S): at 11:04

## 2019-08-23 RX ADMIN — DORZOLAMIDE HYDROCHLORIDE TIMOLOL MALEATE 1 DROP(S): 20; 5 SOLUTION/ DROPS OPHTHALMIC at 17:32

## 2019-08-23 RX ADMIN — Medication 100 MILLIGRAM(S): at 13:40

## 2019-08-23 RX ADMIN — GABAPENTIN 400 MILLIGRAM(S): 400 CAPSULE ORAL at 05:45

## 2019-08-23 RX ADMIN — PIPERACILLIN AND TAZOBACTAM 200 GRAM(S): 4; .5 INJECTION, POWDER, LYOPHILIZED, FOR SOLUTION INTRAVENOUS at 21:13

## 2019-08-23 RX ADMIN — PREGABALIN 1000 MICROGRAM(S): 225 CAPSULE ORAL at 11:04

## 2019-08-23 RX ADMIN — GABAPENTIN 400 MILLIGRAM(S): 400 CAPSULE ORAL at 13:40

## 2019-08-23 RX ADMIN — PIPERACILLIN AND TAZOBACTAM 200 GRAM(S): 4; .5 INJECTION, POWDER, LYOPHILIZED, FOR SOLUTION INTRAVENOUS at 02:43

## 2019-08-23 RX ADMIN — DORZOLAMIDE HYDROCHLORIDE TIMOLOL MALEATE 1 DROP(S): 20; 5 SOLUTION/ DROPS OPHTHALMIC at 05:46

## 2019-08-23 RX ADMIN — Medication 1 MILLIGRAM(S): at 11:04

## 2019-08-23 RX ADMIN — OXYCODONE HYDROCHLORIDE 5 MILLIGRAM(S): 5 TABLET ORAL at 13:44

## 2019-08-23 RX ADMIN — Medication 100 MILLIGRAM(S): at 21:13

## 2019-08-23 RX ADMIN — OXYCODONE HYDROCHLORIDE 5 MILLIGRAM(S): 5 TABLET ORAL at 14:14

## 2019-08-23 RX ADMIN — Medication 250 MILLIGRAM(S): at 21:13

## 2019-08-23 RX ADMIN — OXYCODONE HYDROCHLORIDE 5 MILLIGRAM(S): 5 TABLET ORAL at 07:32

## 2019-08-23 RX ADMIN — OXYCODONE HYDROCHLORIDE 5 MILLIGRAM(S): 5 TABLET ORAL at 20:38

## 2019-08-23 RX ADMIN — Medication 100 MILLIGRAM(S): at 05:45

## 2019-08-23 RX ADMIN — LISINOPRIL 10 MILLIGRAM(S): 2.5 TABLET ORAL at 05:45

## 2019-08-23 RX ADMIN — PIPERACILLIN AND TAZOBACTAM 200 GRAM(S): 4; .5 INJECTION, POWDER, LYOPHILIZED, FOR SOLUTION INTRAVENOUS at 08:39

## 2019-08-23 RX ADMIN — Medication 250 MILLIGRAM(S): at 09:45

## 2019-08-23 RX ADMIN — PIPERACILLIN AND TAZOBACTAM 200 GRAM(S): 4; .5 INJECTION, POWDER, LYOPHILIZED, FOR SOLUTION INTRAVENOUS at 14:29

## 2019-08-23 RX ADMIN — LATANOPROST 1 DROP(S): 0.05 SOLUTION/ DROPS OPHTHALMIC; TOPICAL at 22:04

## 2019-08-23 RX ADMIN — Medication 400 MILLIGRAM(S): at 23:50

## 2019-08-23 RX ADMIN — OXYCODONE HYDROCHLORIDE 5 MILLIGRAM(S): 5 TABLET ORAL at 08:14

## 2019-08-23 NOTE — PROGRESS NOTE ADULT - SUBJECTIVE AND OBJECTIVE BOX
HPI:  69 year old male with progressive lower extremity weakness, s/p lumbar decompression in 2019. s/p sural nerve biopsy on  for lower extremity weakness and atrophy. patient returned for wound dehiscence.  Patient is afebrile and without leukocytosis. He confirms RLE weakness, R ankle pain, b/l LE swelling. He had echo done on previous admission with 58%. hx of mitral valve insufficiency. of note, he had DVT and PE in prior admissions. PMHx also includes HTN, glaucoma. (22 Aug 2019 19:57)    Hospital course:  : Admitted for wound dehiscence. Wound was wash off at bedside with H2O2 at bedside.  : No major events at bedside. Wound was washed with H2O2 and redressed.    OVERNIGHT EVENTS:  Vital Signs Last 24 Hrs  T(C): 35.8 (23 Aug 2019 05:54), Max: 36.7 (23 Aug 2019 00:41)  T(F): 96.4 (23 Aug 2019 05:54), Max: 98.1 (23 Aug 2019 00:41)  HR: 57 (23 Aug 2019 05:54) (57 - 63)  BP: 143/86 (23 Aug 2019 05:54) (121/74 - 143/86)  BP(mean): --  RR: 16 (23 Aug 2019 05:54) (16 - 16)  SpO2: 100% (23 Aug 2019 05:54) (95% - 100%)    I&O's Summary    22 Aug 2019 07:01  -  23 Aug 2019 07:00  --------------------------------------------------------  IN: 790 mL / OUT: 700 mL / NET: 90 mL        PHYSICAL EXAM:  Neurological:  A&O x 3, PERRL, EOMI  Ext: bilateral LE venous insufficience skin change.  RLE wound is clean. 3/5 mostly due to pain.   Sensation: [x] intact to light touch  [] decreased:       DIET: Regular  LABS:                        11.4   3.33  )-----------( 100      ( 23 Aug 2019 07:26 )             34.5     08-    142  |  105  |  11  ----------------------------<  99  3.9   |  26  |  1.20    Ca    9.4      22 Aug 2019 18:37  Mg     1.7         TPro  7.0  /  Alb  3.8  /  TBili  0.6  /  DBili  x   /  AST  24  /  ALT  24  /  AlkPhos  57      PT/INR - ( 22 Aug 2019 18:37 )   PT: 14.4 sec;   INR: 1.27          PTT - ( 22 Aug 2019 18:37 )  PTT:26.0 sec  Urinalysis Basic - ( 22 Aug 2019 19:51 )    Color: Yellow / Appearance: Clear / S.010 / pH: x  Gluc: x / Ketone: NEGATIVE  / Bili: Negative / Urobili: 2.0 E.U./dL   Blood: x / Protein: NEGATIVE mg/dL / Nitrite: NEGATIVE   Leuk Esterase: NEGATIVE / RBC: Many /HPF / WBC < 5 /HPF   Sq Epi: x / Non Sq Epi: 0-5 /HPF / Bacteria: None /HPF          CAPILLARY BLOOD GLUCOSE          Drug Levels: [] N/A    CSF Analysis: [] N/A      Allergies    No Known Allergies    Intolerances      MEDICATIONS:  Antibiotics:  piperacillin/tazobactam IVPB.. 3.375 Gram(s) IV Intermittent every 6 hours  vancomycin  IVPB 1000 milliGRAM(s) IV Intermittent every 12 hours    Neuro:  acetaminophen   Tablet .. 650 milliGRAM(s) Oral every 6 hours PRN  gabapentin 400 milliGRAM(s) Oral three times a day  oxyCODONE    IR 5 milliGRAM(s) Oral every 6 hours PRN    Anticoagulation:  aspirin  chewable 81 milliGRAM(s) Oral daily  enoxaparin Injectable 40 milliGRAM(s) SubCutaneous at bedtime    OTHER:  docusate sodium 100 milliGRAM(s) Oral three times a day  dorzolamide 2%/timolol 0.5% Ophthalmic Solution 1 Drop(s) Both EYES two times a day  latanoprost 0.005% Ophthalmic Solution 1 Drop(s) Both EYES at bedtime  lisinopril 10 milliGRAM(s) Oral daily  senna 2 Tablet(s) Oral at bedtime PRN    IVF:  cyanocobalamin 1000 MICROGram(s) Oral daily  folic acid 1 milliGRAM(s) Oral daily    CULTURES:    RADIOLOGY & ADDITIONAL TESTS:      ASSESSMENT:  69y Male with RLE wound dehiscence, no evidence  of infection.    NEUROPATHY  POSTOP INFECTION  No pertinent family history in first degree relatives  MEWS Score  Mitral valve insufficiency  Cardiomegaly  History of hepatitis B  Glaucoma  Neuropathy  DVT (deep venous thrombosis)  Pulmonary embolism  HTN (hypertension)  Suspected deep vein thrombosis (DVT)  History of appendectomy  History of tonsillectomy  History of lumbar laminectomy  Degeneration of intervertebral disc of thoracic region  History of foot surgery  H/O cervical spine surgery      PLAN:   Pain control  Daily wound care with H2O2  LE doppler  PT/OT  Cont. Vancomycin / Zosyn  DW Dr. Greene.    DVT PROPHYLAXIS:  [] Venodynes                                [x] Heparin/Lovenox    DISPOSITION: pending    Assessment:  Present when checked    []  GCS  E   V  M     Heart Failure: []Acute, [] acute on chronic , []chronic  Heart Failure:  [] Diastolic (HFpEF), [] Systolic (HFrEF), []Combined (HFpEF and HFrEF), [] RHF, [] Pulm HTN, [] Other    [] SETH, [] ATN, [] AIN, [] other  [] CKD1, [] CKD2, [] CKD 3, [] CKD 4, [] CKD 5, []ESRD    Encephalopathy: [] Metabolic, [] Hepatic, [] toxic, [] Neurological, [] Other    Abnormal Nurtitional Status: [] malnurtition (see nutrition note), [ ]underweight: BMI < 19, [] morbid obesity: BMI >40, [] Cachexia    [] Sepsis  [] hypovolemic shock,[] cardiogenic shock, [] hemorrhagic shock, [] neuogenic shock  [] Acute Respiratory Failure  []Cerebral edema, [] Brain compression/ herniation,   [] Functional quadriplegia  [] Acute blood loss anemia

## 2019-08-23 NOTE — PHYSICAL THERAPY INITIAL EVALUATION ADULT - CRITERIA FOR SKILLED THERAPEUTIC INTERVENTIONS
functional limitations in following categories/impairments found/therapy frequency/rehab potential/risk reduction/prevention/anticipated discharge recommendation

## 2019-08-23 NOTE — PROGRESS NOTE ADULT - SUBJECTIVE AND OBJECTIVE BOX
HPI:  69 year old male with progressive lower extremity weakness, s/p lumbar decompression in 2019. s/p sural nerve biopsy on  for lower extremity weakness and atrophy. patient returned for wound dehiscence.  Patient is afebrile and without leukocytosis. He confirms RLE weakness, R ankle pain, b/l LE swelling. He had echo done on previous admission with 58%. hx of mitral valve insufficiency. of note, he had DVT and PE in prior admissions. PMHx also includes HTN, glaucoma. (22 Aug 2019 19:57)    OVERNIGHT EVENTS:  Vital Signs Last 24 Hrs  T(C): 35.8 (23 Aug 2019 05:54), Max: 36.7 (23 Aug 2019 00:41)  T(F): 96.4 (23 Aug 2019 05:54), Max: 98.1 (23 Aug 2019 00:41)  HR: 57 (23 Aug 2019 05:54) (57 - 63)  BP: 143/86 (23 Aug 2019 05:54) (121/74 - 143/86)  BP(mean): --  RR: 16 (23 Aug 2019 05:54) (16 - 16)  SpO2: 100% (23 Aug 2019 05:54) (95% - 100%)    I&O's Summary    22 Aug 2019 07:01  -  23 Aug 2019 07:00  --------------------------------------------------------  IN: 790 mL / OUT: 700 mL / NET: 90 mL        Uneventful night  Right ankel wound yellow exudate  +Painful  wet to dry dressing with H2O2   Will continue to monitor      PHYSICAL EXAM:  Neurological:    A&OX3 Cranial nerves intact  BUE LLE 5/5  RLE 4/5 ? secondary to pain  BLE edematous  Brownish discoloration  + pedal pulses        Cardiovascular:RRR  Respiratory: Lungs CTAB  Gastrointestinal:  +BS  Genitourinary:  voiding without difficulty  Extremities: warm and dry  Incision/Wound: Right ankle ulcer s/p Bx sural nerve      DIET: Regular    LABS:                        11.4   3.33  )-----------( 100      ( 23 Aug 2019 07:26 )             34.5     08-    142  |  105  |  11  ----------------------------<  99  3.9   |  26  |  1.20    Ca    9.4      22 Aug 2019 18:37  Mg     1.7         TPro  7.0  /  Alb  3.8  /  TBili  0.6  /  DBili  x   /  AST  24  /  ALT  24  /  AlkPhos  57      PT/INR - ( 22 Aug 2019 18:37 )   PT: 14.4 sec;   INR: 1.27          PTT - ( 22 Aug 2019 18:37 )  PTT:26.0 sec  Urinalysis Basic - ( 22 Aug 2019 19:51 )    Color: Yellow / Appearance: Clear / S.010 / pH: x  Gluc: x / Ketone: NEGATIVE  / Bili: Negative / Urobili: 2.0 E.U./dL   Blood: x / Protein: NEGATIVE mg/dL / Nitrite: NEGATIVE   Leuk Esterase: NEGATIVE / RBC: Many /HPF / WBC < 5 /HPF   Sq Epi: x / Non Sq Epi: 0-5 /HPF / Bacteria: None /HPF          CAPILLARY BLOOD GLUCOSE    Drug Levels: [] N/A    CSF Analysis: [] N/A      Allergies    No Known Allergies    Intolerances      MEDICATIONS:  Antibiotics:  piperacillin/tazobactam IVPB.. 3.375 Gram(s) IV Intermittent every 6 hours  vancomycin  IVPB 1000 milliGRAM(s) IV Intermittent every 12 hours    Neuro:  acetaminophen   Tablet .. 650 milliGRAM(s) Oral every 6 hours PRN  gabapentin 400 milliGRAM(s) Oral three times a day  oxyCODONE    IR 5 milliGRAM(s) Oral every 6 hours PRN    Anticoagulation:  aspirin  chewable 81 milliGRAM(s) Oral daily  enoxaparin Injectable 40 milliGRAM(s) SubCutaneous at bedtime    OTHER:  docusate sodium 100 milliGRAM(s) Oral three times a day  dorzolamide 2%/timolol 0.5% Ophthalmic Solution 1 Drop(s) Both EYES two times a day  latanoprost 0.005% Ophthalmic Solution 1 Drop(s) Both EYES at bedtime  lisinopril 10 milliGRAM(s) Oral daily  senna 2 Tablet(s) Oral at bedtime PRN    IVF:  cyanocobalamin 1000 MICROGram(s) Oral daily  folic acid 1 milliGRAM(s) Oral daily    CULTURES:    RADIOLOGY & ADDITIONAL TESTS:      ASSESSMENT:  69y Male s/p  2019 Right sural nerv bs, righ gastsrocnemius muscle bx  readmitted right ankle operative sight opened wound  purulent drainage  afebrile    PLAN:      NEURO:    Monitor neuro status  OT/PT  Daily wound care   wet to dry with H2O2  Monitor wound cultures  F/U Medical  in regard to medical management  IV antibx  Dopplers BLE   Pain Management  Bowel regime  F/U Neurology  Continue current medical regime    Dispo: Discussed with attending          DVT PROPHYLAXIS:  [] Venodynes                                [] Heparin/Lovenox    FALL RISK:  [] Low Risk                                    [] Impulsive  Assessment:  Present when checked    []  GCS  E   V  M     Heart Failure: []Acute, [] acute on chronic , []chronic  Heart Failure:  [] Diastolic (HFpEF), [] Systolic (HFrEF), []Combined (HFpEF and HFrEF), [] RHF, [] Pulm HTN, [] Other    [] SETH, [] ATN, [] AIN, [] other  [] CKD1, [] CKD2, [] CKD 3, [] CKD 4, [] CKD 5, []ESRD    Encephalopathy: [] Metabolic, [] Hepatic, [] toxic, [] Neurological, [] Other    Abnormal Nurtitional Status: [] malnurtition (see nutrition note), [ ]underweight: BMI < 19, [] morbid obesity: BMI >40, [] Cachexia    [] Sepsis  [] hypovolemic shock,[] cardiogenic shock, [] hemorrhagic shock, [] neuogenic shock  [] Acute Respiratory Failure  []Cerebral edema, [] Brain compression/ herniation,   [] Functional quadriplegia  [] Acute blood loss anemia

## 2019-08-23 NOTE — OCCUPATIONAL THERAPY INITIAL EVALUATION ADULT - GENERAL OBSERVATIONS, REHAB EVAL
Patient right hand dominant. Chart reviewed, JOSELUIS Anthony cleared patient for OT evaluation. Patient received semi-supine in bed, NAD, +IV, +SCDs.

## 2019-08-23 NOTE — CONSULT NOTE ADULT - SUBJECTIVE AND OBJECTIVE BOX
HPI:  69 year old male with progressive lower extremity weakness, s/p lumbar decompression in 2019. s/p sural nerve biopsy on  for lower extremity weakness and atrophy. patient returned for wound dehiscence.  Patient is afebrile and without leukocytosis. He confirms RLE weakness, R ankle pain, b/l LE swelling. He had echo done on previous admission with 58%. hx of mitral valve insufficiency. of note, he had DVT and PE in prior admissions. PMHx also includes HTN, glaucoma. (22 Aug 2019 19:57)      PAST MEDICAL & SURGICAL HISTORY:  Mitral valve insufficiency  Cardiomegaly  Glaucoma  Neuropathy  DVT (deep venous thrombosis): left leg, 2018  Pulmonary embolism  HTN (hypertension)  History of appendectomy  History of tonsillectomy  History of lumbar laminectomy: 2019  Degeneration of intervertebral disc of thoracic region:   History of foot surgery: right foot  H/O cervical spine surgery: with hardware        REVIEW OF SYSTEMS:    General:	 no weakness; no fevers, no chills  Skin/Breast: no rash  Respiratory and Thorax: no SOB, no cough  Cardiovascular:	No chest pain  Gastrointestinal:	 no nausea, vomiting , diarrhea  Genitourinary:	no dysuria, no difficulty urinating, no hematuria  Musculoskeletal:	no weakness, no joint swelling/pain  Neurological:	no focal weakness/numbness  Endocrine:	no polyuria, no polydipsia      ANTIBIOTICS:  MEDICATIONS  (STANDING):  aspirin  chewable 81 milliGRAM(s) Oral daily  cyanocobalamin 1000 MICROGram(s) Oral daily  docusate sodium 100 milliGRAM(s) Oral three times a day  dorzolamide 2%/timolol 0.5% Ophthalmic Solution 1 Drop(s) Both EYES two times a day  enoxaparin Injectable 40 milliGRAM(s) SubCutaneous at bedtime  folic acid 1 milliGRAM(s) Oral daily  gabapentin 400 milliGRAM(s) Oral three times a day  latanoprost 0.005% Ophthalmic Solution 1 Drop(s) Both EYES at bedtime  lisinopril 10 milliGRAM(s) Oral daily  piperacillin/tazobactam IVPB.. 3.375 Gram(s) IV Intermittent every 6 hours  vancomycin  IVPB 1000 milliGRAM(s) IV Intermittent every 12 hours    MEDICATIONS  (PRN):  acetaminophen   Tablet .. 650 milliGRAM(s) Oral every 6 hours PRN Temp greater or equal to 38C (100.4F), Mild Pain (1 - 3)  oxyCODONE    IR 5 milliGRAM(s) Oral every 6 hours PRN Moderate Pain (4 - 6)  senna 2 Tablet(s) Oral at bedtime PRN Constipation      Allergies    No Known Allergies    Intolerances        SOCIAL HISTORY:    FAMILY HISTORY:  No pertinent family history in first degree relatives      Vital Signs Last 24 Hrs  T(C): 36.1 (23 Aug 2019 12:50), Max: 36.7 (23 Aug 2019 00:41)  T(F): 97 (23 Aug 2019 12:50), Max: 98.1 (23 Aug 2019 00:41)  HR: 56 (23 Aug 2019 12:50) (56 - 63)  BP: 144/83 (23 Aug 2019 12:50) (121/74 - 144/83)  BP(mean): --  RR: 18 (23 Aug 2019 12:50) (16 - 18)  SpO2: 97% (23 Aug 2019 12:50) (95% - 100%)    19 @ 07:01  -  19 @ 07:00  --------------------------------------------------------  IN: 790 mL / OUT: 700 mL / NET: 90 mL    19 @ 07:01  -  19 @ 15:01  --------------------------------------------------------  IN: 360 mL / OUT: 850 mL / NET: -490 mL        PHYSICAL EXAM:  Constitutional:Well-developed, well nourished  Eyes:ELEONORA, EOMI  Ear/Nose/Throat: no oral lesion, no sinus tenderness on percussion	  Neck:no JVD, no lymphadenopathy, supple  Respiratory: CTA mayela  Cardiovascular: S1S2 RRR, no murmurs  Gastrointestinal:soft, (+) BS, no HSM  Extremities:no e/e/c  Vascular: DP Pulse:	right normal; left normal            LABS:                        11.4   3.33  )-----------( 100      ( 23 Aug 2019 07:26 )             34.5     08-    142  |  106  |  11  ----------------------------<  124<H>  3.6   |  26  |  1.21    Ca    9.0      23 Aug 2019 07:26  Phos  2.4     08  Mg     1.7         TPro  7.0  /  Alb  3.8  /  TBili  0.6  /  DBili  x   /  AST  24  /  ALT  24  /  AlkPhos  57  08-22    PT/INR - ( 22 Aug 2019 18:37 )   PT: 14.4 sec;   INR: 1.27          PTT - ( 22 Aug 2019 18:37 )  PTT:26.0 sec  Urinalysis Basic - ( 22 Aug 2019 19:51 )    Color: Yellow / Appearance: Clear / S.010 / pH: x  Gluc: x / Ketone: NEGATIVE  / Bili: Negative / Urobili: 2.0 E.U./dL   Blood: x / Protein: NEGATIVE mg/dL / Nitrite: NEGATIVE   Leuk Esterase: NEGATIVE / RBC: Many /HPF / WBC < 5 /HPF   Sq Epi: x / Non Sq Epi: 0-5 /HPF / Bacteria: None /HPF        MICROBIOLOGY:  RADIOLOGY & ADDITIONAL STUDIES: HPI:  69 year old male with progressive lower extremity weakness, s/p lumbar decompression in 2019. s/p sural nerve biopsy on  for lower extremity weakness and atrophy. patient returned for wound dehiscence.  Patient is afebrile and without leukocytosis. He confirms RLE weakness, R ankle pain, b/l LE swelling. He had echo done on previous admission with 58%. hx of mitral valve insufficiency. of note, he had DVT and PE in prior admissions. PMHx also includes HTN, glaucoma. (22 Aug 2019 19:57)  Per Mr. Sanderson, incision had been stapled, never closed.  Has been painful since the biopsy.  After the staples were removed, appearance of wound continued to worsen.  No fever, chills.      PAST MEDICAL & SURGICAL HISTORY:  Mitral valve insufficiency  Cardiomegaly  Glaucoma  Neuropathy  DVT (deep venous thrombosis): left leg, 2018  Pulmonary embolism  HTN (hypertension)  History of appendectomy  History of tonsillectomy  History of lumbar laminectomy: 2019  Degeneration of intervertebral disc of thoracic region:   History of foot surgery: right foot  H/O cervical spine surgery: with hardware        REVIEW OF SYSTEMS:    General:	 no weakness; no fevers, no chills  Skin/Breast: no rash  Respiratory and Thorax: no SOB, no cough  Cardiovascular:	No chest pain  Gastrointestinal:	 no nausea, vomiting , diarrhea  Genitourinary:	no dysuria, no difficulty urinating, no hematuria  Musculoskeletal:	no weakness, no joint swelling/pain  Neurological:	Ongoing weakness in RLE  Endocrine:	no polyuria, no polydipsia      ANTIBIOTICS:  MEDICATIONS  (STANDING):  aspirin  chewable 81 milliGRAM(s) Oral daily  cyanocobalamin 1000 MICROGram(s) Oral daily  docusate sodium 100 milliGRAM(s) Oral three times a day  dorzolamide 2%/timolol 0.5% Ophthalmic Solution 1 Drop(s) Both EYES two times a day  enoxaparin Injectable 40 milliGRAM(s) SubCutaneous at bedtime  folic acid 1 milliGRAM(s) Oral daily  gabapentin 400 milliGRAM(s) Oral three times a day  latanoprost 0.005% Ophthalmic Solution 1 Drop(s) Both EYES at bedtime  lisinopril 10 milliGRAM(s) Oral daily  piperacillin/tazobactam IVPB.. 3.375 Gram(s) IV Intermittent every 6 hours  vancomycin  IVPB 1000 milliGRAM(s) IV Intermittent every 12 hours    MEDICATIONS  (PRN):  acetaminophen   Tablet .. 650 milliGRAM(s) Oral every 6 hours PRN Temp greater or equal to 38C (100.4F), Mild Pain (1 - 3)  oxyCODONE    IR 5 milliGRAM(s) Oral every 6 hours PRN Moderate Pain (4 - 6)  senna 2 Tablet(s) Oral at bedtime PRN Constipation      Allergies    No Known Allergies    Intolerances        SOCIAL HISTORY:  Born in [place], currently lives here.  Lives alone.  Was a documentary filmmaker.  No tobacco, alcohol or recreational drug use.  No recent travel, no pets.    FAMILY HISTORY:  No pertinent family history in first degree relatives      Vital Signs Last 24 Hrs  T(C): 36.1 (23 Aug 2019 12:50), Max: 36.7 (23 Aug 2019 00:41)  T(F): 97 (23 Aug 2019 12:50), Max: 98.1 (23 Aug 2019 00:41)  HR: 56 (23 Aug 2019 12:50) (56 - 63)  BP: 144/83 (23 Aug 2019 12:50) (121/74 - 144/83)  BP(mean): --  RR: 18 (23 Aug 2019 12:50) (16 - 18)  SpO2: 97% (23 Aug 2019 12:50) (95% - 100%)    19 @ 07:  -  19 @ 07:00  --------------------------------------------------------  IN: 790 mL / OUT: 700 mL / NET: 90 mL    19 @ 07:01  -  19 @ 15:01  --------------------------------------------------------  IN: 360 mL / OUT: 850 mL / NET: -490 mL        PHYSICAL EXAM:  Constitutional:Well-developed, well nourished  Eyes:ELEONORA, EOMI  Ear/Nose/Throat: no oral lesion, no sinus tenderness on percussion	  Neck:no JVD, no lymphadenopathy, supple  Respiratory: CTA mayela  Cardiovascular: S1S2 RRR, no murmurs  Gastrointestinal:soft, (+) BS, no HSM  Extremities:  L ankle edematous and warm.  Lateral incision open with small amount of somewhat cloudy serosanguinous drainage on dressing  Vascular: DP Pulse:	right normal; left normal            LABS:                        11.4   3.33  )-----------( 100      ( 23 Aug 2019 07:26 )             34.5         142  |  106  |  11  ----------------------------<  124<H>  3.6   |  26  |  1.21    Ca    9.0      23 Aug 2019 07:26  Phos  2.4       Mg     1.7         TPro  7.0  /  Alb  3.8  /  TBili  0.6  /  DBili  x   /  AST  24  /  ALT  24  /  AlkPhos  57      PT/INR - ( 22 Aug 2019 18:37 )   PT: 14.4 sec;   INR: 1.27          PTT - ( 22 Aug 2019 18:37 )  PTT:26.0 sec  Urinalysis Basic - ( 22 Aug 2019 19:51 )    Color: Yellow / Appearance: Clear / S.010 / pH: x  Gluc: x / Ketone: NEGATIVE  / Bili: Negative / Urobili: 2.0 E.U./dL   Blood: x / Protein: NEGATIVE mg/dL / Nitrite: NEGATIVE   Leuk Esterase: NEGATIVE / RBC: Many /HPF / WBC < 5 /HPF   Sq Epi: x / Non Sq Epi: 0-5 /HPF / Bacteria: None /HPF        MICROBIOLOGY:    Blood cultures 8/22 X 2 - NGTD    Abscess culture :  few WBCs, no orgs seen, NGTD    RADIOLOGY & ADDITIONAL STUDIES:  < from: US Duplex Venous Lower Ext Complete, Bilateral (19 @ 18:34) >  IMPRESSION:  1.  Chronic-appearing nonocclusive thrombus within the left femoral vein,   uncertain if new or unchanged chronic thrombus from 5/3/2018.  2.  Occlusive thrombus within an intramuscular left calf vein, new from   5/3/2018.    < end of copied text >

## 2019-08-23 NOTE — PHYSICAL THERAPY INITIAL EVALUATION ADULT - PERTINENT HX OF CURRENT PROBLEM, REHAB EVAL
70 Yo M with HTN, glaucoma, neuropathy, hx of DVT/PE presents with wound dehiscence s/p sural nerve biopsy on 7/25

## 2019-08-23 NOTE — PHYSICAL THERAPY INITIAL EVALUATION ADULT - ADDITIONAL COMMENTS
pt lives alone in an elevator access apt building w/ a ramp to enter. States that he uses a rollator for ambulation at baseline and has had some RLE weakness since his previous surgery. States that he has had a couple falls but never injured himself. No home health aide, was going to outpatient PT

## 2019-08-23 NOTE — OCCUPATIONAL THERAPY INITIAL EVALUATION ADULT - ADDITIONAL COMMENTS
Patient reports independence with ADLs PTA, uses a rollator for mobility and a shower chair. Patient was recently attending outpatient therapy. Patient's brother assists with some IADLs prn (e.g., grocery shopping).

## 2019-08-23 NOTE — PHYSICAL THERAPY INITIAL EVALUATION ADULT - IMPAIRMENTS FOUND, PT EVAL
neuromotor development and sensory integration/gait, locomotion, and balance/joint integrity and mobility/integumentary integrity/muscle strength/gross motor/posture/aerobic capacity/endurance/fine motor

## 2019-08-24 LAB
ANION GAP SERPL CALC-SCNC: 10 MMOL/L — SIGNIFICANT CHANGE UP (ref 5–17)
BUN SERPL-MCNC: 9 MG/DL — SIGNIFICANT CHANGE UP (ref 7–23)
CALCIUM SERPL-MCNC: 9.3 MG/DL — SIGNIFICANT CHANGE UP (ref 8.4–10.5)
CHLORIDE SERPL-SCNC: 107 MMOL/L — SIGNIFICANT CHANGE UP (ref 96–108)
CO2 SERPL-SCNC: 26 MMOL/L — SIGNIFICANT CHANGE UP (ref 22–31)
CREAT SERPL-MCNC: 1.11 MG/DL — SIGNIFICANT CHANGE UP (ref 0.5–1.3)
GLUCOSE SERPL-MCNC: 134 MG/DL — HIGH (ref 70–99)
HCT VFR BLD CALC: 35.3 % — LOW (ref 39–50)
HGB BLD-MCNC: 11.7 G/DL — LOW (ref 13–17)
MAGNESIUM SERPL-MCNC: 1.6 MG/DL — SIGNIFICANT CHANGE UP (ref 1.6–2.6)
MCHC RBC-ENTMCNC: 30.6 PG — SIGNIFICANT CHANGE UP (ref 27–34)
MCHC RBC-ENTMCNC: 33.1 GM/DL — SIGNIFICANT CHANGE UP (ref 32–36)
MCV RBC AUTO: 92.4 FL — SIGNIFICANT CHANGE UP (ref 80–100)
NRBC # BLD: 0 /100 WBCS — SIGNIFICANT CHANGE UP (ref 0–0)
PHOSPHATE SERPL-MCNC: 2.3 MG/DL — LOW (ref 2.5–4.5)
PLATELET # BLD AUTO: 117 K/UL — LOW (ref 150–400)
POTASSIUM SERPL-MCNC: 3.8 MMOL/L — SIGNIFICANT CHANGE UP (ref 3.5–5.3)
POTASSIUM SERPL-SCNC: 3.8 MMOL/L — SIGNIFICANT CHANGE UP (ref 3.5–5.3)
RBC # BLD: 3.82 M/UL — LOW (ref 4.2–5.8)
RBC # FLD: 12 % — SIGNIFICANT CHANGE UP (ref 10.3–14.5)
SODIUM SERPL-SCNC: 143 MMOL/L — SIGNIFICANT CHANGE UP (ref 135–145)
VANCOMYCIN TROUGH SERPL-MCNC: 15.1 UG/ML — SIGNIFICANT CHANGE UP (ref 10–20)
WBC # BLD: 5.21 K/UL — SIGNIFICANT CHANGE UP (ref 3.8–10.5)
WBC # FLD AUTO: 5.21 K/UL — SIGNIFICANT CHANGE UP (ref 3.8–10.5)

## 2019-08-24 PROCEDURE — 99221 1ST HOSP IP/OBS SF/LOW 40: CPT

## 2019-08-24 RX ORDER — POTASSIUM CHLORIDE 20 MEQ
20 PACKET (EA) ORAL ONCE
Refills: 0 | Status: COMPLETED | OUTPATIENT
Start: 2019-08-24 | End: 2019-08-24

## 2019-08-24 RX ORDER — MAGNESIUM SULFATE 500 MG/ML
2 VIAL (ML) INJECTION ONCE
Refills: 0 | Status: COMPLETED | OUTPATIENT
Start: 2019-08-24 | End: 2019-08-24

## 2019-08-24 RX ORDER — SODIUM,POTASSIUM PHOSPHATES 278-250MG
2 POWDER IN PACKET (EA) ORAL ONCE
Refills: 0 | Status: COMPLETED | OUTPATIENT
Start: 2019-08-24 | End: 2019-08-24

## 2019-08-24 RX ADMIN — LISINOPRIL 10 MILLIGRAM(S): 2.5 TABLET ORAL at 05:58

## 2019-08-24 RX ADMIN — GABAPENTIN 400 MILLIGRAM(S): 400 CAPSULE ORAL at 21:02

## 2019-08-24 RX ADMIN — Medication 1000 MILLIGRAM(S): at 00:50

## 2019-08-24 RX ADMIN — Medication 1 MILLIGRAM(S): at 13:01

## 2019-08-24 RX ADMIN — LATANOPROST 1 DROP(S): 0.05 SOLUTION/ DROPS OPHTHALMIC; TOPICAL at 21:05

## 2019-08-24 RX ADMIN — PIPERACILLIN AND TAZOBACTAM 200 GRAM(S): 4; .5 INJECTION, POWDER, LYOPHILIZED, FOR SOLUTION INTRAVENOUS at 03:02

## 2019-08-24 RX ADMIN — PREGABALIN 1000 MICROGRAM(S): 225 CAPSULE ORAL at 13:01

## 2019-08-24 RX ADMIN — OXYCODONE HYDROCHLORIDE 10 MILLIGRAM(S): 5 TABLET ORAL at 15:00

## 2019-08-24 RX ADMIN — Medication 100 MILLIGRAM(S): at 05:58

## 2019-08-24 RX ADMIN — Medication 100 MILLIGRAM(S): at 21:02

## 2019-08-24 RX ADMIN — Medication 20 MILLIEQUIVALENT(S): at 13:01

## 2019-08-24 RX ADMIN — Medication 50 GRAM(S): at 13:02

## 2019-08-24 RX ADMIN — Medication 250 MILLIGRAM(S): at 21:45

## 2019-08-24 RX ADMIN — OXYCODONE HYDROCHLORIDE 10 MILLIGRAM(S): 5 TABLET ORAL at 05:58

## 2019-08-24 RX ADMIN — PIPERACILLIN AND TAZOBACTAM 200 GRAM(S): 4; .5 INJECTION, POWDER, LYOPHILIZED, FOR SOLUTION INTRAVENOUS at 09:10

## 2019-08-24 RX ADMIN — OXYCODONE HYDROCHLORIDE 10 MILLIGRAM(S): 5 TABLET ORAL at 06:42

## 2019-08-24 RX ADMIN — OXYCODONE HYDROCHLORIDE 10 MILLIGRAM(S): 5 TABLET ORAL at 14:07

## 2019-08-24 RX ADMIN — Medication 81 MILLIGRAM(S): at 13:01

## 2019-08-24 RX ADMIN — PIPERACILLIN AND TAZOBACTAM 200 GRAM(S): 4; .5 INJECTION, POWDER, LYOPHILIZED, FOR SOLUTION INTRAVENOUS at 14:08

## 2019-08-24 RX ADMIN — Medication 250 MILLIGRAM(S): at 09:37

## 2019-08-24 RX ADMIN — PIPERACILLIN AND TAZOBACTAM 200 GRAM(S): 4; .5 INJECTION, POWDER, LYOPHILIZED, FOR SOLUTION INTRAVENOUS at 21:01

## 2019-08-24 RX ADMIN — GABAPENTIN 400 MILLIGRAM(S): 400 CAPSULE ORAL at 13:01

## 2019-08-24 RX ADMIN — OXYCODONE HYDROCHLORIDE 10 MILLIGRAM(S): 5 TABLET ORAL at 21:00

## 2019-08-24 RX ADMIN — DORZOLAMIDE HYDROCHLORIDE TIMOLOL MALEATE 1 DROP(S): 20; 5 SOLUTION/ DROPS OPHTHALMIC at 05:58

## 2019-08-24 RX ADMIN — DORZOLAMIDE HYDROCHLORIDE TIMOLOL MALEATE 1 DROP(S): 20; 5 SOLUTION/ DROPS OPHTHALMIC at 17:04

## 2019-08-24 RX ADMIN — Medication 2 PACKET(S): at 17:05

## 2019-08-24 RX ADMIN — ENOXAPARIN SODIUM 40 MILLIGRAM(S): 100 INJECTION SUBCUTANEOUS at 21:01

## 2019-08-24 RX ADMIN — Medication 100 MILLIGRAM(S): at 13:01

## 2019-08-24 RX ADMIN — OXYCODONE HYDROCHLORIDE 10 MILLIGRAM(S): 5 TABLET ORAL at 22:11

## 2019-08-24 RX ADMIN — GABAPENTIN 400 MILLIGRAM(S): 400 CAPSULE ORAL at 05:58

## 2019-08-24 NOTE — CONSULT NOTE ADULT - ASSESSMENT
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
68 yo M with HTN, MR, cardiomegaly, h/o prior DVT/PE, neuropathy with nonhealing incision following sural nerve biopsy 1 month ago, likely infected.  He has occlusive venous thrombus in L calf vein, chronic nonocclusive thrombus in L femoral vein, likely contributed to infection.  Need to include coverage for nosocomial pathogens.  Suggest:  - F/U blood and wound cultures - pending  - Continue vancomycin 1 g IV q12h - please check trough prior to 4th dose - scheduled for 9 am tomorrow  - Continue pip-tazo 3.375 g IV q6h  - Local care  Recommendations discussed with primary team.  Will follow with you - ID Team 1.

## 2019-08-24 NOTE — CONSULT NOTE ADULT - SUBJECTIVE AND OBJECTIVE BOX
Pt known to me well from outpatient setting  Had nerve and muscle biopsy to workup progressive neuropathy / R >L leg weakness  Biopsy results nondiagnostic - axonal loss, no clear inflammation, no amyloidosis, no myopathy  Right leg strength actually seems a bit improved from before  Admitted for biopsy site dehiscence and infection  U/S showed left LE chronic DVT, none on right  Hep C antibody positive - states he received treatment     Today right foot seems less swollen, less warm, less tender than prior to admission   Strength in right leg - 4/5 hip flexion and knee extension, ankle dorsiflexion 4  Reflexes: right patellar absent, left 2+, achilles absent b/l

## 2019-08-24 NOTE — PROGRESS NOTE ADULT - SUBJECTIVE AND OBJECTIVE BOX
HPI:  69 year old male with progressive lower extremity weakness, s/p lumbar decompression in 2019. s/p sural nerve biopsy on  for lower extremity weakness and atrophy. patient returned for wound dehiscence.  Patient is afebrile and without leukocytosis. He confirms RLE weakness, R ankle pain, b/l LE swelling. He had echo done on previous admission with 58%. hx of mitral valve insufficiency. of note, he had DVT and PE in prior admissions. PMHx also includes HTN, glaucoma. (22 Aug 2019 19:57)    Hospital course:  : Admitted for wound dehiscence. Wound was wash off at bedside with H2O2 at bedside.  : No major events at bedside. Wound was washed with H2O2 and redressed.    : local wound care. vanc trough this am therapeutic cont to f/u culture results, cont abx. ID recs appreciated.     OVERNIGHT EVENTS:  Vital Signs Last 24 Hrs  T(C): 36.7 (24 Aug 2019 05:52), Max: 36.7 (24 Aug 2019 05:52)  T(F): 98 (24 Aug 2019 05:52), Max: 98 (24 Aug 2019 05:52)  HR: 60 (24 Aug 2019 05:52) (16 - 63)  BP: 135/88 (24 Aug 2019 05:52) (118/77 - 149/90)  BP(mean): --  RR: 16 (24 Aug 2019 05:52) (16 - 18)  SpO2: 98% (24 Aug 2019 05:52) (97% - 100%)    I&O's Summary    23 Aug 2019 07:  -  24 Aug 2019 07:00  --------------------------------------------------------  IN: 1360 mL / OUT: 1900 mL / NET: -540 mL    24 Aug 2019 07:01  -  24 Aug 2019 09:48  --------------------------------------------------------  IN: 0 mL / OUT: 500 mL / NET: -500 mL        PHYSICAL EXAM:  Neurological:  Neurological:  A&O x 3, PERRL, EOMI  Ext: bilateral LE venous insufficience skin change.  RLE wound is clean. 3/5 mostly due to pain.   Sensation: [x] intact to light touch    TUBES/LINES:  pivs    DIET:  [] NPO  [x] Mechanical  [] Tube feeds    LABS:                        11.7   5.21  )-----------( 117      ( 24 Aug 2019 07:56 )             35.3         143  |  107  |  9   ----------------------------<  134<H>  3.8   |  26  |  1.11    Ca    9.3      24 Aug 2019 07:56  Phos  2.4       Mg     1.7         TPro  7.0  /  Alb  3.8  /  TBili  0.6  /  DBili  x   /  AST  24  /  ALT  24  /  AlkPhos  57      PT/INR - ( 22 Aug 2019 18:37 )   PT: 14.4 sec;   INR: 1.27          PTT - ( 22 Aug 2019 18:37 )  PTT:26.0 sec  Urinalysis Basic - ( 22 Aug 2019 19:51 )    Color: Yellow / Appearance: Clear / S.010 / pH: x  Gluc: x / Ketone: NEGATIVE  / Bili: Negative / Urobili: 2.0 E.U./dL   Blood: x / Protein: NEGATIVE mg/dL / Nitrite: NEGATIVE   Leuk Esterase: NEGATIVE / RBC: Many /HPF / WBC < 5 /HPF   Sq Epi: x / Non Sq Epi: 0-5 /HPF / Bacteria: None /HPF          CAPILLARY BLOOD GLUCOSE          Drug Levels: [] N/A  Vancomycin Level, Trough: 15.1 ug/mL ( @ 08:40)    CSF Analysis: [] N/A      Allergies    No Known Allergies    Intolerances      MEDICATIONS:  Antibiotics:  piperacillin/tazobactam IVPB.. 3.375 Gram(s) IV Intermittent every 6 hours  vancomycin  IVPB 1000 milliGRAM(s) IV Intermittent every 12 hours    Neuro:  acetaminophen   Tablet .. 650 milliGRAM(s) Oral every 6 hours PRN  gabapentin 400 milliGRAM(s) Oral three times a day  oxyCODONE    IR 5 milliGRAM(s) Oral every 6 hours PRN  oxyCODONE    IR 10 milliGRAM(s) Oral every 6 hours PRN    Anticoagulation:  aspirin  chewable 81 milliGRAM(s) Oral daily  enoxaparin Injectable 40 milliGRAM(s) SubCutaneous at bedtime    OTHER:  docusate sodium 100 milliGRAM(s) Oral three times a day  dorzolamide 2%/timolol 0.5% Ophthalmic Solution 1 Drop(s) Both EYES two times a day  latanoprost 0.005% Ophthalmic Solution 1 Drop(s) Both EYES at bedtime  lisinopril 10 milliGRAM(s) Oral daily  senna 2 Tablet(s) Oral at bedtime PRN    IVF:  cyanocobalamin 1000 MICROGram(s) Oral daily  folic acid 1 milliGRAM(s) Oral daily  potassium chloride    Tablet ER 20 milliEquivalent(s) Oral once    CULTURES:  Culture Results:   Culture in progress ( @ 20:32)  Culture Results:   No growth at 1 day. ( @ 20:25)    RADIOLOGY & ADDITIONAL TESTS:      ASSESSMENT:  69y Male with RLE wound dehiscence, no evidence  of infection.    NEUROPATHY  POSTOP INFECTION  No pertinent family history in first degree relatives  MEWS Score  Mitral valve insufficiency  Cardiomegaly  History of hepatitis B  Glaucoma  Neuropathy  DVT (deep venous thrombosis)  Pulmonary embolism  HTN (hypertension)  Suspected deep vein thrombosis (DVT)  History of appendectomy  History of tonsillectomy  History of lumbar laminectomy  Degeneration of intervertebral disc of thoracic region  History of foot surgery  H/O cervical spine surgery      PLAN:   Pain control  Daily wound care with H2O2  LE doppler  PT/OT  Cont. Vancomycin / Zosyn  LORRAINE Greene.    DVT PROPHYLAXIS:  [] Venodynes                                [x] Heparin/Lovenox    DISPOSITION: pending

## 2019-08-25 LAB
-  AMPICILLIN: SIGNIFICANT CHANGE UP
-  CEFTRIAXONE: SIGNIFICANT CHANGE UP
-  CLINDAMYCIN: SIGNIFICANT CHANGE UP
-  ERYTHROMYCIN: SIGNIFICANT CHANGE UP
-  LEVOFLOXACIN: SIGNIFICANT CHANGE UP
-  PENICILLIN: SIGNIFICANT CHANGE UP
-  VANCOMYCIN: SIGNIFICANT CHANGE UP
-  VANCOMYCIN: SIGNIFICANT CHANGE UP
ANION GAP SERPL CALC-SCNC: 9 MMOL/L — SIGNIFICANT CHANGE UP (ref 5–17)
BUN SERPL-MCNC: 7 MG/DL — SIGNIFICANT CHANGE UP (ref 7–23)
CALCIUM SERPL-MCNC: 8.8 MG/DL — SIGNIFICANT CHANGE UP (ref 8.4–10.5)
CHLORIDE SERPL-SCNC: 109 MMOL/L — HIGH (ref 96–108)
CO2 SERPL-SCNC: 25 MMOL/L — SIGNIFICANT CHANGE UP (ref 22–31)
CREAT SERPL-MCNC: 1.12 MG/DL — SIGNIFICANT CHANGE UP (ref 0.5–1.3)
CULTURE RESULTS: SIGNIFICANT CHANGE UP
GLUCOSE SERPL-MCNC: 106 MG/DL — HIGH (ref 70–99)
HCT VFR BLD CALC: 36.8 % — LOW (ref 39–50)
HGB BLD-MCNC: 12 G/DL — LOW (ref 13–17)
MAGNESIUM SERPL-MCNC: 1.9 MG/DL — SIGNIFICANT CHANGE UP (ref 1.6–2.6)
MCHC RBC-ENTMCNC: 30.4 PG — SIGNIFICANT CHANGE UP (ref 27–34)
MCHC RBC-ENTMCNC: 32.6 GM/DL — SIGNIFICANT CHANGE UP (ref 32–36)
MCV RBC AUTO: 93.2 FL — SIGNIFICANT CHANGE UP (ref 80–100)
METHOD TYPE: SIGNIFICANT CHANGE UP
NRBC # BLD: 0 /100 WBCS — SIGNIFICANT CHANGE UP (ref 0–0)
ORGANISM # SPEC MICROSCOPIC CNT: SIGNIFICANT CHANGE UP
PHOSPHATE SERPL-MCNC: 2.7 MG/DL — SIGNIFICANT CHANGE UP (ref 2.5–4.5)
PLATELET # BLD AUTO: 113 K/UL — LOW (ref 150–400)
POTASSIUM SERPL-MCNC: 3.6 MMOL/L — SIGNIFICANT CHANGE UP (ref 3.5–5.3)
POTASSIUM SERPL-SCNC: 3.6 MMOL/L — SIGNIFICANT CHANGE UP (ref 3.5–5.3)
RBC # BLD: 3.95 M/UL — LOW (ref 4.2–5.8)
RBC # FLD: 11.9 % — SIGNIFICANT CHANGE UP (ref 10.3–14.5)
SODIUM SERPL-SCNC: 143 MMOL/L — SIGNIFICANT CHANGE UP (ref 135–145)
SPECIMEN SOURCE: SIGNIFICANT CHANGE UP
WBC # BLD: 5.28 K/UL — SIGNIFICANT CHANGE UP (ref 3.8–10.5)
WBC # FLD AUTO: 5.28 K/UL — SIGNIFICANT CHANGE UP (ref 3.8–10.5)

## 2019-08-25 RX ADMIN — Medication 100 MILLIGRAM(S): at 13:25

## 2019-08-25 RX ADMIN — GABAPENTIN 400 MILLIGRAM(S): 400 CAPSULE ORAL at 06:12

## 2019-08-25 RX ADMIN — PREGABALIN 1000 MICROGRAM(S): 225 CAPSULE ORAL at 11:03

## 2019-08-25 RX ADMIN — Medication 100 MILLIGRAM(S): at 06:12

## 2019-08-25 RX ADMIN — Medication 250 MILLIGRAM(S): at 10:00

## 2019-08-25 RX ADMIN — LATANOPROST 1 DROP(S): 0.05 SOLUTION/ DROPS OPHTHALMIC; TOPICAL at 21:05

## 2019-08-25 RX ADMIN — Medication 100 MILLIGRAM(S): at 21:03

## 2019-08-25 RX ADMIN — OXYCODONE HYDROCHLORIDE 10 MILLIGRAM(S): 5 TABLET ORAL at 14:42

## 2019-08-25 RX ADMIN — LISINOPRIL 10 MILLIGRAM(S): 2.5 TABLET ORAL at 06:12

## 2019-08-25 RX ADMIN — OXYCODONE HYDROCHLORIDE 10 MILLIGRAM(S): 5 TABLET ORAL at 09:10

## 2019-08-25 RX ADMIN — Medication 250 MILLIGRAM(S): at 21:03

## 2019-08-25 RX ADMIN — PIPERACILLIN AND TAZOBACTAM 200 GRAM(S): 4; .5 INJECTION, POWDER, LYOPHILIZED, FOR SOLUTION INTRAVENOUS at 20:27

## 2019-08-25 RX ADMIN — Medication 1 MILLIGRAM(S): at 11:03

## 2019-08-25 RX ADMIN — PIPERACILLIN AND TAZOBACTAM 200 GRAM(S): 4; .5 INJECTION, POWDER, LYOPHILIZED, FOR SOLUTION INTRAVENOUS at 15:07

## 2019-08-25 RX ADMIN — ENOXAPARIN SODIUM 40 MILLIGRAM(S): 100 INJECTION SUBCUTANEOUS at 21:05

## 2019-08-25 RX ADMIN — GABAPENTIN 400 MILLIGRAM(S): 400 CAPSULE ORAL at 13:25

## 2019-08-25 RX ADMIN — OXYCODONE HYDROCHLORIDE 10 MILLIGRAM(S): 5 TABLET ORAL at 13:41

## 2019-08-25 RX ADMIN — DORZOLAMIDE HYDROCHLORIDE TIMOLOL MALEATE 1 DROP(S): 20; 5 SOLUTION/ DROPS OPHTHALMIC at 17:16

## 2019-08-25 RX ADMIN — PIPERACILLIN AND TAZOBACTAM 200 GRAM(S): 4; .5 INJECTION, POWDER, LYOPHILIZED, FOR SOLUTION INTRAVENOUS at 03:00

## 2019-08-25 RX ADMIN — PIPERACILLIN AND TAZOBACTAM 200 GRAM(S): 4; .5 INJECTION, POWDER, LYOPHILIZED, FOR SOLUTION INTRAVENOUS at 09:15

## 2019-08-25 RX ADMIN — GABAPENTIN 400 MILLIGRAM(S): 400 CAPSULE ORAL at 21:03

## 2019-08-25 RX ADMIN — DORZOLAMIDE HYDROCHLORIDE TIMOLOL MALEATE 1 DROP(S): 20; 5 SOLUTION/ DROPS OPHTHALMIC at 06:12

## 2019-08-25 RX ADMIN — Medication 81 MILLIGRAM(S): at 11:03

## 2019-08-25 RX ADMIN — OXYCODONE HYDROCHLORIDE 10 MILLIGRAM(S): 5 TABLET ORAL at 10:19

## 2019-08-25 NOTE — PROGRESS NOTE ADULT - SUBJECTIVE AND OBJECTIVE BOX
INTERVAL HPI/OVERNIGHT EVENTS:    CONSTITUTIONAL:  No fever, chills, night sweats  EYES:  No photophobia or visual changes  CARDIOVASCULAR:  No chest pain  RESPIRATORY:  No cough, wheezing, or SOB   GASTROINTESTINAL:  No nausea, vomiting, diarrhea, constipation, or abdominal pain  GENITOURINARY:  No frequency, urgency, dysuria or hematuria  NEUROLOGIC:  No headache, lightheadedness      ANTIBIOTICS/RELEVANT:          Vital Signs Last 24 Hrs  T(C): 36.3 (25 Aug 2019 08:00), Max: 36.8 (24 Aug 2019 16:24)  T(F): 97.4 (25 Aug 2019 08:00), Max: 98.2 (24 Aug 2019 16:24)  HR: 60 (25 Aug 2019 08:00) (58 - 60)  BP: 110/69 (25 Aug 2019 08:00) (110/69 - 152/91)  BP(mean): --  RR: 15 (25 Aug 2019 08:00) (15 - 18)  SpO2: 99% (25 Aug 2019 08:00) (96% - 99%)    PHYSICAL EXAM:  Constitutional:  Well-developed, well nourished  Eyes:  Sclerae anicterica, conjunctivae clear, PERRL  Ear/Nose/Throat:  No nasal exudate or sinus tenderness;  No buccal mucosal lesions, no pharyngeal erythema or exudate	  Neck:  Supple, no adenopathy  Respiratory:  Clear bilaterally  Cardiovascular:  RRR, S1S2, no murmur appreciated  Gastrointestinal:  Symmetric, normoactive BS, soft, NT, no masses, guarding or rebound.  No HSM  Extremities:  No edema      LABS:                        12.0   5.28  )-----------( 113      ( 25 Aug 2019 06:23 )             36.8         08-25    143  |  109<H>  |  7   ----------------------------<  106<H>  3.6   |  25  |  1.12    Ca    8.8      25 Aug 2019 06:23  Phos  2.7     08-25  Mg     1.9     08-25    Sedimentation Rate, Erythrocyte: 15 mm/Hr (08.23.19 @ 07:26)    C-Reactive Protein, Serum: 0.35 mg/dL (08.23.19 @ 07:26)      MICROBIOLOGY:    Abscess wound R ankle 8/22:  few WBCs, no orgs;  mod E. faecalis, mod Strep sanguinous  S PCN, CAX, levo, clinda, erythro, vanc    RADIOLOGY & ADDITIONAL STUDIES:

## 2019-08-26 ENCOUNTER — APPOINTMENT (OUTPATIENT)
Dept: NEUROLOGY | Facility: CLINIC | Age: 69
End: 2019-08-26

## 2019-08-26 LAB
ANION GAP SERPL CALC-SCNC: 8 MMOL/L — SIGNIFICANT CHANGE UP (ref 5–17)
BUN SERPL-MCNC: 11 MG/DL — SIGNIFICANT CHANGE UP (ref 7–23)
CALCIUM SERPL-MCNC: 9 MG/DL — SIGNIFICANT CHANGE UP (ref 8.4–10.5)
CHLORIDE SERPL-SCNC: 107 MMOL/L — SIGNIFICANT CHANGE UP (ref 96–108)
CO2 SERPL-SCNC: 27 MMOL/L — SIGNIFICANT CHANGE UP (ref 22–31)
CREAT SERPL-MCNC: 1.24 MG/DL — SIGNIFICANT CHANGE UP (ref 0.5–1.3)
GLUCOSE SERPL-MCNC: 95 MG/DL — SIGNIFICANT CHANGE UP (ref 70–99)
HCT VFR BLD CALC: 36.6 % — LOW (ref 39–50)
HGB BLD-MCNC: 11.9 G/DL — LOW (ref 13–17)
MAGNESIUM SERPL-MCNC: 1.7 MG/DL — SIGNIFICANT CHANGE UP (ref 1.6–2.6)
MCHC RBC-ENTMCNC: 30.6 PG — SIGNIFICANT CHANGE UP (ref 27–34)
MCHC RBC-ENTMCNC: 32.5 GM/DL — SIGNIFICANT CHANGE UP (ref 32–36)
MCV RBC AUTO: 94.1 FL — SIGNIFICANT CHANGE UP (ref 80–100)
NRBC # BLD: 0 /100 WBCS — SIGNIFICANT CHANGE UP (ref 0–0)
PHOSPHATE SERPL-MCNC: 3.4 MG/DL — SIGNIFICANT CHANGE UP (ref 2.5–4.5)
PLATELET # BLD AUTO: 107 K/UL — LOW (ref 150–400)
POTASSIUM SERPL-MCNC: 4 MMOL/L — SIGNIFICANT CHANGE UP (ref 3.5–5.3)
POTASSIUM SERPL-SCNC: 4 MMOL/L — SIGNIFICANT CHANGE UP (ref 3.5–5.3)
RBC # BLD: 3.89 M/UL — LOW (ref 4.2–5.8)
RBC # FLD: 12.1 % — SIGNIFICANT CHANGE UP (ref 10.3–14.5)
SODIUM SERPL-SCNC: 142 MMOL/L — SIGNIFICANT CHANGE UP (ref 135–145)
WBC # BLD: 5.85 K/UL — SIGNIFICANT CHANGE UP (ref 3.8–10.5)
WBC # FLD AUTO: 5.85 K/UL — SIGNIFICANT CHANGE UP (ref 3.8–10.5)

## 2019-08-26 PROCEDURE — 99232 SBSQ HOSP IP/OBS MODERATE 35: CPT

## 2019-08-26 PROCEDURE — 99232 SBSQ HOSP IP/OBS MODERATE 35: CPT | Mod: GC

## 2019-08-26 RX ORDER — PIPERACILLIN AND TAZOBACTAM 4; .5 G/20ML; G/20ML
3.38 INJECTION, POWDER, LYOPHILIZED, FOR SOLUTION INTRAVENOUS EVERY 6 HOURS
Refills: 0 | Status: DISCONTINUED | OUTPATIENT
Start: 2019-08-26 | End: 2019-08-28

## 2019-08-26 RX ADMIN — GABAPENTIN 400 MILLIGRAM(S): 400 CAPSULE ORAL at 21:27

## 2019-08-26 RX ADMIN — LATANOPROST 1 DROP(S): 0.05 SOLUTION/ DROPS OPHTHALMIC; TOPICAL at 21:40

## 2019-08-26 RX ADMIN — OXYCODONE HYDROCHLORIDE 10 MILLIGRAM(S): 5 TABLET ORAL at 02:44

## 2019-08-26 RX ADMIN — GABAPENTIN 400 MILLIGRAM(S): 400 CAPSULE ORAL at 05:15

## 2019-08-26 RX ADMIN — PREGABALIN 1000 MICROGRAM(S): 225 CAPSULE ORAL at 11:14

## 2019-08-26 RX ADMIN — OXYCODONE HYDROCHLORIDE 10 MILLIGRAM(S): 5 TABLET ORAL at 01:44

## 2019-08-26 RX ADMIN — Medication 1 MILLIGRAM(S): at 11:14

## 2019-08-26 RX ADMIN — PIPERACILLIN AND TAZOBACTAM 200 GRAM(S): 4; .5 INJECTION, POWDER, LYOPHILIZED, FOR SOLUTION INTRAVENOUS at 14:04

## 2019-08-26 RX ADMIN — OXYCODONE HYDROCHLORIDE 10 MILLIGRAM(S): 5 TABLET ORAL at 08:57

## 2019-08-26 RX ADMIN — PIPERACILLIN AND TAZOBACTAM 200 GRAM(S): 4; .5 INJECTION, POWDER, LYOPHILIZED, FOR SOLUTION INTRAVENOUS at 21:26

## 2019-08-26 RX ADMIN — PIPERACILLIN AND TAZOBACTAM 200 GRAM(S): 4; .5 INJECTION, POWDER, LYOPHILIZED, FOR SOLUTION INTRAVENOUS at 02:00

## 2019-08-26 RX ADMIN — ENOXAPARIN SODIUM 40 MILLIGRAM(S): 100 INJECTION SUBCUTANEOUS at 21:27

## 2019-08-26 RX ADMIN — OXYCODONE HYDROCHLORIDE 10 MILLIGRAM(S): 5 TABLET ORAL at 14:05

## 2019-08-26 RX ADMIN — PIPERACILLIN AND TAZOBACTAM 200 GRAM(S): 4; .5 INJECTION, POWDER, LYOPHILIZED, FOR SOLUTION INTRAVENOUS at 08:57

## 2019-08-26 RX ADMIN — OXYCODONE HYDROCHLORIDE 10 MILLIGRAM(S): 5 TABLET ORAL at 09:10

## 2019-08-26 RX ADMIN — DORZOLAMIDE HYDROCHLORIDE TIMOLOL MALEATE 1 DROP(S): 20; 5 SOLUTION/ DROPS OPHTHALMIC at 05:15

## 2019-08-26 RX ADMIN — DORZOLAMIDE HYDROCHLORIDE TIMOLOL MALEATE 1 DROP(S): 20; 5 SOLUTION/ DROPS OPHTHALMIC at 17:31

## 2019-08-26 RX ADMIN — Medication 100 MILLIGRAM(S): at 21:28

## 2019-08-26 RX ADMIN — OXYCODONE HYDROCHLORIDE 10 MILLIGRAM(S): 5 TABLET ORAL at 22:10

## 2019-08-26 RX ADMIN — LISINOPRIL 10 MILLIGRAM(S): 2.5 TABLET ORAL at 05:15

## 2019-08-26 RX ADMIN — Medication 100 MILLIGRAM(S): at 05:14

## 2019-08-26 RX ADMIN — Medication 250 MILLIGRAM(S): at 09:46

## 2019-08-26 RX ADMIN — GABAPENTIN 400 MILLIGRAM(S): 400 CAPSULE ORAL at 13:23

## 2019-08-26 RX ADMIN — Medication 100 MILLIGRAM(S): at 13:23

## 2019-08-26 RX ADMIN — Medication 250 MILLIGRAM(S): at 21:27

## 2019-08-26 RX ADMIN — OXYCODONE HYDROCHLORIDE 10 MILLIGRAM(S): 5 TABLET ORAL at 21:28

## 2019-08-26 RX ADMIN — OXYCODONE HYDROCHLORIDE 10 MILLIGRAM(S): 5 TABLET ORAL at 13:05

## 2019-08-26 RX ADMIN — Medication 81 MILLIGRAM(S): at 11:14

## 2019-08-26 NOTE — PROGRESS NOTE ADULT - SUBJECTIVE AND OBJECTIVE BOX
INTERVAL HPI/OVERNIGHT EVENTS:    CONSTITUTIONAL:  Negative fever or chills, feels well, good appetite  EYES:  Negative  blurry vision or double vision  CARDIOVASCULAR:  Negative for chest pain or palpitations  RESPIRATORY:  Negative for cough, wheezing, or SOB   GASTROINTESTINAL:  Negative for nausea, vomiting, diarrhea, constipation, or abdominal pain  GENITOURINARY:  Negative frequency, urgency or dysuria  NEUROLOGIC:  No headache, confusion, dizziness, lightheadedness      ANTIBIOTICS/RELEVANT:    MEDICATIONS  (STANDING):  aspirin  chewable 81 milliGRAM(s) Oral daily  cyanocobalamin 1000 MICROGram(s) Oral daily  docusate sodium 100 milliGRAM(s) Oral three times a day  dorzolamide 2%/timolol 0.5% Ophthalmic Solution 1 Drop(s) Both EYES two times a day  enoxaparin Injectable 40 milliGRAM(s) SubCutaneous at bedtime  folic acid 1 milliGRAM(s) Oral daily  gabapentin 400 milliGRAM(s) Oral three times a day  latanoprost 0.005% Ophthalmic Solution 1 Drop(s) Both EYES at bedtime  lisinopril 10 milliGRAM(s) Oral daily  piperacillin/tazobactam IVPB.. 3.375 Gram(s) IV Intermittent every 6 hours  vancomycin  IVPB 1000 milliGRAM(s) IV Intermittent every 12 hours    MEDICATIONS  (PRN):  acetaminophen   Tablet .. 650 milliGRAM(s) Oral every 6 hours PRN Temp greater or equal to 38C (100.4F), Mild Pain (1 - 3)  oxyCODONE    IR 5 milliGRAM(s) Oral every 6 hours PRN Moderate Pain (4 - 6)  oxyCODONE    IR 10 milliGRAM(s) Oral every 6 hours PRN Severe Pain (7 - 10)  senna 2 Tablet(s) Oral at bedtime PRN Constipation        Vital Signs Last 24 Hrs  T(C): 36.3 (26 Aug 2019 20:37), Max: 36.7 (26 Aug 2019 13:36)  T(F): 97.4 (26 Aug 2019 20:37), Max: 98 (26 Aug 2019 13:36)  HR: 60 (26 Aug 2019 20:37) (54 - 60)  BP: 122/74 (26 Aug 2019 20:37) (118/78 - 137/66)  BP(mean): --  RR: 17 (26 Aug 2019 20:37) (16 - 17)  SpO2: 96% (26 Aug 2019 20:37) (96% - 98%)    08-25-19 @ 07:01  -  08-26-19 @ 07:00  --------------------------------------------------------  IN: 2080 mL / OUT: 1875 mL / NET: 205 mL    08-26-19 @ 07:01  -  08-26-19 @ 22:51  --------------------------------------------------------  IN: 1100 mL / OUT: 1800 mL / NET: -700 mL      PHYSICAL EXAM:  Constitutional:Well-developed, well nourished  Eyes:ELEONORA, EOMI  Ear/Nose/Throat: no oral lesion, no sinus tenderness on percussion	  Neck:no JVD, no lymphadenopathy, supple  Respiratory: CTA mayela  Cardiovascular: S1S2 RRR, no murmurs  Gastrointestinal:soft, (+) BS, no HSM  Extremities:no e/e/c  Vascular: DP Pulse:	right normal; left normal      LABS:                        11.9   5.85  )-----------( 107      ( 26 Aug 2019 06:54 )             36.6     08-26    142  |  107  |  11  ----------------------------<  95  4.0   |  27  |  1.24    Ca    9.0      26 Aug 2019 06:54  Phos  3.4     08-26  Mg     1.7     08-26            MICROBIOLOGY:    RADIOLOGY & ADDITIONAL STUDIES:

## 2019-08-26 NOTE — DIETITIAN INITIAL EVALUATION ADULT. - ENERGY NEEDS
Ht: 6'2", Wt: 95.4kg, IBW: 80.9kg, 117.9%IBW.   Needs calculated based on [facility] standards of care. Needs adjusted for age Ht: 6'2", Wt: 95.4kg, IBW: 86.4 kg, 110.4%IBW.   Needs calculated based on [facility] standards of care. Needs adjusted for age

## 2019-08-26 NOTE — PROGRESS NOTE ADULT - SUBJECTIVE AND OBJECTIVE BOX
Pt seen and examined.   Consult dictated.     Right leg wound with granulating wound bed. Some fibrinous material.   No infection. No exposed bone or vital structures.

## 2019-08-26 NOTE — PROGRESS NOTE ADULT - ASSESSMENT
70 yo M with HTN, MR, cardiomegaly, h/o prior DVT/PE, neuropathy with nonhealing incision following sural nerve biopsy    8/22 abscess Cx E. Faecalis/S. sanguinis- pan sensitive    Recommendations  - d/c vancomycin 1 g IV q12h  - Continue zosyn 3.375 g IV q6h  - ID team 1 to continue to follow    discussed with Dr. Braswell

## 2019-08-26 NOTE — DIETITIAN INITIAL EVALUATION ADULT. - OTHER INFO
Pt seen for initial assessment. 69 year old male with PMH of DVT, HTN, neuropathy, PE, progressive lower extremity weakness, s/p lumbar decompression in 1/2019. s/p sural nerve biopsy on 7/25 for lower extremity weakness and atrophy. patient returned for wound dehiscence. Skin: 1+ edema R ankle, +wound/surgical incision R ankle. Pt seen resting in bed, awake, alert. Pt is on regular diet with fair PO intake of mealtray, +consumption of foods from outside of hospital. Reports good appetite PTA, adheres to Halal diet PTA, however is declining addition of Halal to current diet order, does not consume pork. NKFA. No apparent GI distress, last BM 8/25. Not reporting pain at this time. UBW of 212lbs +/- 5lbs, current wt of 210.5lbs. RD to follow up per protocol.

## 2019-08-26 NOTE — PROGRESS NOTE ADULT - ASSESSMENT
Would continue with local wound care either with wet to dry gauze or consider collagenase bid.   F/U in office after discharge [phone].

## 2019-08-26 NOTE — PROGRESS NOTE ADULT - SUBJECTIVE AND OBJECTIVE BOX
HPI:  69 year old male with progressive lower extremity weakness, s/p lumbar decompression in 1/2019. s/p sural nerve biopsy on 7/25 for lower extremity weakness and atrophy. patient returned for wound dehiscence.  Patient is afebrile and without leukocytosis. He confirms RLE weakness, R ankle pain, b/l LE swelling. He had echo done on previous admission with 58%. hx of mitral valve insufficiency. of note, he had DVT and PE in prior admissions. PMHx also includes HTN, glaucoma. (22 Aug 2019 19:57)    8/22: Admitted for wound dehiscence. Wound was wash off at bedside with H2O2 at bedside.  8/23: No major events at bedside. Wound was washed with H2O2 and redressed.    8/24: local wound care. vanc trough this am therapeutic cont to f/u culture results, cont abx. ID recs appreciated.   8/25: no major events. Daily wound care without complication. nikhil. Abx per ID    OVERNIGHT EVENTS:  Vital Signs Last 24 Hrs  T(C): 35.9 (26 Aug 2019 05:36), Max: 37.2 (25 Aug 2019 19:52)  T(F): 96.6 (26 Aug 2019 05:36), Max: 99 (25 Aug 2019 19:52)  HR: 54 (26 Aug 2019 05:36) (54 - 62)  BP: 137/66 (26 Aug 2019 05:36) (110/69 - 137/83)  BP(mean): --  RR: 17 (26 Aug 2019 05:36) (15 - 18)  SpO2: 98% (26 Aug 2019 05:36) (97% - 99%)    I&O's Summary    25 Aug 2019 07:01  -  26 Aug 2019 07:00  --------------------------------------------------------  IN: 2080 mL / OUT: 1875 mL / NET: 205 mL        PHYSICAL EXAM:  Neurological:    A&O x 3, PERRL, EOMI  Ext: bilateral LE venous insufficience skin change.  RLE wound is clean. 3/5 mostly due to pain.  Sensation intact to light touch    DIET:  [] NPO  [x] Mechanical  [] Tube feeds    LABS:                        11.9   5.85  )-----------( 107      ( 26 Aug 2019 06:54 )             36.6     08-26    142  |  107  |  11  ----------------------------<  95  4.0   |  27  |  1.24    Ca    9.0      26 Aug 2019 06:54  Phos  3.4     08-26  Mg     1.7     08-26              CAPILLARY BLOOD GLUCOSE          Drug Levels: [] N/A  Vancomycin Level, Trough: 15.1 ug/mL (08-24 @ 08:40)    CSF Analysis: [] N/A      Allergies    No Known Allergies    Intolerances      MEDICATIONS:  Antibiotics:  piperacillin/tazobactam IVPB.. 3.375 Gram(s) IV Intermittent every 6 hours  vancomycin  IVPB 1000 milliGRAM(s) IV Intermittent every 12 hours    Neuro:  acetaminophen   Tablet .. 650 milliGRAM(s) Oral every 6 hours PRN  gabapentin 400 milliGRAM(s) Oral three times a day  oxyCODONE    IR 5 milliGRAM(s) Oral every 6 hours PRN  oxyCODONE    IR 10 milliGRAM(s) Oral every 6 hours PRN    Anticoagulation:  aspirin  chewable 81 milliGRAM(s) Oral daily  enoxaparin Injectable 40 milliGRAM(s) SubCutaneous at bedtime    OTHER:  docusate sodium 100 milliGRAM(s) Oral three times a day  dorzolamide 2%/timolol 0.5% Ophthalmic Solution 1 Drop(s) Both EYES two times a day  latanoprost 0.005% Ophthalmic Solution 1 Drop(s) Both EYES at bedtime  lisinopril 10 milliGRAM(s) Oral daily  senna 2 Tablet(s) Oral at bedtime PRN    IVF:  cyanocobalamin 1000 MICROGram(s) Oral daily  folic acid 1 milliGRAM(s) Oral daily    CULTURES:  Culture Results:   Moderate Enterococcus faecalis  Moderate Streptococcus sanguinis (08-22 @ 20:32)  Culture Results:   No growth at 3 days. (08-22 @ 20:25)    RADIOLOGY & ADDITIONAL TESTS:      ASSESSMENT:  69y Male s/p    NEUROPATHY  POSTOP INFECTION  No pertinent family history in first degree relatives  Handoff  MEWS Score  Mitral valve insufficiency  Cardiomegaly  History of hepatitis B  Glaucoma  Neuropathy  DVT (deep venous thrombosis)  Pulmonary embolism  HTN (hypertension)  Suspected deep vein thrombosis (DVT)  History of appendectomy  History of tonsillectomy  History of lumbar laminectomy  Degeneration of intervertebral disc of thoracic region  History of foot surgery  H/O cervical spine surgery      PLAN:  -Pain control  -Daily wound care with H2O2  -LE doppler - acute on chronic thrombus, consider vascular consult for anticoagulation recommendations  -Cont. Vancomycin / Zosyn, f/u final blood culture results and ID reccs  -DW Dr. Greene.    DVT PROPHYLAXIS:  [] Venodynes                                [x] Heparin/Lovenox    DISPOSITION: pending    Assessment:  Present when checked    []  GCS  E   V  M     Heart Failure: []Acute, [] acute on chronic , []chronic  Heart Failure:  [] Diastolic (HFpEF), [] Systolic (HFrEF), []Combined (HFpEF and HFrEF), [] RHF, [] Pulm HTN, [] Other    [] SETH, [] ATN, [] AIN, [] other  [] CKD1, [] CKD2, [] CKD 3, [] CKD 4, [] CKD 5, []ESRD    Encephalopathy: [] Metabolic, [] Hepatic, [] toxic, [] Neurological, [] Other    Abnormal Nurtitional Status: [] malnurtition (see nutrition note), [ ]underweight: BMI < 19, [] morbid obesity: BMI >40, [] Cachexia    [] Sepsis  [] hypovolemic shock,[] cardiogenic shock, [] hemorrhagic shock, [] neuogenic shock  [] Acute Respiratory Failure  []Cerebral edema, [] Brain compression/ herniation,   [] Functional quadriplegia  [] Acute blood loss anemia HPI:  69 year old male with progressive lower extremity weakness, s/p lumbar decompression in 1/2019. s/p sural nerve biopsy on 7/25 for lower extremity weakness and atrophy. patient returned for wound dehiscence.  Patient is afebrile and without leukocytosis. He confirms RLE weakness, R ankle pain, b/l LE swelling. He had echo done on previous admission with 58%. hx of mitral valve insufficiency. of note, he had DVT and PE in prior admissions. PMHx also includes HTN, glaucoma. (22 Aug 2019 19:57)    8/22: Admitted for wound dehiscence. Wound was wash off at bedside with H2O2 at bedside.  8/23: No major events at bedside. Wound was washed with H2O2 and redressed.    8/24: local wound care. vanc trough this am therapeutic cont to f/u culture results, cont abx. ID recs appreciated.   8/25: no major events. Daily wound care without complication. nikhil. Abx per ID    OVERNIGHT EVENTS:  Vital Signs Last 24 Hrs  T(C): 35.9 (26 Aug 2019 05:36), Max: 37.2 (25 Aug 2019 19:52)  T(F): 96.6 (26 Aug 2019 05:36), Max: 99 (25 Aug 2019 19:52)  HR: 54 (26 Aug 2019 05:36) (54 - 62)  BP: 137/66 (26 Aug 2019 05:36) (110/69 - 137/83)  BP(mean): --  RR: 17 (26 Aug 2019 05:36) (15 - 18)  SpO2: 98% (26 Aug 2019 05:36) (97% - 99%)    I&O's Summary    25 Aug 2019 07:01  -  26 Aug 2019 07:00  --------------------------------------------------------  IN: 2080 mL / OUT: 1875 mL / NET: 205 mL        PHYSICAL EXAM:  Neurological:    A&O x 3, PERRL, EOMI  Ext: bilateral LE venous insufficience skin change.  RLE wound is clean. 3/5 mostly due to pain.  Sensation intact to light touch    DIET:  [] NPO  [x] Mechanical  [] Tube feeds    LABS:                        11.9   5.85  )-----------( 107      ( 26 Aug 2019 06:54 )             36.6     08-26    142  |  107  |  11  ----------------------------<  95  4.0   |  27  |  1.24    Ca    9.0      26 Aug 2019 06:54  Phos  3.4     08-26  Mg     1.7     08-26              CAPILLARY BLOOD GLUCOSE          Drug Levels: [] N/A  Vancomycin Level, Trough: 15.1 ug/mL (08-24 @ 08:40)    CSF Analysis: [] N/A      Allergies    No Known Allergies    Intolerances      MEDICATIONS:  Antibiotics:  piperacillin/tazobactam IVPB.. 3.375 Gram(s) IV Intermittent every 6 hours  vancomycin  IVPB 1000 milliGRAM(s) IV Intermittent every 12 hours    Neuro:  acetaminophen   Tablet .. 650 milliGRAM(s) Oral every 6 hours PRN  gabapentin 400 milliGRAM(s) Oral three times a day  oxyCODONE    IR 5 milliGRAM(s) Oral every 6 hours PRN  oxyCODONE    IR 10 milliGRAM(s) Oral every 6 hours PRN    Anticoagulation:  aspirin  chewable 81 milliGRAM(s) Oral daily  enoxaparin Injectable 40 milliGRAM(s) SubCutaneous at bedtime    OTHER:  docusate sodium 100 milliGRAM(s) Oral three times a day  dorzolamide 2%/timolol 0.5% Ophthalmic Solution 1 Drop(s) Both EYES two times a day  latanoprost 0.005% Ophthalmic Solution 1 Drop(s) Both EYES at bedtime  lisinopril 10 milliGRAM(s) Oral daily  senna 2 Tablet(s) Oral at bedtime PRN    IVF:  cyanocobalamin 1000 MICROGram(s) Oral daily  folic acid 1 milliGRAM(s) Oral daily    CULTURES:  Culture Results:   Moderate Enterococcus faecalis  Moderate Streptococcus sanguinis (08-22 @ 20:32)  Culture Results:   No growth at 3 days. (08-22 @ 20:25)    RADIOLOGY & ADDITIONAL TESTS:      ASSESSMENT:  69y Male s/p wound infection from sural nerve biopsy several weeks ago (patient admitted with wound dehiscence and evidence of wound infection)    NEUROPATHY  POSTOP INFECTION  No pertinent family history in first degree relatives  Handoff  MEWS Score  Mitral valve insufficiency  Cardiomegaly  History of hepatitis B  Glaucoma  Neuropathy  DVT (deep venous thrombosis)  Pulmonary embolism  HTN (hypertension)  Suspected deep vein thrombosis (DVT)  History of appendectomy  History of tonsillectomy  History of lumbar laminectomy  Degeneration of intervertebral disc of thoracic region  History of foot surgery  H/O cervical spine surgery      PLAN:  -Pain control  -Daily wound care with H2O2  -LE doppler - acute on chronic thrombus, consider vascular consult for anticoagulation recommendations  -Cont. Vancomycin / Zosyn, f/u final blood culture results and ID reccs  -DW Dr. Greene.    DVT PROPHYLAXIS:  [] Venodynes                                [x] Heparin/Lovenox    DISPOSITION: pending    Assessment:  Present when checked    []  GCS  E   V  M     Heart Failure: []Acute, [] acute on chronic , []chronic  Heart Failure:  [] Diastolic (HFpEF), [] Systolic (HFrEF), []Combined (HFpEF and HFrEF), [] RHF, [] Pulm HTN, [] Other    [] SETH, [] ATN, [] AIN, [] other  [] CKD1, [] CKD2, [] CKD 3, [] CKD 4, [] CKD 5, []ESRD    Encephalopathy: [] Metabolic, [] Hepatic, [] toxic, [] Neurological, [] Other    Abnormal Nurtitional Status: [] malnurtition (see nutrition note), [ ]underweight: BMI < 19, [] morbid obesity: BMI >40, [] Cachexia    [] Sepsis  [] hypovolemic shock,[] cardiogenic shock, [] hemorrhagic shock, [] neuogenic shock  [] Acute Respiratory Failure  []Cerebral edema, [] Brain compression/ herniation,   [] Functional quadriplegia  [] Acute blood loss anemia

## 2019-08-27 LAB
ANION GAP SERPL CALC-SCNC: 9 MMOL/L — SIGNIFICANT CHANGE UP (ref 5–17)
BUN SERPL-MCNC: 11 MG/DL — SIGNIFICANT CHANGE UP (ref 7–23)
CALCIUM SERPL-MCNC: 9.1 MG/DL — SIGNIFICANT CHANGE UP (ref 8.4–10.5)
CHLORIDE SERPL-SCNC: 106 MMOL/L — SIGNIFICANT CHANGE UP (ref 96–108)
CO2 SERPL-SCNC: 26 MMOL/L — SIGNIFICANT CHANGE UP (ref 22–31)
CREAT SERPL-MCNC: 1.32 MG/DL — HIGH (ref 0.5–1.3)
CRP SERPL-MCNC: 0.19 MG/DL — SIGNIFICANT CHANGE UP (ref 0–0.4)
CULTURE RESULTS: SIGNIFICANT CHANGE UP
CULTURE RESULTS: SIGNIFICANT CHANGE UP
ERYTHROCYTE [SEDIMENTATION RATE] IN BLOOD: 14 MM/HR — SIGNIFICANT CHANGE UP
GLUCOSE SERPL-MCNC: 100 MG/DL — HIGH (ref 70–99)
HCT VFR BLD CALC: 35.1 % — LOW (ref 39–50)
HGB BLD-MCNC: 11.6 G/DL — LOW (ref 13–17)
MCHC RBC-ENTMCNC: 30.9 PG — SIGNIFICANT CHANGE UP (ref 27–34)
MCHC RBC-ENTMCNC: 33 GM/DL — SIGNIFICANT CHANGE UP (ref 32–36)
MCV RBC AUTO: 93.6 FL — SIGNIFICANT CHANGE UP (ref 80–100)
NRBC # BLD: 0 /100 WBCS — SIGNIFICANT CHANGE UP (ref 0–0)
PLATELET # BLD AUTO: 112 K/UL — LOW (ref 150–400)
POTASSIUM SERPL-MCNC: 4 MMOL/L — SIGNIFICANT CHANGE UP (ref 3.5–5.3)
POTASSIUM SERPL-SCNC: 4 MMOL/L — SIGNIFICANT CHANGE UP (ref 3.5–5.3)
RBC # BLD: 3.75 M/UL — LOW (ref 4.2–5.8)
RBC # FLD: 12.2 % — SIGNIFICANT CHANGE UP (ref 10.3–14.5)
SODIUM SERPL-SCNC: 141 MMOL/L — SIGNIFICANT CHANGE UP (ref 135–145)
SPECIMEN SOURCE: SIGNIFICANT CHANGE UP
SPECIMEN SOURCE: SIGNIFICANT CHANGE UP
WBC # BLD: 5.4 K/UL — SIGNIFICANT CHANGE UP (ref 3.8–10.5)
WBC # FLD AUTO: 5.4 K/UL — SIGNIFICANT CHANGE UP (ref 3.8–10.5)

## 2019-08-27 PROCEDURE — 99222 1ST HOSP IP/OBS MODERATE 55: CPT | Mod: GC

## 2019-08-27 PROCEDURE — 99232 SBSQ HOSP IP/OBS MODERATE 35: CPT

## 2019-08-27 PROCEDURE — 99232 SBSQ HOSP IP/OBS MODERATE 35: CPT | Mod: GC

## 2019-08-27 RX ORDER — APIXABAN 2.5 MG/1
10 TABLET, FILM COATED ORAL EVERY 12 HOURS
Refills: 0 | Status: DISCONTINUED | OUTPATIENT
Start: 2019-08-27 | End: 2019-08-28

## 2019-08-27 RX ADMIN — LISINOPRIL 10 MILLIGRAM(S): 2.5 TABLET ORAL at 05:24

## 2019-08-27 RX ADMIN — Medication 100 MILLIGRAM(S): at 21:50

## 2019-08-27 RX ADMIN — OXYCODONE HYDROCHLORIDE 10 MILLIGRAM(S): 5 TABLET ORAL at 13:57

## 2019-08-27 RX ADMIN — PIPERACILLIN AND TAZOBACTAM 200 GRAM(S): 4; .5 INJECTION, POWDER, LYOPHILIZED, FOR SOLUTION INTRAVENOUS at 15:37

## 2019-08-27 RX ADMIN — PREGABALIN 1000 MICROGRAM(S): 225 CAPSULE ORAL at 11:25

## 2019-08-27 RX ADMIN — Medication 100 MILLIGRAM(S): at 13:57

## 2019-08-27 RX ADMIN — Medication 1 MILLIGRAM(S): at 11:25

## 2019-08-27 RX ADMIN — DORZOLAMIDE HYDROCHLORIDE TIMOLOL MALEATE 1 DROP(S): 20; 5 SOLUTION/ DROPS OPHTHALMIC at 18:12

## 2019-08-27 RX ADMIN — GABAPENTIN 400 MILLIGRAM(S): 400 CAPSULE ORAL at 13:57

## 2019-08-27 RX ADMIN — DORZOLAMIDE HYDROCHLORIDE TIMOLOL MALEATE 1 DROP(S): 20; 5 SOLUTION/ DROPS OPHTHALMIC at 05:28

## 2019-08-27 RX ADMIN — OXYCODONE HYDROCHLORIDE 10 MILLIGRAM(S): 5 TABLET ORAL at 21:52

## 2019-08-27 RX ADMIN — GABAPENTIN 400 MILLIGRAM(S): 400 CAPSULE ORAL at 21:49

## 2019-08-27 RX ADMIN — PIPERACILLIN AND TAZOBACTAM 200 GRAM(S): 4; .5 INJECTION, POWDER, LYOPHILIZED, FOR SOLUTION INTRAVENOUS at 09:50

## 2019-08-27 RX ADMIN — LATANOPROST 1 DROP(S): 0.05 SOLUTION/ DROPS OPHTHALMIC; TOPICAL at 21:50

## 2019-08-27 RX ADMIN — APIXABAN 10 MILLIGRAM(S): 2.5 TABLET, FILM COATED ORAL at 20:52

## 2019-08-27 RX ADMIN — PIPERACILLIN AND TAZOBACTAM 200 GRAM(S): 4; .5 INJECTION, POWDER, LYOPHILIZED, FOR SOLUTION INTRAVENOUS at 21:49

## 2019-08-27 RX ADMIN — Medication 81 MILLIGRAM(S): at 11:25

## 2019-08-27 RX ADMIN — PIPERACILLIN AND TAZOBACTAM 200 GRAM(S): 4; .5 INJECTION, POWDER, LYOPHILIZED, FOR SOLUTION INTRAVENOUS at 03:23

## 2019-08-27 RX ADMIN — GABAPENTIN 400 MILLIGRAM(S): 400 CAPSULE ORAL at 05:24

## 2019-08-27 RX ADMIN — Medication 100 MILLIGRAM(S): at 05:24

## 2019-08-27 NOTE — PROGRESS NOTE ADULT - SUBJECTIVE AND OBJECTIVE BOX
INTERVAL HPI/OVERNIGHT EVENTS:    CONSTITUTIONAL:  Negative fever or chills, feels well, good appetite  EYES:  Negative  blurry vision or double vision  CARDIOVASCULAR:  Negative for chest pain or palpitations  RESPIRATORY:  Negative for cough, wheezing, or SOB   GASTROINTESTINAL:  Negative for nausea, vomiting, diarrhea, constipation, or abdominal pain  GENITOURINARY:  Negative frequency, urgency or dysuria  NEUROLOGIC:  No headache, confusion, dizziness, lightheadedness      ANTIBIOTICS/RELEVANT:    MEDICATIONS  (STANDING):  apixaban 10 milliGRAM(s) Oral every 12 hours  aspirin  chewable 81 milliGRAM(s) Oral daily  cyanocobalamin 1000 MICROGram(s) Oral daily  docusate sodium 100 milliGRAM(s) Oral three times a day  dorzolamide 2%/timolol 0.5% Ophthalmic Solution 1 Drop(s) Both EYES two times a day  folic acid 1 milliGRAM(s) Oral daily  gabapentin 400 milliGRAM(s) Oral three times a day  latanoprost 0.005% Ophthalmic Solution 1 Drop(s) Both EYES at bedtime  lisinopril 10 milliGRAM(s) Oral daily  piperacillin/tazobactam IVPB.. 3.375 Gram(s) IV Intermittent every 6 hours    MEDICATIONS  (PRN):  acetaminophen   Tablet .. 650 milliGRAM(s) Oral every 6 hours PRN Temp greater or equal to 38C (100.4F), Mild Pain (1 - 3)  oxyCODONE    IR 5 milliGRAM(s) Oral every 6 hours PRN Moderate Pain (4 - 6)  oxyCODONE    IR 10 milliGRAM(s) Oral every 6 hours PRN Severe Pain (7 - 10)  senna 2 Tablet(s) Oral at bedtime PRN Constipation        Vital Signs Last 24 Hrs  T(C): 36.8 (27 Aug 2019 17:10), Max: 37.3 (27 Aug 2019 13:44)  T(F): 98.2 (27 Aug 2019 17:10), Max: 99.2 (27 Aug 2019 13:44)  HR: 63 (27 Aug 2019 17:10) (57 - 69)  BP: 128/83 (27 Aug 2019 17:10) (119/80 - 134/79)  BP(mean): --  RR: 17 (27 Aug 2019 17:10) (17 - 17)  SpO2: 97% (27 Aug 2019 17:10) (97% - 99%)    08-26-19 @ 07:01  -  08-27-19 @ 07:00  --------------------------------------------------------  IN: 1300 mL / OUT: 2100 mL / NET: -800 mL    08-27-19 @ 07:01  -  08-27-19 @ 21:34  --------------------------------------------------------  IN: 500 mL / OUT: 1500 mL / NET: -1000 mL      PHYSICAL EXAM:  Constitutional:Well-developed, well nourished  Eyes:ELEONORA, EOMI  Ear/Nose/Throat: no oral lesion, no sinus tenderness on percussion	  Neck:no JVD, no lymphadenopathy, supple  Respiratory: CTA mayela  Cardiovascular: S1S2 RRR, no murmurs  Gastrointestinal:soft, (+) BS, no HSM  Extremities:no e/e/c  Vascular: DP Pulse:	right normal; left normal      LABS:                        11.6   5.40  )-----------( 112      ( 27 Aug 2019 06:40 )             35.1     08-27    141  |  106  |  11  ----------------------------<  100<H>  4.0   |  26  |  1.32<H>    Ca    9.1      27 Aug 2019 06:40  Phos  3.4     08-26  Mg     1.7     08-26            MICROBIOLOGY:    RADIOLOGY & ADDITIONAL STUDIES:

## 2019-08-27 NOTE — PROGRESS NOTE ADULT - ASSESSMENT
68 yo M with HTN, MR, cardiomegaly, h/o prior DVT/PE, neuropathy with nonhealing incision following sural nerve biopsy    8/22 abscess Cx E. Faecalis/S. sanguinis- pan sensitive    Recommendations  - please change abx to augmentin 875mg PO BID for 7 days  - ID team 1 to continue to follow    discussed with Dr. Braswell

## 2019-08-27 NOTE — CONSULT NOTE ADULT - ATTENDING COMMENTS
Patient discussed with the Chief Resident    I agree with the findings as documented above    To anticoagulate, and see me as an outpatient

## 2019-08-27 NOTE — PROGRESS NOTE ADULT - SUBJECTIVE AND OBJECTIVE BOX
HPI:  69 year old male with progressive lower extremity weakness, s/p lumbar decompression in 1/2019. s/p sural nerve biopsy on 7/25 for lower extremity weakness and atrophy. patient returned for wound dehiscence.  Patient is afebrile and without leukocytosis. He confirms RLE weakness, R ankle pain, b/l LE swelling. He had echo done on previous admission with 58%. hx of mitral valve insufficiency. of note, he had DVT and PE in prior admissions. PMHx also includes HTN, glaucoma. (22 Aug 2019 19:57)    OVERNIGHT EVENTS:  Vital Signs Last 24 Hrs  T(C): 36.6 (27 Aug 2019 08:39), Max: 36.7 (26 Aug 2019 13:36)  T(F): 97.8 (27 Aug 2019 08:39), Max: 98 (26 Aug 2019 13:36)  HR: 58 (27 Aug 2019 08:39) (56 - 60)  BP: 122/81 (27 Aug 2019 08:39) (118/78 - 122/81)  BP(mean): --  RR: 17 (27 Aug 2019 08:39) (16 - 17)  SpO2: 98% (27 Aug 2019 08:39) (96% - 98%)    I&O's Summary    26 Aug 2019 07:01  -  27 Aug 2019 07:00  --------------------------------------------------------  IN: 1300 mL / OUT: 2100 mL / NET: -800 mL    27 Aug 2019 07:01  -  27 Aug 2019 10:35  --------------------------------------------------------  IN: 360 mL / OUT: 300 mL / NET: 60 mL  8/22: Admitted for wound dehiscence. Wound was wash off at bedside with H2O2 at bedside.  8/23: No major events at bedside. Wound was washed with H2O2 and redressed.    8/24: local wound care. vanc trough this am therapeutic cont to f/u culture results, cont abx. ID recs appreciated.   8/25: no major events. Daily wound care without complication. nikhil. Abx per ID  8/26 uneventful        PHYSICAL EXAM:  Neurological:      A&O x 3, PERRL, EOMI  Ext: bilateral LE venous insufficience skin change.  RLE wound is clean. 3/5 mostly due to pain.  Sensation intact to light touch                                                                                               Cardiovascular:RRR  Respiratory: Lungs CTAB  Gastrointestinal:  Tolerating diet  abdomen round soft  Genitourinary: voiding without difficulty  Extremities: warm   BLE discolration  R ankle area opened wound        DIET: Regular      LABS:                        11.6   5.40  )-----------( 112      ( 27 Aug 2019 06:40 )             35.1     08-27    141  |  106  |  11  ----------------------------<  100<H>  4.0   |  26  |  1.32<H>    Ca    9.1      27 Aug 2019 06:40  Phos  3.4     08-26  Mg     1.7     08-26              CAPILLARY BLOOD GLUCOSE          Drug Levels: [] N/A  Vancomycin Level, Trough: 15.1 ug/mL (08-24 @ 08:40)    CSF Analysis: [] N/A      Allergies    No Known Allergies    Intolerances      MEDICATIONS:  Antibiotics:  piperacillin/tazobactam IVPB.. 3.375 Gram(s) IV Intermittent every 6 hours    Neuro:  acetaminophen   Tablet .. 650 milliGRAM(s) Oral every 6 hours PRN  gabapentin 400 milliGRAM(s) Oral three times a day  oxyCODONE    IR 5 milliGRAM(s) Oral every 6 hours PRN  oxyCODONE    IR 10 milliGRAM(s) Oral every 6 hours PRN    Anticoagulation:  aspirin  chewable 81 milliGRAM(s) Oral daily  enoxaparin Injectable 40 milliGRAM(s) SubCutaneous at bedtime    OTHER:  docusate sodium 100 milliGRAM(s) Oral three times a day  dorzolamide 2%/timolol 0.5% Ophthalmic Solution 1 Drop(s) Both EYES two times a day  latanoprost 0.005% Ophthalmic Solution 1 Drop(s) Both EYES at bedtime  lisinopril 10 milliGRAM(s) Oral daily  senna 2 Tablet(s) Oral at bedtime PRN    IVF:  cyanocobalamin 1000 MICROGram(s) Oral daily  folic acid 1 milliGRAM(s) Oral daily    CULTURES:  Culture Results:   Moderate Enterococcus faecalis  Moderate Streptococcus sanguinis (08-22 @ 20:32)  Culture Results:   No growth at 4 days. (08-22 @ 20:25)    RADIOLOGY & ADDITIONAL TESTS:    ASSESSMENT:  69y Male s/p wound infection from sural nerve biopsy several weeks ago (patient admitted with wound dehiscence and evidence of wound infection)    PLAN:    NEURO:    Monitor neuro status  OT/PT  Vascular consult in regard DVT/PVD  F/U ID final recommendations  Continue IV antibx  F/U AM labs  Continue current medical regime      Dispo: Discussed with attending      CARDIOVASCULAR:    PULMONARY:    RENAL:    GI:    HEME:    ID:    ENDO:    DVT PROPHYLAXIS:  [] Venodynes                                [] Heparin/Lovenox    FALL RISK:  [] Low Risk                                    [] Impulsive  Assessment:  Present when checked    []  GCS  E   V  M     Heart Failure: []Acute, [] acute on chronic , []chronic  Heart Failure:  [] Diastolic (HFpEF), [] Systolic (HFrEF), []Combined (HFpEF and HFrEF), [] RHF, [] Pulm HTN, [] Other    [] SETH, [] ATN, [] AIN, [] other  [] CKD1, [] CKD2, [] CKD 3, [] CKD 4, [] CKD 5, []ESRD    Encephalopathy: [] Metabolic, [] Hepatic, [] toxic, [] Neurological, [] Other    Abnormal Nurtitional Status: [] malnurtition (see nutrition note), [ ]underweight: BMI < 19, [] morbid obesity: BMI >40, [] Cachexia    [] Sepsis  [] hypovolemic shock,[] cardiogenic shock, [] hemorrhagic shock, [] neuogenic shock  [] Acute Respiratory Failure  []Cerebral edema, [] Brain compression/ herniation,   [] Functional quadriplegia  [] Acute blood loss anemia

## 2019-08-27 NOTE — CONSULT NOTE ADULT - SUBJECTIVE AND OBJECTIVE BOX
Attending: Dell    HPI: 69M with progressive lower extremity weakness, s/p lumbar decompression 1/2019, sural nerve biopsy 7/25, readmitted with R lateral ankle wound dehiscence, evaluated by plastic surgery this morning. No claudication or rest pain. Complains of bilateral ankle pain with walking, but that due to his muscle weakness, he does not walk frequently. Complains of bilateral shin skin darkening. Wears compression socks at home intermittently, does not routinely elevate his legs. Never seen a vascular surgeon before. Never had a DVT before    PMH: as above, plus HTN, glaucoma  PSH: as above  Meds: lisinopril, alfuzosin, eye drops, nucynta  Allerg: NKDA  SH: nonsmoker, no alcohol, no other drug use;   FH: noncontributory    PAST MEDICAL & SURGICAL HISTORY:  Mitral valve insufficiency  Cardiomegaly  Glaucoma  Neuropathy  DVT (deep venous thrombosis): left leg, 2018  Pulmonary embolism  HTN (hypertension)  History of appendectomy  History of tonsillectomy  History of lumbar laminectomy: 1/2019  Degeneration of intervertebral disc of thoracic region: 2015  History of foot surgery: right foot  H/O cervical spine surgery: with hardware      MEDICATIONS  (STANDING):  aspirin  chewable 81 milliGRAM(s) Oral daily  cyanocobalamin 1000 MICROGram(s) Oral daily  docusate sodium 100 milliGRAM(s) Oral three times a day  dorzolamide 2%/timolol 0.5% Ophthalmic Solution 1 Drop(s) Both EYES two times a day  enoxaparin Injectable 40 milliGRAM(s) SubCutaneous at bedtime  folic acid 1 milliGRAM(s) Oral daily  gabapentin 400 milliGRAM(s) Oral three times a day  latanoprost 0.005% Ophthalmic Solution 1 Drop(s) Both EYES at bedtime  lisinopril 10 milliGRAM(s) Oral daily  piperacillin/tazobactam IVPB.. 3.375 Gram(s) IV Intermittent every 6 hours    MEDICATIONS  (PRN):  acetaminophen   Tablet .. 650 milliGRAM(s) Oral every 6 hours PRN Temp greater or equal to 38C (100.4F), Mild Pain (1 - 3)  oxyCODONE    IR 5 milliGRAM(s) Oral every 6 hours PRN Moderate Pain (4 - 6)  oxyCODONE    IR 10 milliGRAM(s) Oral every 6 hours PRN Severe Pain (7 - 10)  senna 2 Tablet(s) Oral at bedtime PRN Constipation      Allergies    No Known Allergies    Intolerances        FAMILY HISTORY:  No pertinent family history in first degree relatives      ROS: otherwise negative.    Vital Signs Last 24 Hrs  T(C): 36.6 (27 Aug 2019 08:39), Max: 36.7 (26 Aug 2019 13:36)  T(F): 97.8 (27 Aug 2019 08:39), Max: 98 (26 Aug 2019 13:36)  HR: 58 (27 Aug 2019 08:39) (56 - 60)  BP: 122/81 (27 Aug 2019 08:39) (118/78 - 122/81)  BP(mean): --  RR: 17 (27 Aug 2019 08:39) (16 - 17)  SpO2: 98% (27 Aug 2019 08:39) (96% - 98%)    I&O's Summary    26 Aug 2019 07:01  -  27 Aug 2019 07:00  --------------------------------------------------------  IN: 1300 mL / OUT: 2100 mL / NET: -800 mL    27 Aug 2019 07:01  -  27 Aug 2019 11:19  --------------------------------------------------------  IN: 360 mL / OUT: 300 mL / NET: 60 mL        Physical Exam:  General: NAD, resting comfortably in bed  HEENT: MMM  Pulmonary: nonlabored breathing, normal resp effort  Cardiovascular: RRR  Extremities: WWP, no edema, no calf tenderness, negative Calin's sign, 2+ femoral, popliteal, DP and PT pulses bilaterally, clean wound on R ankle about 1x2cm without surrounding erythema, packed with gauze, no foul smell, no drainage  Neuro: no focal deficits  Psych: pleasant    LABS:                        11.6   5.40  )-----------( 112      ( 27 Aug 2019 06:40 )             35.1     08-27    141  |  106  |  11  ----------------------------<  100<H>  4.0   |  26  |  1.32<H>    Ca    9.1      27 Aug 2019 06:40  Phos  3.4     08-26  Mg     1.7     08-26          CAPILLARY BLOOD GLUCOSE            Cultures:      RADIOLOGY & ADDITIONAL STUDIES:  < from: US Duplex Venous Lower Ext Complete, Bilateral (08.23.19 @ 18:34) >  FINDINGS:    Thigh veins: The common femoral, popliteal, proximal greater saphenous,   and proximal deep femoral veins are patent and free of thrombus   bilaterally. The veins are normally compressible and have normal phasic   flow and augmentation response.    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 There is   hyperechoic, eccentric, occlusive thrombus within the distal portion of   the one of the paired left femoral vein. On 5/3/2018, chronic appearing   thrombus was reported in the proximal portion of one of the left femoral   veins.    Calf veins: The paired peroneal and posterior tibial calf veins are   patent bilaterally.    There is occlusive thrombus within an intramuscular left calf vein.    < end of copied text >      Assessment: 69M PMHx HTN, lower extremity weakness s/p sural nerve biopsy, readmitted for wound dehiscence. Vascular consulted for asymptomatic chronic DVT. Surgical wound management per neurosurgery.    Recommendations:  - no anticoagulation necessary  - no vascular surgery intervention necessary  - vascular will sign off, please reconsult with questions or concerns  - discussed with chief on call, attending surgeon Attending: Dell    HPI: 69M with progressive lower extremity weakness, s/p lumbar decompression 1/2019, sural nerve biopsy 7/25, readmitted with R lateral ankle wound dehiscence, evaluated by plastic surgery this morning. No claudication or rest pain. Complains of bilateral ankle pain with walking, but that due to his muscle weakness, he does not walk frequently. Complains of bilateral shin skin darkening. Wears compression socks at home intermittently, does not routinely elevate his legs. Never seen a vascular surgeon before. Never had a blood clot before.    PMH: as above, plus HTN, glaucoma  PSH: as above  Meds: lisinopril, alfuzosin, eye drops, nucynta  Allerg: NKDA  SH: nonsmoker, no alcohol, no other drug use;   FH: noncontributory    PAST MEDICAL & SURGICAL HISTORY:  Mitral valve insufficiency  Cardiomegaly  Glaucoma  Neuropathy  DVT (deep venous thrombosis): left leg, 2018  Pulmonary embolism  HTN (hypertension)  History of appendectomy  History of tonsillectomy  History of lumbar laminectomy: 1/2019  Degeneration of intervertebral disc of thoracic region: 2015  History of foot surgery: right foot  H/O cervical spine surgery: with hardware      MEDICATIONS  (STANDING):  aspirin  chewable 81 milliGRAM(s) Oral daily  cyanocobalamin 1000 MICROGram(s) Oral daily  docusate sodium 100 milliGRAM(s) Oral three times a day  dorzolamide 2%/timolol 0.5% Ophthalmic Solution 1 Drop(s) Both EYES two times a day  enoxaparin Injectable 40 milliGRAM(s) SubCutaneous at bedtime  folic acid 1 milliGRAM(s) Oral daily  gabapentin 400 milliGRAM(s) Oral three times a day  latanoprost 0.005% Ophthalmic Solution 1 Drop(s) Both EYES at bedtime  lisinopril 10 milliGRAM(s) Oral daily  piperacillin/tazobactam IVPB.. 3.375 Gram(s) IV Intermittent every 6 hours    MEDICATIONS  (PRN):  acetaminophen   Tablet .. 650 milliGRAM(s) Oral every 6 hours PRN Temp greater or equal to 38C (100.4F), Mild Pain (1 - 3)  oxyCODONE    IR 5 milliGRAM(s) Oral every 6 hours PRN Moderate Pain (4 - 6)  oxyCODONE    IR 10 milliGRAM(s) Oral every 6 hours PRN Severe Pain (7 - 10)  senna 2 Tablet(s) Oral at bedtime PRN Constipation      Allergies    No Known Allergies    Intolerances        FAMILY HISTORY:  No pertinent family history in first degree relatives      ROS: otherwise negative.    Vital Signs Last 24 Hrs  T(C): 36.6 (27 Aug 2019 08:39), Max: 36.7 (26 Aug 2019 13:36)  T(F): 97.8 (27 Aug 2019 08:39), Max: 98 (26 Aug 2019 13:36)  HR: 58 (27 Aug 2019 08:39) (56 - 60)  BP: 122/81 (27 Aug 2019 08:39) (118/78 - 122/81)  BP(mean): --  RR: 17 (27 Aug 2019 08:39) (16 - 17)  SpO2: 98% (27 Aug 2019 08:39) (96% - 98%)    I&O's Summary    26 Aug 2019 07:01  -  27 Aug 2019 07:00  --------------------------------------------------------  IN: 1300 mL / OUT: 2100 mL / NET: -800 mL    27 Aug 2019 07:01  -  27 Aug 2019 11:19  --------------------------------------------------------  IN: 360 mL / OUT: 300 mL / NET: 60 mL        Physical Exam:  General: NAD, resting comfortably in bed  HEENT: MMM  Pulmonary: nonlabored breathing, normal resp effort  Cardiovascular: RRR  Extremities: WWP, no edema, no calf tenderness, negative Calin's sign on the left, 2+ femoral, popliteal, DP and PT pulses bilaterally, clean wound on R ankle about 1x2cm without surrounding erythema, packed with gauze, no foul smell, no drainage. Shiny skin on anterior shins bilaterally, L > R with hair loss to the knees.  Neuro: no focal deficits  Psych: pleasant    LABS:                        11.6   5.40  )-----------( 112      ( 27 Aug 2019 06:40 )             35.1     08-27    141  |  106  |  11  ----------------------------<  100<H>  4.0   |  26  |  1.32<H>    Ca    9.1      27 Aug 2019 06:40  Phos  3.4     08-26  Mg     1.7     08-26          CAPILLARY BLOOD GLUCOSE            Cultures:      RADIOLOGY & ADDITIONAL STUDIES:  < from: US Duplex Venous Lower Ext Complete, Bilateral (08.23.19 @ 18:34) >  FINDINGS:    Thigh veins: The common femoral, popliteal, proximal greater saphenous,   and proximal deep femoral veins are patent and free of thrombus   bilaterally. The veins are normally compressible and have normal phasic   flow and augmentation response.    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 There is   hyperechoic, eccentric, occlusive thrombus within the distal portion of   the one of the paired left femoral vein. On 5/3/2018, chronic appearing   thrombus was reported in the proximal portion of one of the left femoral   veins.    Calf veins: The paired peroneal and posterior tibial calf veins are   patent bilaterally.    There is occlusive thrombus within an intramuscular left calf vein.    < end of copied text >      Assessment: 69M PMHx HTN, lower extremity weakness s/p sural nerve biopsy, readmitted for wound dehiscence. Vascular consulted for asymptomatic chronic DVT. Surgical wound management per neurosurgery. Signs of chronic venous stasis on exam.    Recommendations:  - no anticoagulation necessary  - no vascular surgery intervention necessary  - vascular will sign off, please reconsult with questions or concerns  - discussed with chief on call, attending surgeon Attending: Dell    HPI: 69M with progressive lower extremity weakness, s/p lumbar decompression 1/2019, sural nerve biopsy 7/25, readmitted with R lateral ankle wound dehiscence, evaluated by plastic surgery this morning. No claudication or rest pain. Complains of bilateral ankle pain with walking, but that due to his muscle weakness, he does not walk frequently. Complains of bilateral shin skin darkening. Wears compression socks at home intermittently, does not routinely elevate his legs. Never seen a vascular surgeon before. Never had a blood clot before.    PMH: as above, plus HTN, glaucoma  PSH: as above  Meds: lisinopril, alfuzosin, eye drops, nucynta  Allerg: NKDA  SH: nonsmoker, no alcohol, no other drug use;   FH: noncontributory    PAST MEDICAL & SURGICAL HISTORY:  Mitral valve insufficiency  Cardiomegaly  Glaucoma  Neuropathy  DVT (deep venous thrombosis): left leg, 2018  Pulmonary embolism  HTN (hypertension)  History of appendectomy  History of tonsillectomy  History of lumbar laminectomy: 1/2019  Degeneration of intervertebral disc of thoracic region: 2015  History of foot surgery: right foot  H/O cervical spine surgery: with hardware      MEDICATIONS  (STANDING):  aspirin  chewable 81 milliGRAM(s) Oral daily  cyanocobalamin 1000 MICROGram(s) Oral daily  docusate sodium 100 milliGRAM(s) Oral three times a day  dorzolamide 2%/timolol 0.5% Ophthalmic Solution 1 Drop(s) Both EYES two times a day  enoxaparin Injectable 40 milliGRAM(s) SubCutaneous at bedtime  folic acid 1 milliGRAM(s) Oral daily  gabapentin 400 milliGRAM(s) Oral three times a day  latanoprost 0.005% Ophthalmic Solution 1 Drop(s) Both EYES at bedtime  lisinopril 10 milliGRAM(s) Oral daily  piperacillin/tazobactam IVPB.. 3.375 Gram(s) IV Intermittent every 6 hours    MEDICATIONS  (PRN):  acetaminophen   Tablet .. 650 milliGRAM(s) Oral every 6 hours PRN Temp greater or equal to 38C (100.4F), Mild Pain (1 - 3)  oxyCODONE    IR 5 milliGRAM(s) Oral every 6 hours PRN Moderate Pain (4 - 6)  oxyCODONE    IR 10 milliGRAM(s) Oral every 6 hours PRN Severe Pain (7 - 10)  senna 2 Tablet(s) Oral at bedtime PRN Constipation      Allergies    No Known Allergies    Intolerances        FAMILY HISTORY:  No pertinent family history in first degree relatives      ROS: otherwise negative.    Vital Signs Last 24 Hrs  T(C): 36.6 (27 Aug 2019 08:39), Max: 36.7 (26 Aug 2019 13:36)  T(F): 97.8 (27 Aug 2019 08:39), Max: 98 (26 Aug 2019 13:36)  HR: 58 (27 Aug 2019 08:39) (56 - 60)  BP: 122/81 (27 Aug 2019 08:39) (118/78 - 122/81)  BP(mean): --  RR: 17 (27 Aug 2019 08:39) (16 - 17)  SpO2: 98% (27 Aug 2019 08:39) (96% - 98%)    I&O's Summary    26 Aug 2019 07:01  -  27 Aug 2019 07:00  --------------------------------------------------------  IN: 1300 mL / OUT: 2100 mL / NET: -800 mL    27 Aug 2019 07:01  -  27 Aug 2019 11:19  --------------------------------------------------------  IN: 360 mL / OUT: 300 mL / NET: 60 mL        Physical Exam:  General: NAD, resting comfortably in bed  HEENT: MMM  Pulmonary: nonlabored breathing, normal resp effort  Cardiovascular: RRR  Extremities: WWP, no edema, no calf tenderness, negative Calin's sign on the left, 2+ femoral, popliteal, DP and PT pulses bilaterally, clean wound on R ankle about 1x2cm without surrounding erythema, packed with gauze, no foul smell, no drainage. Shiny skin on anterior shins bilaterally, L > R with hair loss to the knees.  Neuro: no focal deficits  Psych: pleasant    LABS:                        11.6   5.40  )-----------( 112      ( 27 Aug 2019 06:40 )             35.1     08-27    141  |  106  |  11  ----------------------------<  100<H>  4.0   |  26  |  1.32<H>    Ca    9.1      27 Aug 2019 06:40  Phos  3.4     08-26  Mg     1.7     08-26          CAPILLARY BLOOD GLUCOSE            Cultures:      RADIOLOGY & ADDITIONAL STUDIES:  < from: US Duplex Venous Lower Ext Complete, Bilateral (08.23.19 @ 18:34) >  FINDINGS:    Thigh veins: The common femoral, popliteal, proximal greater saphenous,   and proximal deep femoral veins are patent and free of thrombus   bilaterally. The veins are normally compressible and have normal phasic   flow and augmentation response.    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 There is   hyperechoic, eccentric, occlusive thrombus within the distal portion of   the one of the paired left femoral vein. On 5/3/2018, chronic appearing   thrombus was reported in the proximal portion of one of the left femoral   veins.    Calf veins: The paired peroneal and posterior tibial calf veins are   patent bilaterally.    There is occlusive thrombus within an intramuscular left calf vein.    < end of copied text >      Assessment: 69M PMHx HTN, lower extremity weakness s/p sural nerve biopsy, readmitted for wound dehiscence. Vascular consulted for asymptomatic acute on chronic DVT. Surgical wound management per neurosurgery. Signs of chronic venous stasis on exam.    Recommendations:  - oral anticoagulate if possible  - no vascular surgery intervention necessary  - vascular will sign off, please reconsult with questions or concerns  - discussed with chief on call, attending surgeon Attending: Dell    HPI: 69M with progressive lower extremity weakness, s/p lumbar decompression 1/2019, sural nerve biopsy 7/25, readmitted with R lateral ankle wound dehiscence, evaluated by plastic surgery this morning. No claudication or rest pain. Complains of bilateral ankle pain with walking, but that due to his muscle weakness, he does not walk frequently. Complains of bilateral shin skin darkening. Wears compression socks at home intermittently, does not routinely elevate his legs. Never seen a vascular surgeon before. Never had a blood clot before.    PMH: as above, plus HTN, glaucoma  PSH: as above  Meds: lisinopril, alfuzosin, eye drops, nucynta  Allerg: NKDA  SH: nonsmoker, no alcohol, no other drug use;   FH: noncontributory    PAST MEDICAL & SURGICAL HISTORY:  Mitral valve insufficiency  Cardiomegaly  Glaucoma  Neuropathy  DVT (deep venous thrombosis): left leg, 2018  Pulmonary embolism  HTN (hypertension)  History of appendectomy  History of tonsillectomy  History of lumbar laminectomy: 1/2019  Degeneration of intervertebral disc of thoracic region: 2015  History of foot surgery: right foot  H/O cervical spine surgery: with hardware      MEDICATIONS  (STANDING):  aspirin  chewable 81 milliGRAM(s) Oral daily  cyanocobalamin 1000 MICROGram(s) Oral daily  docusate sodium 100 milliGRAM(s) Oral three times a day  dorzolamide 2%/timolol 0.5% Ophthalmic Solution 1 Drop(s) Both EYES two times a day  enoxaparin Injectable 40 milliGRAM(s) SubCutaneous at bedtime  folic acid 1 milliGRAM(s) Oral daily  gabapentin 400 milliGRAM(s) Oral three times a day  latanoprost 0.005% Ophthalmic Solution 1 Drop(s) Both EYES at bedtime  lisinopril 10 milliGRAM(s) Oral daily  piperacillin/tazobactam IVPB.. 3.375 Gram(s) IV Intermittent every 6 hours    MEDICATIONS  (PRN):  acetaminophen   Tablet .. 650 milliGRAM(s) Oral every 6 hours PRN Temp greater or equal to 38C (100.4F), Mild Pain (1 - 3)  oxyCODONE    IR 5 milliGRAM(s) Oral every 6 hours PRN Moderate Pain (4 - 6)  oxyCODONE    IR 10 milliGRAM(s) Oral every 6 hours PRN Severe Pain (7 - 10)  senna 2 Tablet(s) Oral at bedtime PRN Constipation      Allergies    No Known Allergies    Intolerances        FAMILY HISTORY:  No pertinent family history in first degree relatives      ROS: otherwise negative.    Vital Signs Last 24 Hrs  T(C): 36.6 (27 Aug 2019 08:39), Max: 36.7 (26 Aug 2019 13:36)  T(F): 97.8 (27 Aug 2019 08:39), Max: 98 (26 Aug 2019 13:36)  HR: 58 (27 Aug 2019 08:39) (56 - 60)  BP: 122/81 (27 Aug 2019 08:39) (118/78 - 122/81)  BP(mean): --  RR: 17 (27 Aug 2019 08:39) (16 - 17)  SpO2: 98% (27 Aug 2019 08:39) (96% - 98%)    I&O's Summary    26 Aug 2019 07:01  -  27 Aug 2019 07:00  --------------------------------------------------------  IN: 1300 mL / OUT: 2100 mL / NET: -800 mL    27 Aug 2019 07:01  -  27 Aug 2019 11:19  --------------------------------------------------------  IN: 360 mL / OUT: 300 mL / NET: 60 mL        Physical Exam:  General: NAD, resting comfortably in bed  HEENT: MMM  Pulmonary: nonlabored breathing, normal resp effort  Cardiovascular: RRR  Extremities: WWP, no edema, no calf tenderness, negative Calin's sign on the left, 2+ femoral, popliteal, DP and PT pulses bilaterally, clean wound on R ankle about 1x2cm without surrounding erythema, packed with gauze, no foul smell, no drainage. Shiny skin on anterior shins bilaterally, L > R with hair loss to the knees.  Neuro: no focal deficits  Psych: pleasant    LABS:                        11.6   5.40  )-----------( 112      ( 27 Aug 2019 06:40 )             35.1     08-27    141  |  106  |  11  ----------------------------<  100<H>  4.0   |  26  |  1.32<H>    Ca    9.1      27 Aug 2019 06:40  Phos  3.4     08-26  Mg     1.7     08-26          CAPILLARY BLOOD GLUCOSE            Cultures:      RADIOLOGY & ADDITIONAL STUDIES:  < from: US Duplex Venous Lower Ext Complete, Bilateral (08.23.19 @ 18:34) >  FINDINGS:    Thigh veins: The common femoral, popliteal, proximal greater saphenous,   and proximal deep femoral veins are patent and free of thrombus   bilaterally. The veins are normally compressible and have normal phasic   flow and augmentation response.    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 There is   hyperechoic, eccentric, occlusive thrombus within the distal portion of   the one of the paired left femoral vein. On 5/3/2018, chronic appearing   thrombus was reported in the proximal portion of one of the left femoral   veins.    Calf veins: The paired peroneal and posterior tibial calf veins are   patent bilaterally.    There is occlusive thrombus within an intramuscular left calf vein.    < end of copied text >      Assessment: 69M PMHx HTN, lower extremity weakness s/p sural nerve biopsy, readmitted for wound dehiscence. Vascular consulted for asymptomatic acute on chronic DVT. Surgical wound management per neurosurgery. Signs of chronic venous stasis on exam.    Recommendations:  - oral anticoagulate if possible  - upon discharge, please recommend following up with Dr. Sharpe in 1-2 weeks Phone: [phone] [facility] - Dept of Surgery [address] [place] [address], [address] [place] [address]  - no vascular surgery intervention necessary  - vascular will sign off, please reconsult with questions or concerns  - discussed with chief on call, attending surgeon

## 2019-08-28 ENCOUNTER — TRANSCRIPTION ENCOUNTER (OUTPATIENT)
Age: 69
End: 2019-08-28

## 2019-08-28 VITALS
OXYGEN SATURATION: 96 % | RESPIRATION RATE: 17 BRPM | SYSTOLIC BLOOD PRESSURE: 117 MMHG | TEMPERATURE: 98 F | DIASTOLIC BLOOD PRESSURE: 76 MMHG | HEART RATE: 59 BPM

## 2019-08-28 LAB
ANION GAP SERPL CALC-SCNC: 11 MMOL/L — SIGNIFICANT CHANGE UP (ref 5–17)
APTT BLD: 31.3 SEC — SIGNIFICANT CHANGE UP (ref 27.5–36.3)
BUN SERPL-MCNC: 10 MG/DL — SIGNIFICANT CHANGE UP (ref 7–23)
CALCIUM SERPL-MCNC: 9.4 MG/DL — SIGNIFICANT CHANGE UP (ref 8.4–10.5)
CHLORIDE SERPL-SCNC: 106 MMOL/L — SIGNIFICANT CHANGE UP (ref 96–108)
CO2 SERPL-SCNC: 23 MMOL/L — SIGNIFICANT CHANGE UP (ref 22–31)
CREAT SERPL-MCNC: 1.24 MG/DL — SIGNIFICANT CHANGE UP (ref 0.5–1.3)
GLUCOSE SERPL-MCNC: 106 MG/DL — HIGH (ref 70–99)
HCT VFR BLD CALC: 38.8 % — LOW (ref 39–50)
HGB BLD-MCNC: 12.7 G/DL — LOW (ref 13–17)
INR BLD: 1.43 — HIGH (ref 0.88–1.16)
MCHC RBC-ENTMCNC: 30.5 PG — SIGNIFICANT CHANGE UP (ref 27–34)
MCHC RBC-ENTMCNC: 32.7 GM/DL — SIGNIFICANT CHANGE UP (ref 32–36)
MCV RBC AUTO: 93 FL — SIGNIFICANT CHANGE UP (ref 80–100)
NRBC # BLD: 0 /100 WBCS — SIGNIFICANT CHANGE UP (ref 0–0)
PLATELET # BLD AUTO: 119 K/UL — LOW (ref 150–400)
POTASSIUM SERPL-MCNC: 4.1 MMOL/L — SIGNIFICANT CHANGE UP (ref 3.5–5.3)
POTASSIUM SERPL-SCNC: 4.1 MMOL/L — SIGNIFICANT CHANGE UP (ref 3.5–5.3)
PROTHROM AB SERPL-ACNC: 16.3 SEC — HIGH (ref 10–12.9)
RBC # BLD: 4.17 M/UL — LOW (ref 4.2–5.8)
RBC # FLD: 12.3 % — SIGNIFICANT CHANGE UP (ref 10.3–14.5)
SODIUM SERPL-SCNC: 140 MMOL/L — SIGNIFICANT CHANGE UP (ref 135–145)
WBC # BLD: 5.33 K/UL — SIGNIFICANT CHANGE UP (ref 3.8–10.5)
WBC # FLD AUTO: 5.33 K/UL — SIGNIFICANT CHANGE UP (ref 3.8–10.5)

## 2019-08-28 PROCEDURE — 99232 SBSQ HOSP IP/OBS MODERATE 35: CPT | Mod: GC

## 2019-08-28 RX ORDER — COLLAGENASE CLOSTRIDIUM HIST. 250 UNIT/G
1 OINTMENT (GRAM) TOPICAL
Qty: 1 | Refills: 0
Start: 2019-08-28 | End: 2019-09-10

## 2019-08-28 RX ORDER — ASPIRIN/CALCIUM CARB/MAGNESIUM 324 MG
1 TABLET ORAL
Qty: 14 | Refills: 0
Start: 2019-08-28 | End: 2019-09-10

## 2019-08-28 RX ORDER — GABAPENTIN 400 MG/1
1 CAPSULE ORAL
Qty: 42 | Refills: 0
Start: 2019-08-28 | End: 2019-09-10

## 2019-08-28 RX ORDER — APIXABAN 2.5 MG/1
2 TABLET, FILM COATED ORAL
Qty: 52 | Refills: 0
Start: 2019-08-28 | End: 2019-09-09

## 2019-08-28 RX ADMIN — Medication 81 MILLIGRAM(S): at 12:05

## 2019-08-28 RX ADMIN — LISINOPRIL 10 MILLIGRAM(S): 2.5 TABLET ORAL at 07:08

## 2019-08-28 RX ADMIN — Medication 100 MILLIGRAM(S): at 14:15

## 2019-08-28 RX ADMIN — OXYCODONE HYDROCHLORIDE 10 MILLIGRAM(S): 5 TABLET ORAL at 07:12

## 2019-08-28 RX ADMIN — PIPERACILLIN AND TAZOBACTAM 200 GRAM(S): 4; .5 INJECTION, POWDER, LYOPHILIZED, FOR SOLUTION INTRAVENOUS at 09:40

## 2019-08-28 RX ADMIN — GABAPENTIN 400 MILLIGRAM(S): 400 CAPSULE ORAL at 07:07

## 2019-08-28 RX ADMIN — Medication 1 MILLIGRAM(S): at 12:05

## 2019-08-28 RX ADMIN — PREGABALIN 1000 MICROGRAM(S): 225 CAPSULE ORAL at 12:06

## 2019-08-28 RX ADMIN — DORZOLAMIDE HYDROCHLORIDE TIMOLOL MALEATE 1 DROP(S): 20; 5 SOLUTION/ DROPS OPHTHALMIC at 07:09

## 2019-08-28 RX ADMIN — Medication 1 TABLET(S): at 14:14

## 2019-08-28 RX ADMIN — Medication 100 MILLIGRAM(S): at 07:08

## 2019-08-28 RX ADMIN — GABAPENTIN 400 MILLIGRAM(S): 400 CAPSULE ORAL at 14:14

## 2019-08-28 RX ADMIN — APIXABAN 10 MILLIGRAM(S): 2.5 TABLET, FILM COATED ORAL at 07:08

## 2019-08-28 RX ADMIN — PIPERACILLIN AND TAZOBACTAM 200 GRAM(S): 4; .5 INJECTION, POWDER, LYOPHILIZED, FOR SOLUTION INTRAVENOUS at 03:09

## 2019-08-28 NOTE — DISCHARGE NOTE PROVIDER - CARE PROVIDERS DIRECT ADDRESSES
[email or web address]

## 2019-08-28 NOTE — DISCHARGE NOTE PROVIDER - NSDCFUADDINST_GEN_ALL_CORE_FT
Call the office or return to ED if you notice any discharge, bleeding, worsening redness or swelling around wound sight, if you are febrile, or have any change in mental status,     Apply collagenase to your wound every day and place a clean dressing on top. Keep your wound area clean.     Follow up with Dr. Allison from plastic surgery outpatient, call the office to make an appointment for management of your wound    Follow up with Dr. Sharpe from vascular surgery outpatient, call the office to make an appointment for management of your venous stasis and DVTs    Follow up with Dr. Greene outpatient, call the office to make an appointment    Continue taking antibiotics, complete a 7 day course of Augmentin after discharge    Continue taking Eliquis 10mg x14 days and then switch to 5mg. Follow up with your primary care doctor and vascular surgery regarding you anticoagulation management

## 2019-08-28 NOTE — DISCHARGE NOTE NURSING/CASE MANAGEMENT/SOCIAL WORK - PATIENT PORTAL LINK FT
You can access the FollowMyHealth Patient Portal offered by [facility] by registering at the following website: [email or web address]. By joining [employer]’s FollowMyHealth portal, you will also be able to view your health information using other applications (apps) compatible with our system.

## 2019-08-28 NOTE — PROGRESS NOTE ADULT - ATTENDING COMMENTS
I have reviewed the medical record, including laboratory and radiographic studies, interviewed and examined the patient and discussed the plan with Dr. Graves, the ID Resident.  Agree with above. Will continue to follow with you – ID Team 1.
I have reviewed the medical record, including laboratory and radiographic studies, interviewed and examined the patient and discussed the plan with Dr. Graves, the ID Resident.  Agree with above. Will continue to follow with you – ID Team 1.
I have reviewed the medical record, including laboratory and radiographic studies, interviewed and examined the patient and discussed the plan with Dr. Graves, the ID Resident.  Agree with above. Please recall if further ID input is desired – ID Team 1.

## 2019-08-28 NOTE — PROGRESS NOTE ADULT - SUBJECTIVE AND OBJECTIVE BOX
INTERVAL HPI/OVERNIGHT EVENTS:    CONSTITUTIONAL:  Negative fever or chills, feels well, good appetite  EYES:  Negative  blurry vision or double vision  CARDIOVASCULAR:  Negative for chest pain or palpitations  RESPIRATORY:  Negative for cough, wheezing, or SOB   GASTROINTESTINAL:  Negative for nausea, vomiting, diarrhea, constipation, or abdominal pain  GENITOURINARY:  Negative frequency, urgency or dysuria  NEUROLOGIC:  No headache, confusion, dizziness, lightheadedness      ANTIBIOTICS/RELEVANT:    MEDICATIONS  (STANDING):  amoxicillin  875 milliGRAM(s)/clavulanate 1 Tablet(s) Oral every 12 hours  apixaban 10 milliGRAM(s) Oral every 12 hours  aspirin  chewable 81 milliGRAM(s) Oral daily  cyanocobalamin 1000 MICROGram(s) Oral daily  docusate sodium 100 milliGRAM(s) Oral three times a day  dorzolamide 2%/timolol 0.5% Ophthalmic Solution 1 Drop(s) Both EYES two times a day  folic acid 1 milliGRAM(s) Oral daily  gabapentin 400 milliGRAM(s) Oral three times a day  latanoprost 0.005% Ophthalmic Solution 1 Drop(s) Both EYES at bedtime  lisinopril 10 milliGRAM(s) Oral daily    MEDICATIONS  (PRN):  acetaminophen   Tablet .. 650 milliGRAM(s) Oral every 6 hours PRN Temp greater or equal to 38C (100.4F), Mild Pain (1 - 3)  oxyCODONE    IR 5 milliGRAM(s) Oral every 6 hours PRN Moderate Pain (4 - 6)  oxyCODONE    IR 10 milliGRAM(s) Oral every 6 hours PRN Severe Pain (7 - 10)  senna 2 Tablet(s) Oral at bedtime PRN Constipation        Vital Signs Last 24 Hrs  T(C): 36.4 (28 Aug 2019 13:00), Max: 37.6 (28 Aug 2019 08:05)  T(F): 97.6 (28 Aug 2019 13:00), Max: 99.6 (28 Aug 2019 08:05)  HR: 59 (28 Aug 2019 13:00) (58 - 62)  BP: 117/76 (28 Aug 2019 13:00) (117/76 - 149/88)  BP(mean): --  RR: 17 (28 Aug 2019 13:00) (15 - 17)  SpO2: 96% (28 Aug 2019 13:00) (96% - 97%)    08-27-19 @ 07:01  -  08-28-19 @ 07:00  --------------------------------------------------------  IN: 500 mL / OUT: 2100 mL / NET: -1600 mL    08-28-19 @ 07:01  -  08-28-19 @ 20:51  --------------------------------------------------------  IN: 1260 mL / OUT: 750 mL / NET: 510 mL      PHYSICAL EXAM:  Constitutional:Well-developed, well nourished  Eyes:ELEONORA, EOMI  Ear/Nose/Throat: no oral lesion, no sinus tenderness on percussion	  Neck:no JVD, no lymphadenopathy, supple  Respiratory: CTA mayela  Cardiovascular: S1S2 RRR, no murmurs  Gastrointestinal:soft, (+) BS, no HSM  Extremities:no e/e/c  Vascular: DP Pulse:	right normal; left normal      LABS:                        12.7   5.33  )-----------( 119      ( 28 Aug 2019 07:09 )             38.8     08-28    140  |  106  |  10  ----------------------------<  106<H>  4.1   |  23  |  1.24    Ca    9.4      28 Aug 2019 07:09      PT/INR - ( 28 Aug 2019 07:09 )   PT: 16.3 sec;   INR: 1.43          PTT - ( 28 Aug 2019 07:09 )  PTT:31.3 sec      MICROBIOLOGY:    RADIOLOGY & ADDITIONAL STUDIES: INTERVAL HPI/OVERNIGHT EVENTS:    No acute events    CONSTITUTIONAL:  Negative fever or chills, feels well, good appetite  EYES:  Negative  blurry vision or double vision  CARDIOVASCULAR:  Negative for chest pain or palpitations  RESPIRATORY:  Negative for cough, wheezing, or SOB   GASTROINTESTINAL:  Negative for nausea, vomiting, diarrhea, constipation, or abdominal pain  GENITOURINARY:  Negative frequency, urgency or dysuria  NEUROLOGIC:  No headache, confusion, dizziness, lightheadedness      ANTIBIOTICS/RELEVANT:    MEDICATIONS  (STANDING):  amoxicillin  875 milliGRAM(s)/clavulanate 1 Tablet(s) Oral every 12 hours  apixaban 10 milliGRAM(s) Oral every 12 hours  aspirin  chewable 81 milliGRAM(s) Oral daily  cyanocobalamin 1000 MICROGram(s) Oral daily  docusate sodium 100 milliGRAM(s) Oral three times a day  dorzolamide 2%/timolol 0.5% Ophthalmic Solution 1 Drop(s) Both EYES two times a day  folic acid 1 milliGRAM(s) Oral daily  gabapentin 400 milliGRAM(s) Oral three times a day  latanoprost 0.005% Ophthalmic Solution 1 Drop(s) Both EYES at bedtime  lisinopril 10 milliGRAM(s) Oral daily    MEDICATIONS  (PRN):  acetaminophen   Tablet .. 650 milliGRAM(s) Oral every 6 hours PRN Temp greater or equal to 38C (100.4F), Mild Pain (1 - 3)  oxyCODONE    IR 5 milliGRAM(s) Oral every 6 hours PRN Moderate Pain (4 - 6)  oxyCODONE    IR 10 milliGRAM(s) Oral every 6 hours PRN Severe Pain (7 - 10)  senna 2 Tablet(s) Oral at bedtime PRN Constipation        Vital Signs Last 24 Hrs  T(C): 36.4 (28 Aug 2019 13:00), Max: 37.6 (28 Aug 2019 08:05)  T(F): 97.6 (28 Aug 2019 13:00), Max: 99.6 (28 Aug 2019 08:05)  HR: 59 (28 Aug 2019 13:00) (58 - 62)  BP: 117/76 (28 Aug 2019 13:00) (117/76 - 149/88)  BP(mean): --  RR: 17 (28 Aug 2019 13:00) (15 - 17)  SpO2: 96% (28 Aug 2019 13:00) (96% - 97%)    08-27-19 @ 07:01  -  08-28-19 @ 07:00  --------------------------------------------------------  IN: 500 mL / OUT: 2100 mL / NET: -1600 mL    08-28-19 @ 07:01  -  08-28-19 @ 20:51  --------------------------------------------------------  IN: 1260 mL / OUT: 750 mL / NET: 510 mL      PHYSICAL EXAM:  Constitutional:Well-developed, well nourished  Eyes:ELEONORA, EOMI  Ear/Nose/Throat: no oral lesion, no sinus tenderness on percussion	  Neck:no JVD, no lymphadenopathy, supple  Respiratory: CTA mayela  Cardiovascular: S1S2 RRR, no murmurs  Gastrointestinal:soft, (+) BS, no HSM  Extremities:no e/e/c  Vascular: DP Pulse:	right normal; left normal      LABS:                        12.7   5.33  )-----------( 119      ( 28 Aug 2019 07:09 )             38.8     08-28    140  |  106  |  10  ----------------------------<  106<H>  4.1   |  23  |  1.24    Ca    9.4      28 Aug 2019 07:09      PT/INR - ( 28 Aug 2019 07:09 )   PT: 16.3 sec;   INR: 1.43          PTT - ( 28 Aug 2019 07:09 )  PTT:31.3 sec      MICROBIOLOGY:    RADIOLOGY & ADDITIONAL STUDIES:

## 2019-08-28 NOTE — DISCHARGE NOTE PROVIDER - PROVIDER TOKENS
PROVIDER:[TOKEN:[[id number]]],PROVIDER:[TOKEN:[[id number]]],PROVIDER:[TOKEN:[[id number]]]

## 2019-08-28 NOTE — DISCHARGE NOTE PROVIDER - NSDCACTIVITY_GEN_ALL_CORE
Stairs allowed/No heavy lifting/straining/Walking - Indoors allowed/Walking - Outdoors allowed/Showering allowed

## 2019-08-28 NOTE — DISCHARGE NOTE PROVIDER - NSDCFUSCHEDAPPT_GEN_ALL_CORE_FT
BELKIS BOLDEN ; 09/18/2019 ; NPP Heartvasc [address]  BELKIS BOLDEN ; 10/02/2019 ; NPP Neuro [address]

## 2019-08-28 NOTE — PROGRESS NOTE ADULT - SUBJECTIVE AND OBJECTIVE BOX
HPI:  69 year old male with progressive lower extremity weakness, s/p lumbar decompression in 1/2019. s/p sural nerve biopsy on 7/25 for lower extremity weakness and atrophy. patient returned for wound dehiscence.  Patient is afebrile and without leukocytosis. He confirms RLE weakness, R ankle pain, b/l LE swelling. He had echo done on previous admission with 58%. hx of mitral valve insufficiency. of note, he had DVT and PE in prior admissions. PMHx also includes HTN, glaucoma. (22 Aug 2019 19:57)    OVERNIGHT EVENTS:  No events overnight.     Hospital Stay:  8/22: Admitted for wound dehiscence. Wound was wash off at bedside with H2O2 at bedside.  8/23: No major events at bedside. Wound was washed with H2O2 and redressed.    8/24: local wound care. vanc trough this am therapeutic cont to f/u culture results, cont abx. ID recs appreciated.   8/25: no major events. Daily wound care without complication. nikhil. Abx per ID  8/26: ID recommended 7 days of Oral Abx. Vascular consult recommended full AC    Vital Signs Last 24 Hrs  T(C): 37.6 (28 Aug 2019 08:05), Max: 37.6 (28 Aug 2019 08:05)  T(F): 99.6 (28 Aug 2019 08:05), Max: 99.6 (28 Aug 2019 08:05)  HR: 62 (28 Aug 2019 08:05) (58 - 69)  BP: 135/84 (28 Aug 2019 08:05) (124/84 - 149/88)  BP(mean): --  RR: 17 (28 Aug 2019 08:05) (15 - 17)  SpO2: 96% (28 Aug 2019 08:05) (96% - 99%)    I&O's Summary    27 Aug 2019 07:01  -  28 Aug 2019 07:00  --------------------------------------------------------  IN: 500 mL / OUT: 2100 mL / NET: -1600 mL    28 Aug 2019 07:01  -  28 Aug 2019 13:23  --------------------------------------------------------  IN: 760 mL / OUT: 450 mL / NET: 310 mL        PHYSICAL EXAM:  Neurological:  A&O x 3, PERRL, EOMI  Ext: bilateral LE venous insufficience skin change.  RLE wound is clean. 3/5 mostly due to pain.  Sensation intact to light touch.  Incision/Wound: packing changed    TUBES/LINES:  [] Sanz  [] Lumbar Drain  [] Wound Drains  [] Others      DIET:  [] NPO  [x] Mechanical  [] Tube feeds    LABS:                        12.7   5.33  )-----------( 119      ( 28 Aug 2019 07:09 )             38.8     08-28    140  |  106  |  10  ----------------------------<  106<H>  4.1   |  23  |  1.24    Ca    9.4      28 Aug 2019 07:09      PT/INR - ( 28 Aug 2019 07:09 )   PT: 16.3 sec;   INR: 1.43          PTT - ( 28 Aug 2019 07:09 )  PTT:31.3 sec        CAPILLARY BLOOD GLUCOSE          Drug Levels: [] N/A  Vancomycin Level, Trough: 15.1 ug/mL (08-24 @ 08:40)    CSF Analysis: [] N/A      Allergies    No Known Allergies    Intolerances      MEDICATIONS:  Antibiotics:  amoxicillin  875 milliGRAM(s)/clavulanate 1 Tablet(s) Oral every 12 hours    Neuro:  acetaminophen   Tablet .. 650 milliGRAM(s) Oral every 6 hours PRN  gabapentin 400 milliGRAM(s) Oral three times a day  oxyCODONE    IR 5 milliGRAM(s) Oral every 6 hours PRN  oxyCODONE    IR 10 milliGRAM(s) Oral every 6 hours PRN    Anticoagulation:  apixaban 10 milliGRAM(s) Oral every 12 hours  aspirin  chewable 81 milliGRAM(s) Oral daily    OTHER:  docusate sodium 100 milliGRAM(s) Oral three times a day  dorzolamide 2%/timolol 0.5% Ophthalmic Solution 1 Drop(s) Both EYES two times a day  latanoprost 0.005% Ophthalmic Solution 1 Drop(s) Both EYES at bedtime  lisinopril 10 milliGRAM(s) Oral daily  senna 2 Tablet(s) Oral at bedtime PRN    IVF:  cyanocobalamin 1000 MICROGram(s) Oral daily  folic acid 1 milliGRAM(s) Oral daily    CULTURES:  Culture Results:   Moderate Enterococcus faecalis  Moderate Streptococcus sanguinis (08-22 @ 20:32)  Culture Results:   No growth at 5 days. (08-22 @ 20:25)    RADIOLOGY & ADDITIONAL TESTS:      ASSESSMENT:  69y Male w/ R ankle wound infection s/p sural nerve biopsy    NEUROPATHY  POSTOP INFECTION  No pertinent family history in first degree relatives  Handoff  MEWS Score  Mitral valve insufficiency  Cardiomegaly  History of hepatitis B  Glaucoma  Neuropathy  DVT (deep venous thrombosis)  Pulmonary embolism  HTN (hypertension)  Wound dehiscence  Suspected deep vein thrombosis (DVT)  History of appendectomy  History of tonsillectomy  History of lumbar laminectomy  Degeneration of intervertebral disc of thoracic region  History of foot surgery  H/O cervical spine surgery      PLAN:  NEURO:  -ID recs recommened. Po Abx course  -Vasc surgery recommending full oral AC  -plastics recommending collagenase to wound BID  -D/C home today  -D/W     DVT PROPHYLAXIS:  [x] Venodynes                                [] Heparin/Lovenox    DISPOSITION: home w/ wound care

## 2019-08-28 NOTE — DISCHARGE NOTE PROVIDER - NSDCCPCAREPLAN_GEN_ALL_CORE_FT
PRINCIPAL DISCHARGE DIAGNOSIS  Diagnosis: Wound dehiscence  Assessment and Plan of Treatment: wound dehiscence at sight of sural nerve biopsy

## 2019-08-28 NOTE — DISCHARGE NOTE PROVIDER - CARE PROVIDER_API CALL
Agustin Greene)  Neurological Surgery  [address], [address] [place] [address]  Phone: [phone]  Fax: [phone]  Follow Up Time:     Shahid Sharpe)  Vascular Surgery  [address] [place] [address]  Phone: [phone]  Fax: [phone]  Follow Up Time:     Rosario Allison)  Plastic Surgery Surgery  [address] [place] [address]  Phone: [phone]  Fax: [phone]  Follow Up Time:

## 2019-08-28 NOTE — DISCHARGE NOTE PROVIDER - HOSPITAL COURSE
HPI:    69 year old male with progressive lower extremity weakness, s/p lumbar decompression in 1/2019. s/p sural nerve biopsy on 7/25 for lower extremity weakness and atrophy. patient returned for wound dehiscence.  Patient is afebrile and without leukocytosis. He confirms RLE weakness, R ankle pain, b/l LE swelling. He had echo done on previous admission with 58%. hx of mitral valve insufficiency. of note, he had DVT and PE in prior admissions. PMHx also includes HTN, glaucoma. (22 Aug 2019 19:57)        8/22: Admitted for wound dehiscence. Wound was wash off at bedside with H2O2 at bedside.    8/23: No major events at bedside. Wound was washed with H2O2 and redressed.      8/24: local wound care. vanc trough this am therapeutic cont to f/u culture results, cont abx. ID recs appreciated.     8/25: no major events. Daily wound care without complication. nikhil. Abx per ID    8/26 uneventful    8/27: MIRIAM overnight, ID recommending PO Augmentin x7 days, patient started on Eliquis yesterday for DVT's per vascular surgery.        Patient evaluated by PT/OT who recommended home with outpatient PT. Patient needs home care for wound care. Patient afebrile, tolerating PO diet, voiding, eager for discharge to home today. Patient understands and agrees to discharge plan     Patient's wound packing was being changed daily while inpatient, will discharge with collagenase topical ointment to be used QD instead of packing.

## 2019-08-29 ENCOUNTER — INBOUND DOCUMENT (OUTPATIENT)
Age: 69
End: 2019-08-29

## 2019-09-13 ENCOUNTER — APPOINTMENT (OUTPATIENT)
Dept: NEUROSURGERY | Facility: CLINIC | Age: 69
End: 2019-09-13
Payer: MEDICARE

## 2019-09-13 VITALS
SYSTOLIC BLOOD PRESSURE: 110 MMHG | BODY MASS INDEX: 26.51 KG/M2 | OXYGEN SATURATION: 98 % | DIASTOLIC BLOOD PRESSURE: 72 MMHG | RESPIRATION RATE: 18 BRPM | HEIGHT: 73 IN | TEMPERATURE: 98.2 F | WEIGHT: 200 LBS | HEART RATE: 60 BPM

## 2019-09-13 PROCEDURE — 99214 OFFICE O/P EST MOD 30 MIN: CPT

## 2019-09-13 NOTE — ASSESSMENT
[FreeTextEntry1] : Patient is a follow up case of Right Sural nerve biopsy who subsequently developed surgical site infection. His Wound has improved consideratbly as compared to the previous hospital admission, and swelling, erythema and tenderness in the right  lower limb has improved . I had a long discussion with the patient regarding the role of wound care and local hygiene. The patient was extensively educated about the nature of his disease process and his skin changes suggestive of  venous hypertension due to previous DVT.  The patient should see Dr. Brito for his opinion. I will see the patient back in 3 months to review the wound healing. I have explained the alternatives, risks and benefits to the patient and she understands and agrees to proceed.  This risk of the conservative management including but not limited to pain, infection, and cellulitis have been carefully explained to the patient who clearly understands and agrees to proceed.\par

## 2019-09-13 NOTE — HISTORY OF PRESENT ILLNESS
[de-identified] : 69 year old male with progressive  lower extremity weakness, s/p lumbar decompression in 1/2019. Improved back and radiating leg pain after surgery but continues to  have lower extremity weakness and atrophy. Follows with Dr. Brito where extensive workup has been thus far unrevealing. \par \par S/p gastroc muscle/sural nerve biopsy 7/25/19.\par \par Presents today with complaints of progressive pain at incision site, wound drainage and swelling. He was last sen for staple removal 2 weeks ago and wound was intact with some localized edema at that time, presumed to be dependent edema. He was counseled at that time notify for new new symptoms including drainage, fever, redness or progressing edema. He was then seen 8/14 by Dr. Brito followed by 8/20 by his hematologist who noted a poorly healing wound and started augmentin. He denies fevers or active wound drainage over the last 2-3 days. He denies fatigue or generalized malaise. \par \par He was hospitalized and discharge on PO augmentin. He did not follow up with plastic surgery.

## 2019-09-18 ENCOUNTER — APPOINTMENT (OUTPATIENT)
Dept: HEART AND VASCULAR | Facility: CLINIC | Age: 69
End: 2019-09-18
Payer: MEDICARE

## 2019-09-18 VITALS
RESPIRATION RATE: 14 BRPM | OXYGEN SATURATION: 98 % | DIASTOLIC BLOOD PRESSURE: 80 MMHG | BODY MASS INDEX: 27.57 KG/M2 | TEMPERATURE: 97.5 F | HEIGHT: 73 IN | HEART RATE: 54 BPM | WEIGHT: 208 LBS | SYSTOLIC BLOOD PRESSURE: 116 MMHG

## 2019-09-18 DIAGNOSIS — Z86.718 PERSONAL HISTORY OF OTHER VENOUS THROMBOSIS AND EMBOLISM: ICD-10-CM

## 2019-09-18 PROCEDURE — 99214 OFFICE O/P EST MOD 30 MIN: CPT

## 2019-09-18 RX ORDER — SULFAMETHOXAZOLE AND TRIMETHOPRIM 800; 160 MG/1; MG/1
800-160 TABLET ORAL TWICE DAILY
Qty: 20 | Refills: 0 | Status: DISCONTINUED | COMMUNITY
Start: 2019-08-22 | End: 2019-09-18

## 2019-09-18 RX ORDER — CEPHALEXIN 750 MG/1
750 CAPSULE ORAL
Qty: 14 | Refills: 0 | Status: DISCONTINUED | COMMUNITY
Start: 2019-08-22 | End: 2019-09-18

## 2019-09-18 NOTE — HISTORY OF PRESENT ILLNESS
[FreeTextEntry1] : 69 year male who reports being hospitalized with infection of his RLE. He reports seeing ID. He is now followed by a wound nurse, [facility] [phone], [phone] and is off of antibiotics. He reports being diagnosed with DVT in his LLE. He is scheduled to see Vascular tomorrow. He notes night time trembling of his RLE. He describes sliding off of his bed without bruising. He notes being unable

## 2019-09-18 NOTE — PHYSICAL EXAM
[Normal Conjunctiva] : the conjunctiva exhibited no abnormalities [Eyelids - No Xanthelasma] : the eyelids demonstrated no xanthelasmas [Respiration, Rhythm And Depth] : normal respiratory rhythm and effort [] : no respiratory distress [Exaggerated Use Of Accessory Muscles For Inspiration] : no accessory muscle use [Auscultation Breath Sounds / Voice Sounds] : lungs were clear to auscultation bilaterally [Abdomen Soft] : soft [Heart Sounds] : normal S1 and S2 [FreeTextEntry1] : right ankle dressed [Skin Turgor] : normal skin turgor [Oriented To Time, Place, And Person] : oriented to person, place, and time [Affect] : the affect was normal [Mood] : the mood was normal [No Anxiety] : not feeling anxious

## 2019-09-19 ENCOUNTER — APPOINTMENT (OUTPATIENT)
Dept: VASCULAR SURGERY | Facility: CLINIC | Age: 69
End: 2019-09-19
Payer: MEDICARE

## 2019-09-19 VITALS — SYSTOLIC BLOOD PRESSURE: 130 MMHG | DIASTOLIC BLOOD PRESSURE: 86 MMHG | HEART RATE: 67 BPM | OXYGEN SATURATION: 93 %

## 2019-09-19 PROCEDURE — 93971 EXTREMITY STUDY: CPT

## 2019-09-19 PROCEDURE — 99214 OFFICE O/P EST MOD 30 MIN: CPT

## 2019-09-20 ENCOUNTER — OTHER (OUTPATIENT)
Age: 69
End: 2019-09-20

## 2019-09-23 NOTE — HISTORY OF PRESENT ILLNESS
[FreeTextEntry1] : 69 year old male with PMH of PE/DVT (left). Taking Eliquis, Negative hypercoagulable workup. He underwent nerve biopsy on 7/25/19 by neurosurgery in the right leg. Wound became infected and he was sent to the hospital for care. Infection has improved but wound is still not healing. Doing dry dressings per patient. Minimal swelling in the right leg.

## 2019-09-23 NOTE — PHYSICAL EXAM
[2+] : right 2+ [Ankle Swelling (On Exam)] : present [Ankle Swelling On The Right] : mild [FreeTextEntry1] : [id number] over the lateral right ankle with slough. clear drainage, no infection. Palpable pulses.  [de-identified] : well appearing

## 2019-09-23 NOTE — ASSESSMENT
[FreeTextEntry1] : 69 year old male with non healing wound in the right leg and history of DVT in the left leg. Currently taking Eliquis. He should try betadine and perxide on the wound and wrap with wet to dry dressings, instructions given to patient. FU PRN

## 2019-10-01 PROCEDURE — 80048 BASIC METABOLIC PNL TOTAL CA: CPT

## 2019-10-01 PROCEDURE — 86803 HEPATITIS C AB TEST: CPT

## 2019-10-01 PROCEDURE — 86900 BLOOD TYPING SEROLOGIC ABO: CPT

## 2019-10-01 PROCEDURE — 86850 RBC ANTIBODY SCREEN: CPT

## 2019-10-01 PROCEDURE — 85652 RBC SED RATE AUTOMATED: CPT

## 2019-10-01 PROCEDURE — 87040 BLOOD CULTURE FOR BACTERIA: CPT

## 2019-10-01 PROCEDURE — 83036 HEMOGLOBIN GLYCOSYLATED A1C: CPT

## 2019-10-01 PROCEDURE — 87184 SC STD DISK METHOD PER PLATE: CPT

## 2019-10-01 PROCEDURE — 81001 URINALYSIS AUTO W/SCOPE: CPT

## 2019-10-01 PROCEDURE — 71045 X-RAY EXAM CHEST 1 VIEW: CPT

## 2019-10-01 PROCEDURE — 80202 ASSAY OF VANCOMYCIN: CPT

## 2019-10-01 PROCEDURE — 87186 SC STD MICRODIL/AGAR DIL: CPT

## 2019-10-01 PROCEDURE — 87075 CULTR BACTERIA EXCEPT BLOOD: CPT

## 2019-10-01 PROCEDURE — 97161 PT EVAL LOW COMPLEX 20 MIN: CPT

## 2019-10-01 PROCEDURE — 85027 COMPLETE CBC AUTOMATED: CPT

## 2019-10-01 PROCEDURE — 87070 CULTURE OTHR SPECIMN AEROBIC: CPT

## 2019-10-01 PROCEDURE — 84100 ASSAY OF PHOSPHORUS: CPT

## 2019-10-01 PROCEDURE — 80053 COMPREHEN METABOLIC PANEL: CPT

## 2019-10-01 PROCEDURE — 85730 THROMBOPLASTIN TIME PARTIAL: CPT

## 2019-10-01 PROCEDURE — 97535 SELF CARE MNGMENT TRAINING: CPT

## 2019-10-01 PROCEDURE — 97116 GAIT TRAINING THERAPY: CPT

## 2019-10-01 PROCEDURE — 87521 HEPATITIS C PROBE&RVRS TRNSC: CPT

## 2019-10-01 PROCEDURE — 93005 ELECTROCARDIOGRAM TRACING: CPT

## 2019-10-01 PROCEDURE — 36415 COLL VENOUS BLD VENIPUNCTURE: CPT

## 2019-10-01 PROCEDURE — 85610 PROTHROMBIN TIME: CPT

## 2019-10-01 PROCEDURE — 93970 EXTREMITY STUDY: CPT

## 2019-10-01 PROCEDURE — 84443 ASSAY THYROID STIM HORMONE: CPT

## 2019-10-01 PROCEDURE — 86140 C-REACTIVE PROTEIN: CPT

## 2019-10-01 PROCEDURE — 83735 ASSAY OF MAGNESIUM: CPT

## 2019-10-01 PROCEDURE — 86901 BLOOD TYPING SEROLOGIC RH(D): CPT

## 2019-10-01 PROCEDURE — 85025 COMPLETE CBC W/AUTO DIFF WBC: CPT

## 2019-10-01 PROCEDURE — 87205 SMEAR GRAM STAIN: CPT

## 2019-10-02 ENCOUNTER — APPOINTMENT (OUTPATIENT)
Dept: NEUROLOGY | Facility: CLINIC | Age: 69
End: 2019-10-02
Payer: MEDICARE

## 2019-10-02 ENCOUNTER — OUTPATIENT (OUTPATIENT)
Dept: OUTPATIENT SERVICES | Facility: HOSPITAL | Age: 69
LOS: 1 days | End: 2019-10-02
Payer: MEDICARE

## 2019-10-02 DIAGNOSIS — Z98.890 OTHER SPECIFIED POSTPROCEDURAL STATES: Chronic | ICD-10-CM

## 2019-10-02 DIAGNOSIS — M51.34 OTHER INTERVERTEBRAL DISC DEGENERATION, THORACIC REGION: Chronic | ICD-10-CM

## 2019-10-02 DIAGNOSIS — L03.115 CELLULITIS OF RIGHT LOWER LIMB: ICD-10-CM

## 2019-10-02 DIAGNOSIS — Z90.49 ACQUIRED ABSENCE OF OTHER SPECIFIED PARTS OF DIGESTIVE TRACT: Chronic | ICD-10-CM

## 2019-10-02 DIAGNOSIS — Z90.89 ACQUIRED ABSENCE OF OTHER ORGANS: Chronic | ICD-10-CM

## 2019-10-02 PROCEDURE — 99214 OFFICE O/P EST MOD 30 MIN: CPT

## 2019-10-02 PROCEDURE — 73562 X-RAY EXAM OF KNEE 3: CPT | Mod: 26,RT

## 2019-10-02 PROCEDURE — 73562 X-RAY EXAM OF KNEE 3: CPT

## 2019-10-02 RX ORDER — APIXABAN 5 MG/1
5 TABLET, FILM COATED ORAL
Qty: 60 | Refills: 5 | Status: ACTIVE | COMMUNITY
Start: 1900-01-01 | End: 1900-01-01

## 2019-10-02 NOTE — HISTORY OF PRESENT ILLNESS
[FreeTextEntry1] : Since last visit he was admitted to the hospital for cellulitis at biopsy site, received IV antibiotics.\par Now site is healing, less painful\par He is having pain in the right knee, sharp, worse with walking but also occurs at night and wakes him up from sleep. \par

## 2019-10-02 NOTE — ASSESSMENT
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

## 2019-10-02 NOTE — PHYSICAL EXAM
[General Appearance - Alert] : alert [General Appearance - In No Acute Distress] : in no acute distress [Oriented To Time, Place, And Person] : oriented to person, place, and time [Impaired Insight] : insight and judgment were intact [Affect] : the affect was normal [Fluency] : fluency intact [Concentration Intact] : normal concentrating ability [Comprehension] : comprehension intact [Vocabulary] : adequate range of vocabulary [Past History] : adequate knowledge of personal past history [Sensation Tactile Decrease] : light touch was intact [Sensation Pain / Temperature Decrease] : pain and temperature was intact [FreeTextEntry1] : Right hip flexion 4-, knee flex/ext 4, ankle dorsilfexion 4\par LLE 5/5\par Reflexes: patellar absent on right, 2+ L; achilles absent b/l\par Gait: drags right foot with walker\par \par biopsy site - clean, dry, bottom half is closing up, top half still somewhat open  [FreeTextEntry8] : can walk short distances with walker, right foot drags, balance is poor

## 2019-10-18 ENCOUNTER — APPOINTMENT (OUTPATIENT)
Dept: HEART AND VASCULAR | Facility: CLINIC | Age: 69
End: 2019-10-18
Payer: MEDICARE

## 2019-10-18 VITALS
BODY MASS INDEX: 27.44 KG/M2 | TEMPERATURE: 97.7 F | WEIGHT: 208 LBS | DIASTOLIC BLOOD PRESSURE: 90 MMHG | SYSTOLIC BLOOD PRESSURE: 130 MMHG | HEART RATE: 62 BPM | OXYGEN SATURATION: 96 %

## 2019-10-18 DIAGNOSIS — I51.7 CARDIOMEGALY: ICD-10-CM

## 2019-10-18 PROCEDURE — 90732 PPSV23 VACC 2 YRS+ SUBQ/IM: CPT

## 2019-10-18 PROCEDURE — 99214 OFFICE O/P EST MOD 30 MIN: CPT | Mod: 25

## 2019-10-18 PROCEDURE — G0008: CPT

## 2019-10-18 PROCEDURE — 90662 IIV NO PRSV INCREASED AG IM: CPT

## 2019-10-18 PROCEDURE — G0009: CPT

## 2019-10-18 NOTE — PHYSICAL EXAM
[Normal Appearance] : normal appearance [General Appearance - Well Developed] : well developed [Well Groomed] : well groomed [General Appearance - Well Nourished] : well nourished [No Deformities] : no deformities [General Appearance - In No Acute Distress] : no acute distress [Normal Conjunctiva] : the conjunctiva exhibited no abnormalities [] : no respiratory distress [Respiration, Rhythm And Depth] : normal respiratory rhythm and effort [Exaggerated Use Of Accessory Muscles For Inspiration] : no accessory muscle use [Auscultation Breath Sounds / Voice Sounds] : lungs were clear to auscultation bilaterally [Heart Sounds] : normal S1 and S2 [Skin Turgor] : normal skin turgor [FreeTextEntry1] : RLE dressed [Oriented To Time, Place, And Person] : oriented to person, place, and time [Mood] : the mood was normal [Affect] : the affect was normal [No Anxiety] : not feeling anxious

## 2019-10-18 NOTE — HISTORY OF PRESENT ILLNESS
[FreeTextEntry1] : 69 year male who requests Flu Vax and pneumovax. He continues to get wound care and is planning to see Ortho

## 2019-10-21 ENCOUNTER — APPOINTMENT (OUTPATIENT)
Age: 69
End: 2019-10-21
Payer: MEDICARE

## 2019-10-21 ENCOUNTER — NON-APPOINTMENT (OUTPATIENT)
Age: 69
End: 2019-10-21

## 2019-10-21 ENCOUNTER — APPOINTMENT (OUTPATIENT)
Dept: OPHTHALMOLOGY | Facility: CLINIC | Age: 69
End: 2019-10-21

## 2019-10-21 PROCEDURE — 92083 EXTENDED VISUAL FIELD XM: CPT

## 2019-10-21 PROCEDURE — 92012 INTRM OPH EXAM EST PATIENT: CPT

## 2019-10-29 ENCOUNTER — MEDICATION RENEWAL (OUTPATIENT)
Age: 69
End: 2019-10-29

## 2019-10-29 RX ORDER — GABAPENTIN 400 MG/1
400 CAPSULE ORAL 3 TIMES DAILY
Qty: 270 | Refills: 3 | Status: ACTIVE | COMMUNITY
Start: 2019-08-09 | End: 1900-01-01

## 2019-11-04 ENCOUNTER — APPOINTMENT (OUTPATIENT)
Dept: ORTHOPEDIC SURGERY | Facility: CLINIC | Age: 69
End: 2019-11-04
Payer: MEDICARE

## 2019-11-04 VITALS
WEIGHT: 210 LBS | BODY MASS INDEX: 26.95 KG/M2 | HEART RATE: 60 BPM | SYSTOLIC BLOOD PRESSURE: 138 MMHG | DIASTOLIC BLOOD PRESSURE: 91 MMHG | HEIGHT: 74 IN

## 2019-11-04 DIAGNOSIS — Z87.39 PERSONAL HISTORY OF OTHER DISEASES OF THE MUSCULOSKELETAL SYSTEM AND CONNECTIVE TISSUE: ICD-10-CM

## 2019-11-04 DIAGNOSIS — M25.561 PAIN IN RIGHT KNEE: ICD-10-CM

## 2019-11-04 PROCEDURE — 20610 DRAIN/INJ JOINT/BURSA W/O US: CPT | Mod: RT

## 2019-11-04 PROCEDURE — 99203 OFFICE O/P NEW LOW 30 MIN: CPT | Mod: 25

## 2019-11-04 NOTE — DISCUSSION/SUMMARY
[de-identified] : A 69-year-old gentleman comes in for pain in his RIGHT knee. he has moderate knee osteoarthritis and there was a small joint effusion. I do feel the arthritis is continued into his pain but it may not be the sole cause of his symptoms given the neuropathy that is present. He has a lot of weakness in that weakness may accentuate the knee arthritis pain as well.\par We did a corticosteroid injection today which did seem to give him relief of pain at the knee.\par He can apply ice. He was referred to physical therapy but should check with his spine surgeon to make sure that it's okay for him to proceed with therapy. Tylenol as needed. Follow up in 6Weeks

## 2019-11-04 NOTE — HISTORY OF PRESENT ILLNESS
[de-identified] : Mr. Sanderson is a 69-year-old gentleman who comes in for evaluation of his RIGHT knee. \par he has a complicated history with a history of bilateral DVT and pulmonary embolus and has chronic leg edema and he had a lumbar decompression and fusion performed at the [facility] for special surgery in January 2019. He had muscle and sural nerve biopsy several months ago complicated by an infection\par He comes in because of pain specifically in his knee. He has some chronic weakness in the lower extremities that is being evaluated by neurology with please biopsies.\par Pain is worse with walking, bending but also occurs lying down. Pain is more in the medial knee but he also has some pain up and down the entire RIGHT lower extremity. His pain is better with rest and taking medication, Pain killers as needed. His pain is throbbing and can be an 8/10.\par He is done martial arts for many years. He never had any knee injections or significant injuries specifically to the knee. He has had some x-rays in the last few months.\par

## 2019-11-04 NOTE — PHYSICAL EXAM
[de-identified] : Knees:\par He walks with a walker. He has some weakness  Lower extremities walking with flexed knees. He walks better if the walker then without it but couldn't walk short distance without her walker.\par Trace RIGHT effusion.\par Edema bilateral ankles with chronic venous stasis changes\par Tender greatest medial joint line\par ROM: -3 degrees extension to 125 degrees flexion with pain on full flexion. Nocrepitus\par - Donna.\par 1A Lachman.  - Pivot shift. - posterior drawer. Normal rotational, varus/valgus laxity.\par he can do a straight leg raise but there is quadriceps weakness bilaterally.Weakness with ankle dorsiflexion.\par Intact sensation to light touch.\par NVI distally.\par  [de-identified] : \par EXAM: XR KNEE-RIGHT-3 VIEWS \par PROCEDURE DATE: 10/02/2019 \par INTERPRETATION: CLINICAL INDICATION: 69-year-old with pain. \par IMPRESSION: Frontal, lateral and flexion views of the right knee are \par compared to 5/24/2019 and demonstrates mild medial compartment narrowing. \par Lateral compartment is preserved. Periarticular osteopenia. No significant \par joint effusion. No fracture. No dislocation. \par X-rays in May show osteophytes at the patella with mild patellofemoral narrowing.\par

## 2019-11-04 NOTE — PROCEDURE
[Injection] : Injection [Right] : of the right [Knee Joint] : knee joint [Effusion] : Effusion [Osteoarthritis] : Osteoarthritis [Patient] : patient [Risk] : risk [Benefits] : benefits [Alternatives] : alternatives [Bleeding] : bleeding [Infection] : infection [Allergic Reaction] : allergic reaction [Verbal Consent Obtained] : verbal consent was obtained prior to the procedure [Alcohol] : Alcohol [Betadine] : Betadine [Ethyl Chloride Spray] : ethyl chloride spray was used as a topical anesthetic [Lateral] : lateral [22] : a 22-gauge [1% Lidocaine___(mL)] : [unfilled] mL of 1% Lidocaine [Methylpred. 40mg/mL___(mL)] : [unfilled] mL of 40mg/mL methylprednisolone [Bandage Applied] : a bandage [Tolerated Well] : The patient tolerated the procedure well [None] : none [No Strenuous Activity___day(s)] : avoid strenuous activity for [unfilled] day(s) [PRN] : as needed

## 2019-12-13 ENCOUNTER — APPOINTMENT (OUTPATIENT)
Dept: NEUROSURGERY | Facility: CLINIC | Age: 69
End: 2019-12-13
Payer: MEDICARE

## 2019-12-13 VITALS
BODY MASS INDEX: 26.95 KG/M2 | HEIGHT: 74 IN | WEIGHT: 210 LBS | SYSTOLIC BLOOD PRESSURE: 112 MMHG | TEMPERATURE: 97.8 F | DIASTOLIC BLOOD PRESSURE: 73 MMHG | HEART RATE: 68 BPM | OXYGEN SATURATION: 98 % | RESPIRATION RATE: 18 BRPM

## 2019-12-13 PROCEDURE — 99214 OFFICE O/P EST MOD 30 MIN: CPT

## 2019-12-13 NOTE — PHYSICAL EXAM
[General Appearance - Alert] : alert [General Appearance - In No Acute Distress] : in no acute distress [Longitudinal] : longitudinal [Clean] : clean [Well-Healed] : well-healed [Intact] : intact [No Drainage] : without drainage [Normal Skin] : normal [Neck Appearance] : the appearance of the neck was normal [] : no respiratory distress [FreeTextEntry1] : right lateral lower leg

## 2019-12-13 NOTE — ASSESSMENT
[FreeTextEntry1] : Patient is a follow up case of Right Sural nerve biopsy who subsequently developed surgical site infection. His Wound has healed well at this point with no swelling, erythema or tenderness. The patient should see Dr. Brito for his opinion. I will see the patient back as needed. \par

## 2019-12-13 NOTE — HISTORY OF PRESENT ILLNESS
[de-identified] : 69 year old male with progressive  lower extremity weakness, s/p lumbar decompression in 1/2019. Improved back and radiating leg pain after surgery but continues to  have lower extremity weakness and atrophy. Follows with Dr. Brito where extensive workup has been thus far unrevealing. \par \par S/p gastroc muscle/sural nerve biopsy 7/25/19.\par \par Presents today with complaints of progressive pain at incision site, wound drainage and swelling. He was last sen for staple removal 2 weeks ago and wound was intact with some localized edema at that time, presumed to be dependent edema. He was counseled at that time notify for new new symptoms including drainage, fever, redness or progressing edema. He was then seen 8/14 by Dr. Brito followed by 8/20 by his hematologist who noted a poorly healing wound and started augmentin. He denies fevers or active wound drainage over the last 2-3 days. He denies fatigue or generalized malaise. \par \par He was hospitalized and discharge on PO augmentin. He did not follow up with plastic surgery. \par \par He denies any recent wound drainage, erythema or sweling.

## 2019-12-13 NOTE — REASON FOR VISIT
[Follow-Up: _____] : a [unfilled] follow-up visit [de-identified] : s/p right gastrocnemius and sural nerve biopsies [de-identified] : 7/25/19 [de-identified] : Pathology: Myofiber atrophy

## 2019-12-16 ENCOUNTER — APPOINTMENT (OUTPATIENT)
Dept: ORTHOPEDIC SURGERY | Facility: CLINIC | Age: 69
End: 2019-12-16
Payer: MEDICARE

## 2019-12-16 DIAGNOSIS — M17.11 UNILATERAL PRIMARY OSTEOARTHRITIS, RIGHT KNEE: ICD-10-CM

## 2019-12-16 DIAGNOSIS — M54.5 LOW BACK PAIN: ICD-10-CM

## 2019-12-16 DIAGNOSIS — M25.461 EFFUSION, RIGHT KNEE: ICD-10-CM

## 2019-12-16 DIAGNOSIS — F40.240 CLAUSTROPHOBIA: ICD-10-CM

## 2019-12-16 DIAGNOSIS — G89.29 LOW BACK PAIN: ICD-10-CM

## 2019-12-16 PROCEDURE — 99214 OFFICE O/P EST MOD 30 MIN: CPT

## 2019-12-16 PROCEDURE — 73522 X-RAY EXAM HIPS BI 3-4 VIEWS: CPT

## 2019-12-16 RX ORDER — DIAZEPAM 5 MG/1
5 TABLET ORAL
Qty: 2 | Refills: 0 | Status: ACTIVE | COMMUNITY
Start: 2019-12-16 | End: 1900-01-01

## 2019-12-16 NOTE — DISCUSSION/SUMMARY
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 The pain seems to be more in the hip and thigh then the knee now. Given the periosteal elevation seen on the x-rays of the femur I would like for him to get an MRI of the femur to make sure there is no insufficiency fracture\par I will call him with the results.\par He will continue walking with a walker. He'll start therapy. Followup in 6-8 weeks

## 2019-12-16 NOTE — HISTORY OF PRESENT ILLNESS
[de-identified] : 69-year-old comes in for followup for his RIGHT knee and leg pain and weakness.\par We had done a corticosteroid injection in the knee last visit which didn't help much..\par He was referred to physical therapy -- He got cleared to start and wants to start. He hasn't done any therapy yet\par He walks with a walker. He will be seeing a neurologist to discuss the findings of the nerve biopsy.\par He doesn't have pain or weakness in his LEFT lower extremity.\par He is on gabapentin.

## 2019-12-16 NOTE — PHYSICAL EXAM
[de-identified] : RIGHT Knee lower leg:\par He walks with a limp with weakness on the RIGHT.\par  Brawny edema RIGHT ankle. No ulcers or erythema.\par Tender Distal lower leg.\par Mildly tender knee joint line. Tender through the RIGHT femur..\par ROM Knee: 0 degrees extension to 125 degrees flexion Without significant pain.. \par ROM hip: 0-125° flexion with stiffness.\par He has weakness With difficulty doing an active straight leg raise and mild weakness with knee extension. Tender generally through the femur.\par - Donna.\par 1A Lachman.  - Pivot shift. - posterior drawer. Normal rotational, varus/valgus laxity.\par Intact extensor mechanism.\par NVI distally.\par  [de-identified] : \par AP pelvis and lateral of the RIGHT hip taken today show no significant osteoarthritis in the hip joints. There is some periosteal elevation in the femur at the diaphyseal area distally No fracture line seen

## 2019-12-27 ENCOUNTER — APPOINTMENT (OUTPATIENT)
Dept: NEUROLOGY | Facility: CLINIC | Age: 69
End: 2019-12-27
Payer: MEDICARE

## 2019-12-27 VITALS
SYSTOLIC BLOOD PRESSURE: 125 MMHG | DIASTOLIC BLOOD PRESSURE: 86 MMHG | OXYGEN SATURATION: 97 % | HEART RATE: 60 BPM | TEMPERATURE: 97.8 F

## 2019-12-27 VITALS — HEIGHT: 74 IN | BODY MASS INDEX: 26.95 KG/M2 | WEIGHT: 210 LBS

## 2019-12-27 DIAGNOSIS — R26.89 OTHER ABNORMALITIES OF GAIT AND MOBILITY: ICD-10-CM

## 2019-12-27 PROCEDURE — 99214 OFFICE O/P EST MOD 30 MIN: CPT

## 2019-12-27 NOTE — HISTORY OF PRESENT ILLNESS
[FreeTextEntry1] : Still has pain in his right knee when he walks\par He had a steroid injection by Dr. Brian which did not help much\par She ordered MRI of the right femur\par Right leg strength is improving slowing\par He had one session of PT but they told him he needs neurologic clearance before resuming\par Also c/o numbness in feet and lower legs, worse on the right\par \par 13 pt review of systems performed and reviewed with patient (General, Eyes, Ears, Cardiovascular, Respiratory, Gastrointestinal, Genitourinary, Musculoskeletal, Skin, Endocrine, Hematologic, Psychiatric, Neurologic)\par Past medical history, surgical history, social history, and family history reviewed with patient\par See scanned document for details

## 2019-12-27 NOTE — PHYSICAL EXAM
[FreeTextEntry1] : Right hip flexion 4-, knee flex/ext 4+, ankle dorsilflexion 4\par LLE 5/5\par Reflexes: patellar absent on right, 2+ L; achilles absent b/l\par Gait: drags right foot with walker, appears more antalgic than prior \par \par biopsy site - wound closed, well-healed, non-tender [General Appearance - In No Acute Distress] : in no acute distress [General Appearance - Alert] : alert [Oriented To Time, Place, And Person] : oriented to person, place, and time [Affect] : the affect was normal [Impaired Insight] : insight and judgment were intact [Concentration Intact] : normal concentrating ability [Fluency] : fluency intact [Comprehension] : comprehension intact [Past History] : adequate knowledge of personal past history [Sensation Pain / Temperature Decrease] : pain and temperature was intact [Sensation Tactile Decrease] : light touch was intact [Vocabulary] : adequate range of vocabulary [FreeTextEntry8] : can walk short distances with walker, right foot drags, balance is poor

## 2019-12-27 NOTE — ASSESSMENT
[FreeTextEntry1] : right leg strength continues to gradually improve\par continue PT - no neurologic contraindication (given letter)\par continue gabapentin 400 TID\par f/u with Dr. Brian after MRI femur\par return in 3 months

## 2019-12-30 ENCOUNTER — MOBILE ON CALL (OUTPATIENT)
Age: 69
End: 2019-12-30

## 2020-01-23 ENCOUNTER — APPOINTMENT (OUTPATIENT)
Dept: HEART AND VASCULAR | Facility: CLINIC | Age: 70
End: 2020-01-23
Payer: MEDICARE

## 2020-01-23 VITALS
BODY MASS INDEX: 28.49 KG/M2 | DIASTOLIC BLOOD PRESSURE: 90 MMHG | SYSTOLIC BLOOD PRESSURE: 120 MMHG | HEIGHT: 74 IN | WEIGHT: 222.01 LBS | HEART RATE: 111 BPM | OXYGEN SATURATION: 98 % | TEMPERATURE: 97.7 F | RESPIRATION RATE: 14 BRPM

## 2020-01-23 VITALS — SYSTOLIC BLOOD PRESSURE: 120 MMHG | DIASTOLIC BLOOD PRESSURE: 82 MMHG

## 2020-01-23 DIAGNOSIS — I10 ESSENTIAL (PRIMARY) HYPERTENSION: ICD-10-CM

## 2020-01-23 PROCEDURE — 99214 OFFICE O/P EST MOD 30 MIN: CPT

## 2020-01-23 PROCEDURE — 93000 ELECTROCARDIOGRAM COMPLETE: CPT

## 2020-01-23 PROCEDURE — 36415 COLL VENOUS BLD VENIPUNCTURE: CPT

## 2020-01-23 NOTE — HISTORY OF PRESENT ILLNESS
[FreeTextEntry1] : 69 year male who notes limitation of his knee but is going to PT 2x/week. He has pain when standing to cook or shave. He notes ankle weakness. 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 His grand-daughter has sickle cell.

## 2020-01-23 NOTE — PHYSICAL EXAM
[General Appearance - Well Developed] : well developed [Normal Appearance] : normal appearance [Well Groomed] : well groomed [No Deformities] : no deformities [General Appearance - Well Nourished] : well nourished [General Appearance - In No Acute Distress] : no acute distress [Normal Conjunctiva] : the conjunctiva exhibited no abnormalities [] : no respiratory distress [Respiration, Rhythm And Depth] : normal respiratory rhythm and effort [Auscultation Breath Sounds / Voice Sounds] : lungs were clear to auscultation bilaterally [Exaggerated Use Of Accessory Muscles For Inspiration] : no accessory muscle use [FreeTextEntry1] : walking with cane [Skin Turgor] : normal skin turgor [Oriented To Time, Place, And Person] : oriented to person, place, and time [Affect] : the affect was normal [Mood] : the mood was normal [No Anxiety] : not feeling anxious

## 2020-01-23 NOTE — DISCUSSION/SUMMARY
[FreeTextEntry1] : Paresthesia, hypertension, knee pain--We discussed Aqua-aerobics. labs drawn and sent.

## 2020-01-24 ENCOUNTER — APPOINTMENT (OUTPATIENT)
Dept: OPHTHALMOLOGY | Facility: CLINIC | Age: 70
End: 2020-01-24
Payer: MEDICARE

## 2020-01-24 ENCOUNTER — NON-APPOINTMENT (OUTPATIENT)
Age: 70
End: 2020-01-24

## 2020-01-24 PROCEDURE — 92133 CPTRZD OPH DX IMG PST SGM ON: CPT

## 2020-01-24 PROCEDURE — 92012 INTRM OPH EXAM EST PATIENT: CPT

## 2020-01-24 PROCEDURE — 92083 EXTENDED VISUAL FIELD XM: CPT

## 2020-01-27 ENCOUNTER — APPOINTMENT (OUTPATIENT)
Dept: ORTHOPEDIC SURGERY | Facility: CLINIC | Age: 70
End: 2020-01-27
Payer: MEDICARE

## 2020-01-27 DIAGNOSIS — M79.651 PAIN IN RIGHT THIGH: ICD-10-CM

## 2020-01-27 DIAGNOSIS — M54.16 RADICULOPATHY, LUMBAR REGION: ICD-10-CM

## 2020-01-27 DIAGNOSIS — G62.9 POLYNEUROPATHY, UNSPECIFIED: ICD-10-CM

## 2020-01-27 PROCEDURE — 99213 OFFICE O/P EST LOW 20 MIN: CPT

## 2020-01-27 NOTE — PHYSICAL EXAM
[de-identified] : rIGHT lower extremity\par Strain seems a little bit better. He could walk without any assistive devices but with a limp due to the weakness in the RIGHT leg.\par 4/5 hip flexor strength, knee extension strength, anterior tibialis.\par shiny skin and the RIGHT lower leg consistent with venous insufficiency. No evidence of infection.\par Sensation is grossly intact in the RIGHT lower extremity. [de-identified] : \par MRI of the thigh showed denervation of the muscles but no boneabnormalities or tears.

## 2020-01-27 NOTE — ASSESSMENT
[FreeTextEntry1] : 69-year-old with RIGHT lower extremity weakness. the thigh muscles are denervated. I would defer to the neurologist as to the cause of the denervation and nerve issues going on and what is coming from the lumbar spine or is peripheral\par He lives given a new prescription to continue with the physical therapy which does seem to be helping him although very slow and minimal.He should continue with home exercises. He'll followup with a neurologist and follow up with me as needed. he did have some mild knee arthritis.Injection didn't help very much. If the knee pain or other musculoskeletal issues become more symptomatic I would be happy to see him again.

## 2020-01-27 NOTE — HISTORY OF PRESENT ILLNESS
[de-identified] : Mr. Sanderson comes in for followup.He was last seen about 6 weeks ago. He saw the neurologist last month.\par MRI of the size showed decreased by him of the muscles compared to the LEFT superimposed on prominent intramuscular edema and atrophy consistent with acute on chronic denervation.\par He's been going to physical therapy.\par He feels it is helping some He is walking with a cane at times now and otherwise uses a walker for longer distances.No significant pain.

## 2020-03-05 LAB
ALBUMIN SERPL ELPH-MCNC: 4.5 G/DL
ALP BLD-CCNC: 56 U/L
ALT SERPL-CCNC: 10 U/L
ANION GAP SERPL CALC-SCNC: 11 MMOL/L
AST SERPL-CCNC: 15 U/L
BASOPHILS # BLD AUTO: 0.04 K/UL
BASOPHILS NFR BLD AUTO: 1.1 %
BILIRUB SERPL-MCNC: 1.1 MG/DL
BUN SERPL-MCNC: 14 MG/DL
CALCIUM SERPL-MCNC: 9.5 MG/DL
CHLORIDE SERPL-SCNC: 104 MMOL/L
CHOLEST SERPL-MCNC: 141 MG/DL
CHOLEST/HDLC SERPL: 3.3 RATIO
CO2 SERPL-SCNC: 27 MMOL/L
CREAT SERPL-MCNC: 1.23 MG/DL
EOSINOPHIL # BLD AUTO: 0.22 K/UL
EOSINOPHIL NFR BLD AUTO: 6.3 %
GLUCOSE SERPL-MCNC: 112 MG/DL
HCT VFR BLD CALC: 41.6 %
HDLC SERPL-MCNC: 43 MG/DL
HGB BLD-MCNC: 13 G/DL
IMM GRANULOCYTES NFR BLD AUTO: 0 %
LDLC SERPL CALC-MCNC: 84 MG/DL
LYMPHOCYTES # BLD AUTO: 1.06 K/UL
LYMPHOCYTES NFR BLD AUTO: 30.1 %
MAN DIFF?: NORMAL
MCHC RBC-ENTMCNC: 29.8 PG
MCHC RBC-ENTMCNC: 31.3 GM/DL
MCV RBC AUTO: 95.4 FL
MONOCYTES # BLD AUTO: 0.31 K/UL
MONOCYTES NFR BLD AUTO: 8.8 %
NEUTROPHILS # BLD AUTO: 1.89 K/UL
NEUTROPHILS NFR BLD AUTO: 53.7 %
NT-PROBNP SERPL-MCNC: 19 PG/ML
PLATELET # BLD AUTO: 121 K/UL
POTASSIUM SERPL-SCNC: 4.9 MMOL/L
PROT SERPL-MCNC: 7.3 G/DL
RBC # BLD: 4.36 M/UL
RBC # FLD: 11.6 %
SODIUM SERPL-SCNC: 142 MMOL/L
TRIGL SERPL-MCNC: 75 MG/DL
TSH SERPL-ACNC: 0.99 UIU/ML
WBC # FLD AUTO: 3.52 K/UL

## 2020-03-13 ENCOUNTER — APPOINTMENT (OUTPATIENT)
Dept: ORTHOPEDIC SURGERY | Facility: CLINIC | Age: 70
End: 2020-03-13
Payer: MEDICARE

## 2020-03-13 DIAGNOSIS — R29.898 OTHER SYMPTOMS AND SIGNS INVOLVING THE MUSCULOSKELETAL SYSTEM: ICD-10-CM

## 2020-03-13 DIAGNOSIS — G54.1 LUMBOSACRAL PLEXUS DISORDERS: ICD-10-CM

## 2020-03-13 PROCEDURE — 99213 OFFICE O/P EST LOW 20 MIN: CPT
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
## 2020-03-13 NOTE — PHYSICAL EXAM
[de-identified] : RIGHT lower extremity\par he walks with the walker with an antalgic/Trendelenburg-type gait.\par There is obvious atrophy and weakness in the RIGHT thigh.\par 3+/5 hip flexor strength, knee extension strength, anterior tibialis.\par shiny skin and the RIGHT lower leg consistent with venous insufficiency. No evidence of infection.\par Sensation is grossly intact in the RIGHT lower extremity.

## 2020-03-13 NOTE — ASSESSMENT
[FreeTextEntry1] : 69-year-old with lumbar radiculopathy and weakness in the RIGHT lower extremity. His leg seems to be getting weaker clinically. He is walking with a walker. His knee is buckling. I recommended a knee immobilizer to help keep the knee straight. He will need to walk with a stiff gait but it may help prevent the leg from buckling under him. He can use it just for walking as needed. He is continuing with physical therapy. I will see him back in about one to 2 weeks to check and see if it's helping. He's been having some rIGHT thigh pain. I may get x-rays of the RIGHT hip to see if there is any arthritis that could explain the pain or this may just be muscular/neuropathic. He is seeing neurology next week for evaluation and will talk to them about the worsening symptoms as well.

## 2020-03-16 ENCOUNTER — APPOINTMENT (OUTPATIENT)
Dept: HEART AND VASCULAR | Facility: CLINIC | Age: 70
End: 2020-03-16

## 2020-03-20 ENCOUNTER — APPOINTMENT (OUTPATIENT)
Dept: NEUROLOGY | Facility: CLINIC | Age: 70
End: 2020-03-20

## 2020-03-27 NOTE — ED ADULT NURSE NOTE - NSFALLRSKOUTCOME_ED_ALL_ED
Universal Safety Interventions [Patient Intake Form Reviewed] : Patient intake form was reviewed [Joint Pain] : joint pain [Muscle Weakness] : muscle weakness [Unsteady Walk] : ataxia [Negative] : Heme/Lymph [Back Pain] : no back pain [Joint Swelling] : no joint swelling [Headache] : no headache [Fainting] : no fainting [Confusion] : no confusion [Memory Loss] : no memory loss

## 2020-04-30 ENCOUNTER — NON-APPOINTMENT (OUTPATIENT)
Age: 70
End: 2020-04-30

## 2020-04-30 ENCOUNTER — APPOINTMENT (OUTPATIENT)
Age: 70
End: 2020-04-30
Payer: MEDICARE

## 2020-04-30 PROCEDURE — 99442: CPT | Mod: CR

## 2020-05-06 ENCOUNTER — INPATIENT (INPATIENT)
Facility: HOSPITAL | Age: 70
LOS: 13 days | DRG: 870 | End: 2020-05-20
Attending: INTERNAL MEDICINE | Admitting: INTERNAL MEDICINE
Payer: MEDICARE

## 2020-05-06 VITALS
DIASTOLIC BLOOD PRESSURE: 71 MMHG | HEART RATE: 103 BPM | TEMPERATURE: 102 F | WEIGHT: 210.1 LBS | SYSTOLIC BLOOD PRESSURE: 103 MMHG | RESPIRATION RATE: 19 BRPM | OXYGEN SATURATION: 93 %

## 2020-05-06 DIAGNOSIS — Z90.89 ACQUIRED ABSENCE OF OTHER ORGANS: Chronic | ICD-10-CM

## 2020-05-06 DIAGNOSIS — Z98.890 OTHER SPECIFIED POSTPROCEDURAL STATES: Chronic | ICD-10-CM

## 2020-05-06 DIAGNOSIS — M51.34 OTHER INTERVERTEBRAL DISC DEGENERATION, THORACIC REGION: Chronic | ICD-10-CM

## 2020-05-06 DIAGNOSIS — Z90.49 ACQUIRED ABSENCE OF OTHER SPECIFIED PARTS OF DIGESTIVE TRACT: Chronic | ICD-10-CM

## 2020-05-06 LAB
ALBUMIN SERPL ELPH-MCNC: 3.5 G/DL — SIGNIFICANT CHANGE UP (ref 3.3–5)
ALBUMIN SERPL ELPH-MCNC: 4.7 G/DL — SIGNIFICANT CHANGE UP (ref 3.3–5)
ALP SERPL-CCNC: 35 U/L — LOW (ref 40–120)
ALP SERPL-CCNC: 45 U/L — SIGNIFICANT CHANGE UP (ref 40–120)
ALT FLD-CCNC: 40 U/L — SIGNIFICANT CHANGE UP (ref 10–45)
ALT FLD-CCNC: 54 U/L — HIGH (ref 10–45)
ANION GAP SERPL CALC-SCNC: 14 MMOL/L — SIGNIFICANT CHANGE UP (ref 5–17)
ANION GAP SERPL CALC-SCNC: 17 MMOL/L — SIGNIFICANT CHANGE UP (ref 5–17)
APPEARANCE UR: CLEAR — SIGNIFICANT CHANGE UP
APTT BLD: 31 SEC — SIGNIFICANT CHANGE UP (ref 27.5–36.3)
AST SERPL-CCNC: 102 U/L — HIGH (ref 10–40)
AST SERPL-CCNC: 79 U/L — HIGH (ref 10–40)
BASE EXCESS BLDV CALC-SCNC: -0.5 MMOL/L — SIGNIFICANT CHANGE UP
BASOPHILS # BLD AUTO: 0.02 K/UL — SIGNIFICANT CHANGE UP (ref 0–0.2)
BASOPHILS NFR BLD AUTO: 0.5 % — SIGNIFICANT CHANGE UP (ref 0–2)
BILIRUB SERPL-MCNC: 2.1 MG/DL — HIGH (ref 0.2–1.2)
BILIRUB SERPL-MCNC: 2.7 MG/DL — HIGH (ref 0.2–1.2)
BILIRUB UR-MCNC: NEGATIVE — SIGNIFICANT CHANGE UP
BUN SERPL-MCNC: 45 MG/DL — HIGH (ref 7–23)
BUN SERPL-MCNC: 52 MG/DL — HIGH (ref 7–23)
CALCIUM SERPL-MCNC: 8.6 MG/DL — SIGNIFICANT CHANGE UP (ref 8.4–10.5)
CALCIUM SERPL-MCNC: 9.6 MG/DL — SIGNIFICANT CHANGE UP (ref 8.4–10.5)
CHLORIDE SERPL-SCNC: 103 MMOL/L — SIGNIFICANT CHANGE UP (ref 96–108)
CHLORIDE SERPL-SCNC: 110 MMOL/L — HIGH (ref 96–108)
CO2 SERPL-SCNC: 19 MMOL/L — LOW (ref 22–31)
CO2 SERPL-SCNC: 24 MMOL/L — SIGNIFICANT CHANGE UP (ref 22–31)
COLOR SPEC: YELLOW — SIGNIFICANT CHANGE UP
CREAT SERPL-MCNC: 1.68 MG/DL — HIGH (ref 0.5–1.3)
CREAT SERPL-MCNC: 1.88 MG/DL — HIGH (ref 0.5–1.3)
CRP SERPL-MCNC: 3.33 MG/DL — HIGH (ref 0–0.4)
DIFF PNL FLD: ABNORMAL
EOSINOPHIL # BLD AUTO: 0 K/UL — SIGNIFICANT CHANGE UP (ref 0–0.5)
EOSINOPHIL NFR BLD AUTO: 0 % — SIGNIFICANT CHANGE UP (ref 0–6)
FERRITIN SERPL-MCNC: 2537 NG/ML — HIGH (ref 30–400)
GLUCOSE SERPL-MCNC: 109 MG/DL — HIGH (ref 70–99)
GLUCOSE SERPL-MCNC: 126 MG/DL — HIGH (ref 70–99)
GLUCOSE UR QL: NEGATIVE — SIGNIFICANT CHANGE UP
HCO3 BLDV-SCNC: 26 MMOL/L — SIGNIFICANT CHANGE UP (ref 20–27)
HCT VFR BLD CALC: 46 % — SIGNIFICANT CHANGE UP (ref 39–50)
HGB BLD-MCNC: 15.3 G/DL — SIGNIFICANT CHANGE UP (ref 13–17)
IMM GRANULOCYTES NFR BLD AUTO: 0.2 % — SIGNIFICANT CHANGE UP (ref 0–1.5)
INR BLD: 2.57 — HIGH (ref 0.88–1.16)
KETONES UR-MCNC: NEGATIVE — SIGNIFICANT CHANGE UP
LACTATE SERPL-SCNC: 1.8 MMOL/L — SIGNIFICANT CHANGE UP (ref 0.5–2)
LACTATE SERPL-SCNC: 2.7 MMOL/L — HIGH (ref 0.5–2)
LEUKOCYTE ESTERASE UR-ACNC: NEGATIVE — SIGNIFICANT CHANGE UP
LYMPHOCYTES # BLD AUTO: 0.78 K/UL — LOW (ref 1–3.3)
LYMPHOCYTES # BLD AUTO: 19.1 % — SIGNIFICANT CHANGE UP (ref 13–44)
MCHC RBC-ENTMCNC: 30.2 PG — SIGNIFICANT CHANGE UP (ref 27–34)
MCHC RBC-ENTMCNC: 33.3 GM/DL — SIGNIFICANT CHANGE UP (ref 32–36)
MCV RBC AUTO: 90.7 FL — SIGNIFICANT CHANGE UP (ref 80–100)
MONOCYTES # BLD AUTO: 0.4 K/UL — SIGNIFICANT CHANGE UP (ref 0–0.9)
MONOCYTES NFR BLD AUTO: 9.8 % — SIGNIFICANT CHANGE UP (ref 2–14)
NEUTROPHILS # BLD AUTO: 2.87 K/UL — SIGNIFICANT CHANGE UP (ref 1.8–7.4)
NEUTROPHILS NFR BLD AUTO: 70.4 % — SIGNIFICANT CHANGE UP (ref 43–77)
NITRITE UR-MCNC: NEGATIVE — SIGNIFICANT CHANGE UP
NRBC # BLD: 0 /100 WBCS — SIGNIFICANT CHANGE UP (ref 0–0)
PCO2 BLDV: 47 MMHG — SIGNIFICANT CHANGE UP (ref 41–51)
PH BLDV: 7.35 — SIGNIFICANT CHANGE UP (ref 7.32–7.43)
PH UR: 6 — SIGNIFICANT CHANGE UP (ref 5–8)
PLATELET # BLD AUTO: 84 K/UL — LOW (ref 150–400)
PO2 BLDV: 18 MMHG — SIGNIFICANT CHANGE UP
POTASSIUM SERPL-MCNC: 4.6 MMOL/L — SIGNIFICANT CHANGE UP (ref 3.5–5.3)
POTASSIUM SERPL-MCNC: 4.9 MMOL/L — SIGNIFICANT CHANGE UP (ref 3.5–5.3)
POTASSIUM SERPL-SCNC: 4.6 MMOL/L — SIGNIFICANT CHANGE UP (ref 3.5–5.3)
POTASSIUM SERPL-SCNC: 4.9 MMOL/L — SIGNIFICANT CHANGE UP (ref 3.5–5.3)
PROCALCITONIN SERPL-MCNC: 0.28 NG/ML — HIGH (ref 0.02–0.1)
PROT SERPL-MCNC: 7.1 G/DL — SIGNIFICANT CHANGE UP (ref 6–8.3)
PROT SERPL-MCNC: 9.4 G/DL — HIGH (ref 6–8.3)
PROT UR-MCNC: 30 MG/DL
PROTHROM AB SERPL-ACNC: 30.2 SEC — HIGH (ref 10–12.9)
RBC # BLD: 5.07 M/UL — SIGNIFICANT CHANGE UP (ref 4.2–5.8)
RBC # FLD: 11.6 % — SIGNIFICANT CHANGE UP (ref 10.3–14.5)
SAO2 % BLDV: 24 % — SIGNIFICANT CHANGE UP
SARS-COV-2 RNA SPEC QL NAA+PROBE: DETECTED
SODIUM SERPL-SCNC: 143 MMOL/L — SIGNIFICANT CHANGE UP (ref 135–145)
SODIUM SERPL-SCNC: 144 MMOL/L — SIGNIFICANT CHANGE UP (ref 135–145)
SP GR SPEC: 1.02 — SIGNIFICANT CHANGE UP (ref 1–1.03)
URATE SERPL-MCNC: 10.1 MG/DL — HIGH (ref 3.4–8.8)
UROBILINOGEN FLD QL: 2 E.U./DL
WBC # BLD: 4.08 K/UL — SIGNIFICANT CHANGE UP (ref 3.8–10.5)
WBC # FLD AUTO: 4.08 K/UL — SIGNIFICANT CHANGE UP (ref 3.8–10.5)

## 2020-05-06 PROCEDURE — 99231 SBSQ HOSP IP/OBS SF/LOW 25: CPT

## 2020-05-06 PROCEDURE — 72146 MRI CHEST SPINE W/O DYE: CPT | Mod: 26

## 2020-05-06 PROCEDURE — 99223 1ST HOSP IP/OBS HIGH 75: CPT | Mod: GC

## 2020-05-06 PROCEDURE — 93010 ELECTROCARDIOGRAM REPORT: CPT

## 2020-05-06 PROCEDURE — 74176 CT ABD & PELVIS W/O CONTRAST: CPT | Mod: 26

## 2020-05-06 PROCEDURE — 99282 EMERGENCY DEPT VISIT SF MDM: CPT

## 2020-05-06 PROCEDURE — 76705 ECHO EXAM OF ABDOMEN: CPT | Mod: 26

## 2020-05-06 PROCEDURE — 71045 X-RAY EXAM CHEST 1 VIEW: CPT | Mod: 26

## 2020-05-06 PROCEDURE — 99291 CRITICAL CARE FIRST HOUR: CPT | Mod: CS

## 2020-05-06 PROCEDURE — 72148 MRI LUMBAR SPINE W/O DYE: CPT | Mod: 26

## 2020-05-06 RX ORDER — DORZOLAMIDE HYDROCHLORIDE TIMOLOL MALEATE 20; 5 MG/ML; MG/ML
1 SOLUTION/ DROPS OPHTHALMIC EVERY 12 HOURS
Refills: 0 | Status: DISCONTINUED | OUTPATIENT
Start: 2020-05-06 | End: 2020-05-20

## 2020-05-06 RX ORDER — ACETAMINOPHEN 500 MG
1000 TABLET ORAL ONCE
Refills: 0 | Status: COMPLETED | OUTPATIENT
Start: 2020-05-06 | End: 2020-05-06

## 2020-05-06 RX ORDER — OXYCODONE HYDROCHLORIDE 5 MG/1
5 TABLET ORAL EVERY 4 HOURS
Refills: 0 | Status: DISCONTINUED | OUTPATIENT
Start: 2020-05-06 | End: 2020-05-07

## 2020-05-06 RX ORDER — GABAPENTIN 400 MG/1
400 CAPSULE ORAL ONCE
Refills: 0 | Status: COMPLETED | OUTPATIENT
Start: 2020-05-06 | End: 2020-05-06

## 2020-05-06 RX ORDER — APIXABAN 2.5 MG/1
5 TABLET, FILM COATED ORAL EVERY 12 HOURS
Refills: 0 | Status: DISCONTINUED | OUTPATIENT
Start: 2020-05-07 | End: 2020-05-16

## 2020-05-06 RX ORDER — LATANOPROST 0.05 MG/ML
1 SOLUTION/ DROPS OPHTHALMIC; TOPICAL AT BEDTIME
Refills: 0 | Status: DISCONTINUED | OUTPATIENT
Start: 2020-05-06 | End: 2020-05-20

## 2020-05-06 RX ORDER — SODIUM CHLORIDE 9 MG/ML
500 INJECTION INTRAMUSCULAR; INTRAVENOUS; SUBCUTANEOUS ONCE
Refills: 0 | Status: COMPLETED | OUTPATIENT
Start: 2020-05-06 | End: 2020-05-06

## 2020-05-06 RX ORDER — ACETAMINOPHEN 500 MG
650 TABLET ORAL ONCE
Refills: 0 | Status: COMPLETED | OUTPATIENT
Start: 2020-05-06 | End: 2020-05-06

## 2020-05-06 RX ORDER — LIDOCAINE 4 G/100G
2 CREAM TOPICAL EVERY 24 HOURS
Refills: 0 | Status: DISCONTINUED | OUTPATIENT
Start: 2020-05-06 | End: 2020-05-20

## 2020-05-06 RX ORDER — SODIUM CHLORIDE 9 MG/ML
1000 INJECTION INTRAMUSCULAR; INTRAVENOUS; SUBCUTANEOUS ONCE
Refills: 0 | Status: COMPLETED | OUTPATIENT
Start: 2020-05-06 | End: 2020-05-06

## 2020-05-06 RX ORDER — FOLIC ACID 0.8 MG
1 TABLET ORAL DAILY
Refills: 0 | Status: DISCONTINUED | OUTPATIENT
Start: 2020-05-06 | End: 2020-05-20

## 2020-05-06 RX ORDER — GABAPENTIN 400 MG/1
400 CAPSULE ORAL THREE TIMES A DAY
Refills: 0 | Status: DISCONTINUED | OUTPATIENT
Start: 2020-05-06 | End: 2020-05-17

## 2020-05-06 RX ORDER — ASPIRIN/CALCIUM CARB/MAGNESIUM 324 MG
81 TABLET ORAL EVERY 24 HOURS
Refills: 0 | Status: DISCONTINUED | OUTPATIENT
Start: 2020-05-06 | End: 2020-05-20

## 2020-05-06 RX ADMIN — Medication 81 MILLIGRAM(S): at 22:34

## 2020-05-06 RX ADMIN — SODIUM CHLORIDE 1000 MILLILITER(S): 9 INJECTION INTRAMUSCULAR; INTRAVENOUS; SUBCUTANEOUS at 13:30

## 2020-05-06 RX ADMIN — LIDOCAINE 2 PATCH: 4 CREAM TOPICAL at 22:39

## 2020-05-06 RX ADMIN — SODIUM CHLORIDE 1000 MILLILITER(S): 9 INJECTION INTRAMUSCULAR; INTRAVENOUS; SUBCUTANEOUS at 16:07

## 2020-05-06 RX ADMIN — Medication 1 MILLIGRAM(S): at 22:40

## 2020-05-06 RX ADMIN — LATANOPROST 1 DROP(S): 0.05 SOLUTION/ DROPS OPHTHALMIC; TOPICAL at 23:08

## 2020-05-06 RX ADMIN — Medication 1 MILLIGRAM(S): at 17:55

## 2020-05-06 RX ADMIN — GABAPENTIN 400 MILLIGRAM(S): 400 CAPSULE ORAL at 22:45

## 2020-05-06 RX ADMIN — SODIUM CHLORIDE 500 MILLILITER(S): 9 INJECTION INTRAMUSCULAR; INTRAVENOUS; SUBCUTANEOUS at 16:26

## 2020-05-06 RX ADMIN — SODIUM CHLORIDE 500 MILLILITER(S): 9 INJECTION INTRAMUSCULAR; INTRAVENOUS; SUBCUTANEOUS at 17:55

## 2020-05-06 RX ADMIN — OXYCODONE HYDROCHLORIDE 5 MILLIGRAM(S): 5 TABLET ORAL at 22:34

## 2020-05-06 RX ADMIN — Medication 650 MILLIGRAM(S): at 20:03

## 2020-05-06 RX ADMIN — GABAPENTIN 400 MILLIGRAM(S): 400 CAPSULE ORAL at 20:03

## 2020-05-06 RX ADMIN — APIXABAN 5 MILLIGRAM(S): 2.5 TABLET, FILM COATED ORAL at 22:44

## 2020-05-06 RX ADMIN — DORZOLAMIDE HYDROCHLORIDE TIMOLOL MALEATE 1 DROP(S): 20; 5 SOLUTION/ DROPS OPHTHALMIC at 23:08

## 2020-05-06 RX ADMIN — Medication 1000 MILLIGRAM(S): at 12:49

## 2020-05-06 NOTE — CONSULT NOTE ADULT - SUBJECTIVE AND OBJECTIVE BOX
HISTORY OF PRESENT ILLNESS:   69 year old male with history of lumbar decompression (2019 out of HSS), s/p sural nerve biopsy on 19 for ongoing lower extremity weakness and atrophy and had a subsequent infection of the the sural wound. Today pt presents to ED with concern for low back pain status post mechanical slip and fall while ambulating with walker 3 days ago.  Patient notes he experienced pain to low back immediately, however did not want to come to ED as he was concerned given current COVID-19 pandemic.  Pt c/o b/l LE pain and notes decreased sensation to RLE. Pt c/o occasional urinary incontinence, but more because he cant make it to the bathroom in time.  He also admits to groin numbness and sensation of tingling throughout RLE.  Of note, pt has RLE weakness and muscle wasting is noted.  Patient denies fever (though noted to be febrile on arrival to ED), chills, chest pain, headache, hit to head, neck or back pain, shortness of breath, abdominal pain, nausea, emesis, changes to bowel movements, rashes, peripheral edema or any additional acute complaints or concerns at this time.  Patient was evaluated by his neurologist, Dr. Brito, today and instructed to come to ED for further evaluation.    PAST MEDICAL & SURGICAL HISTORY:  Mitral valve insufficiency  Cardiomegaly  Glaucoma  Neuropathy  DVT (deep venous thrombosis): left leg, 2018  Pulmonary embolism  HTN (hypertension)  History of appendectomy  History of tonsillectomy  History of lumbar laminectomy: 2019  Degeneration of intervertebral disc of thoracic region:   History of foot surgery: right foot  H/O cervical spine surgery: with hardware    FAMILY HISTORY:  No pertinent family history in first degree relatives      SOCIAL HISTORY:  Tobacco Use:  EtOH use:   Substance:    Allergies    No Known Allergies    Intolerances        REVIEW OF SYSTEMS      General:	no recent illnesses, no recent wt gain/loss    Skin/Breast:  intact  	  Ophthalmologic:  negative, glasses for ***  	  ENMT:	negative    Respiratory and Thorax: no coughing, wheezing, recent URI  	  Cardiovascular: no chest pain, GRAY    Gastrointestinal:	soft, non tender    Genitourinary: no frequency, dysuria    Musculoskeletal:	negative    Neurological:	see HPI    Psychiatric:	negative    Hematology/Lymphatics:	negative    Endocrine:  	negative    Allergic/Immunologic:  Negative      MEDICATIONS:  Antibiotics:    Neuro:    Anticoagulation:    OTHER:    IVF:      Vital Signs Last 24 Hrs  T(C): 36.6 (06 May 2020 14:18), Max: 38.8 (06 May 2020 11:57)  T(F): 97.8 (06 May 2020 14:18), Max: 101.9 (06 May 2020 11:57)  HR: 103 (06 May 2020 16:45) (103 - 121)  BP: 110/67 (06 May 2020 16:45) (103/71 - 113/67)  BP(mean): --  RR: 18 (06 May 2020 16:45) (18 - 22)  SpO2: 97% (06 May 2020 16:45) (93% - 100%)    PHYSICAL EXAM:  Awake and alert  5/5 b/l UE  able to lift b/l LE off bed but ROM limited secondary to pain  b/l LE spastic  ? sensory level T 10-11        LABS:                        15.3   4.08  )-----------( 84       ( 06 May 2020 12:45 )             46.0     05-06    143  |  110<H>  |  45<H>  ----------------------------<  109<H>  4.6   |  19<L>  |  1.68<H>    Ca    8.6      06 May 2020 15:11    TPro  7.1  /  Alb  3.5  /  TBili  2.1<H>  /  DBili  x   /  AST  79<H>  /  ALT  40  /  AlkPhos  35<L>  05-06    PT/INR - ( 06 May 2020 12:45 )   PT: 30.2 sec;   INR: 2.57          PTT - ( 06 May 2020 12:45 )  PTT:31.0 sec  Urinalysis Basic - ( 06 May 2020 14:17 )    Color: Yellow / Appearance: Clear / S.020 / pH: x  Gluc: x / Ketone: NEGATIVE  / Bili: Negative / Urobili: 2.0 E.U./dL   Blood: x / Protein: 30 mg/dL / Nitrite: NEGATIVE   Leuk Esterase: NEGATIVE / RBC: 5-10 /HPF / WBC < 5 /HPF   Sq Epi: x / Non Sq Epi: 0-5 /HPF / Bacteria: Present /HPF      CULTURES:      RADIOLOGY & ADDITIONAL STUDIES:  pending    Assessment:  70 yo male s/p fall c/o legs giving out and is Covid +    Plan:  -obtain MRI T/L spine w/o  -will follow once MRI is done  -D/W  HISTORY OF PRESENT ILLNESS:   69 year old male with history of lumbar decompression (2019 out of HSS), s/p sural nerve biopsy on 19 for ongoing lower extremity weakness and atrophy and had a subsequent infection of the the sural wound. Today pt presents to ED with concern for low back pain status post mechanical slip and fall while ambulating with walker 3 days ago.  Patient notes he experienced pain to low back immediately, however did not want to come to ED as he was concerned given current COVID-19 pandemic.  Pt c/o b/l LE pain and notes decreased sensation to RLE. Pt c/o occasional urinary incontinence, but more because he cant make it to the bathroom in time.  He also admits to groin numbness and sensation of tingling throughout RLE.  Of note, pt has RLE weakness and muscle wasting is noted.  Patient denies fever (though noted to be febrile on arrival to ED), chills, chest pain, headache, hit to head, neck or back pain, shortness of breath, abdominal pain, nausea, emesis, changes to bowel movements, rashes, peripheral edema or any additional acute complaints or concerns at this time.  Patient was evaluated by his neurologist, Dr. Brito, today and instructed to come to ED for further evaluation.    PAST MEDICAL & SURGICAL HISTORY:  Mitral valve insufficiency  Cardiomegaly  Glaucoma  Neuropathy  DVT (deep venous thrombosis): left leg, 2018  Pulmonary embolism  HTN (hypertension)  History of appendectomy  History of tonsillectomy  History of lumbar laminectomy: 2019  Degeneration of intervertebral disc of thoracic region:   History of foot surgery: right foot  H/O cervical spine surgery: with hardware    FAMILY HISTORY:  No pertinent family history in first degree relatives      SOCIAL HISTORY:  Tobacco Use:  EtOH use:   Substance:    Allergies    No Known Allergies    Intolerances        REVIEW OF SYSTEMS      General:	no recent illnesses, no recent wt gain/loss    Skin/Breast:  intact  	  Ophthalmologic:  negative, glasses for ***  	  ENMT:	negative    Respiratory and Thorax: no coughing, wheezing, recent URI  	  Cardiovascular: no chest pain, GRAY    Gastrointestinal:	soft, non tender    Genitourinary: no frequency, dysuria    Musculoskeletal:	negative    Neurological:	see HPI    Psychiatric:	negative    Hematology/Lymphatics:	negative    Endocrine:  	negative    Allergic/Immunologic:  Negative      MEDICATIONS:  Antibiotics:    Neuro:    Anticoagulation:    OTHER:    IVF:      Vital Signs Last 24 Hrs  T(C): 36.6 (06 May 2020 14:18), Max: 38.8 (06 May 2020 11:57)  T(F): 97.8 (06 May 2020 14:18), Max: 101.9 (06 May 2020 11:57)  HR: 103 (06 May 2020 16:45) (103 - 121)  BP: 110/67 (06 May 2020 16:45) (103/71 - 113/67)  BP(mean): --  RR: 18 (06 May 2020 16:45) (18 - 22)  SpO2: 97% (06 May 2020 16:45) (93% - 100%)    PHYSICAL EXAM:  Awake and alert  5/5 b/l UE  able to lift b/l LE off bed but ROM limited secondary to pain  b/l LE spastic  ? sensory level T 10-11          < from: MR Thoracic Spine No Cont (20 @ 18:22) >  1.  No abnormal cord signal to suggest acute cord compression.  2.  Posterior disc bulge at the C7/T1 level contributing to moderate central canal stenosis at this level.    < end of copied text >  < from: MR Lumbar Spine No Cont (20 @ 18:47) >  At the L1/2 level, there is a diffuse disc bulge. There is severe right and moderate left neural foraminal narrowing. No spinal canal stenosis.    At the L2/3 level, there is a diffuse disc bulge. There is severe right and moderate left neural foraminal narrowing. No spinal canal stenosis.    At the L3/4 level, there is a diffuse disc bulge. There is moderate to severe bilateral neural foraminal narrowing. No central canal stenosis.    At the L4/5 level, there is a mild diffuse disc bulge. There is mild bilateral neural foraminal narrowing. No spinal canal stenosis.    At the L5/S1 level, there is no disc herniation. There is no spinal canal stenosis or neural foramen narrowing.    < end of copied text >      LABS:                        15.3   4.08  )-----------( 84       ( 06 May 2020 12:45 )             46.0     05-06    143  |  110<H>  |  45<H>  ----------------------------<  109<H>  4.6   |  19<L>  |  1.68<H>    Ca    8.6      06 May 2020 15:11    TPro  7.1  /  Alb  3.5  /  TBili  2.1<H>  /  DBili  x   /  AST  79<H>  /  ALT  40  /  AlkPhos  35<L>  05-06    PT/INR - ( 06 May 2020 12:45 )   PT: 30.2 sec;   INR: 2.57          PTT - ( 06 May 2020 12:45 )  PTT:31.0 sec  Urinalysis Basic - ( 06 May 2020 14:17 )    Color: Yellow / Appearance: Clear / S.020 / pH: x  Gluc: x / Ketone: NEGATIVE  / Bili: Negative / Urobili: 2.0 E.U./dL   Blood: x / Protein: 30 mg/dL / Nitrite: NEGATIVE   Leuk Esterase: NEGATIVE / RBC: 5-10 /HPF / WBC < 5 /HPF   Sq Epi: x / Non Sq Epi: 0-5 /HPF / Bacteria: Present /HPF      CULTURES:      RADIOLOGY & ADDITIONAL STUDIES:  pending    Assessment:  68 yo male s/p fall c/o legs giving out and is Covid +    Plan:  -MRI T/L spine w/o : were reviewed with Dr. Greene, no acute Neurosurgical Intervention is indicated at this time  - consider pain management consult  -follow as outpatient [phone] Dr Xiong  -D/W  HISTORY OF PRESENT ILLNESS:   69 year old male with history of lumbar decompression (2019 out of HSS), s/p sural nerve biopsy on 19 for ongoing lower extremity weakness and atrophy and had a subsequent infection of the the sural wound. Today pt presents to ED with concern for low back pain status post mechanical slip and fall while ambulating with walker 3 days ago.  Patient notes he experienced pain to low back immediately, however did not want to come to ED as he was concerned given current COVID-19 pandemic.  Pt c/o b/l LE pain and notes decreased sensation to RLE. Pt c/o occasional urinary incontinence, but more because he cant make it to the bathroom in time.  He also admits to groin numbness and sensation of tingling throughout RLE.  Of note, pt has RLE weakness and muscle wasting is noted.  Patient denies fever (though noted to be febrile on arrival to ED), chills, chest pain, headache, hit to head, neck or back pain, shortness of breath, abdominal pain, nausea, emesis, changes to bowel movements, rashes, peripheral edema or any additional acute complaints or concerns at this time.  Patient was evaluated by his neurologist, Dr. Brito, today and instructed to come to ED for further evaluation.    PAST MEDICAL & SURGICAL HISTORY:  Mitral valve insufficiency  Cardiomegaly     Glaucoma  Neuropathy  DVT (deep venous thrombosis): left leg, 2018  Pulmonary embolism  HTN (hypertension)  History of appendectomy  History of tonsillectomy  History of lumbar laminectomy: 2019  Degeneration of intervertebral disc of thoracic region:   History of foot surgery: right foot  H/O cervical spine surgery: with hardware    FAMILY HISTORY:  No pertinent family history in first degree relatives      SOCIAL HISTORY:  Tobacco Use:  EtOH use:   Substance:    Allergies    No Known Allergies    Intolerances        REVIEW OF SYSTEMS      General:	no recent illnesses, no recent wt gain/loss    Skin/Breast:  intact  	  Ophthalmologic:  negative, glasses for ***  	  ENMT:	negative    Respiratory and Thorax: no coughing, wheezing, recent URI  	  Cardiovascular: no chest pain, GRAY    Gastrointestinal:	soft, non tender    Genitourinary: no frequency, dysuria    Musculoskeletal:	negative    Neurological:	see HPI    Psychiatric:	negative    Hematology/Lymphatics:	negative    Endocrine:  	negative    Allergic/Immunologic:  Negative      MEDICATIONS:  Antibiotics:    Neuro:    Anticoagulation:    OTHER:    IVF:      Vital Signs Last 24 Hrs  T(C): 36.6 (06 May 2020 14:18), Max: 38.8 (06 May 2020 11:57)  T(F): 97.8 (06 May 2020 14:18), Max: 101.9 (06 May 2020 11:57)  HR: 103 (06 May 2020 16:45) (103 - 121)  BP: 110/67 (06 May 2020 16:45) (103/71 - 113/67)  BP(mean): --  RR: 18 (06 May 2020 16:45) (18 - 22)  SpO2: 97% (06 May 2020 16:45) (93% - 100%)    PHYSICAL EXAM:  Awake and alert  5/5 b/l UE  able to lift b/l LE off bed but ROM limited secondary to pain  b/l LE spastic  ? sensory level T 10-11          < from: MR Thoracic Spine No Cont (20 @ 18:22) >  1.  No abnormal cord signal to suggest acute cord compression.  2.  Posterior disc bulge at the C7/T1 level contributing to moderate central canal stenosis at this level.    < end of copied text >  < from: MR Lumbar Spine No Cont (20 @ 18:47) >  At the L1/2 level, there is a diffuse disc bulge. There is severe right and moderate left neural foraminal narrowing. No spinal canal stenosis.    At the L2/3 level, there is a diffuse disc bulge. There is severe right and moderate left neural foraminal narrowing. No spinal canal stenosis.    At the L3/4 level, there is a diffuse disc bulge. There is moderate to severe bilateral neural foraminal narrowing. No central canal stenosis.    At the L4/5 level, there is a mild diffuse disc bulge. There is mild bilateral neural foraminal narrowing. No spinal canal stenosis.    At the L5/S1 level, there is no disc herniation. There is no spinal canal stenosis or neural foramen narrowing.    < end of copied text >      LABS:                        15.3   4.08  )-----------( 84       ( 06 May 2020 12:45 )             46.0     05-06    143  |  110<H>  |  45<H>  ----------------------------<  109<H>  4.6   |  19<L>  |  1.68<H>    Ca    8.6      06 May 2020 15:11    TPro  7.1  /  Alb  3.5  /  TBili  2.1<H>  /  DBili  x   /  AST  79<H>  /  ALT  40  /  AlkPhos  35<L>  05-06    PT/INR - ( 06 May 2020 12:45 )   PT: 30.2 sec;   INR: 2.57          PTT - ( 06 May 2020 12:45 )  PTT:31.0 sec  Urinalysis Basic - ( 06 May 2020 14:17 )    Color: Yellow / Appearance: Clear / S.020 / pH: x  Gluc: x / Ketone: NEGATIVE  / Bili: Negative / Urobili: 2.0 E.U./dL   Blood: x / Protein: 30 mg/dL / Nitrite: NEGATIVE   Leuk Esterase: NEGATIVE / RBC: 5-10 /HPF / WBC < 5 /HPF   Sq Epi: x / Non Sq Epi: 0-5 /HPF / Bacteria: Present /HPF      CULTURES:      RADIOLOGY & ADDITIONAL STUDIES:  pending    Assessment:  68 yo male s/p fall c/o legs giving out and is Covid +    Plan:  -MRI T/L spine w/o : were reviewed with Dr. Greene, no acute Neurosurgical Intervention is indicated at this time  - consider pain management consult  -follow as outpatient [phone] Dr Xiong  -D/W

## 2020-05-06 NOTE — H&P ADULT - PROBLEM SELECTOR PLAN 2
Pt with chronic radiculopathy and low back pain due to disc herniation s/p lumbar decompression in 1/2019. s/p sural nerve biopsy on 7/25 due to muscle weakness, presented with worsening low back pain after fall few days before presentation as well as worsening weakness in the LEs mainly in the right side and few weeks of urinary incontinence. exam shows weakness of the LEs.   MRI spine showed Multilevel disc herniations with multilevel neural foraminal narrowing but no signs of acute cord compression.     Likely worsening of the chronic radiculopathy due to worsening of spinal stenosis due to disc herniations.     - Continue his home dose of oxycodone 5 q4 PRN  - Lidocaine patch 2 patches every day  - c/w home Gabapentin 400 tid  - Appreciate neurosurgery recs; they cleared the pt for emergent surgical intervention  - Will contact Dr. Brito his neurologist in AM for pain management   - PT/OT evaluation in AM

## 2020-05-06 NOTE — H&P ADULT - PROBLEM SELECTOR PLAN 3
Pt with sore throat and runny nose and mild diarrhea for the past few days but no SOB or cough, was found to be positive for COVID. Had fever and tachycardia in ED but no O2 desaturation. currently sating 98% on RA. CXR clear.     COVID positive, Ferritin 2537, CRP 3.33    - Pt mildly symptomatic and does not require O2 supplement; will not start COVID treatment at this time.   - Monitor O2 saturation and symptoms Pt with sore throat and runny nose and mild diarrhea for the past few days but no SOB or cough, was found to be positive for COVID. Had fever and tachycardia in ED but no O2 desaturation. currently sating 98% on RA. CXR clear.     COVID positive, Ferritin 2537, CRP 3.33    - Pt mildly symptomatic and does not require O2 supplement; will not start COVID treatment at this time.   - Monitor O2 saturation and symptoms  - Trending inflammatory markers daily  - tylenol for fever      #transaminitis and elevated bili:  Pt with bili 2.7 and elevated AST and ALT but normal Alk-p.  can be due to COVID infection or due to gallbladder stones given the US findings. Improved with fluid therapy    - monitor LFT  - will need outpt surgery follow up for cholelithiasis

## 2020-05-06 NOTE — H&P ADULT - PROBLEM SELECTOR PLAN 7
Pt with hx of HTN on lisinopril 10 at home  Currently with symptoms of sepsis, dehydration, SETH     - Will hold lisinopril for now   - Will resume when more stable and has high BP

## 2020-05-06 NOTE — H&P ADULT - PROBLEM SELECTOR PLAN 10
Fluids: s/p __ NS, 500 bolus  Electrolytes-replete as needed  Nutrition - DASH/TLC  Code- Full Code  DVT ppx: Eliquis   GI ppx: none needed  DIspo: [facility]

## 2020-05-06 NOTE — ED PROVIDER NOTE - PHYSICAL EXAMINATION
VITAL SIGNS: I have reviewed nursing notes and confirm.  CONSTITUTIONAL: Well-developed; well-nourished; in no acute distress.   SKIN:  warm and dry, no acute rash.   HEAD:  normocephalic, atraumatic.  EYES: PERRL.  EOM intact; conjunctiva and sclera clear.  ENT: No nasal discharge; airway clear.   NECK: Supple; non tender.  no midline cervical spine pain/tenderness/stepoff/deformity.   CARD: S1, S2 normal; no murmurs, gallops, or rubs. Regular rate and rhythm.   RESP:  Clear to auscultation b/l, no wheezes, rales or rhonchi.  ABD: Rectal tone intact.  Normal bowel sounds; soft; non-distended; non-tender; no guarding/rebound.  MSK:  + TTP over low thoracic/upper lumbar spine, no step off/deformity.  no midline thoracic or lumbar spine pain/tenderness/stepoff/deformity.   EXT: + 2/5 strength to RLE appears 2/2 discomfort, 3+/5 strength to LLE. No clubbing, cyanosis or edema. 2+ pulses to b/l ue/le.  NEURO: Alert, oriented, grossly unremarkable.  + 2/5 strength to RLE appears 2/2 discomfort, 3+/5 strength to LLE.  + Decreased sensation throughout RLE, no saddle anesthesia.  Sensation normal to LLE, bilateral UEs.  5/5 strength to bilateral UEs against gravity and external force.  No facial asymmetry.    PSYCH: Cooperative, mood and affect appropriate.

## 2020-05-06 NOTE — H&P ADULT - PROBLEM SELECTOR PLAN 5
Pt with baseline Cr 1.1-1.2 presented with Cr 1.88 and BUN 52 likely due to prerenal azotemia likely due to dehydration following having diarrhea for few days and not eating and drinking well. responded to fluid therapy and decreased to 1.68. Urine lytes also were compatible with pre-renal causes,     Prerenal azotemia   - Normal saline 500 bolus   - avoid nephrotoxic meds at this time   - monitor kidney function Pt with baseline Cr 1.1-1.2 presented with Cr 1.88 and BUN 52 likely due to prerenal azotemia likely due to dehydration following having diarrhea for few days and not eating and drinking well. responded to fluid therapy and decreased to 1.68. Urine lytes also were compatible with pre-renal causes, s/p 1.5 L of IVF in ED    Prerenal azotemia   - Normal saline 500 bolus   - avoid nephrotoxic meds at this time   - monitor kidney function    #hematuria:   pt complains of blood in urine and ua showed large blood,  lilkely due to nephrolithiasis     - will monitor the urine for gross hematuria   - c/w AC for now Pt with baseline Cr 1.1-1.2 presented with Cr 1.88 and BUN 52 likely due to prerenal azotemia likely due to dehydration following having diarrhea for few days and not eating and drinking well. responded to fluid therapy and decreased to 1.68. Urine lytes also were compatible with pre-renal causes, s/p 1.5 L of IVF in ED    Prerenal azotemia   - S/p Normal saline 1500cc  - avoid nephrotoxic meds at this time   - monitor kidney function    #hematuria:   pt complains of blood in urine and ua showed large blood,  lilkely due to non-obstructing nephrolithiasis as seen on CT     - will monitor the urine for gross hematuria   - c/w AC for now

## 2020-05-06 NOTE — H&P ADULT - ASSESSMENT
A 69 years old M with PMH of progressive lower extremity weakness, s/p lumbar decompression in 1/2019. 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

## 2020-05-06 NOTE — ED PROVIDER NOTE - OBJECTIVE STATEMENT
69 year old male with history of lumbar decompression (1/2019 out of HSS), s/p sural nerve biopsy on 7/25 for ongoing lower extremity weakness and atrophy presents to ED with concern for low back pain status post mechanical slip and fall while ambulating with walker 3 days ago.  Patient notes he experienced pain to low back immediately, however did not want to come to ED as he was concerned given current COVID-19 pandemic.  Patient notes decreased sensation to RLE and states he is now experiencing urinary incontinence.  He also admits to sensation of tingling throughout RLE.  Of note, history of RLE weakness and muscle wasting is noted.  Patient denies fever (though noted to be febrile on arrival to ED), chills, chest pain, headache, hit to head, neck or back pain, shortness of breath, abdominal pain, nausea, emesis, changes to bowel movements, rashes, peripheral edema or any additional acute complaints or concerns at this time.  Patient was evaluated by his neurologist, Dr. Brito, today and instructed to come to ED for further evaluation.

## 2020-05-06 NOTE — ED ADULT NURSE REASSESSMENT NOTE - NS ED NURSE REASSESS COMMENT FT1
writer and charge Rn both in contact with MRI technicians in regards to the delay in imaging tech informed pt has been waiting 4 hrs and expedited testing requested

## 2020-05-06 NOTE — H&P ADULT - PROBLEM SELECTOR PLAN 1
Pt has had sore throat and diarrhea in the past few days as well as knee pain and back pain. presented with Fever 101.9, tachycardia and lactate 2.7. fever improved with tylenol and lactate cleared with some IVF.   COVID was positive. WBC was normal. procal is 0.28.     sepsis is likely due to COVID as well as dehydration. Bacterial infection is unlikely.     - Pending blood culturesx2  - Pt does not require O2; will not treat COVID at this time  - Fever management with tylenol   - xray of right knee to rule out septic joint although unlikely to be septic Pt has had sore throat and diarrhea in the past few days as well as knee pain and back pain. presented with Fever 101.9, tachycardia and lactate 2.7. fever improved with tylenol and lactate cleared with some IVF.   COVID was positive. WBC was normal. procal is 0.28.     sepsis is likely due to COVID as well as dehydration. although given the back pain and tenderness cannot rule out osteomyelitis although less likely. Knee pain and redness also can be due to septic joint and the source of sepsis.      - Pending blood culturesx2  - Pt does not require O2; will not treat COVID at this time  - Fever management with tylenol   - pending xray of right knee to rule out septic joint although unlikely to be septic  - will call ortho in the morning for knee arthritis Pt has had sore throat and diarrhea in the past few days as well as knee pain and back pain. presented with Fever 101.9, tachycardia and lactate 2.7. fever improved with tylenol and lactate cleared with some IVF.   COVID was positive. WBC was normal. procal is 0.28.     sepsis is likely due to COVID as well as dehydration. OM less likely given negative MRI. Knee pain and redness also can be due to septic joint and the source of sepsis. Also with many gallstones and elevated bili and transaminitis although normal ALP, negative cage sign and normal CBD so less likely source of infection.    - Pending blood culturesx2  - Pt does not require O2; will not treat COVID at this time  - Fever management with tylenol   - pending xray of right knee to rule out septic joint   - ortho recs for knee arthritis

## 2020-05-06 NOTE — ED PROVIDER NOTE - CARE PLAN
Principal Discharge DX:	Acute midline low back pain, unspecified whether sciatica present  Secondary Diagnosis:	COVID-19 virus detected  Secondary Diagnosis:	Fever, unspecified fever cause  Secondary Diagnosis:	Renal insufficiency

## 2020-05-06 NOTE — ED ADULT NURSE REASSESSMENT NOTE - NS ED NURSE REASSESS COMMENT FT1
called to check on pt status in MRI, tech states pt is in US at this time. Will revital and reassess upon return to ED

## 2020-05-06 NOTE — ED ADULT NURSE NOTE - CHIEF COMPLAINT QUOTE
Sent down from Dr Brito's office because patient fell 5 days ago in his living room, c/o worsening whole back pain, RLE numbness and incontinence that started the day he fell.  Pt states "I didn't go to the hospital then because I don't want to be bothered with the covid non-sense."  Pt verbalized he did passed out and hit his head on the floor.  Currently on eliquis for dvt.  Mask applied pta

## 2020-05-06 NOTE — ED ADULT NURSE NOTE - PMH
Cardiomegaly    DVT (deep venous thrombosis)  left leg, 2018  Glaucoma    HTN (hypertension)    Mitral valve insufficiency    Neuropathy    Pulmonary embolism

## 2020-05-06 NOTE — ED ADULT NURSE REASSESSMENT NOTE - NS ED NURSE REASSESS COMMENT FT1
pt states he is claustriphobic for mri despite declining medications earlier. Md Radford made aware, will medicate per orders monitor and reassess

## 2020-05-06 NOTE — ED ADULT NURSE REASSESSMENT NOTE - NS ED NURSE REASSESS COMMENT FT1
pt is resting in the stretcher. complains b/l legs pain, back pain.  will not give pain meds at this time. last tyrenol given at 20:03

## 2020-05-06 NOTE — ED PROVIDER NOTE - NS ED ROS FT
Constitutional: No fever or chills.   Eyes: No pain, blurry vision, or discharge.  ENMT: No hearing changes, pain, discharge or infections. No neck pain or stiffness.  Cardiac: No chest pain, SOB or edema. No chest pain with exertion.  Respiratory: No cough or respiratory distress. No hemoptysis. No history of asthma or RAD.  GI: No nausea, vomiting, diarrhea or abdominal pain.  : + Urinary incontinence.  No dysuria, frequency or burning.  MS: No myalgia, muscle weakness, joint pain or back pain.  Neuro: No headache, + RLE weakness. + RLE paresthesias.  No LOC.  Skin: No skin rash.   Endocrine: No history of thyroid disease or diabetes.  Except as documented in the HPI, all other systems are negative.

## 2020-05-06 NOTE — H&P ADULT - NSHPPHYSICALEXAM_GEN_ALL_CORE
VITAL SIGNS:  T(C): 37.9 (05-06-20 @ 21:15), Max: 38.8 (05-06-20 @ 11:57)  T(F): 100.2 (05-06-20 @ 21:15), Max: 101.9 (05-06-20 @ 11:57)  HR: 76 (05-06-20 @ 21:15) (76 - 121)  BP: 125/79 (05-06-20 @ 21:15) (103/71 - 125/79)  BP(mean): --  RR: 24 (05-06-20 @ 21:15) (18 - 24)  SpO2: 98% (05-06-20 @ 21:15) (93% - 100%)  Wt(kg): --    PHYSICAL EXAM:  Constitutional: WDWN, in distress because of pain in his back and legs   Head: NC/AT  Eyes: PERRL, EOMI, anicteric sclera  ENT: no nasal discharge; uvula midline, no oropharyngeal erythema or exudates; dry mucus membrane   Neck: supple; no JVD or thyromegaly  Respiratory: CTA B/L; no W/, mild crackles in the bases specially in the left lower lobe, no retractions  Cardiac: +S1/S2; RRR; no M/R/G; PMI non-displaced  Gastrointestinal: abdomen soft, NT/ND; no rebound or guarding; +BSx4  Back: spine midline, midline tenderness in the lower spines but no step-offs; no CVAT B/L  Extremities: WWP, no clubbing or cyanosis; no peripheral edema  Musculoskeletal: NROM x4; right knee with slight swelling, tenderness and erythema and warmness   Vascular: 2+ radial, femoral, DP/PT pulses B/L  Dermatologic: skin warm, dry and intact; no rashes, wounds, or scars  Lymphatic: no submandibular or cervical LAD  Neurologic: AAOx3; CNII-XII grossly intact; Right LE: can only slide the leg on the bed, 3/5 forces in the foot muscles with plantar and dorsi flexion, Left LE: 2/5 in the proximal muscles, 3-4/5 in the distal muscles for plantar and dorsi flexion  Psychiatric: affect and characteristics of appearance, verbalizations, behaviors are appropriate

## 2020-05-06 NOTE — H&P ADULT - HISTORY OF PRESENT ILLNESS
A 69 years old M with PMH of progressive lower extremity weakness, s/p lumbar decompression in 1/2019. 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 Pt states that on the weekend while he was walking with his walker, his leg gave away and he fell and since then he has a severe pain in his low back with radiation to both legs. Pt has chronic back pain and is following with a pain specialist and Dr. Brito neurologist as outpt and is taking several pain medications but since fall the pain has been worsening. Pt also states that it has been few weeks that he cannot control his urine and gets soaked and wet without noticing but denies bowel incontinence. He also complains that the weakness of the lower extremities is worse mainly in the right leg and he is not able to move the right leg both because of pain and because of weakness. His tingling and numbness in the LEs is also worsening.     Pt denied headache, dizziness, nausea, vomiting, abdominal pain, rash, fever, chills,. But stated that it has been more than a week that he has sore throat and runny nose as well as few days of diarrhea few times a day. He also complains of pain in his right knee but does not know when the pain in the knee started. Complains of bloody urine and slight burning with urination. denied recent travel or sick contact.     In the ED his Temp was 101.9,  that increased to 121, /71, RR 19 with 93% saturation on RA that then increased to 98% on RA. WBC 4.08, Hb 15.3, Plt 84, Na 144, K 4.9, BUN 52 and Cr 1.88 (baseline 1.1-1.2), INR 2.57, Bili 2.7, AST 54, ALT 35, Alk 102, lactate 2.7 that then cleared to 1.8, UA showed a large blood otherwise clear, CT abd/pelvis showed two non-obstructing tiny left renal calculi. US of abdomen showed Gallbladder full of gallstones.MRI of the spine showed no cord compression but multiple disc herniation. COVID test was positive.     Neurosurgery was consulted for back pain that cleared the pt for any surgical intervention at this time.

## 2020-05-06 NOTE — H&P ADULT - NSHPLABSRESULTS_GEN_ALL_CORE
CBC Full  -  ( 06 May 2020 12:45 )  WBC Count : 4.08 K/uL  RBC Count : 5.07 M/uL  Hemoglobin : 15.3 g/dL  Hematocrit : 46.0 %  Platelet Count - Automated : 84 K/uL  Mean Cell Volume : 90.7 fl  Mean Cell Hemoglobin : 30.2 pg  Mean Cell Hemoglobin Concentration : 33.3 gm/dL  Auto Neutrophil # : 2.87 K/uL  Auto Lymphocyte # : 0.78 K/uL  Auto Monocyte # : 0.40 K/uL  Auto Eosinophil # : 0.00 K/uL  Auto Basophil # : 0.02 K/uL  Auto Neutrophil % : 70.4 %  Auto Lymphocyte % : 19.1 %  Auto Monocyte % : 9.8 %  Auto Eosinophil % : 0.0 %  Auto Basophil % : 0.5 %        143  |  110<H>  |  45<H>  ----------------------------<  109<H>  4.6   |  19<L>  |  1.68<H>    Ca    8.6      06 May 2020 15:11    TPro  7.1  /  Alb  3.5  /  TBili  2.1<H>  /  DBili  x   /  AST  79<H>  /  ALT  40  /  AlkPhos  35<L>      Urinalysis Basic - ( 06 May 2020 14:17 )    Color: Yellow / Appearance: Clear / S.020 / pH: x  Gluc: x / Ketone: NEGATIVE  / Bili: Negative / Urobili: 2.0 E.U./dL   Blood: x / Protein: 30 mg/dL / Nitrite: NEGATIVE   Leuk Esterase: NEGATIVE / RBC: 5-10 /HPF / WBC < 5 /HPF   Sq Epi: x / Non Sq Epi: 0-5 /HPF / Bacteria: Present /HPF    < from: US Abdomen Limited (20 @ 19:36) >    EXAM:  US ABDOMEN LIMITED                          PROCEDURE DATE:  2020          INTERPRETATION:  Right upper quadrant ultrasound dated 2020 7:36 PM    INDICATION: Abdominal pain.    PRIOR STUDIES: None.    IMPRESSION:  Gallbladder full of gallstones.    < from: MR Lumbar Spine No Cont (20 @ 18:47) >    IMPRESSION:   Multilevel disc herniations with multilevel neural foraminal narrowing as above.  EXAM:  MR SPINE LUMBAR        < from: CT Abdomen and Pelvis No Cont (20 @ 15:32) >      IMPRESSION: Two nonobstructing tiny left renal calculi.  No evidence of ureteric calculus. No hydronephrosis.    < from: Xray Chest 1 View-PORTABLE IMMEDIATE (20 @ 13:17) >    EXAM:  XR CHEST PORTABLE IMMED 1V                          PROCEDURE DATE:  2020          INTERPRETATION:  PORTABLE CHEST X-RAY     HISTORY: Shortness of Breath, Cough,  Fever    PRIOR STUDIES: 2019    FINDINGS: The lungs are clear.  There are no pleural effusions.  The cardiomediastinal silhouette is unremarkable. Status post lower cervical spine ACDF and posterior spinal fusion.    IMPRESSION:  The lungs are clear.

## 2020-05-06 NOTE — ED ADULT TRIAGE NOTE - PRO INTERPRETER NEED 2
Patient brought to ER from home by EMS because she has 2 weeks of not feeling well. Coughing, loss of appetite, was taking a shower this morning and felt dizzy. English

## 2020-05-06 NOTE — H&P ADULT - PROBLEM SELECTOR PLAN 9
Has a chronic neuropathy likely secondary to disc herniation. on gabapentin 400 tid at home.   - Currently in worsening back pain and tingling and numbness   - Will resume his gabapentin 400 tid  - will call Dr. Brito in AM

## 2020-05-06 NOTE — ED PROVIDER NOTE - CLINICAL SUMMARY MEDICAL DECISION MAKING FREE TEXT BOX
Patient in ED w concern for low back pain s/p fall several days ago, now complaining of paresthesias into RLE and urinary incontinence.  Patient states he did not seek medical care at time of fall, as he was nervous to come to ED given COVID pandemic.  Of note, patient denies hit to head, neck pain or additional injury secondary to what he states was a trip and fall when his foot got caught up in his walker.  + TTP over midline low thoracic/lumbar spine without stepoff or deformity.  + Weakness to RLE with change to sensation noted.  Rectal tone intact.  Neurosx team consulted and MR thoracic and lumbar spine w/wo contrast is ordered.  Patient also noted to be febrile on arrival to ED.  COVID testing positive.  Patient in no resp distress, pulsox wnl on room air.  CXR clear.  UA with evidence of blood, no WBCs.  Non contrast CT also ordered while awaiting MR studies to eval for possible ureteral stone as cause of pain - currently pending result.  Initial lactate elevated - given IVF bolus.  Creatinine also trending up from last lab draw - both lactate and Cr repeated followin gIVF showing some improvement.  Patient's care is handed over to oncoming physician, Dr. Radford, pending MR study completion and neurosx recommendations.  Patient is stable at time of transfer of care. Patient in ED w concern for low back pain s/p fall several days ago, now complaining of paresthesias into RLE and urinary incontinence.  Patient states he did not seek medical care at time of fall, as he was nervous to come to ED given COVID pandemic.  Of note, patient denies hit to head, neck pain or additional injury secondary to what he states was a trip and fall when his foot got caught up in his walker.  + TTP over midline low thoracic/lumbar spine without stepoff or deformity.  + Weakness to RLE with change to sensation noted.  Rectal tone intact.  Neurosx team consulted and MR thoracic and lumbar spine w/wo contrast is ordered.  Patient also noted to be febrile on arrival to ED.  COVID testing positive.  Patient in no resp distress, pulsox wnl on room air.  CXR clear.  UA with evidence of blood, no WBCs.  Non contrast CT also ordered while awaiting MR studies to eval for possible ureteral stone as cause of pain - negative for acute process.  Given elevated bili, US RUQ ordered and results are pending at this time.  Initial lactate elevated - given IVF bolus.  Creatinine also trending up from last lab draw - both lactate and Cr repeated following IVF showing some improvement.  Patient's care is handed over to oncoming physician, Dr. Radford, pending US RUQ, MR study completion and neurosx recommendations.  Patient is stable at time of transfer of care.

## 2020-05-06 NOTE — ED ADULT NURSE NOTE - OBJECTIVE STATEMENT
pt received into spot 17 A&Ox3 ambulatory with walker very slowly arrives via walk in triage for eval of fall at home due to leg weakness 5 days ago. Pt denies any lightheadedness/ dizziness prior t fall or any tripping pt states his legs just got weak and he fell cant remember if he hit his head or not but denies LOC, reports severe low middle back pain with RLE weakness/ numbness tingling. No slurred speech or facial droop noted. b/l weak hand grasps noted on exam. No neck pain. Reports some loss of bowel continence, per Md Manzo pt +rectal tone. Pt takes eliquis for DVT. Pt denies CP SOB palpitations head ache blurry vision. abd soft nondistended denies N/V/D. Reports he did not come to the hospital earlier because he did ot want to deal with covid nonsense. Denies cough fever/chills though noted to be febrile. 20G placed to L arm labs drawn and sent pt in nad

## 2020-05-06 NOTE — ED ADULT NURSE NOTE - NSIMPLEMENTINTERV_GEN_ALL_ED
Implemented All Fall with Harm Risk Interventions:  [place] to call system. Call bell, personal items and telephone within reach. Instruct patient to call for assistance. Room bathroom lighting operational. Non-slip footwear when patient is off stretcher. Physically safe environment: no spills, clutter or unnecessary equipment. Stretcher in lowest position, wheels locked, appropriate side rails in place. Provide visual cue, wrist band, yellow gown, etc. Monitor gait and stability. Monitor for mental status changes and reorient to person, place, and time. Review medications for side effects contributing to fall risk. Reinforce activity limits and safety measures with patient and family. Provide visual clues: red socks.

## 2020-05-06 NOTE — H&P ADULT - PROBLEM SELECTOR PLAN 4
Pt complains of pain in the right knee with unknown duration. Has mild swelling as well as tenderness and redness in the right knee.   WBC is normal, has CRP 3.33 that can be due to COVID infection, porcal is mildly elevated 0.28. uric acid is 10.1.     Can be gout or pseudogout. Septic joint is unlikely given the presentation and lab findings.     - Xray of knee; If showed effusion will consult ortho for arthrocentesis   - will not start him on ABx at this time; fever and tachycardia are likely due to COVID infection and dehydration Pt complains of pain in the right knee with unknown duration. Has mild swelling as well as tenderness and redness in the right knee.   WBC is normal, has CRP 3.33 that can be due to COVID infection, porcal is mildly elevated 0.28. uric acid is 10.1.     Can be gout or pseudogout. Septic joint is one of the differentials although less likely given the presentation and lab findings.     - pending Xray of knee; If showed effusion will consult ortho for arthrocentesis   - will not start him on ABx at this time; fever and tachycardia are likely due to COVID infection and dehydration Pt complains of pain in the right knee with unknown duration. Has mild swelling as well as tenderness and redness in the right knee.   WBC is normal, has CRP 3.33 that can be due to COVID infection, porcal is mildly elevated 0.28. uric acid is 10.1.     Can be gout or pseudogout. Septic joint on DDx as well    - pending Xray of knee; If showed effusion will consult ortho for arthrocentesis   - will not start him on ABx at this time; fever and tachycardia are likely due to COVID infection and dehydration  - Pending ortho eval

## 2020-05-06 NOTE — ED ADULT NURSE REASSESSMENT NOTE - NS ED NURSE REASSESS COMMENT FT1
pt screaming out in pain, Md Radford pt bedside pt medicated per orders with Gabapentin and Tylenol as per orders will monitor and reassess VSS

## 2020-05-07 DIAGNOSIS — A41.9 SEPSIS, UNSPECIFIED ORGANISM: ICD-10-CM

## 2020-05-07 DIAGNOSIS — M17.11 UNILATERAL PRIMARY OSTEOARTHRITIS, RIGHT KNEE: ICD-10-CM

## 2020-05-07 DIAGNOSIS — Z29.9 ENCOUNTER FOR PROPHYLACTIC MEASURES, UNSPECIFIED: ICD-10-CM

## 2020-05-07 DIAGNOSIS — U07.1 COVID-19: ICD-10-CM

## 2020-05-07 DIAGNOSIS — M54.5 LOW BACK PAIN: ICD-10-CM
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
## 2020-05-07 DIAGNOSIS — H40.9 UNSPECIFIED GLAUCOMA: ICD-10-CM

## 2020-05-07 DIAGNOSIS — N17.9 ACUTE KIDNEY FAILURE, UNSPECIFIED: ICD-10-CM

## 2020-05-07 DIAGNOSIS — I10 ESSENTIAL (PRIMARY) HYPERTENSION: ICD-10-CM

## 2020-05-07 DIAGNOSIS — G62.9 POLYNEUROPATHY, UNSPECIFIED: ICD-10-CM

## 2020-05-07 DIAGNOSIS — I82.409 ACUTE EMBOLISM AND THROMBOSIS OF UNSPECIFIED DEEP VEINS OF UNSPECIFIED LOWER EXTREMITY: ICD-10-CM

## 2020-05-07 LAB
ALBUMIN SERPL ELPH-MCNC: 3.8 G/DL — SIGNIFICANT CHANGE UP (ref 3.3–5)
ALP SERPL-CCNC: 37 U/L — LOW (ref 40–120)
ALT FLD-CCNC: 44 U/L — SIGNIFICANT CHANGE UP (ref 10–45)
ANION GAP SERPL CALC-SCNC: 12 MMOL/L — SIGNIFICANT CHANGE UP (ref 5–17)
APTT BLD: 29.5 SEC — SIGNIFICANT CHANGE UP (ref 27.5–36.3)
AST SERPL-CCNC: 87 U/L — HIGH (ref 10–40)
BASOPHILS # BLD AUTO: 0.03 K/UL — SIGNIFICANT CHANGE UP (ref 0–0.2)
BASOPHILS NFR BLD AUTO: 0.9 % — SIGNIFICANT CHANGE UP (ref 0–2)
BILIRUB SERPL-MCNC: 2.3 MG/DL — HIGH (ref 0.2–1.2)
BUN SERPL-MCNC: 33 MG/DL — HIGH (ref 7–23)
CALCIUM SERPL-MCNC: 8.9 MG/DL — SIGNIFICANT CHANGE UP (ref 8.4–10.5)
CHLORIDE SERPL-SCNC: 114 MMOL/L — HIGH (ref 96–108)
CK SERPL-CCNC: 2358 U/L — HIGH (ref 30–200)
CO2 SERPL-SCNC: 22 MMOL/L — SIGNIFICANT CHANGE UP (ref 22–31)
CREAT ?TM UR-MCNC: 82 MG/DL — SIGNIFICANT CHANGE UP
CREAT SERPL-MCNC: 1.38 MG/DL — HIGH (ref 0.5–1.3)
CRP SERPL-MCNC: 2.82 MG/DL — HIGH (ref 0–0.4)
D DIMER BLD IA.RAPID-MCNC: 212 NG/ML DDU — SIGNIFICANT CHANGE UP
EOSINOPHIL # BLD AUTO: 0 K/UL — SIGNIFICANT CHANGE UP (ref 0–0.5)
EOSINOPHIL NFR BLD AUTO: 0 % — SIGNIFICANT CHANGE UP (ref 0–6)
FERRITIN SERPL-MCNC: 2218 NG/ML — HIGH (ref 30–400)
GIANT PLATELETS BLD QL SMEAR: PRESENT — SIGNIFICANT CHANGE UP
GLUCOSE SERPL-MCNC: 94 MG/DL — SIGNIFICANT CHANGE UP (ref 70–99)
HCT VFR BLD CALC: 37.3 % — LOW (ref 39–50)
HGB BLD-MCNC: 12.3 G/DL — LOW (ref 13–17)
INR BLD: 2.1 — HIGH (ref 0.88–1.16)
LYMPHOCYTES # BLD AUTO: 0.2 K/UL — LOW (ref 1–3.3)
LYMPHOCYTES # BLD AUTO: 5.2 % — LOW (ref 13–44)
MAGNESIUM SERPL-MCNC: 2.1 MG/DL — SIGNIFICANT CHANGE UP (ref 1.6–2.6)
MANUAL SMEAR VERIFICATION: SIGNIFICANT CHANGE UP
MCHC RBC-ENTMCNC: 30.6 PG — SIGNIFICANT CHANGE UP (ref 27–34)
MCHC RBC-ENTMCNC: 33 GM/DL — SIGNIFICANT CHANGE UP (ref 32–36)
MCV RBC AUTO: 92.8 FL — SIGNIFICANT CHANGE UP (ref 80–100)
MONOCYTES # BLD AUTO: 0.33 K/UL — SIGNIFICANT CHANGE UP (ref 0–0.9)
MONOCYTES NFR BLD AUTO: 8.7 % — SIGNIFICANT CHANGE UP (ref 2–14)
NEUTROPHILS # BLD AUTO: 3.24 K/UL — SIGNIFICANT CHANGE UP (ref 1.8–7.4)
NEUTROPHILS NFR BLD AUTO: 85.2 % — HIGH (ref 43–77)
NRBC # BLD: 1 /100 — HIGH (ref 0–0)
NRBC # BLD: SIGNIFICANT CHANGE UP /100 WBCS (ref 0–0)
PHOSPHATE SERPL-MCNC: 1.7 MG/DL — LOW (ref 2.5–4.5)
PLAT MORPH BLD: ABNORMAL
PLATELET # BLD AUTO: 68 K/UL — LOW (ref 150–400)
POTASSIUM SERPL-MCNC: 5.1 MMOL/L — SIGNIFICANT CHANGE UP (ref 3.5–5.3)
POTASSIUM SERPL-SCNC: 5.1 MMOL/L — SIGNIFICANT CHANGE UP (ref 3.5–5.3)
PROT SERPL-MCNC: 7.5 G/DL — SIGNIFICANT CHANGE UP (ref 6–8.3)
PROTHROM AB SERPL-ACNC: 24.5 SEC — HIGH (ref 10–12.9)
RBC # BLD: 4.02 M/UL — LOW (ref 4.2–5.8)
RBC # FLD: 11.5 % — SIGNIFICANT CHANGE UP (ref 10.3–14.5)
RBC BLD AUTO: NORMAL — SIGNIFICANT CHANGE UP
SODIUM SERPL-SCNC: 148 MMOL/L — HIGH (ref 135–145)
SODIUM UR-SCNC: 39 MMOL/L — SIGNIFICANT CHANGE UP
WBC # BLD: 3.8 K/UL — SIGNIFICANT CHANGE UP (ref 3.8–10.5)
WBC # FLD AUTO: 3.8 K/UL — SIGNIFICANT CHANGE UP (ref 3.8–10.5)

## 2020-05-07 PROCEDURE — 99233 SBSQ HOSP IP/OBS HIGH 50: CPT | Mod: GC

## 2020-05-07 PROCEDURE — 93970 EXTREMITY STUDY: CPT | Mod: 26,CS

## 2020-05-07 PROCEDURE — 73560 X-RAY EXAM OF KNEE 1 OR 2: CPT | Mod: 26,RT

## 2020-05-07 PROCEDURE — 99223 1ST HOSP IP/OBS HIGH 75: CPT | Mod: CS

## 2020-05-07 PROCEDURE — 71045 X-RAY EXAM CHEST 1 VIEW: CPT | Mod: 26

## 2020-05-07 PROCEDURE — 99233 SBSQ HOSP IP/OBS HIGH 50: CPT

## 2020-05-07 RX ORDER — FAMOTIDINE 10 MG/ML
20 INJECTION INTRAVENOUS EVERY 24 HOURS
Refills: 0 | Status: DISCONTINUED | OUTPATIENT
Start: 2020-05-07 | End: 2020-05-13

## 2020-05-07 RX ORDER — SODIUM CHLORIDE 9 MG/ML
500 INJECTION INTRAMUSCULAR; INTRAVENOUS; SUBCUTANEOUS ONCE
Refills: 0 | Status: COMPLETED | OUTPATIENT
Start: 2020-05-07 | End: 2020-05-07

## 2020-05-07 RX ORDER — OXYCODONE HYDROCHLORIDE 5 MG/1
10 TABLET ORAL EVERY 4 HOURS
Refills: 0 | Status: DISCONTINUED | OUTPATIENT
Start: 2020-05-07 | End: 2020-05-08

## 2020-05-07 RX ORDER — ACETAMINOPHEN 500 MG
650 TABLET ORAL EVERY 6 HOURS
Refills: 0 | Status: DISCONTINUED | OUTPATIENT
Start: 2020-05-07 | End: 2020-05-20

## 2020-05-07 RX ORDER — HYDROMORPHONE HYDROCHLORIDE 2 MG/ML
1 INJECTION INTRAMUSCULAR; INTRAVENOUS; SUBCUTANEOUS ONCE
Refills: 0 | Status: DISCONTINUED | OUTPATIENT
Start: 2020-05-07 | End: 2020-05-07

## 2020-05-07 RX ORDER — SODIUM,POTASSIUM PHOSPHATES 278-250MG
1 POWDER IN PACKET (EA) ORAL ONCE
Refills: 0 | Status: COMPLETED | OUTPATIENT
Start: 2020-05-07 | End: 2020-05-07

## 2020-05-07 RX ADMIN — LIDOCAINE 2 PATCH: 4 CREAM TOPICAL at 21:34

## 2020-05-07 RX ADMIN — GABAPENTIN 400 MILLIGRAM(S): 400 CAPSULE ORAL at 13:00

## 2020-05-07 RX ADMIN — OXYCODONE HYDROCHLORIDE 10 MILLIGRAM(S): 5 TABLET ORAL at 18:06

## 2020-05-07 RX ADMIN — APIXABAN 5 MILLIGRAM(S): 2.5 TABLET, FILM COATED ORAL at 22:51

## 2020-05-07 RX ADMIN — Medication 1 PACKET(S): at 11:20

## 2020-05-07 RX ADMIN — Medication 650 MILLIGRAM(S): at 09:01

## 2020-05-07 RX ADMIN — LIDOCAINE 2 PATCH: 4 CREAM TOPICAL at 05:12

## 2020-05-07 RX ADMIN — DORZOLAMIDE HYDROCHLORIDE TIMOLOL MALEATE 1 DROP(S): 20; 5 SOLUTION/ DROPS OPHTHALMIC at 05:11

## 2020-05-07 RX ADMIN — OXYCODONE HYDROCHLORIDE 10 MILLIGRAM(S): 5 TABLET ORAL at 08:55

## 2020-05-07 RX ADMIN — FAMOTIDINE 20 MILLIGRAM(S): 10 INJECTION INTRAVENOUS at 05:11

## 2020-05-07 RX ADMIN — APIXABAN 5 MILLIGRAM(S): 2.5 TABLET, FILM COATED ORAL at 11:22

## 2020-05-07 RX ADMIN — Medication 81 MILLIGRAM(S): at 22:50

## 2020-05-07 RX ADMIN — DORZOLAMIDE HYDROCHLORIDE TIMOLOL MALEATE 1 DROP(S): 20; 5 SOLUTION/ DROPS OPHTHALMIC at 17:54

## 2020-05-07 RX ADMIN — OXYCODONE HYDROCHLORIDE 10 MILLIGRAM(S): 5 TABLET ORAL at 22:49

## 2020-05-07 RX ADMIN — HYDROMORPHONE HYDROCHLORIDE 1 MILLIGRAM(S): 2 INJECTION INTRAMUSCULAR; INTRAVENOUS; SUBCUTANEOUS at 06:13

## 2020-05-07 RX ADMIN — LIDOCAINE 2 PATCH: 4 CREAM TOPICAL at 11:00

## 2020-05-07 RX ADMIN — GABAPENTIN 400 MILLIGRAM(S): 400 CAPSULE ORAL at 05:11

## 2020-05-07 RX ADMIN — Medication 1 MILLIGRAM(S): at 11:22

## 2020-05-07 RX ADMIN — Medication 650 MILLIGRAM(S): at 15:06

## 2020-05-07 RX ADMIN — SODIUM CHLORIDE 1000 MILLILITER(S): 9 INJECTION INTRAMUSCULAR; INTRAVENOUS; SUBCUTANEOUS at 01:46

## 2020-05-07 RX ADMIN — OXYCODONE HYDROCHLORIDE 10 MILLIGRAM(S): 5 TABLET ORAL at 13:01

## 2020-05-07 RX ADMIN — OXYCODONE HYDROCHLORIDE 5 MILLIGRAM(S): 5 TABLET ORAL at 05:18

## 2020-05-07 RX ADMIN — LATANOPROST 1 DROP(S): 0.05 SOLUTION/ DROPS OPHTHALMIC; TOPICAL at 21:35

## 2020-05-07 RX ADMIN — GABAPENTIN 400 MILLIGRAM(S): 400 CAPSULE ORAL at 21:34

## 2020-05-07 NOTE — PROGRESS NOTE ADULT - PROBLEM SELECTOR PLAN 6
Pt with hx of DVT and PE, on Eliquis 5 daily at home  - Will resume eliquis 5 daily Pt with hx of DVT and PE, on Eliquis 5 daily at home. DVT reported in 2018, unclear indication for AC at this time.   - Will resume eliquis 5 daily Pt with baseline Cr 1.1-1.2 presented with Cr 1.88 and BUN 52 likely due to prerenal azotemia likely due to dehydration following having diarrhea for few days and not eating and drinking well. responded to fluid therapy and decreased to 1.68. Urine lytes also were compatible with pre-renal causes, s/p 1.5 L of IVF in ED  - improving this AM (Cr 1.38)  - avoid nephrotoxic meds at this time   - monitor kidney function    #Hematuria:   Patient with compliant of blood in urine and UA demonstrating large blood,  likely due to non-obstructing nephrolithiasis as seen on CT   - will monitor the urine for gross hematuria   - c/w home Eliquis 5mg BID

## 2020-05-07 NOTE — CONSULT NOTE ADULT - SUBJECTIVE AND OBJECTIVE BOX
Patient is a 70y old  Male who presents with a chief complaint of back pain (07 May 2020 07:26)       HPI:  A 69 years old M with PMH of progressive lower extremity weakness, s/p lumbar decompression in 2019. s/p sural nerve biopsy on  for lower extremity weakness and atrophy, cardiomegaly, DVT/PE in 2018 (on Eliquis), HTN, MR, and galucoma presented with severe back pain mainly since his fall 3-4 days before presentation. Pt states that on the weekend while he was walking with his walker, his leg gave away and he fell and since then he has a severe pain in his low back with radiation to both legs. Pt has chronic back pain and is following with a pain specialist and Dr. Brito neurologist as outpt and is taking several pain medications but since fall the pain has been worsening. Pt also states that it has been few weeks that he cannot control his urine and gets soaked and wet without noticing but denies bowel incontinence. He also complains that the weakness of the lower extremities is worse mainly in the right leg and he is not able to move the right leg both because of pain and because of weakness. His tingling and numbness in the LEs is also worsening.     Pt denied headache, dizziness, nausea, vomiting, abdominal pain, rash, fever, chills,. But stated that it has been more than a week that he has sore throat and runny nose as well as few days of diarrhea few times a day. He also complains of pain in his right knee but does not know when the pain in the knee started. Complains of bloody urine and slight burning with urination. denied recent travel or sick contact.     In the ED his Temp was 101.9,  that increased to 121, /71, RR 19 with 93% saturation on RA that then increased to 98% on RA. WBC 4.08, Hb 15.3, Plt 84, Na 144, K 4.9, BUN 52 and Cr 1.88 (baseline 1.1-1.2), INR 2.57, Bili 2.7, AST 54, ALT 35, Alk 102, lactate 2.7 that then cleared to 1.8, UA showed a large blood otherwise clear, CT abd/pelvis showed two non-obstructing tiny left renal calculi. US of abdomen showed Gallbladder full of gallstones.MRI of the spine showed no cord compression but multiple disc herniation. COVID test was positive.     Neurosurgery was consulted for back pain that cleared the pt for any surgical intervention at this time. (06 May 2020 20:52)      PAST MEDICAL & SURGICAL HISTORY:  Mitral valve insufficiency  Cardiomegaly  Glaucoma  Neuropathy  DVT (deep venous thrombosis): left leg, 2018  Pulmonary embolism  HTN (hypertension)  History of appendectomy  History of tonsillectomy  History of lumbar laminectomy: 2019  Degeneration of intervertebral disc of thoracic region:   History of foot surgery: right foot  H/O cervical spine surgery: with hardware      MEDICATIONS  (STANDING):  apixaban 5 milliGRAM(s) Oral every 12 hours  aspirin  chewable 81 milliGRAM(s) Oral every 24 hours  dorzolamide 2%/timolol 0.5% Ophthalmic Solution 1 Drop(s) Both EYES every 12 hours  famotidine    Tablet 20 milliGRAM(s) Oral every 24 hours  folic acid 1 milliGRAM(s) Oral daily  gabapentin 400 milliGRAM(s) Oral three times a day  latanoprost 0.005% Ophthalmic Solution 1 Drop(s) Both EYES at bedtime  lidocaine   Patch 2 Patch Transdermal every 24 hours  potassium phosphate / sodium phosphate powder 1 Packet(s) Oral once    MEDICATIONS  (PRN):  acetaminophen   Tablet .. 650 milliGRAM(s) Oral every 6 hours PRN Temp greater or equal to 38.5C (101.3F)  oxyCODONE    IR 10 milliGRAM(s) Oral every 4 hours PRN Moderate Pain (4 - 6)    FAMILY HISTORY:      CBC Full  -  ( 06 May 2020 12:45 )  WBC Count : 4.08 K/uL  RBC Count : 5.07 M/uL  Hemoglobin : 15.3 g/dL  Hematocrit : 46.0 %  Platelet Count - Automated : 84 K/uL  Mean Cell Volume : 90.7 fl  Mean Cell Hemoglobin : 30.2 pg  Mean Cell Hemoglobin Concentration : 33.3 gm/dL  Auto Neutrophil # : 2.87 K/uL  Auto Lymphocyte # : 0.78 K/uL  Auto Monocyte # : 0.40 K/uL  Auto Eosinophil # : 0.00 K/uL  Auto Basophil # : 0.02 K/uL  Auto Neutrophil % : 70.4 %  Auto Lymphocyte % : 19.1 %  Auto Monocyte % : 9.8 %  Auto Eosinophil % : 0.0 %  Auto Basophil % : 0.5 %      05-07    148<H>  |  114<H>  |  33<H>  ----------------------------<  94  5.1   |  22  |  1.38<H>    Ca    8.9      07 May 2020 07:38  Phos  1.7       Mg     2.1         TPro  7.5  /  Alb  3.8  /  TBili  2.3<H>  /  DBili  x   /  AST  87<H>  /  ALT  44  /  AlkPhos  37<L>        Urinalysis Basic - ( 06 May 2020 14:17 )    Color: Yellow / Appearance: Clear / S.020 / pH: x  Gluc: x / Ketone: NEGATIVE  / Bili: Negative / Urobili: 2.0 E.U./dL   Blood: x / Protein: 30 mg/dL / Nitrite: NEGATIVE   Leuk Esterase: NEGATIVE / RBC: 5-10 /HPF / WBC < 5 /HPF   Sq Epi: x / Non Sq Epi: 0-5 /HPF / Bacteria: Present /HPF          Radiology:    < from: Xray Chest 1 View-PORTABLE IMMEDIATE (20 @ 13:17) >  EXAM:  XR CHEST PORTABLE IMMED 1V                          PROCEDURE DATE:  2020          INTERPRETATION:  PORTABLE CHEST X-RAY     HISTORY: Shortness of Breath, Cough,  Fever    PRIOR STUDIES: 2019    FINDINGS: The lungs are clear.  There are no pleural effusions.  The cardiomediastinal silhouette is unremarkable. Status post lower cervical spine ACDF and posterior spinal fusion.    IMPRESSION:  The lungs are clear.        < from: Xray Knee 1 or 2 Views, Right (20 @ 08:17) >  EXAM:  XR KNEE-RIGHT-1 OR 2 VIEWS                          PROCEDURE DATE:  2020          INTERPRETATION:  X-rays of the right knee dated 2020 8:17 AM.    INDICATION: Pain, redness, swelling and tenderness in the right knee.    COMPARISON: Right knee x-ray from 10/2/2019    TECHNIQUE: A single portable AP view of the right knee is provided. Patient was in too much pain at the time of this examination to complete additional views.    FINDINGS: There is no fracture or dislocation. Thereis minimal osteophytosis along the right knee with gross preservation of the medial and lateral compartments.    IMPRESSION: Minimal osteophytosis. Otherwise, unremarkable AP view of the right knee. Periarticular lucencies which may represent erosions or osteopenia. Overall stable from 10/19        < from: CT Abdomen and Pelvis No Cont (20 @ 15:32) >    EXAM:  CT ABDOMEN AND PELVIS                          PROCEDURE DATE:  2020          INTERPRETATION:  CT SCAN OF ABDOMEN AND PELVIS    HISTORY: Back pain, rule out kidney stone.    TECHNIQUE: CT scan of the abdomen and pelvis performed from lung bases through symphysis pubis.  Oral and intravenous contrast not administered, as per renal stone protocol.  Axial, coronal, and sagittal reformatted images and coronal MIPS were produced.    PRIOR STUDIES: CTA chest dated 2019.     FINDINGS:     Lower chest: A few scattered groundglass opacities in the right lateral and medial middle lobe, new from prior study, may be infectious/inflammatory in etiology. Bibasilar and lingular scarring and/or linear atelectasis is unchanged.    Liver:  Normal.    Gallbladder: No radiopaque gallstones..    Spleen:  Normal.    Pancreas:  Normal.    Adrenal glands: Unremarkable adrenals..    Kidneys: 3 x 2.8 x 3.6 cm cyst in the upper pole of right kidney, grossly unchanged. Two tiny punctate nonobstructing renal calculi in the left kidney, unchanged. No hydronephrosis. Subtle cortical scarring in the mid left kidney laterally.    Ureters: No ureteral stone.    Adenopathy:  No lymphadenopathy in abdomen or pelvis.    Ascites: None.    Gastrointestinal tract: Normal.    Vessels: Markedly tortuous abdominal aorta with mild atherosclerotic plaque in the distal branching vessels. No aneurysm.    Pelvic organs: Normal size prostate measuring 4.8 x 4 x 2.7 cm. Seminal vesicles are unremarkable. Unremarkable urinary bladder.    Soft tissues: Moderate  fat-containing right indirect inguinal hernia. Tiny fat-containing left direct inguinal hernia.     Bones: Moderate degenerative changes of the spine. Degenerative disc disease L1-L2 and L2-L3. Slight retrolisthesis of L2 on L3. Slight anterolisthesis of L4 on L5. Status post laminectomies at L3 and L4. Levoscoliosis of the lumbar spine.    IMPRESSION: Two nonobstructing tiny left renal calculi.  No evidence of ureteric calculus. No hydronephrosis.        < from: MR Thoracic Spine No Cont (20 @ 18:22) >  EXAM:  MR SPINE THORACIC                          PROCEDURE DATE:  2020          INTERPRETATION:  Indy SAVAGE MD, have reviewed the images and the report and agree with the findings with the additional modification: There is motion artifact that limits this evaluation. There is kyphosis of the thoracic spine. There is mild loss of disc height and T2 signal throughout the thoracic spine consistent with desiccation. There is no evidence of abnormal signal changes within the spinal cord.    There are mild patchy opacities seen at the bases of the lungs that may represent pneumonia.    PROCEDURE: MRI thoracic spine without contrast    INDICATION: Back pain. Decreased sensation and tingling of the right lower extremity, as well as urinaryincontinence. Right lower extremity weakness and muscle wasting on physical examination. Covid positive. Evaluate for cord compression.    TECHNIQUE: Sagittal T1, T2 and STIR, and axial T1 and T2 weighted images of the thoracic spine were obtained.     COMPARISON: None    FINDINGS: The MRI examination demonstrates the thoracic alignment to be intact. The vertebral body heights and intervertebral disc spaces are maintained. The vertebral body marrow signal is appropriate for the patient's stated age. No abnormal signal is identified within the thoracic cord. The conus medullaris ends at the T12 level. There are mild degenerative changes of the thoracic spine.    There is a posterior disc bulge at the C7/T1 level, contributing to mild to moderate central canal stenosis at this level no disc herniation, spinal canal stenosis or neural foramen narrowing.    Multiple bilateral renal cysts, the largest measuring 3.5 cm within the upper pole of the right kidney.    IMPRESSION:   1.  No abnormal cord signal to suggest acute cord compression.  2.  Posterior disc bulge at the C7/T1 level contributing to moderate central canal stenosis at this level.      < from: MR Lumbar Spine No Cont (20 @ 18:47) >    EXAM:  MR SPINE LUMBAR                          PROCEDURE DATE:  2020          INTERPRETATION:  Indy SAVAGE MD, have reviewed the images and the report and agree with the findings.    Status post bilateral laminectomies at L3/L4.    Levoscoliosis of the lumbar spine with apex at L2/L3. Grade 1 anterolisthesis of L4 on L5. Minimal retrolisthesis of L1 on L2 and L2 on L3 (approximately 2 mm). There is exaggeration of the lumbar lordosis. Loss of disc height and T2 signal consistent with with moderate desiccation at L1/L2 and L2/L3 and mild to moderate desiccation at L3/L4 and L4/L5.    The spinal cord terminates at the T12/L1 level and demonstrates no evidence of abnormal signal changes.    At the L4/L5 level there is unroofing of the posterior aspect of the disc space due to the anterolisthesis. There is a superimposed diffuse disc bulge flattening the ventral thecal sac and contacting the bilateral distal L4 foraminal nerve roots. There is severe facet degenerative changes. Thereis moderate bilateral foraminal narrowing. There has been decompression at this level.    At the L3/L4 level there is a diffuse disc bulge contacting the ventral thecal sac and compressing the bilateral L3 foraminal nerve roots. There is severe bilateral foraminal narrowing. There is moderate facet degenerative changes. There is been decompression at this level.    At the L2/L3 level there is a mild diffuse disc bulge compressing the right L2 foraminal nerve root and flattening the ventral thecalsac. There is moderate facet degenerative changes. There is severe bilateral foraminal narrowing. The constellation of findings is causing mild spinal canal stenosis.    At the L1/L2 level there is a diffuse disc bulge and osteophyte flattening the ventral thecal sac. There is moderate facet and ligamentous hypertrophy. There is moderate to severe bilateral foraminal narrowing. Constellation of findings is causing mild to moderate spinal canal stenosis.      PROCEDURE: MRI of the lumbar spine without contrast    INDICATION: Back pain. Decreased sensation and tingling of the right lower extremity, as well as urinary incontinence. Right lower extremity weakness and muscle wasting on physical examination. Covid positive. Evaluate for cord compression.    TECHNIQUE: Sagittal T1, T2, STIR, and axial T1 and T2 weighted images of the lumbar spine were obtained.     COMPARISON: None    FINDINGS:     There is moderate levoscoliosis of the lumbar spine with the apex at the L2/L3 level. There is 3 mm of anterolisthesis of L4 on L5 and 4 mm of retrolisthesis of L2 on L3. Intervertebral disc space narrowing is noted at the L1/L2 and L2/L3 levels. There is decreased disc signal at the L1/L2, L2/L3, L3/L4, and L4/L5 levels, compatible with disc dehydration. The vertebral body heights are maintained. The vertebral body marrow signal is appropriate for the patient's stated age, slightly decreased in signal.     The conus medullaris ends at T12/L1.    At the L1/2 level, there is a diffuse disc bulge. There is severe right and moderate left neural foraminal narrowing. No spinal canal stenosis.    At the L2/3 level, there is a diffuse disc bulge. There is severe right and moderate left neural foraminal narrowing. No spinal canal stenosis.    At the L3/4 level, there is a diffuse disc bulge. There is moderate to severe bilateral neural foraminal narrowing. No central canal stenosis.    At the L4/5 level, there is a mild diffuse disc bulge. There is mild bilateral neural foraminal narrowing. Nospinal canal stenosis.    At the L5/S1 level, there is no disc herniation. There is no spinal canal stenosis or neural foramen narrowing.    IMPRESSION:   Multilevel disc herniations with multilevel neural foraminal narrowing as above.          Vital Signs Last 24 Hrs  T(C): 39.4 (07 May 2020 08:55), Max: 39.4 (07 May 2020 08:55)  T(F): 103 (07 May 2020 08:55), Max: 103 (07 May 2020 08:55)  HR: 80 (07 May 2020 08:55) (76 - 121)  BP: 148/90 (07 May 2020 08:55) (103/71 - 153/75)  BP(mean): --  RR: 20 (07 May 2020 08:55) (18 - 24)  SpO2: 96% (07 May 2020 08:55) (93% - 100%)    REVIEW OF SYSTEMS: per HPI      Physical Exam: on COVID isolation, in accordance with current standards limiting patient contact, please refer to exam performed on 2020    	Constitutional: WDWN, in distress because of pain in his back and legs   	Head: NC/AT  	Eyes: PERRL, EOMI, anicteric sclera  	ENT: no nasal discharge; uvula midline, no oropharyngeal erythema or exudates; dry mucus membrane   	Neck: supple; no JVD or thyromegaly  	Respiratory: CTA B/L; no W/, mild crackles in the bases specially in the left lower lobe, no retractions  	Cardiac: +S1/S2; RRR; no M/R/G; PMI non-displaced  	Gastrointestinal: abdomen soft, NT/ND; no rebound or guarding; +BSx4  	Back: spine midline, midline tenderness in the lower spines but no step-offs; no CVAT B/L  	Extremities: WWP, no clubbing or cyanosis; no peripheral edema  	Musculoskeletal: NROM x4; right knee with slight swelling, tenderness and erythema and warmness   	Vascular: 2+ radial, femoral, DP/PT pulses B/L  	Dermatologic: skin warm, dry and intact; no rashes, wounds, or scars  	Lymphatic: no submandibular or cervical LAD  	Neurologic: AAOx3; CNII-XII grossly intact; Right LE: can only slide the leg on the bed, 3/5 forces in the foot muscles with plantar and dorsi flexion, Left LE: 2/5 in the proximal muscles, 3-4/5 in the distal muscles for plantar and dorsi flexion    Psychiatric: affect and characteristics of appearance, verbalizations, behaviors are appropriate      PM&R Impression:    1)  multilevel L disc herniations    Plan:    1) Physical therapy focusing on therapeutic exercises, bed mobility/transfer out of bed evaluation, progressive ambulation with assistive devices prn.    2) Anticipated Disposition Plan/Recs:  pending functional progress

## 2020-05-07 NOTE — PROGRESS NOTE ADULT - PROBLEM SELECTOR PLAN 3
Pt with sore throat and runny nose and mild diarrhea for the past few days but no SOB or cough, was found to be positive for COVID. Had fever and tachycardia in ED but no O2 desaturation. currently sating 98% on RA. CXR clear.     COVID positive, Ferritin 2537, CRP 3.33    - Pt mildly symptomatic and does not require O2 supplement; will not start COVID treatment at this time.   - Monitor O2 saturation and symptoms  - Trending inflammatory markers daily  - tylenol for fever      #transaminitis and elevated bili:  Pt with bili 2.7 and elevated AST and ALT but normal Alk-p.  can be due to COVID infection or due to gallbladder stones given the US findings. Improved with fluid therapy    - monitor LFT  - will need outpt surgery follow up for cholelithiasis Pt with sore throat and runny nose and mild diarrhea for the past few days but no SOB or cough, was found to be positive for COVID. Had fever and tachycardia in ED but no O2 desaturation. currently sating 98% on RA. CXR clear.     COVID positive, Ferritin 2537, CRP 3.33    - Pt mildly symptomatic and does not require O2 supplement; will not start COVID treatment at this time.   - Monitor O2 saturation and symptoms  - Trending inflammatory markers daily  - tylenol for fever      #transaminitis and elevated bili:  Pt with bili 2.7 and elevated AST and ALT but normal Alk-p.  can be due to COVID infection or due to gallbladder stones given the US findings. Improved with fluid therapy  - monitor LFT  - will need outpt surgery follow up for cholelithiasis Pt with chronic radiculopathy and low back pain due to disc herniation s/p lumbar decompression in 1/2019. s/p sural nerve biopsy on 7/25 due to muscle weakness, presented with worsening low back pain after fall few days before presentation as well as worsening weakness in the LEs mainly in the right side and few weeks of urinary incontinence. exam shows weakness of the LEs.   MRI spine showed Multilevel disc herniations with multilevel neural foraminal narrowing but no signs of acute cord compression.     Likely worsening of the chronic radiculopathy due to worsening of spinal stenosis due to disc herniations.   - Continue his home dose of oxycodone 5 q4 PRN  - Lidocaine patch 2 patches every day  - holding home Nucenta ER 100mg   - c/w home Gabapentin 400 tid  - No neurosurgical intervention at this time   - evaluated by Dr. Brito (outpatient neurologist) -> plan for EMG in AM   - PT/OT evaluation

## 2020-05-07 NOTE — PROGRESS NOTE ADULT - PROBLEM SELECTOR PLAN 7
Pt with hx of HTN on lisinopril 10 at home  Currently with symptoms of sepsis, dehydration, SETH     - Will hold lisinopril for now   - Will resume when more stable and has high BP Pt with hx of HTN on lisinopril 10 at home  Currently with symptoms of sepsis, dehydration, SETH   - Will hold lisinopril for now   - Will resume when more stable and has high BP Pt with hx of DVT and PE, on Eliquis 5 daily at home. DVT reported in 2018, unclear indication for AC at this time.   - c/w home Eliquis 5mg BID qd     #Thrombocytopenia  Patient with baseline plt 110s, 84 on arrival and decreasing to 68 this AM. Possibly 2/2 viral infection and sepsis. No evidence of bleeding.  - hold home Aspirin 81mg  - f/u blue top platelets  - f/u Hep C, consent for HIV   - monitor for signs of bleeding

## 2020-05-07 NOTE — CONSULT NOTE ADULT - ASSESSMENT
per Internal Medicine    A 69 years old M with PMH of progressive lower extremity weakness, s/p lumbar decompression in 1/2019. 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         Problem/Plan - 1:  ·  Problem: Severe sepsis.  Plan: Pt has had sore throat and diarrhea in the past few days as well as knee pain and back pain. presented with Fever 101.9, tachycardia and lactate 2.7. fever improved with tylenol and lactate cleared with some IVF.   COVID was positive. WBC was normal. procal is 0.28.     sepsis is likely due to COVID as well as dehydration. OM less likely given negative MRI. Knee pain and redness also can be due to septic joint and the source of sepsis. Also with many gallstones and elevated bili and transaminitis although normal ALP, negative cage sign and normal CBD so less likely source of infection.    - Pending blood culturesx2  - Pt does not require O2; will not treat COVID at this time  - Fever management with tylenol   - pending xray of right knee to rule out septic joint   - ortho recs for knee arthritis.    Problem/Plan - 2:  ·  Problem: Acute midline low back pain, unspecified whether sciatica present.  Plan: Pt with chronic radiculopathy and low back pain due to disc herniation s/p lumbar decompression in 1/2019. s/p sural nerve biopsy on 7/25 due to muscle weakness, presented with worsening low back pain after fall few days before presentation as well as worsening weakness in the LEs mainly in the right side and few weeks of urinary incontinence. exam shows weakness of the LEs.   MRI spine showed Multilevel disc herniations with multilevel neural foraminal narrowing but no signs of acute cord compression.     Likely worsening of the chronic radiculopathy due to worsening of spinal stenosis due to disc herniations.     - Continue his home dose of oxycodone 5 q4 PRN  - Lidocaine patch 2 patches every day  - c/w home Gabapentin 400 tid  - Appreciate neurosurgery recs; they cleared the pt for emergent surgical intervention  - Will contact Dr. Brito his neurologist in AM for pain management   - PT/OT evaluation in AM.     Problem/Plan - 3:  ·  Problem: Pneumonia due to COVID-19 virus.  Plan: Pt with sore throat and runny nose and mild diarrhea for the past few days but no SOB or cough, was found to be positive for COVID. Had fever and tachycardia in ED but no O2 desaturation. currently sating 98% on RA. CXR clear.     COVID positive, Ferritin 2537, CRP 3.33    - Pt mildly symptomatic and does not require O2 supplement; will not start COVID treatment at this time.   - Monitor O2 saturation and symptoms  - Trending inflammatory markers daily  - tylenol for fever      #transaminitis and elevated bili:  Pt with bili 2.7 and elevated AST and ALT but normal Alk-p.  can be due to COVID infection or due to gallbladder stones given the US findings. Improved with fluid therapy    - monitor LFT  - will need outpt surgery follow up for cholelithiasis.     Problem/Plan - 4:  ·  Problem: Arthritis of knee, right.  Plan: Pt complains of pain in the right knee with unknown duration. Has mild swelling as well as tenderness and redness in the right knee.   WBC is normal, has CRP 3.33 that can be due to COVID infection, porcal is mildly elevated 0.28. uric acid is 10.1.     Can be gout or pseudogout. Septic joint on DDx as well    - pending Xray of knee; If showed effusion will consult ortho for arthrocentesis   - will not start him on ABx at this time; fever and tachycardia are likely due to COVID infection and dehydration  - Pending ortho eval.    Problem/Plan - 5:  ·  Problem: SETH (acute kidney injury).  Plan: Pt with baseline Cr 1.1-1.2 presented with Cr 1.88 and BUN 52 likely due to prerenal azotemia likely due to dehydration following having diarrhea for few days and not eating and drinking well. responded to fluid therapy and decreased to 1.68. Urine lytes also were compatible with pre-renal causes, s/p 1.5 L of IVF in ED    Prerenal azotemia   - S/p Normal saline 1500cc  - avoid nephrotoxic meds at this time   - monitor kidney function    #hematuria:   pt complains of blood in urine and ua showed large blood,  lilkely due to non-obstructing nephrolithiasis as seen on CT     - will monitor the urine for gross hematuria   - c/w AC for now.    Problem/Plan - 6:  Problem: DVT (deep venous thrombosis). Plan: Pt with hx of DVT and PE, on Eliquis 5 daily at home  - Will resume eliquis 5 daily.    Problem/Plan - 7:  ·  Problem: HTN (hypertension).  Plan: Pt with hx of HTN on lisinopril 10 at home  Currently with symptoms of sepsis, dehydration, SETH     - Will hold lisinopril for now   - Will resume when more stable and has high BP.     Problem/Plan - 8:  ·  Problem: Glaucoma.  Plan: Pt with hx of glaucoma takes eye drops.   resumed his drops.     Problem/Plan - 9:  ·  Problem: Neuropathy.  Plan: Has a chronic neuropathy likely secondary to disc herniation. on gabapentin 400 tid at home.   - Currently in worsening back pain and tingling and numbness   - Will resume his gabapentin 400 tid  - will call Dr. Brito in AM.     Problem/Plan - 10:  Problem: Prophylactic measure. Plan; Fluids: s/p __ NS, 500 bolus  Electrolytes-replete as needed  Nutrition - DASH/TLC  Code- Full Code  DVT ppx: Eliquis   GI ppx: none needed  DIspo: [facility].

## 2020-05-07 NOTE — CONSULT NOTE ADULT - ASSESSMENT
Leg pain and weakness (Although perceived weakness may be secondary to pain, since exam is very limited from pain)  Creatine kinase elevated - may be from recent falls - please trend daily for now  LE duplex - no DVT  Will likely perform EMG tomorrow to r/o myositis or other neuromuscular etiology

## 2020-05-07 NOTE — PROGRESS NOTE ADULT - SUBJECTIVE AND OBJECTIVE BOX
HISTORY OF PRESENT ILLNESS from ED 20:   69 year old male with history of lumbar decompression (2019 out of [facility]), s/p sural nerve biopsy on 19 for ongoing lower extremity weakness and atrophy and had a subsequent infection of the the sural wound. Today pt presents to ED with concern for low back pain status post mechanical slip and fall while ambulating with walker 3 days ago.  Patient notes he experienced pain to low back immediately, however did not want to come to ED as he was concerned given current COVID-19 pandemic.  Pt c/o b/l LE pain and notes decreased sensation to RLE. Pt c/o occasional urinary incontinence, but more because he cant make it to the bathroom in time.  He also admits to groin numbness and sensation of tingling throughout RLE.  Of note, pt has RLE weakness and muscle wasting is noted.  Patient denies fever (though noted to be febrile on arrival to ED), chills, chest pain, headache, hit to head, neck or back pain, shortness of breath, abdominal pain, nausea, emesis, changes to bowel movements, rashes, peripheral edema or any additional acute complaints or concerns at this time.  Patient was evaluated by his neurologist, Dr. Brito, today and instructed to come to ED for further evaluation.    S/Overnight events:  No overnight events.    Hospital Course:  Admitted through ED for COVID sepsis and workup.  : Patient disoriented.  Poor historian.  States urinary urgency and groin numbness for months.  Also complains of generalized pain "all over."  Denies new overnight events.    PAST MEDICAL & SURGICAL HISTORY:  Mitral valve insufficiency  Cardiomegaly     Glaucoma  Neuropathy  DVT (deep venous thrombosis): left leg, 2018  Pulmonary embolism  HTN (hypertension)  History of appendectomy  History of tonsillectomy  History of lumbar laminectomy: 2019  Degeneration of intervertebral disc of thoracic region:   History of foot surgery: right foot  H/O cervical spine surgery: with hardware    Vital Signs Last 24 Hrs  T(C): 39.4 (07 May 2020 08:55), Max: 39.4 (07 May 2020 08:55)  T(F): 103 (07 May 2020 08:55), Max: 103 (07 May 2020 08:55)  HR: 80 (07 May 2020 08:55) (76 - 121)  BP: 148/90 (07 May 2020 08:55) (103/71 - 153/75)  BP(mean): --  RR: 20 (07 May 2020 08:55) (18 - 24)  SpO2: 96% (07 May 2020 08:55) (93% - 100%)    PHYSICAL EXAM:  Constitutional:  69 y/o  male awake, alert in no acute distress. ORIENTED TO SELF ONLY.  Eyes:  Sclera anicteric, conjunctiva noninjected.  ENMT: Oropharyngeal mucosa moist, pink. Tongue midline.    Neck: Neck supple.  Respiratory: Symmetric chest rise, no accessory muscles used.  Cardiovascular: Regular rate and rhythm.  S1, S2+  Gastrointestinal:  Soft, nontender, nondistended.  +BS.  Genitourinary:  Deferred.  Rectal: Deferred.  Vascular: Extremities warm, no ulcers, no discoloration of skin.   Neurological: Gen: AA&O xPERSON only.  Short attention. Poor historian.       CN II-XII grossly intact. PERRL.    Motor: HOLLAND x 4, 4/5 throughout UE B/L.  LE 3/5 B/L throughout.    Sens: Unable to accurately assess sensation to light touch/pain.    Plantar downgoing bilaterally.  No clonus.      No pronator drift, no dysmetria.    Patient jumps up off bed when his lower extremities are touched on exam and complains of pain.  Skin: Warm, dry, no erythema.      LABS:                      15.3   4.08  )-----------( 84       ( 06 May 2020 12:45 )             46.0     05-07    148<H>  |  114<H>  |  33<H>  ----------------------------<  94  5.1   |  22  |  1.38<H>    Ca    8.9      07 May 2020 07:38  Phos  1.7     05-07  Mg     2.1     05-07    TPro  7.5  /  Alb  3.8  /  TBili  2.3<H>  /  DBili  x   /  AST  87<H>  /  ALT  44  /  AlkPhos  37<L>  05-07    PT/INR - ( 07 May 2020 07:55 )   PT: 24.5 sec;   INR: 2.10          PTT - ( 07 May 2020 07:55 )  PTT:29.5 sec  Urinalysis Basic - ( 06 May 2020 14:17 )    Color: Yellow / Appearance: Clear / S.020 / pH: x  Gluc: x / Ketone: NEGATIVE  / Bili: Negative / Urobili: 2.0 E.U./dL   Blood: x / Protein: 30 mg/dL / Nitrite: NEGATIVE   Leuk Esterase: NEGATIVE / RBC: 5-10 /HPF / WBC < 5 /HPF   Sq Epi: x / Non Sq Epi: 0-5 /HPF / Bacteria: Present /HPF      CARDIAC MARKERS ( 07 May 2020 07:38 )  x     / x     / 2358 U/L / x     / x          CAPILLARY BLOOD GLUCOSE          Drug Levels: [] N/A    CSF Analysis: [] N/A      Allergies    No Known Allergies    Intolerances      MEDICATIONS:  Antibiotics:    Neuro:  acetaminophen   Tablet .. 650 milliGRAM(s) Oral every 6 hours PRN  gabapentin 400 milliGRAM(s) Oral three times a day  oxyCODONE    IR 10 milliGRAM(s) Oral every 4 hours PRN    Anticoagulation:  apixaban 5 milliGRAM(s) Oral every 12 hours  aspirin  chewable 81 milliGRAM(s) Oral every 24 hours    OTHER:  dorzolamide 2%/timolol 0.5% Ophthalmic Solution 1 Drop(s) Both EYES every 12 hours  famotidine    Tablet 20 milliGRAM(s) Oral every 24 hours  latanoprost 0.005% Ophthalmic Solution 1 Drop(s) Both EYES at bedtime  lidocaine   Patch 2 Patch Transdermal every 24 hours    IVF:  folic acid 1 milliGRAM(s) Oral daily    CULTURES:  Culture Results:   No growth at 12 hours ( @ 14:13)  Culture Results:   No growth at 12 hours ( @ 14:13)    RADIOLOGY & ADDITIONAL TESTS:  Imaging reviewed with Dr. Shepherd: < from: MR Thoracic Spine No Cont (20 @ 18:22) >  1.  No abnormal cord signal to suggest acute cord compression.  2.  Posterior disc bulge at the C7/T1 level contributing to moderate central canal stenosis at this level.    < end of copied text >  < from: MR Lumbar Spine No Cont (20 @ 18:47) >  At the L1/2 level, there is a diffuse disc bulge. There is severe right and moderate left neural foraminal narrowing. No spinal canal stenosis.    At the L2/3 level, there is a diffuse disc bulge. There is severe right and moderate left neural foraminal narrowing. No spinal canal stenosis.    At the L3/4 level, there is a diffuse disc bulge. There is moderate to severe bilateral neural foraminal narrowing. No central canal stenosis.    At the L4/5 level, there is a mild diffuse disc bulge. There is mild bilateral neural foraminal narrowing. No spinal canal stenosis.    At the L5/S1 level, there is no disc herniation. There is no spinal canal stenosis or neural foramen narrowing.    < end of copied text >    ASSESSMENT:  70y Male COVID+ admitted to medicine for altered mental status, treatment of sepsis, acute renal injury, h/o fall 3 weeks ago with reported urinary urgency/groin numbness.  No acute findings on MRI T/L Spine to explain his exam (performed without contrast due to SETH).  Recommend workup for encephalopathy, recommend CT Head, MRI C-Spine and Brain.  No acute neurosurgical intervention warranted.  Recommend continued workup as per neurology Dr. Brito and medicine, appreciated.    Thank you for this consultation.  Neurosurgery to sign off for now, please recall as needed. [phone].    All above d/w Dr. Casey Shepherd.

## 2020-05-07 NOTE — PROGRESS NOTE ADULT - PROBLEM SELECTOR PLAN 10
Fluids: s/p __ NS, 500 bolus  Electrolytes-replete as needed  Nutrition - DASH/TLC  Code- Full Code  DVT ppx: Eliquis   GI ppx: none needed  DIspo: [facility] Fluids: N/A  Electrolytes-replete as needed  Nutrition - DASH/TLC  Code- Full Code  DVT ppx: Eliquis   GI ppx: none needed  DIspo: [facility] Fluids: N/A  Electrolytes-replete as needed  Nutrition - DASH/TLC  Code- Full Code  DVT ppx: Eliquis   GI ppx: none needed  DIspo: [facility]    DISCHARGE PLANNING:  Medical readiness goals: pending PT evaluation  Anticipated d/c: 24-48 hours     PCP:     Pharmacy:

## 2020-05-07 NOTE — CONSULT NOTE ADULT - SUBJECTIVE AND OBJECTIVE BOX
HPI: PT known to me from outpatient setting, with a history of lumbar spondylosis and right L4 radiculopathy with right leg weakness, s/p lumbar decompression at [facility] 1/2019 with improvement, s/p sural nerve biopsy to with no evidence of inflammatory neuropathy.   Pt sent to ER by myself and Dr. Yap (pain management) due to 3-4 days of severe leg and back pain with leg weakness after a fall. Initially there was a concern for cauda equina syndrome however this was not present on MRI of the lumbar / thoracic spine.   On admission to ER was found to be febrile and covid PCR positive; he had no complains of SOB or fever / cough    Location: both legs, especially groin   Quality: pain, weakness   Severity: severe 10/10  Duration: 4 days  Timing: constant   Context: history of lumbar spine surgery   Modifying Factors: not better with tylenol  Associated signs and symptoms: numbness in legs     Data reviewed:  Labs:  05-07    148<H>  |  114<H>  |  33<H>  ----------------------------<  94  5.1   |  22  |  1.38<H>    Ca    8.9      07 May 2020 07:38  Phos  1.7     05-07  Mg     2.1     05-07    TPro  7.5  /  Alb  3.8  /  TBili  2.3<H>  /  DBili  x   /  AST  87<H>  /  ALT  44  /  AlkPhos  37<L>  05-07                        12.3   3.80  )-----------( 68       ( 07 May 2020 12:10 )             37.3   Creatine Kinase, Serum: 2358 U/L (05.07.20 @ 07:38)    Imaging (independently reviewed):   MRI T and L spine - no cord or cauda equina compression    ROS: 13 pt review of systems performed and reviewed with patient (General, Eyes, Ears, Cardiovascular, Respiratory, Gastrointestinal, Genitourinary, Musculoskeletal, Skin, Endocrine, Hematologic, Psychiatric, Neurologic); negative except as noted above.    PAST MEDICAL & SURGICAL HISTORY:  Mitral valve insufficiency  Cardiomegaly  Glaucoma  Neuropathy  DVT (deep venous thrombosis): left leg, 2018  Pulmonary embolism  HTN (hypertension)  History of appendectomy  History of tonsillectomy  History of lumbar laminectomy: 1/2019  Degeneration of intervertebral disc of thoracic region: 2015  History of foot surgery: right foot  H/O cervical spine surgery: with hardware      FAMILY HISTORY:      Social History:     Allergies    No Known Allergies    Intolerances        Home Medications:  alfuzosin 10 mg oral tablet, extended release: 1 tab(s) orally once a day (24 Jul 2019 12:43)  Dorzolamide Hydrochloride-Timolol Maleate: 1 drop(s) to each affected eye 2 times a day BOTH EYES (24 Jul 2019 12:43)  folic acid 1 mg oral tablet: 1 tab(s) orally once a day (24 Jul 2019 12:43)  latanoprost 0.005% ophthalmic solution: 1 drop(s) to each affected eye once a day (in the evening) BOTH EYES (24 Jul 2019 12:43)  lisinopril 10 mg oral tablet: 1 tab(s) orally once a day (24 Jul 2019 12:43)  Nucynta  mg oral tablet, extended release: 1 tab(s) orally 2 times a day (24 Jul 2019 12:43)      Vital Signs Last 24 Hrs  T(C): 38.3 (07 May 2020 10:19), Max: 39.4 (07 May 2020 08:55)  T(F): 100.9 (07 May 2020 10:19), Max: 103 (07 May 2020 08:55)  HR: 80 (07 May 2020 08:55) (76 - 82)  BP: 148/90 (07 May 2020 08:55) (112/75 - 153/75)  BP(mean): --  RR: 20 (07 May 2020 08:55) (18 - 24)  SpO2: 96% (07 May 2020 08:55) (96% - 98%)    Exam:  Gen: appears uncomfortable, in pain, no respiratory distress    MS: awake, alert, speech fluent, comprehension intact, follows commands    CN: PERRL, EOMI, visual fields full, facial strength and sensation intact and symmetric, palate elevation symmetric, tongue midline, no tongue atrophy or fasciculations    Motor: normal bulk and tone; UE strength 5/5 symmetric, LE strength testing limited due to pain, at least 4- in proximal LE and at least 4+ distally     Sensory: light touch intact and symmetric throughout    Reflexes: 2+ UE and left patellar, absent right patellar and b/l achilles    Coordination: no dysmetria on finger to nose    Gait: deferred

## 2020-05-07 NOTE — PROGRESS NOTE ADULT - PROBLEM SELECTOR PLAN 1
Pt has had sore throat and diarrhea in the past few days as well as knee pain and back pain. presented with Fever 101.9, tachycardia and lactate 2.7. fever improved with tylenol and lactate cleared with some IVF.   COVID was positive. WBC was normal. procal is 0.28.     sepsis is likely due to COVID as well as dehydration. OM less likely given negative MRI. Knee pain and redness also can be due to septic joint and the source of sepsis. Also with many gallstones and elevated bili and transaminitis although normal ALP, negative cage sign and normal CBD so less likely source of infection.    - Pending blood culturesx2  - Pt does not require O2; will not treat COVID at this time  - Fever management with tylenol   - pending xray of right knee to rule out septic joint   - ortho recs for knee arthritis Pt has had sore throat and diarrhea in the past few days as well as knee pain and back pain. Febrile to 101.9, tachycardia and lactate 2.7. fever. COVID was positive. WBC was normal. procal is 0.28. Sepsis is likely due to COVID as well as dehydration. OM less likely given negative MRI. Knee pain and redness also can be due to septic joint and the source of sepsis. Also with many gallstones and elevated bili and transaminitis although normal ALP, negative cage sign and normal CBD so less likely source of infection.  - Blood cultures NGTD  - Fever management with tylenol   - Knee xray w/o evidence of septic joint   - Tylenol for fever Patient with severe sepsis criteria on admission (fever, tachycardia and elevated lactate), with complaints of URI and diarrhea. Found to COVID-19 positive. No evidence of leukocytosis, procalcitonin 0.28.  WBC was normal. procal is 0.28. Sepsis is likely due to COVID as well as dehydration. Right knee with erythema, no evidence of effusion. Gallstones present, Perdue sign negative.  - Blood cultures NGTD  - Fever management with tylenol   - Knee xray w/o evidence of septic joint   - Tylenol for fever  - COVID management as below

## 2020-05-07 NOTE — PROGRESS NOTE ADULT - PROBLEM SELECTOR PLAN 4
Pt complains of pain in the right knee with unknown duration. Has mild swelling as well as tenderness and redness in the right knee.   WBC is normal, has CRP 3.33 that can be due to COVID infection, porcal is mildly elevated 0.28. uric acid is 10.1.     Can be gout or pseudogout. Septic joint on DDx as well    - pending Xray of knee; If showed effusion will consult ortho for arthrocentesis   - will not start him on ABx at this time; fever and tachycardia are likely due to COVID infection and dehydration  - Pending ortho eval Pt complains of pain in the right knee with unknown duration. Has mild swelling as well as tenderness and redness in the right knee.   WBC is normal, has CRP 3.33 that can be due to COVID infection, porcal is mildly elevated 0.28. uric acid is 10.1.   Can be gout or pseudogout. Septic joint on DDx as well  - Knee xray without evidence of effusion   - f/u PT eval Pt with sore throat and runny nose and mild diarrhea for the past few days but no SOB or cough, was found to be positive for COVID. Had fever and tachycardia in ED but no O2 desaturation. currently sating 98% on RA. CXR clear.     COVID positive, Ferritin 2537, CRP 3.33    - Pt mildly symptomatic and does not require O2 supplement; will not start COVID treatment at this time.   - Monitor O2 saturation and symptoms  - Trending inflammatory markers daily  - tylenol for fever      #transaminitis and elevated bili:  Pt with bili 2.7 and elevated AST and ALT but normal Alk-p.  can be due to COVID infection or due to gallbladder stones given the US findings. Improved with fluid therapy  - monitor LFT  - will need outpt surgery follow up for cholelithiasis

## 2020-05-07 NOTE — PROGRESS NOTE ADULT - PROBLEM SELECTOR PLAN 5
Pt with baseline Cr 1.1-1.2 presented with Cr 1.88 and BUN 52 likely due to prerenal azotemia likely due to dehydration following having diarrhea for few days and not eating and drinking well. responded to fluid therapy and decreased to 1.68. Urine lytes also were compatible with pre-renal causes, s/p 1.5 L of IVF in ED    Prerenal azotemia   - S/p Normal saline 1500cc  - avoid nephrotoxic meds at this time   - monitor kidney function    #hematuria:   pt complains of blood in urine and ua showed large blood,  lilkely due to non-obstructing nephrolithiasis as seen on CT     - will monitor the urine for gross hematuria   - c/w AC for now Pt with baseline Cr 1.1-1.2 presented with Cr 1.88 and BUN 52 likely due to prerenal azotemia likely due to dehydration following having diarrhea for few days and not eating and drinking well. responded to fluid therapy and decreased to 1.68. Urine lytes also were compatible with pre-renal causes, s/p 1.5 L of IVF in ED    Prerenal azotemia   - S/p Normal saline 1500cc  - avoid nephrotoxic meds at this time   - monitor kidney function    #hematuria:   pt complains of blood in urine and ua showed large blood,  lilkely due to non-obstructing nephrolithiasis as seen on CT   - will monitor the urine for gross hematuria   - c/w AC for now Pt complains of pain in the right knee with unknown duration. Has mild swelling as well as tenderness and redness in the right knee.   WBC is normal, has CRP 3.33 that can be due to COVID infection, porcal is mildly elevated 0.28. uric acid is 10.1.   Can be gout or pseudogout. Septic joint on DDx as well  - Knee xray without evidence of effusion   - f/u PT eval

## 2020-05-07 NOTE — PROGRESS NOTE ADULT - PROBLEM SELECTOR PLAN 9
Has a chronic neuropathy likely secondary to disc herniation. on gabapentin 400 tid at home.   - Currently in worsening back pain and tingling and numbness   - Will resume his gabapentin 400 tid  - will call Dr. Brito in AM Has a chronic neuropathy likely secondary to disc herniation. on gabapentin 400 tid at home. Currently in worsening back pain and tingling and numbness. Follows with Dr. Brito outpatient.   - LE dopplers negative for DVT  - CPK elevated -> concern for myositis as underlying etiology   - trend CPK   - EMG in AM    - c/w home gabapentin 400 tid    #Glaucoma  Patient with history of glaucoma.   - c/w home latanoprost 0.005%

## 2020-05-07 NOTE — PROGRESS NOTE ADULT - SUBJECTIVE AND OBJECTIVE BOX
**Incomplete Note**    OVERNIGHT EVENTS:    SUBJECTIVE / INTERVAL HPI: Patient seen and examined at bedside.     VITAL SIGNS:  Vital Signs Last 24 Hrs  T(C): 36.7 (07 May 2020 06:23), Max: 38.8 (06 May 2020 11:57)  T(F): 98 (07 May 2020 06:23), Max: 101.9 (06 May 2020 11:57)  HR: 82 (07 May 2020 06:23) (76 - 121)  BP: 153/75 (07 May 2020 06:23) (103/71 - 153/75)  BP(mean): --  RR: 20 (07 May 2020 06:23) (18 - 24)  SpO2: 97% (07 May 2020 06:23) (93% - 100%)    PHYSICAL EXAM:    General: WDWN  HEENT: NC/AT; PERRL, anicteric sclera; MMM  Neck: supple  Cardiovascular: +S1/S2; RRR  Respiratory: CTA B/L; no W/R/R  Gastrointestinal: soft, NT/ND; +BSx4  Extremities: WWP; no edema, clubbing or cyanosis  Vascular: 2+ radial, DP/PT pulses B/L  Neurological: AAOx3; no focal deficits    MEDICATIONS:  MEDICATIONS  (STANDING):  apixaban 5 milliGRAM(s) Oral every 12 hours  aspirin  chewable 81 milliGRAM(s) Oral every 24 hours  dorzolamide 2%/timolol 0.5% Ophthalmic Solution 1 Drop(s) Both EYES every 12 hours  famotidine    Tablet 20 milliGRAM(s) Oral every 24 hours  folic acid 1 milliGRAM(s) Oral daily  gabapentin 400 milliGRAM(s) Oral three times a day  latanoprost 0.005% Ophthalmic Solution 1 Drop(s) Both EYES at bedtime  lidocaine   Patch 2 Patch Transdermal every 24 hours    MEDICATIONS  (PRN):  acetaminophen   Tablet .. 650 milliGRAM(s) Oral every 6 hours PRN Temp greater or equal to 38.5C (101.3F)  oxyCODONE    IR 10 milliGRAM(s) Oral every 4 hours PRN Moderate Pain (4 - 6)      ALLERGIES:  Allergies    No Known Allergies    Intolerances        LABS:                        15.3   4.08  )-----------( 84       ( 06 May 2020 12:45 )             46.0     05-06    143  |  110<H>  |  45<H>  ----------------------------<  109<H>  4.6   |  19<L>  |  1.68<H>    Ca    8.6      06 May 2020 15:11    TPro  7.1  /  Alb  3.5  /  TBili  2.1<H>  /  DBili  x   /  AST  79<H>  /  ALT  40  /  AlkPhos  35<L>  05-06    PT/INR - ( 06 May 2020 12:45 )   PT: 30.2 sec;   INR: 2.57          PTT - ( 06 May 2020 12:45 )  PTT:31.0 sec  Urinalysis Basic - ( 06 May 2020 14:17 )    Color: Yellow / Appearance: Clear / S.020 / pH: x  Gluc: x / Ketone: NEGATIVE  / Bili: Negative / Urobili: 2.0 E.U./dL   Blood: x / Protein: 30 mg/dL / Nitrite: NEGATIVE   Leuk Esterase: NEGATIVE / RBC: 5-10 /HPF / WBC < 5 /HPF   Sq Epi: x / Non Sq Epi: 0-5 /HPF / Bacteria: Present /HPF      CAPILLARY BLOOD GLUCOSE          RADIOLOGY & ADDITIONAL TESTS: Reviewed.    ASSESSMENT:    PLAN: OVERNIGHT EVENTS: No acute events overnight. Tmax 103.     SUBJECTIVE / INTERVAL HPI: Patient seen and examined at bedside this AM in full PPE, unwilling to participate in ROS, screaming in pain, stating it is in his back and legs.    VITAL SIGNS:  Vital Signs Last 24 Hrs  T(C): 36.7 (07 May 2020 06:23), Max: 38.8 (06 May 2020 11:57)  T(F): 98 (07 May 2020 06:23), Max: 101.9 (06 May 2020 11:57)  HR: 82 (07 May 2020 06:23) (76 - 121)  BP: 153/75 (07 May 2020 06:23) (103/71 - 153/75)  BP(mean): --  RR: 20 (07 May 2020 06:23) (18 - 24)  SpO2: 97% (07 May 2020 06:23) (93% - 100%)    PHYSICAL EXAM:    General: elderly male resting in bed;  HEENT: NC/AT; PERRL, anicteric sclera; MMM  Neck: supple  Cardiovascular: +S1/S2; RRR  Respiratory: CTA B/L; no W/R/R  Gastrointestinal: overly nourished; soft, NT/ND; +BSx4  Extremities: right thigh muscle wasting  Vascular: 2+ radial, DP/PT pulses B/L  Neurological: AAOx3; unwilling to participate in neurological exam 2/2 pain     MEDICATIONS:  MEDICATIONS  (STANDING):  apixaban 5 milliGRAM(s) Oral every 12 hours  aspirin  chewable 81 milliGRAM(s) Oral every 24 hours  dorzolamide 2%/timolol 0.5% Ophthalmic Solution 1 Drop(s) Both EYES every 12 hours  famotidine    Tablet 20 milliGRAM(s) Oral every 24 hours  folic acid 1 milliGRAM(s) Oral daily  gabapentin 400 milliGRAM(s) Oral three times a day  latanoprost 0.005% Ophthalmic Solution 1 Drop(s) Both EYES at bedtime  lidocaine   Patch 2 Patch Transdermal every 24 hours    MEDICATIONS  (PRN):  acetaminophen   Tablet .. 650 milliGRAM(s) Oral every 6 hours PRN Temp greater or equal to 38.5C (101.3F)  oxyCODONE    IR 10 milliGRAM(s) Oral every 4 hours PRN Moderate Pain (4 - 6)      ALLERGIES:  Allergies    No Known Allergies    Intolerances        LABS:                        15.3   4.08  )-----------( 84       ( 06 May 2020 12:45 )             46.0     05-    143  |  110<H>  |  45<H>  ----------------------------<  109<H>  4.6   |  19<L>  |  1.68<H>    Ca    8.6      06 May 2020 15:11    TPro  7.1  /  Alb  3.5  /  TBili  2.1<H>  /  DBili  x   /  AST  79<H>  /  ALT  40  /  AlkPhos  35<L>  05-    PT/INR - ( 06 May 2020 12:45 )   PT: 30.2 sec;   INR: 2.57          PTT - ( 06 May 2020 12:45 )  PTT:31.0 sec  Urinalysis Basic - ( 06 May 2020 14:17 )    Color: Yellow / Appearance: Clear / S.020 / pH: x  Gluc: x / Ketone: NEGATIVE  / Bili: Negative / Urobili: 2.0 E.U./dL   Blood: x / Protein: 30 mg/dL / Nitrite: NEGATIVE   Leuk Esterase: NEGATIVE / RBC: 5-10 /HPF / WBC < 5 /HPF   Sq Epi: x / Non Sq Epi: 0-5 /HPF / Bacteria: Present /HPF      CAPILLARY BLOOD GLUCOSE          RADIOLOGY & ADDITIONAL TESTS: Reviewed.    ASSESSMENT:    PLAN:

## 2020-05-07 NOTE — PROGRESS NOTE ADULT - PROBLEM SELECTOR PLAN 8
Pt with hx of glaucoma takes eye drops.   resumed his drops Pt with hx of HTN on lisinopril 10 at home  Currently with symptoms of sepsis, dehydration, SETH   - hold home lisinopril  - monitor BPs  - Will resume when more stable and has high BP

## 2020-05-07 NOTE — PROGRESS NOTE ADULT - PROBLEM SELECTOR PLAN 2
Pt with chronic radiculopathy and low back pain due to disc herniation s/p lumbar decompression in 1/2019. s/p sural nerve biopsy on 7/25 due to muscle weakness, presented with worsening low back pain after fall few days before presentation as well as worsening weakness in the LEs mainly in the right side and few weeks of urinary incontinence. exam shows weakness of the LEs.   MRI spine showed Multilevel disc herniations with multilevel neural foraminal narrowing but no signs of acute cord compression.     Likely worsening of the chronic radiculopathy due to worsening of spinal stenosis due to disc herniations.     - Continue his home dose of oxycodone 5 q4 PRN  - Lidocaine patch 2 patches every day  - c/w home Gabapentin 400 tid  - Appreciate neurosurgery recs; they cleared the pt for emergent surgical intervention  - Will contact Dr. Brito his neurologist in AM for pain management   - PT/OT evaluation in AM Pt with chronic radiculopathy and low back pain due to disc herniation s/p lumbar decompression in 1/2019. s/p sural nerve biopsy on 7/25 due to muscle weakness, presented with worsening low back pain after fall few days before presentation as well as worsening weakness in the LEs mainly in the right side and few weeks of urinary incontinence. exam shows weakness of the LEs.   MRI spine showed Multilevel disc herniations with multilevel neural foraminal narrowing but no signs of acute cord compression.     Likely worsening of the chronic radiculopathy due to worsening of spinal stenosis due to disc herniations.   - Continue his home dose of oxycodone 5 q4 PRN  - Lidocaine patch 2 patches every day  - holding home Nucenta ER 100mg   - c/w home Gabapentin 400 tid  - No neurosurgical intervention at this time   - evaluated by Dr. Brito (outpatient neurologist) -> plan for EMG in AM   - PT/OT evaluation in AM Patient with reported fall and multiple previous falls. Denies LOC or trauma to head.   Ambulates with walker. Unwilling to cooperate with neurological exam 2/2 to reported pain.   - PT evaluation   - neurology following

## 2020-05-08 DIAGNOSIS — Z71.89 OTHER SPECIFIED COUNSELING: ICD-10-CM

## 2020-05-08 LAB
ALBUMIN SERPL ELPH-MCNC: 3.7 G/DL — SIGNIFICANT CHANGE UP (ref 3.3–5)
ALBUMIN SERPL ELPH-MCNC: 3.8 G/DL — SIGNIFICANT CHANGE UP (ref 3.3–5)
ALP SERPL-CCNC: 40 U/L — SIGNIFICANT CHANGE UP (ref 40–120)
ALP SERPL-CCNC: SIGNIFICANT CHANGE UP (ref 40–120)
ALT FLD-CCNC: 51 U/L — HIGH (ref 10–45)
ALT FLD-CCNC: SIGNIFICANT CHANGE UP (ref 10–45)
ANION GAP SERPL CALC-SCNC: 13 MMOL/L — SIGNIFICANT CHANGE UP (ref 5–17)
ANION GAP SERPL CALC-SCNC: 15 MMOL/L — SIGNIFICANT CHANGE UP (ref 5–17)
AST SERPL-CCNC: 127 U/L — HIGH (ref 10–40)
AST SERPL-CCNC: SIGNIFICANT CHANGE UP (ref 10–40)
BASOPHILS # BLD AUTO: 0.01 K/UL — SIGNIFICANT CHANGE UP (ref 0–0.2)
BASOPHILS NFR BLD AUTO: 0.3 % — SIGNIFICANT CHANGE UP (ref 0–2)
BILIRUB SERPL-MCNC: 2 MG/DL — HIGH (ref 0.2–1.2)
BILIRUB SERPL-MCNC: 2.6 MG/DL — HIGH (ref 0.2–1.2)
BUN SERPL-MCNC: 23 MG/DL — SIGNIFICANT CHANGE UP (ref 7–23)
BUN SERPL-MCNC: 24 MG/DL — HIGH (ref 7–23)
CALCIUM SERPL-MCNC: 9.1 MG/DL — SIGNIFICANT CHANGE UP (ref 8.4–10.5)
CALCIUM SERPL-MCNC: 9.1 MG/DL — SIGNIFICANT CHANGE UP (ref 8.4–10.5)
CHLORIDE SERPL-SCNC: 112 MMOL/L — HIGH (ref 96–108)
CHLORIDE SERPL-SCNC: 113 MMOL/L — HIGH (ref 96–108)
CK MB CFR SERPL CALC: 6.6 NG/ML — SIGNIFICANT CHANGE UP (ref 0–6.7)
CK MB CFR SERPL CALC: 7.6 NG/ML — HIGH (ref 0–6.7)
CK SERPL-CCNC: 3959 U/L — HIGH (ref 30–200)
CK SERPL-CCNC: 4050 U/L — HIGH (ref 30–200)
CK SERPL-CCNC: 4527 U/L — HIGH (ref 30–200)
CK SERPL-CCNC: SIGNIFICANT CHANGE UP (ref 30–200)
CLOSURE TME COLL+EPINEP BLD: 61 K/UL — LOW (ref 150–400)
CO2 SERPL-SCNC: 21 MMOL/L — LOW (ref 22–31)
CO2 SERPL-SCNC: 23 MMOL/L — SIGNIFICANT CHANGE UP (ref 22–31)
CREAT SERPL-MCNC: 1.15 MG/DL — SIGNIFICANT CHANGE UP (ref 0.5–1.3)
CREAT SERPL-MCNC: 1.33 MG/DL — HIGH (ref 0.5–1.3)
CRP SERPL-MCNC: 4.55 MG/DL — HIGH (ref 0–0.4)
D DIMER BLD IA.RAPID-MCNC: 248 NG/ML DDU — HIGH
EOSINOPHIL # BLD AUTO: 0 K/UL — SIGNIFICANT CHANGE UP (ref 0–0.5)
EOSINOPHIL NFR BLD AUTO: 0 % — SIGNIFICANT CHANGE UP (ref 0–6)
GLUCOSE SERPL-MCNC: 107 MG/DL — HIGH (ref 70–99)
GLUCOSE SERPL-MCNC: 138 MG/DL — HIGH (ref 70–99)
HCT VFR BLD CALC: 42.7 % — SIGNIFICANT CHANGE UP (ref 39–50)
HCV AB S/CO SERPL IA: 12.83 S/CO — SIGNIFICANT CHANGE UP
HCV AB SERPL-IMP: REACTIVE
HGB BLD-MCNC: 14.2 G/DL — SIGNIFICANT CHANGE UP (ref 13–17)
IMM GRANULOCYTES NFR BLD AUTO: 0.3 % — SIGNIFICANT CHANGE UP (ref 0–1.5)
LYMPHOCYTES # BLD AUTO: 0.54 K/UL — LOW (ref 1–3.3)
LYMPHOCYTES # BLD AUTO: 14.3 % — SIGNIFICANT CHANGE UP (ref 13–44)
MAGNESIUM SERPL-MCNC: 1.9 MG/DL — SIGNIFICANT CHANGE UP (ref 1.6–2.6)
MAGNESIUM SERPL-MCNC: 2 MG/DL — SIGNIFICANT CHANGE UP (ref 1.6–2.6)
MCHC RBC-ENTMCNC: 30.6 PG — SIGNIFICANT CHANGE UP (ref 27–34)
MCHC RBC-ENTMCNC: 33.3 GM/DL — SIGNIFICANT CHANGE UP (ref 32–36)
MCV RBC AUTO: 92 FL — SIGNIFICANT CHANGE UP (ref 80–100)
MONOCYTES # BLD AUTO: 0.31 K/UL — SIGNIFICANT CHANGE UP (ref 0–0.9)
MONOCYTES NFR BLD AUTO: 8.2 % — SIGNIFICANT CHANGE UP (ref 2–14)
NEUTROPHILS # BLD AUTO: 2.9 K/UL — SIGNIFICANT CHANGE UP (ref 1.8–7.4)
NEUTROPHILS NFR BLD AUTO: 76.9 % — SIGNIFICANT CHANGE UP (ref 43–77)
NRBC # BLD: 0 /100 WBCS — SIGNIFICANT CHANGE UP (ref 0–0)
PHOSPHATE SERPL-MCNC: 1.9 MG/DL — LOW (ref 2.5–4.5)
PHOSPHATE SERPL-MCNC: 3 MG/DL — SIGNIFICANT CHANGE UP (ref 2.5–4.5)
PLATELET # BLD AUTO: 70 K/UL — LOW (ref 150–400)
POTASSIUM SERPL-MCNC: 5.2 MMOL/L — SIGNIFICANT CHANGE UP (ref 3.5–5.3)
POTASSIUM SERPL-MCNC: SIGNIFICANT CHANGE UP (ref 3.5–5.3)
POTASSIUM SERPL-SCNC: 5.2 MMOL/L — SIGNIFICANT CHANGE UP (ref 3.5–5.3)
POTASSIUM SERPL-SCNC: SIGNIFICANT CHANGE UP (ref 3.5–5.3)
PROCALCITONIN SERPL-MCNC: 0.21 NG/ML — HIGH (ref 0.02–0.1)
PROT SERPL-MCNC: 7.9 G/DL — SIGNIFICANT CHANGE UP (ref 6–8.3)
PROT SERPL-MCNC: 8 G/DL — SIGNIFICANT CHANGE UP (ref 6–8.3)
RBC # BLD: 4.64 M/UL — SIGNIFICANT CHANGE UP (ref 4.2–5.8)
RBC # FLD: 11.6 % — SIGNIFICANT CHANGE UP (ref 10.3–14.5)
SODIUM SERPL-SCNC: 148 MMOL/L — HIGH (ref 135–145)
SODIUM SERPL-SCNC: 149 MMOL/L — HIGH (ref 135–145)
TROPONIN T SERPL-MCNC: 0.04 NG/ML — CRITICAL HIGH (ref 0–0.01)
TROPONIN T SERPL-MCNC: 0.08 NG/ML — CRITICAL HIGH (ref 0–0.01)
WBC # BLD: 3.77 K/UL — LOW (ref 3.8–10.5)
WBC # FLD AUTO: 3.77 K/UL — LOW (ref 3.8–10.5)

## 2020-05-08 PROCEDURE — 93010 ELECTROCARDIOGRAM REPORT: CPT

## 2020-05-08 PROCEDURE — 99223 1ST HOSP IP/OBS HIGH 75: CPT | Mod: CS

## 2020-05-08 PROCEDURE — 95909 NRV CNDJ TST 5-6 STUDIES: CPT | Mod: 26,CS

## 2020-05-08 PROCEDURE — 99233 SBSQ HOSP IP/OBS HIGH 50: CPT | Mod: GC

## 2020-05-08 PROCEDURE — 95885 MUSC TST DONE W/NERV TST LIM: CPT | Mod: 26,CS

## 2020-05-08 PROCEDURE — 99233 SBSQ HOSP IP/OBS HIGH 50: CPT | Mod: CS

## 2020-05-08 PROCEDURE — 71045 X-RAY EXAM CHEST 1 VIEW: CPT | Mod: 26

## 2020-05-08 RX ORDER — MORPHINE SULFATE 50 MG/1
2 CAPSULE, EXTENDED RELEASE ORAL ONCE
Refills: 0 | Status: DISCONTINUED | OUTPATIENT
Start: 2020-05-08 | End: 2020-05-08

## 2020-05-08 RX ORDER — SODIUM CHLORIDE 9 MG/ML
1000 INJECTION, SOLUTION INTRAVENOUS
Refills: 0 | Status: DISCONTINUED | OUTPATIENT
Start: 2020-05-08 | End: 2020-05-09

## 2020-05-08 RX ORDER — OXYCODONE HYDROCHLORIDE 5 MG/1
5 TABLET ORAL EVERY 4 HOURS
Refills: 0 | Status: DISCONTINUED | OUTPATIENT
Start: 2020-05-08 | End: 2020-05-14

## 2020-05-08 RX ADMIN — Medication 85 MILLIMOLE(S): at 16:14

## 2020-05-08 RX ADMIN — LIDOCAINE 2 PATCH: 4 CREAM TOPICAL at 06:36

## 2020-05-08 RX ADMIN — DORZOLAMIDE HYDROCHLORIDE TIMOLOL MALEATE 1 DROP(S): 20; 5 SOLUTION/ DROPS OPHTHALMIC at 04:06

## 2020-05-08 RX ADMIN — FAMOTIDINE 20 MILLIGRAM(S): 10 INJECTION INTRAVENOUS at 04:06

## 2020-05-08 RX ADMIN — Medication 81 MILLIGRAM(S): at 23:04

## 2020-05-08 RX ADMIN — GABAPENTIN 400 MILLIGRAM(S): 400 CAPSULE ORAL at 09:13

## 2020-05-08 RX ADMIN — LIDOCAINE 2 PATCH: 4 CREAM TOPICAL at 21:37

## 2020-05-08 RX ADMIN — Medication 650 MILLIGRAM(S): at 16:16

## 2020-05-08 RX ADMIN — Medication 1 MILLIGRAM(S): at 10:19

## 2020-05-08 RX ADMIN — MORPHINE SULFATE 2 MILLIGRAM(S): 50 CAPSULE, EXTENDED RELEASE ORAL at 23:03

## 2020-05-08 RX ADMIN — LATANOPROST 1 DROP(S): 0.05 SOLUTION/ DROPS OPHTHALMIC; TOPICAL at 22:38

## 2020-05-08 RX ADMIN — MORPHINE SULFATE 2 MILLIGRAM(S): 50 CAPSULE, EXTENDED RELEASE ORAL at 04:58

## 2020-05-08 RX ADMIN — OXYCODONE HYDROCHLORIDE 5 MILLIGRAM(S): 5 TABLET ORAL at 16:15

## 2020-05-08 RX ADMIN — OXYCODONE HYDROCHLORIDE 10 MILLIGRAM(S): 5 TABLET ORAL at 04:06

## 2020-05-08 RX ADMIN — OXYCODONE HYDROCHLORIDE 5 MILLIGRAM(S): 5 TABLET ORAL at 21:36

## 2020-05-08 RX ADMIN — Medication 650 MILLIGRAM(S): at 10:18

## 2020-05-08 RX ADMIN — LIDOCAINE 2 PATCH: 4 CREAM TOPICAL at 17:09

## 2020-05-08 RX ADMIN — OXYCODONE HYDROCHLORIDE 10 MILLIGRAM(S): 5 TABLET ORAL at 09:13

## 2020-05-08 RX ADMIN — APIXABAN 5 MILLIGRAM(S): 2.5 TABLET, FILM COATED ORAL at 23:03

## 2020-05-08 RX ADMIN — GABAPENTIN 400 MILLIGRAM(S): 400 CAPSULE ORAL at 21:36

## 2020-05-08 RX ADMIN — GABAPENTIN 400 MILLIGRAM(S): 400 CAPSULE ORAL at 16:15

## 2020-05-08 RX ADMIN — DORZOLAMIDE HYDROCHLORIDE TIMOLOL MALEATE 1 DROP(S): 20; 5 SOLUTION/ DROPS OPHTHALMIC at 16:16

## 2020-05-08 RX ADMIN — APIXABAN 5 MILLIGRAM(S): 2.5 TABLET, FILM COATED ORAL at 10:19

## 2020-05-08 RX ADMIN — SODIUM CHLORIDE 150 MILLILITER(S): 9 INJECTION, SOLUTION INTRAVENOUS at 16:15

## 2020-05-08 NOTE — OCCUPATIONAL THERAPY INITIAL EVALUATION ADULT - PLANNED THERAPY INTERVENTIONS, OT EVAL
ADL retraining/balance training/bed mobility training/motor coordination training/fine motor coordination training/strengthening/transfer training/IADL retraining/cognitive, visual perceptual

## 2020-05-08 NOTE — CONSULT NOTE ADULT - SUBJECTIVE AND OBJECTIVE BOX
Cardiology fellow Consult note    HPI:  A 69 years old M with PMH of progressive lower extremity weakness, s/p lumbar decompression in 1/2019. s/p sural nerve biopsy on 7/25 for lower extremity weakness and atrophy, cardiomegaly, DVT/PE in 2018 (on Eliquis), HTN, presenting with back pain and high grade fevers and a fall where his legs gave way. He is currently being managed on the [facility] medical floor. Patient endorses complaining of overall body pain and back pain but denies any chest pain, shortness of breath or prior cardiac history. We are being consulted for a concern for NSTEMI/Myocarditis     OP Cardiologist:     ROS: A 10-point review of systems was otherwise negative.    PAST MEDICAL & SURGICAL HISTORY:  Mitral valve insufficiency  Cardiomegaly  Glaucoma  Neuropathy  DVT (deep venous thrombosis): left leg, 2018  Pulmonary embolism  HTN (hypertension)  History of appendectomy  History of tonsillectomy  History of lumbar laminectomy: 1/2019  Degeneration of intervertebral disc of thoracic region: 2015  History of foot surgery: right foot  H/O cervical spine surgery: with hardware      SOCIAL HISTORY:  FAMILY HISTORY:      ALLERGIES: 	  No Known Allergies            MEDICATIONS:  acetaminophen   Tablet .. 650 Oral every 6 hours PRN  apixaban 5 Oral every 12 hours  aspirin  chewable 81 Oral every 24 hours  dorzolamide 2%/timolol 0.5% Ophthalmic Solution 1 Both EYES every 12 hours  famotidine    Tablet 20 Oral every 24 hours  folic acid 1 Oral daily  gabapentin 400 Oral three times a day  lactated ringers. 1000 IV Continuous <Continuous>  latanoprost 0.005% Ophthalmic Solution 1 Both EYES at bedtime  lidocaine   Patch 2 Transdermal every 24 hours  oxyCODONE    IR 5 Oral every 4 hours PRN  sodium phosphate IVPB 30 IV Intermittent once      PHYSICAL EXAM:  T(C): 38.9 (05-08-20 @ 09:24), Max: 38.9 (05-08-20 @ 09:24)  T(F): 102 (05-08-20 @ 09:24), Max: 102 (05-08-20 @ 09:24)  HR: 108 (05-08-20 @ 11:10) (87 - 110)  BP: 136/92 (05-08-20 @ 09:24) (127/83 - 137/86)  RR: 18 (05-08-20 @ 11:10) (18 - 20)  SpO2: 95% (05-08-20 @ 11:10) (93% - 100%)    Not examined, examination per primary team     I&O's Summary    07 May 2020 07:01  -  08 May 2020 07:00  --------------------------------------------------------  IN: 0 mL / OUT: 1550 mL / NET: -1550 mL    08 May 2020 07:01  -  08 May 2020 15:48  --------------------------------------------------------  IN: 0 mL / OUT: 700 mL / NET: -700 mL      Height (cm): 183.4 (05-06 @ 21:15)  	  LABS:	 	                          14.2   3.77  )-----------( 70       ( 08 May 2020 06:05 )             42.7     05-08    149<H>  |  113<H>  |  24<H>  ----------------------------<  107<H>  5.2   |  21<L>  |  1.33<H>    Ca    9.1      08 May 2020 06:05  Phos  1.9     05-08  Mg     1.9     05-08    TPro  8.0  /  Alb  3.8  /  TBili  2.6<H>  /  DBili  x   /  AST  127<H>  /  ALT  51<H>  /  AlkPhos  40  05-08    CARDIAC MARKERS ( 08 May 2020 06:05 )  x     / 0.08 ng/mL / 4050 U/L / x     / 7.6 ng/mL  CARDIAC MARKERS ( 07 May 2020 07:38 )  x     / x     / 2358 U/L / x     / x          PT/INR - ( 07 May 2020 07:55 )   PT: 24.5 sec;   INR: 2.10          PTT - ( 07 May 2020 07:55 )  PTT:29.5 sec  proBNP:   Lipid Profile:   HgA1c: Hemoglobin A1C, Whole Blood: 4.8 % [4.0 - 5.6] (08-22 @ 18:37)        TELEMETRY: Not on Tele    ECG: Sinus tachycardia, Left axis deviation 	  TTE (06/05/2018): EF 58%, mild MR/TR, PASP 26, Normal RV function. Borderline dilated RV

## 2020-05-08 NOTE — PROGRESS NOTE ADULT - SUBJECTIVE AND OBJECTIVE BOX
**Incomplete Note**    OVERNIGHT EVENTS: No acute events overnight. Afebrile. VSS. HD stable. Saturating well on RA.     SUBJECTIVE / INTERVAL HPI: Patient seen and examined at bedside this AM. Patient awake, however difficult to obtain ROS shakes head to all ROS and follows commands.     VITAL SIGNS:  Vital Signs Last 24 Hrs  T(C): 36.9 (08 May 2020 06:01), Max: 38.3 (07 May 2020 10:19)  T(F): 98.4 (08 May 2020 06:01), Max: 100.9 (07 May 2020 10:19)  HR: 98 (08 May 2020 06:01) (87 - 110)  BP: 127/83 (08 May 2020 06:01) (127/83 - 137/86)  BP(mean): --  RR: 18 (08 May 2020 06:01) (18 - 20)  SpO2: 100% (08 May 2020 06:01) (93% - 100%)    PHYSICAL EXAM:    General: WDWN  HEENT: NC/AT; PERRL, anicteric sclera; MMM; poor oral hygiene   Neck: supple; no JVD   Cardiovascular: +S1/S2; RRR  Respiratory: CTA B/L; no W/R/R  Gastrointestinal: obese; soft, NT/ND; +BSx4  Extremities: WWP; no peripheral edema; right thigh muscle wasting present   Vascular: 2+ radial, DP/PT pulses B/L  Neurological: AAOx2; unwilling to cooperate in neurologic exam     MEDICATIONS:  MEDICATIONS  (STANDING):  apixaban 5 milliGRAM(s) Oral every 12 hours  aspirin  chewable 81 milliGRAM(s) Oral every 24 hours  dorzolamide 2%/timolol 0.5% Ophthalmic Solution 1 Drop(s) Both EYES every 12 hours  famotidine    Tablet 20 milliGRAM(s) Oral every 24 hours  folic acid 1 milliGRAM(s) Oral daily  gabapentin 400 milliGRAM(s) Oral three times a day  latanoprost 0.005% Ophthalmic Solution 1 Drop(s) Both EYES at bedtime  lidocaine   Patch 2 Patch Transdermal every 24 hours    MEDICATIONS  (PRN):  acetaminophen   Tablet .. 650 milliGRAM(s) Oral every 6 hours PRN Temp greater or equal to 38.5C (101.3F)  oxyCODONE    IR 10 milliGRAM(s) Oral every 4 hours PRN Moderate Pain (4 - 6)      ALLERGIES:  Allergies    No Known Allergies    Intolerances        LABS:                        14.2   3.77  )-----------( 70       ( 08 May 2020 06:05 )             42.7     05-08    149<H>  |  113<H>  |  24<H>  ----------------------------<  107<H>  5.2   |  21<L>  |  1.33<H>    Ca    9.1      08 May 2020 06:05  Phos  1.9     05-08  Mg     1.9     05-08    TPro  8.0  /  Alb  3.8  /  TBili  2.6<H>  /  DBili  x   /  AST  127<H>  /  ALT  51<H>  /  AlkPhos  40  05-08    PT/INR - ( 07 May 2020 07:55 )   PT: 24.5 sec;   INR: 2.10          PTT - ( 07 May 2020 07:55 )  PTT:29.5 sec  Urinalysis Basic - ( 06 May 2020 14:17 )    Color: Yellow / Appearance: Clear / S.020 / pH: x  Gluc: x / Ketone: NEGATIVE  / Bili: Negative / Urobili: 2.0 E.U./dL   Blood: x / Protein: 30 mg/dL / Nitrite: NEGATIVE   Leuk Esterase: NEGATIVE / RBC: 5-10 /HPF / WBC < 5 /HPF   Sq Epi: x / Non Sq Epi: 0-5 /HPF / Bacteria: Present /HPF      CAPILLARY BLOOD GLUCOSE          RADIOLOGY & ADDITIONAL TESTS: Reviewed.    ASSESSMENT:    PLAN: OVERNIGHT EVENTS: Morphine 2mg x1 for pain.  Afebrile. VSS. HD stable. Saturating well on RA.     SUBJECTIVE / INTERVAL HPI: Patient seen and examined at bedside this AM. Patient awake, however difficult to obtain ROS shakes head to all ROS and follows commands.     VITAL SIGNS:  Vital Signs Last 24 Hrs  T(C): 36.9 (08 May 2020 06:01), Max: 38.3 (07 May 2020 10:19)  T(F): 98.4 (08 May 2020 06:01), Max: 100.9 (07 May 2020 10:19)  HR: 98 (08 May 2020 06:01) (87 - 110)  BP: 127/83 (08 May 2020 06:01) (127/83 - 137/86)  BP(mean): --  RR: 18 (08 May 2020 06:01) (18 - 20)  SpO2: 100% (08 May 2020 06:01) (93% - 100%)    PHYSICAL EXAM:    General: WDWN  HEENT: NC/AT; PERRL, anicteric sclera; MMM; poor oral hygiene   Neck: supple; no JVD   Cardiovascular: +S1/S2; RRR  Respiratory: CTA B/L; no W/R/R  Gastrointestinal: obese; soft, NT/ND; +BSx4  Extremities: WWP; no peripheral edema; right thigh muscle wasting present   Vascular: 2+ radial, DP/PT pulses B/L  Neurological: AAOx2; less arousalable unwilling to cooperate in neurologic exam     MEDICATIONS:  MEDICATIONS  (STANDING):  apixaban 5 milliGRAM(s) Oral every 12 hours  aspirin  chewable 81 milliGRAM(s) Oral every 24 hours  dorzolamide 2%/timolol 0.5% Ophthalmic Solution 1 Drop(s) Both EYES every 12 hours  famotidine    Tablet 20 milliGRAM(s) Oral every 24 hours  folic acid 1 milliGRAM(s) Oral daily  gabapentin 400 milliGRAM(s) Oral three times a day  latanoprost 0.005% Ophthalmic Solution 1 Drop(s) Both EYES at bedtime  lidocaine   Patch 2 Patch Transdermal every 24 hours    MEDICATIONS  (PRN):  acetaminophen   Tablet .. 650 milliGRAM(s) Oral every 6 hours PRN Temp greater or equal to 38.5C (101.3F)  oxyCODONE    IR 10 milliGRAM(s) Oral every 4 hours PRN Moderate Pain (4 - 6)      ALLERGIES:  Allergies    No Known Allergies    Intolerances        LABS:                        14.2   3.77  )-----------( 70       ( 08 May 2020 06:05 )             42.7     05-08    149<H>  |  113<H>  |  24<H>  ----------------------------<  107<H>  5.2   |  21<L>  |  1.33<H>    Ca    9.1      08 May 2020 06:05  Phos  1.9     05-08  Mg     1.9     05-08    TPro  8.0  /  Alb  3.8  /  TBili  2.6<H>  /  DBili  x   /  AST  127<H>  /  ALT  51<H>  /  AlkPhos  40  05-08    PT/INR - ( 07 May 2020 07:55 )   PT: 24.5 sec;   INR: 2.10          PTT - ( 07 May 2020 07:55 )  PTT:29.5 sec  Urinalysis Basic - ( 06 May 2020 14:17 )    Color: Yellow / Appearance: Clear / S.020 / pH: x  Gluc: x / Ketone: NEGATIVE  / Bili: Negative / Urobili: 2.0 E.U./dL   Blood: x / Protein: 30 mg/dL / Nitrite: NEGATIVE   Leuk Esterase: NEGATIVE / RBC: 5-10 /HPF / WBC < 5 /HPF   Sq Epi: x / Non Sq Epi: 0-5 /HPF / Bacteria: Present /HPF      CAPILLARY BLOOD GLUCOSE          RADIOLOGY & ADDITIONAL TESTS: Reviewed.    ASSESSMENT:    PLAN:

## 2020-05-08 NOTE — PROGRESS NOTE ADULT - PROBLEM SELECTOR PLAN 10
Fluids: N/A  Electrolytes-replete as needed  Nutrition - DASH/TLC  Code- Full Code  DVT ppx: Eliquis   GI ppx: none needed  DIspo: RMF    DISCHARGE PLANNING:  Medical readiness goals: improvement in CK; KEVIN placement   Anticipated d/c: 24-48 hours     PCP:     Pharmacy:

## 2020-05-08 NOTE — PROGRESS NOTE ADULT - PROBLEM SELECTOR PLAN 2
Sepsis of unknown etiology. Patient with severe sepsis criteria on admission (fever, tachycardia and elevated lactate), with complaints of URI and diarrhea. Found to COVID-19 positive. No evidence of leukocytosis, procalcitonin 0.28.  WBC was normal. procal is 0.28. Sepsis is likely due to COVID as well as dehydration. Right knee with erythema, no evidence of effusion. Gallstones present, Perdue sign negative.Still with fever (102) and leukopenia. Differential diagnosis autoinflammatory state 2/2 COVID vs autoimmune vs bacterial process.   - Blood cultures NGTD  - UA negative   - Fever management with tylenol   - Knee xray w/o evidence of septic joint   - Tylenol for fever  - COVID management as below  - no indication for antibiotics at this time, will initiate in setting of uptrending fever curve

## 2020-05-08 NOTE — PROGRESS NOTE ADULT - SUBJECTIVE AND OBJECTIVE BOX
Neurology Progress Note    INTERVAL HPI/OVERNIGHT EVENTS:  Patient seen and examined with Dr. Brito. Patient would answer some questions but then would continue to doze off, even during EMG    MEDICATIONS  (STANDING):  apixaban 5 milliGRAM(s) Oral every 12 hours  aspirin  chewable 81 milliGRAM(s) Oral every 24 hours  dorzolamide 2%/timolol 0.5% Ophthalmic Solution 1 Drop(s) Both EYES every 12 hours  famotidine    Tablet 20 milliGRAM(s) Oral every 24 hours  folic acid 1 milliGRAM(s) Oral daily  gabapentin 400 milliGRAM(s) Oral three times a day  lactated ringers. 1000 milliLiter(s) (150 mL/Hr) IV Continuous <Continuous>  latanoprost 0.005% Ophthalmic Solution 1 Drop(s) Both EYES at bedtime  lidocaine   Patch 2 Patch Transdermal every 24 hours    MEDICATIONS  (PRN):  acetaminophen   Tablet .. 650 milliGRAM(s) Oral every 6 hours PRN Temp greater or equal to 38.5C (101.3F)  oxyCODONE    IR 5 milliGRAM(s) Oral every 4 hours PRN Moderate Pain (4 - 6)      Allergies    No Known Allergies    Intolerances        Vital Signs Last 24 Hrs  T(C): 38.9 (08 May 2020 09:24), Max: 38.9 (08 May 2020 09:24)  T(F): 102 (08 May 2020 09:24), Max: 102 (08 May 2020 09:24)  HR: 108 (08 May 2020 11:10) (87 - 110)  BP: 136/92 (08 May 2020 09:24) (127/83 - 137/86)  BP(mean): --  RR: 18 (08 May 2020 11:10) (18 - 20)  SpO2: 95% (08 May 2020 11:10) (93% - 100%)    Physical exam:    -Mental status: lethargic, would follow commands after much encouragement  able to lift extremities antigravity    COVID LABS:   D-Dimer Assay, Quantitative: 248 (05-08-20)  D-Dimer Assay, Quantitative: 212 (05-07-20)      Ferritin, Serum: 2218 (05-07 @ 07:55)  Ferritin, Serum: 2537 (05-06 @ 21:40)      C-Reactive Protein, Serum: 4.55 (05-08 @ 06:05)  C-Reactive Protein, Serum: 2.82 (05-07 @ 07:38)  C-Reactive Protein, Serum: 3.33 (05-06 @ 12:45)                            14.2   3.77  )-----------( 70       ( 08 May 2020 06:05 )             42.7     05-08    149<H>  |  113<H>  |  24<H>  ----------------------------<  107<H>  5.2   |  21<L>  |  1.33<H>    Ca    9.1      08 May 2020 06:05  Phos  1.9     05-08  Mg     1.9     05-08    TPro  8.0  /  Alb  3.8  /  TBili  2.6<H>  /  DBili  x   /  AST  127<H>  /  ALT  51<H>  /  AlkPhos  40  05-08    PT/INR - ( 07 May 2020 07:55 )   PT: 24.5 sec;   INR: 2.10          PTT - ( 07 May 2020 07:55 )  PTT:29.5 sec      RADIOLOGY & ADDITIONAL TESTS:  < from: EMG (05.08.20 @ 15:32) >  Impression:     This electrodiagnostic study demonstrated evidence of a moderate axonal sensorimotor polyneuropathy, mildly progressed compared to the prior study 1 year ago.     There was again demonstrated a right lumbosacral plexopathy vs. polyradiculopathy, with less severe active denervation than on the prior study.     There was again a right ulnar nerve entrapment at the elbow, mildly progressed from the prior study.     There was no evidence of myopathy or myositis.     < end of copied text >      Assessment and Plan  70y Male w/ hx of plumbar spondylosis and right L4 radiculopathy with right leg weakness, s/p lumbar decompression at [facility] 1/2019 with improvement, admitted for severe leg and back pain. MRI T and L spine negative. COVID + EMG done 5/8 similar to the one done by Dr. Brito one year ago.     -Recommend EEG given relative altered mental status, especially during EMG  -Continue to trend CK  -Continue to trend D dimer, ferritin, and CRP  -inpatient management per ED. Neurology Progress Note    INTERVAL HPI/OVERNIGHT EVENTS:  Patient seen and examined with Dr. Brito. Patient would answer some questions but then would continue to doze off, even during EMG    MEDICATIONS  (STANDING):  apixaban 5 milliGRAM(s) Oral every 12 hours  aspirin  chewable 81 milliGRAM(s) Oral every 24 hours  dorzolamide 2%/timolol 0.5% Ophthalmic Solution 1 Drop(s) Both EYES every 12 hours  famotidine    Tablet 20 milliGRAM(s) Oral every 24 hours  folic acid 1 milliGRAM(s) Oral daily  gabapentin 400 milliGRAM(s) Oral three times a day  lactated ringers. 1000 milliLiter(s) (150 mL/Hr) IV Continuous <Continuous>  latanoprost 0.005% Ophthalmic Solution 1 Drop(s) Both EYES at bedtime  lidocaine   Patch 2 Patch Transdermal every 24 hours    MEDICATIONS  (PRN):  acetaminophen   Tablet .. 650 milliGRAM(s) Oral every 6 hours PRN Temp greater or equal to 38.5C (101.3F)  oxyCODONE    IR 5 milliGRAM(s) Oral every 4 hours PRN Moderate Pain (4 - 6)      Allergies  No Known Allergies    Vital Signs Last 24 Hrs  T(C): 38.9 (08 May 2020 09:24), Max: 38.9 (08 May 2020 09:24)  T(F): 102 (08 May 2020 09:24), Max: 102 (08 May 2020 09:24)  HR: 108 (08 May 2020 11:10) (87 - 110)  BP: 136/92 (08 May 2020 09:24) (127/83 - 137/86)  BP(mean): --  RR: 18 (08 May 2020 11:10) (18 - 20)  SpO2: 95% (08 May 2020 11:10) (93% - 100%)    Physical exam:    -Mental status: lethargic, would follow commands with encouragement  able to lift extremities antigravity    COVID LABS:   D-Dimer Assay, Quantitative: 248 (05-08-20)  D-Dimer Assay, Quantitative: 212 (05-07-20)      Ferritin, Serum: 2218 (05-07 @ 07:55)  Ferritin, Serum: 2537 (05-06 @ 21:40)      C-Reactive Protein, Serum: 4.55 (05-08 @ 06:05)  C-Reactive Protein, Serum: 2.82 (05-07 @ 07:38)  C-Reactive Protein, Serum: 3.33 (05-06 @ 12:45)                            14.2   3.77  )-----------( 70       ( 08 May 2020 06:05 )             42.7     05-08    149<H>  |  113<H>  |  24<H>  ----------------------------<  107<H>  5.2   |  21<L>  |  1.33<H>    Ca    9.1      08 May 2020 06:05  Phos  1.9     05-08  Mg     1.9     05-08    TPro  8.0  /  Alb  3.8  /  TBili  2.6<H>  /  DBili  x   /  AST  127<H>  /  ALT  51<H>  /  AlkPhos  40  05-08    PT/INR - ( 07 May 2020 07:55 )   PT: 24.5 sec;   INR: 2.10          PTT - ( 07 May 2020 07:55 )  PTT:29.5 sec      RADIOLOGY & ADDITIONAL TESTS:  < from: EMG (05.08.20 @ 15:32) >  Impression:     This electrodiagnostic study demonstrated evidence of a moderate axonal sensorimotor polyneuropathy, mildly progressed compared to the prior study 1 year ago.     There was again demonstrated a right lumbosacral plexopathy vs. polyradiculopathy, with less severe active denervation than on the prior study.     There was again a right ulnar nerve entrapment at the elbow, mildly progressed from the prior study.     There was no evidence of myopathy or myositis.     < end of copied text >      Assessment and Plan  70y Male w/ hx of plumbar spondylosis and right L4 radiculopathy with right leg weakness, s/p lumbar decompression at [facility] 1/2019 with improvement, admitted for severe leg and back pain. MRI T and L spine negative. COVID + EMG done 5/8 with mild progression of neuropathy compared to prior one a year ago; right LE plexopathy / polyradiculopathy improved somewhat. No evidence of myositis / myopathy    -Recommend EEG given relative altered / fluctuating mental status  -Continue to trend CK; elevation likely related to covid infection given lack of myopathy on EMG; less likely rhabdomyolysis as CK is rising while hospitalized, however agree with hydration for now   -will follow

## 2020-05-08 NOTE — PROGRESS NOTE ADULT - PROBLEM SELECTOR PLAN 8
Pt with hx of HTN on lisinopril 10 at home  Currently with symptoms of sepsis, dehydration, SETH   - hold home lisinopril  - monitor BPs  - Will resume when more stable and has high BP

## 2020-05-08 NOTE — PHYSICAL THERAPY INITIAL EVALUATION ADULT - PLANNED THERAPY INTERVENTIONS, PT EVAL
gait training/postural re-education/strengthening/balance training/bed mobility training/transfer training/ROM

## 2020-05-08 NOTE — PHYSICAL THERAPY INITIAL EVALUATION ADULT - ADDITIONAL COMMENTS
Pt is poor historian, increased time and sevral repeated cues/choices needed to glean subjective. Pt states he live in an apt with roommate, 4 NARESH, elevator inside. At baseline able to household amb with rollator. Owns showerchair, able to shower and dress without assist. States he has nurse 4day/week ("she doesn't really help me), unable to gather # of hours.

## 2020-05-08 NOTE — CONSULT NOTE ADULT - ASSESSMENT
70yo F with HTN, DVT/PE on Eliquis, chronic lower extremity weakness with prior lumbar decompression presenting with high grade fevers and fall, ruled in for COVID-19 infection is being consulted to evaluate for myocarditis / NSTEMI due to rising CK with minimal Trop T elevation    #Troponemia: Likely due to decreased renal clearance in the setting of SETH and rising CK which is highly concerning for rhabdomyolysis due to his fall and or severe hypophosphatemia. EKG is non-ischemic and patient does not endorse ischemic complains. Also the patient's precipitously rising CK is out of proportion to the minimally elevated troponins which have other etiologies as mentioned and is not consistent with myocarditis  - Recommend aggressive fluid management for worsening rhabdomyolysis   - Repletion of Phosphorus levels   - No indication for a TTE at this time as it does not   - Replete electrolytes to maintain K>4, Mg>2.   - Incentive Spirometry, Out of bed to chair, Encourage physical therapy    Plan discussed with primary team after rounds with cardiology attending.   Please refer to cardiology attending addendum for additional recs.   Thank you for allowing to participate in the care of this patient    GITA Wilkerson  Cardiology fellow, PGY 6 68yo F with HTN, DVT/PE on Eliquis, chronic lower extremity weakness with prior lumbar decompression presenting with high grade fevers and fall, ruled in for COVID-19 infection is being consulted to evaluate for myocarditis / NSTEMI due to rising CK with minimal Trop T elevation    #Troponemia: Likely due to decreased renal clearance in the setting of SEHT and rising CK which is highly concerning for rhabdomyolysis due to his fall and or severe hypophosphatemia. EKG is non-ischemic and patient does not endorse ischemic complains. Also the patient's precipitously rising CK is out of proportion to the minimally elevated troponins which have other etiologies as mentioned and is not consistent with myocarditis  - Recommend aggressive fluid management for worsening rhabdomyolysis   - Repletion of Phosphorus levels   - No indication for a TTE at this time as it does not   - Replete electrolytes to maintain K>4, Mg>2.   - Incentive Spirometry, Out of bed to chair, Encourage physical therapy    Plan discussed with primary team after rounds with cardiology attending.   Please refer to cardiology attending addendum for additional recs.   Thank you for allowing to participate in the care of this patient. Please call us back if any further questions or concerns.    GITA Wilkerson  Cardiology fellow, PGY 6

## 2020-05-08 NOTE — CONSULT NOTE ADULT - ASSESSMENT
69 years old M with PMH of progressive lower extremity weakness, s/p lumbar decompression in 1/2019. 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 Neurosurgery was consulted for back pain. Patient was found to be COVID19 positive and was noted to have thrombocytopenia on admission for which hematology was consulted.     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 Lake

## 2020-05-08 NOTE — PHYSICAL THERAPY INITIAL EVALUATION ADULT - ACTIVE RANGE OF MOTION EXAMINATION, REHAB EVAL
bilateral upper extremity Active ROM was WFL (within functional limits)/L hip flexion 0-110, L knee extn -20deg, RLE pt refused formal AROM, unable to lift RLE onto bed

## 2020-05-08 NOTE — PHYSICAL THERAPY INITIAL EVALUATION ADULT - IMPAIRMENTS FOUND, PT EVAL
muscle strength/gait, locomotion, and balance/arousal, attention, and cognition/posture/ROM/ergonomics and body mechanics

## 2020-05-08 NOTE — PHYSICAL THERAPY INITIAL EVALUATION ADULT - GENERAL OBSERVATIONS, REHAB EVAL
Pt admitted RLE pain,  +COVID. Pt rcvd semi supine, +droplet/contact, +bedalarm, NC around neck satting 95% on RA. A&Ox1-2 with heavy cues, incr response time. Tolerated session fairly, limited by pain. Demo modA 1-2 bed mob, MaxAx2 transfers, 2 side steps. FIm gait=1. Yes

## 2020-05-08 NOTE — PROGRESS NOTE ADULT - PROBLEM SELECTOR PLAN 7
Pt with hx of DVT and PE, on Eliquis 5 daily at home. DVT reported in 2018, unclear indication for AC at this time.   - c/w home Eliquis 5mg BID qd     #Thrombocytopenia  Patient with baseline plt 110s, 84 on arrival and decreasing to 68 this AM. Possibly 2/2 viral infection and sepsis. No evidence of bleeding.  - hold home Aspirin 81mg  - f/u blue top platelets  - f/u Hep C, consent for HIV   - monitor for signs of bleeding Pt with hx of DVT and PE, on Eliquis 5 daily at home. DVT reported in 2018, unclear indication for AC at this time.   - c/w home Eliquis 5mg BID qd     #Thrombocytopenia  Patient with baseline plt 110s, 84 on arrival and decreasing to 61 this AM. Possibly 2/2 viral infection and sepsis. No evidence of bleeding.  - hold home Aspirin 81mg  - f/u blue top platelets  - Hep C +  - consent for HIV   - monitor for signs of bleeding  - hematology following appreciate recs

## 2020-05-08 NOTE — PROGRESS NOTE ADULT - PROBLEM SELECTOR PLAN 4
Pt with sore throat and runny nose and mild diarrhea for the past few days but no SOB or cough, was found to be positive for COVID. Had fever and tachycardia in ED but no O2 desaturation. currently sating 98% on RA. CXR clear.   COVID positive, Ferritin 2537, CRP 3.33  - Pt mildly symptomatic and does not require O2 supplement -> no indication for azithromycin for CAP or COVID-19 management   - Monitor O2 saturation and symptoms  - Trending inflammatory markers daily  - tylenol for fever  - symptomtic management   - contact precaustions: isolation and droplet  - avoid NSAID/ACE-I/ARB       #Hyperbilirubinemia   Pt with bili 2.7 and elevated AST and ALT but normal [email or web address] ultrasound demonstrating gallbladder with multiple gallstones. Benign abdominal exam. Negative cage sign.  Likelu 2/2 COVID infection or due to gallbladder stones. Improved with fluid therapy  - monitor LFT  - will need outpt surgery follow up for cholelithiasis Pt with sore throat and runny nose and mild diarrhea for the past few days but no SOB or cough, was found to be positive for COVID. Had fever and tachycardia in ED but no O2 desaturation. currently sating 98% on RA. CXR clear.   COVID positive, Ferritin 2537, CRP 3.33  - Pt mildly symptomatic and does not require O2 supplement -> no indication for azithromycin for CAP or COVID-19 management   - Monitor O2 saturation and symptoms  - Trending inflammatory markers daily  - tylenol for fever  - symptomtic management   - contact precaustions: isolation and droplet  - avoid NSAID/ACE-I/ARB       #Hyperbilirubinemia   Pt with bili 2.7 and elevated AST and ALT but normal [email or web address] ultrasound demonstrating gallbladder with multiple gallstones. Benign abdominal exam. Negative cage sign.  Likely 2/2 COVID infection or due to gallbladder stones. Improved with fluid therapy. Patient Hep C positive.   - monitor LFT  - f/u hep panel

## 2020-05-08 NOTE — OCCUPATIONAL THERAPY INITIAL EVALUATION ADULT - LIVES WITH, PROFILE
Patient diane historian, states that he lives in an apartment in elevator building with 4 steps to enter.

## 2020-05-08 NOTE — PROGRESS NOTE ADULT - PROBLEM SELECTOR PLAN 6
Pt with chronic radiculopathy and low back pain due to disc herniation s/p lumbar decompression in 1/2019. s/p sural nerve biopsy on 7/25 due to muscle weakness, presented with worsening low back pain after fall few days before presentation as well as worsening weakness in the LEs mainly in the right side and few weeks of urinary incontinence. exam shows weakness of the LEs.   MRI spine showed Multilevel disc herniations with multilevel neural foraminal narrowing but no signs of acute cord compression.   Likely worsening of the chronic radiculopathy due to worsening of spinal stenosis due to disc herniations.   - Continue his home dose of oxycodone 5 q4 PRN  - Lidocaine patch 2 patches every day  - holding home Nucenta ER 100mg   - c/w home Gabapentin 400 tid  - No neurosurgical intervention at this time   - evaluated by Dr. Brito (outpatient neurologist) -> EMG without evidence of myopathy or myositis, unchanged from prior (2019)   - neurology following apperciate recs   - PT/OT evaluation -> recommend KEVIN     #Right Knee Arthritis   Pt complains of pain in the right knee with unknown duration. Has mild swelling as well as tenderness and redness in the right knee.   WBC is normal, has CRP 3.33 that can be due to COVID infection, porcal is mildly elevated 0.28. uric acid is 10.1.   Can be gout or pseudogout. Septic joint on DDx as well  - Knee xray without evidence of effusion   - PT eval -> KEVIN

## 2020-05-08 NOTE — PHYSICAL THERAPY INITIAL EVALUATION ADULT - CRITERIA FOR SKILLED THERAPEUTIC INTERVENTIONS
functional limitations in following categories/risk reduction/prevention/rehab potential/therapy frequency/anticipated discharge recommendation/impairments found/predicted duration of therapy intervention

## 2020-05-08 NOTE — PHYSICAL THERAPY INITIAL EVALUATION ADULT - GAIT DEVIATIONS NOTED, PT EVAL
decreased maine/decreased step length/decreased stride length/decreased weight-shifting ability/increased time in double stance/decreased velocity of limb motion

## 2020-05-08 NOTE — PROGRESS NOTE ADULT - PROBLEM SELECTOR PLAN 5
Has a chronic neuropathy likely secondary to disc herniation. on gabapentin 400 tid at home. Currently in worsening back pain and tingling and numbness. Follows with Dr. Brito outpatient.   - LE dopplers negative for DVT  - CPK elevated -> concern for myositis as underlying etiology   - trend CPK   - EMG unchanged from prior   - c/w home gabapentin 400 tid    #Lethargy   Patient with reported fall and multiple previous falls. Denies LOC or trauma to head.   Ambulates with walker. Unwilling to cooperate with neurological exam 2/2 to reported pain.   - PT evaluation -> recommendation of KEVIN   - neurology following    #Lethargy   Patient with increased lethargy, less arousable compared to baseline.   - EEG to be placed   - neurology following appreciate recs  #Glaucoma  Patient with history of glaucoma.   - c/w home latanoprost 0.005% Has a chronic neuropathy likely secondary to disc herniation. on gabapentin 400 tid at home. Currently in worsening back pain and tingling and numbness. Follows with Dr. Brito outpatient.   - LE dopplers negative for DVT  - CPK elevated -> concern for myositis as underlying etiology   - trend CPK   - EMG unchanged from prior   - c/w home gabapentin 400 tid    #Fall  Patient with reported fall and multiple previous falls. Denies LOC or trauma to head.   Ambulates with walker. Unwilling to cooperate with neurological exam 2/2 to reported pain.   - PT evaluation -> recommendation of KEVIN   - neurology following    #Lethargy   Patient with increased lethargy, less arousable compared to baseline.   - EEG to be placed   - neurology following appreciate recs  #Glaucoma  Patient with history of glaucoma.   - c/w home latanoprost 0.005%

## 2020-05-08 NOTE — PROGRESS NOTE ADULT - ASSESSMENT
A 69 years old M with PMH of progressive lower extremity weakness, s/p lumbar decompression in 1/2019. s/p sural nerve biopsy on 7/25 for lower extremity weakness and atrophy, cardiomegaly, DVT/PE in 2018 (on Eliquis), HTN, MR, and galucoma presented with severe back pain mainly since his fall 3-4 days before presentation with SETH and rising CK, now with concern for rhabdomyolysis.

## 2020-05-08 NOTE — OCCUPATIONAL THERAPY INITIAL EVALUATION ADULT - ADDITIONAL COMMENTS
Patient is poor historian, states that he uses a rollator both inside and outside the home. Patient states he owns a commode and shower chair. Patient states he has a HHA for days a week, unable to provide how many hours per day or elaborate on what the HHA assists with.

## 2020-05-08 NOTE — CONSULT NOTE ADULT - SUBJECTIVE AND OBJECTIVE BOX
Hematology Oncology Consult Note     69 years old M with PMH of progressive lower extremity weakness, s/p lumbar decompression in 1/2019. 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 Neurosurgery was consulted for back pain. Patient was found to be COVID19 positive and was noted to have thrombocytopenia on admission for which hematology was consulted.     PAST MEDICAL & SURGICAL HISTORY:  Mitral valve insufficiency  Cardiomegaly  Glaucoma  Neuropathy  DVT (deep venous thrombosis): left leg, 2018  Pulmonary embolism  HTN (hypertension)  History of appendectomy  History of tonsillectomy  History of lumbar laminectomy: 1/2019  Degeneration of intervertebral disc of thoracic region: 2015  History of foot surgery: right foot  H/O cervical spine surgery: with hardware      Allergies:      Medications:  apixaban 5 milliGRAM(s) Oral every 12 hours  aspirin  chewable 81 milliGRAM(s) Oral every 24 hours        PHYSICAL EXAM:    T(F): 102 (05-08-20 @ 09:24), Max: 102 (05-08-20 @ 09:24)  HR: 108 (05-08-20 @ 11:10) (87 - 110)  BP: 136/92 (05-08-20 @ 09:24) (127/83 - 137/86)  RR: 18 (05-08-20 @ 11:10) (18 - 20)  SpO2: 95% (05-08-20 @ 11:10) (93% - 100%)  Wt(kg): --    PE per primary team          Labs:                          14.2   3.77  )-----------( 70       ( 08 May 2020 06:05 )             42.7     CBC Full  -  ( 08 May 2020 06:05 )  WBC Count : 3.77 K/uL  RBC Count : 4.64 M/uL  Hemoglobin : 14.2 g/dL  Hematocrit : 42.7 %  Platelet Count - Automated : 70 K/uL  Mean Cell Volume : 92.0 fl  Mean Cell Hemoglobin : 30.6 pg  Mean Cell Hemoglobin Concentration : 33.3 gm/dL  Auto Neutrophil # : 2.90 K/uL  Auto Lymphocyte # : 0.54 K/uL  Auto Monocyte # : 0.31 K/uL  Auto Eosinophil # : 0.00 K/uL  Auto Basophil # : 0.01 K/uL  Auto Neutrophil % : 76.9 %  Auto Lymphocyte % : 14.3 %  Auto Monocyte % : 8.2 %  Auto Eosinophil % : 0.0 %  Auto Basophil % : 0.3 %    PT/INR - ( 07 May 2020 07:55 )   PT: 24.5 sec;   INR: 2.10          PTT - ( 07 May 2020 07:55 )  PTT:29.5 sec    05-08    149<H>  |  113<H>  |  24<H>  ----------------------------<  107<H>  5.2   |  21<L>  |  1.33<H>    Ca    9.1      08 May 2020 06:05  Phos  1.9     05-08  Mg     1.9     05-08    TPro  8.0  /  Alb  3.8  /  TBili  2.6<H>  /  DBili  x   /  AST  127<H>  /  ALT  51<H>  /  AlkPhos  40  05-08

## 2020-05-08 NOTE — OCCUPATIONAL THERAPY INITIAL EVALUATION ADULT - GENERAL OBSERVATIONS, REHAB EVAL
Patient cleared for OT evaluation by JOSELUIS Quarles. Patient received semi-delarosa, NAD, +IV heplock, +[place], +bed alarm, O2 sat at 95%, patient states he is in pain, however unable to provide numerical value.

## 2020-05-08 NOTE — PROGRESS NOTE ADULT - PROBLEM SELECTOR PLAN 1
Patient with rising CK in setting of fall and SETH, and reported hematuria prior to arrival. Also, with hypophosphatemia, concern for rhabdomyolysis.   -  cc/hr   - trend CK q8hr   - monitor Cr

## 2020-05-09 LAB
ALBUMIN SERPL ELPH-MCNC: 3.2 G/DL — LOW (ref 3.3–5)
ALBUMIN SERPL ELPH-MCNC: 3.3 G/DL — SIGNIFICANT CHANGE UP (ref 3.3–5)
ALBUMIN SERPL ELPH-MCNC: 3.5 G/DL — SIGNIFICANT CHANGE UP (ref 3.3–5)
ALP SERPL-CCNC: 33 U/L — LOW (ref 40–120)
ALP SERPL-CCNC: 33 U/L — LOW (ref 40–120)
ALP SERPL-CCNC: 36 U/L — LOW (ref 40–120)
ALT FLD-CCNC: 42 U/L — SIGNIFICANT CHANGE UP (ref 10–45)
ALT FLD-CCNC: 45 U/L — SIGNIFICANT CHANGE UP (ref 10–45)
ALT FLD-CCNC: 47 U/L — HIGH (ref 10–45)
ANION GAP SERPL CALC-SCNC: 10 MMOL/L — SIGNIFICANT CHANGE UP (ref 5–17)
ANION GAP SERPL CALC-SCNC: 11 MMOL/L — SIGNIFICANT CHANGE UP (ref 5–17)
ANION GAP SERPL CALC-SCNC: 14 MMOL/L — SIGNIFICANT CHANGE UP (ref 5–17)
APPEARANCE UR: CLEAR — SIGNIFICANT CHANGE UP
AST SERPL-CCNC: 120 U/L — HIGH (ref 10–40)
AST SERPL-CCNC: 125 U/L — HIGH (ref 10–40)
AST SERPL-CCNC: 130 U/L — HIGH (ref 10–40)
BASE EXCESS BLDA CALC-SCNC: 2 MMOL/L — SIGNIFICANT CHANGE UP (ref -2–3)
BASOPHILS # BLD AUTO: 0.01 K/UL — SIGNIFICANT CHANGE UP (ref 0–0.2)
BASOPHILS NFR BLD AUTO: 0.2 % — SIGNIFICANT CHANGE UP (ref 0–2)
BILIRUB DIRECT SERPL-MCNC: 0.9 MG/DL — HIGH (ref 0–0.2)
BILIRUB INDIRECT FLD-MCNC: 1.4 MG/DL — HIGH (ref 0.2–1)
BILIRUB SERPL-MCNC: 1.9 MG/DL — HIGH (ref 0.2–1.2)
BILIRUB SERPL-MCNC: 2.1 MG/DL — HIGH (ref 0.2–1.2)
BILIRUB SERPL-MCNC: 2.3 MG/DL — HIGH (ref 0.2–1.2)
BILIRUB UR-MCNC: NEGATIVE — SIGNIFICANT CHANGE UP
BLD GP AB SCN SERPL QL: NEGATIVE — SIGNIFICANT CHANGE UP
BUN SERPL-MCNC: 19 MG/DL — SIGNIFICANT CHANGE UP (ref 7–23)
BUN SERPL-MCNC: 20 MG/DL — SIGNIFICANT CHANGE UP (ref 7–23)
BUN SERPL-MCNC: 20 MG/DL — SIGNIFICANT CHANGE UP (ref 7–23)
CALCIUM SERPL-MCNC: 8.3 MG/DL — LOW (ref 8.4–10.5)
CALCIUM SERPL-MCNC: 8.5 MG/DL — SIGNIFICANT CHANGE UP (ref 8.4–10.5)
CALCIUM SERPL-MCNC: 8.8 MG/DL — SIGNIFICANT CHANGE UP (ref 8.4–10.5)
CHLORIDE SERPL-SCNC: 108 MMOL/L — SIGNIFICANT CHANGE UP (ref 96–108)
CHLORIDE SERPL-SCNC: 112 MMOL/L — HIGH (ref 96–108)
CHLORIDE SERPL-SCNC: 113 MMOL/L — HIGH (ref 96–108)
CK MB CFR SERPL CALC: 5.6 NG/ML — SIGNIFICANT CHANGE UP (ref 0–6.7)
CK MB CFR SERPL CALC: 5.9 NG/ML — SIGNIFICANT CHANGE UP (ref 0–6.7)
CK SERPL-CCNC: 4396 U/L — HIGH (ref 30–200)
CK SERPL-CCNC: 4620 U/L — HIGH (ref 30–200)
CK SERPL-CCNC: 5292 U/L — HIGH (ref 30–200)
CO2 SERPL-SCNC: 23 MMOL/L — SIGNIFICANT CHANGE UP (ref 22–31)
CO2 SERPL-SCNC: 24 MMOL/L — SIGNIFICANT CHANGE UP (ref 22–31)
CO2 SERPL-SCNC: 26 MMOL/L — SIGNIFICANT CHANGE UP (ref 22–31)
COLOR SPEC: YELLOW — SIGNIFICANT CHANGE UP
CREAT SERPL-MCNC: 1.22 MG/DL — SIGNIFICANT CHANGE UP (ref 0.5–1.3)
CREAT SERPL-MCNC: 1.23 MG/DL — SIGNIFICANT CHANGE UP (ref 0.5–1.3)
CREAT SERPL-MCNC: 1.27 MG/DL — SIGNIFICANT CHANGE UP (ref 0.5–1.3)
CRP SERPL-MCNC: 4.92 MG/DL — HIGH (ref 0–0.4)
D DIMER BLD IA.RAPID-MCNC: 294 NG/ML DDU — HIGH
DIFF PNL FLD: ABNORMAL
EOSINOPHIL # BLD AUTO: 0 K/UL — SIGNIFICANT CHANGE UP (ref 0–0.5)
EOSINOPHIL NFR BLD AUTO: 0 % — SIGNIFICANT CHANGE UP (ref 0–6)
ERYTHROCYTE [SEDIMENTATION RATE] IN BLOOD: 45 MM/HR — HIGH
FERRITIN SERPL-MCNC: 9946 NG/ML — HIGH (ref 30–400)
FOLATE SERPL-MCNC: >20 NG/ML — SIGNIFICANT CHANGE UP
GAS PNL BLDA: SIGNIFICANT CHANGE UP
GLUCOSE BLDC GLUCOMTR-MCNC: 104 MG/DL — HIGH (ref 70–99)
GLUCOSE BLDC GLUCOMTR-MCNC: 112 MG/DL — HIGH (ref 70–99)
GLUCOSE BLDC GLUCOMTR-MCNC: 92 MG/DL — SIGNIFICANT CHANGE UP (ref 70–99)
GLUCOSE BLDC GLUCOMTR-MCNC: 97 MG/DL — SIGNIFICANT CHANGE UP (ref 70–99)
GLUCOSE SERPL-MCNC: 110 MG/DL — HIGH (ref 70–99)
GLUCOSE SERPL-MCNC: 113 MG/DL — HIGH (ref 70–99)
GLUCOSE SERPL-MCNC: 96 MG/DL — SIGNIFICANT CHANGE UP (ref 70–99)
GLUCOSE UR QL: NEGATIVE — SIGNIFICANT CHANGE UP
HCO3 BLDA-SCNC: 26 MMOL/L — SIGNIFICANT CHANGE UP (ref 21–28)
HCT VFR BLD CALC: 39 % — SIGNIFICANT CHANGE UP (ref 39–50)
HCV RNA FLD QL NAA+PROBE: SIGNIFICANT CHANGE UP
HCV RNA SPEC QL PROBE+SIG AMP: SIGNIFICANT CHANGE UP
HGB BLD-MCNC: 12.6 G/DL — LOW (ref 13–17)
IMM GRANULOCYTES NFR BLD AUTO: 0.5 % — SIGNIFICANT CHANGE UP (ref 0–1.5)
KETONES UR-MCNC: NEGATIVE — SIGNIFICANT CHANGE UP
LDH SERPL L TO P-CCNC: 721 U/L — HIGH (ref 50–242)
LEUKOCYTE ESTERASE UR-ACNC: NEGATIVE — SIGNIFICANT CHANGE UP
LYMPHOCYTES # BLD AUTO: 0.57 K/UL — LOW (ref 1–3.3)
LYMPHOCYTES # BLD AUTO: 13.6 % — SIGNIFICANT CHANGE UP (ref 13–44)
MAGNESIUM SERPL-MCNC: 1.6 MG/DL — SIGNIFICANT CHANGE UP (ref 1.6–2.6)
MCHC RBC-ENTMCNC: 30.5 PG — SIGNIFICANT CHANGE UP (ref 27–34)
MCHC RBC-ENTMCNC: 32.3 GM/DL — SIGNIFICANT CHANGE UP (ref 32–36)
MCV RBC AUTO: 94.4 FL — SIGNIFICANT CHANGE UP (ref 80–100)
MONOCYTES # BLD AUTO: 0.2 K/UL — SIGNIFICANT CHANGE UP (ref 0–0.9)
MONOCYTES NFR BLD AUTO: 4.8 % — SIGNIFICANT CHANGE UP (ref 2–14)
MRSA PCR RESULT.: NEGATIVE — SIGNIFICANT CHANGE UP
NEUTROPHILS # BLD AUTO: 3.39 K/UL — SIGNIFICANT CHANGE UP (ref 1.8–7.4)
NEUTROPHILS NFR BLD AUTO: 80.9 % — HIGH (ref 43–77)
NITRITE UR-MCNC: NEGATIVE — SIGNIFICANT CHANGE UP
NRBC # BLD: 0 /100 WBCS — SIGNIFICANT CHANGE UP (ref 0–0)
PCO2 BLDA: 36 MMHG — SIGNIFICANT CHANGE UP (ref 35–48)
PH BLDA: 7.47 — HIGH (ref 7.35–7.45)
PH UR: 7 — SIGNIFICANT CHANGE UP (ref 5–8)
PHOSPHATE SERPL-MCNC: 2.5 MG/DL — SIGNIFICANT CHANGE UP (ref 2.5–4.5)
PHOSPHATE SERPL-MCNC: 2.6 MG/DL — SIGNIFICANT CHANGE UP (ref 2.5–4.5)
PHOSPHATE SERPL-MCNC: 2.6 MG/DL — SIGNIFICANT CHANGE UP (ref 2.5–4.5)
PLATELET # BLD AUTO: 63 K/UL — LOW (ref 150–400)
PO2 BLDA: 66 MMHG — LOW (ref 83–108)
POTASSIUM SERPL-MCNC: 4.4 MMOL/L — SIGNIFICANT CHANGE UP (ref 3.5–5.3)
POTASSIUM SERPL-MCNC: 4.4 MMOL/L — SIGNIFICANT CHANGE UP (ref 3.5–5.3)
POTASSIUM SERPL-MCNC: 4.5 MMOL/L — SIGNIFICANT CHANGE UP (ref 3.5–5.3)
POTASSIUM SERPL-SCNC: 4.4 MMOL/L — SIGNIFICANT CHANGE UP (ref 3.5–5.3)
POTASSIUM SERPL-SCNC: 4.4 MMOL/L — SIGNIFICANT CHANGE UP (ref 3.5–5.3)
POTASSIUM SERPL-SCNC: 4.5 MMOL/L — SIGNIFICANT CHANGE UP (ref 3.5–5.3)
PROCALCITONIN SERPL-MCNC: 0.28 NG/ML — HIGH (ref 0.02–0.1)
PROT SERPL-MCNC: 6.6 G/DL — SIGNIFICANT CHANGE UP (ref 6–8.3)
PROT SERPL-MCNC: 6.8 G/DL — SIGNIFICANT CHANGE UP (ref 6–8.3)
PROT SERPL-MCNC: 7.4 G/DL — SIGNIFICANT CHANGE UP (ref 6–8.3)
PROT UR-MCNC: 100 MG/DL
RBC # BLD: 4.13 M/UL — LOW (ref 4.2–5.8)
RBC # FLD: 11.7 % — SIGNIFICANT CHANGE UP (ref 10.3–14.5)
RH IG SCN BLD-IMP: POSITIVE — SIGNIFICANT CHANGE UP
RHEUMATOID FACT SERPL-ACNC: 22 IU/ML — HIGH (ref 0–13)
S AUREUS DNA NOSE QL NAA+PROBE: NEGATIVE — SIGNIFICANT CHANGE UP
SAO2 % BLDA: 96 % — SIGNIFICANT CHANGE UP (ref 95–100)
SODIUM SERPL-SCNC: 146 MMOL/L — HIGH (ref 135–145)
SODIUM SERPL-SCNC: 147 MMOL/L — HIGH (ref 135–145)
SODIUM SERPL-SCNC: 148 MMOL/L — HIGH (ref 135–145)
SP GR SPEC: 1.02 — SIGNIFICANT CHANGE UP (ref 1–1.03)
TROPONIN T SERPL-MCNC: 0.04 NG/ML — CRITICAL HIGH (ref 0–0.01)
TROPONIN T SERPL-MCNC: 0.05 NG/ML — CRITICAL HIGH (ref 0–0.01)
UROBILINOGEN FLD QL: >=8 E.U./DL
VIT B12 SERPL-MCNC: >2000 PG/ML — HIGH (ref 232–1245)
WBC # BLD: 4.19 K/UL — SIGNIFICANT CHANGE UP (ref 3.8–10.5)
WBC # FLD AUTO: 4.19 K/UL — SIGNIFICANT CHANGE UP (ref 3.8–10.5)

## 2020-05-09 PROCEDURE — 99232 SBSQ HOSP IP/OBS MODERATE 35: CPT | Mod: CS

## 2020-05-09 PROCEDURE — 71045 X-RAY EXAM CHEST 1 VIEW: CPT | Mod: 26

## 2020-05-09 PROCEDURE — 71250 CT THORAX DX C-: CPT | Mod: 26,CS

## 2020-05-09 PROCEDURE — 99233 SBSQ HOSP IP/OBS HIGH 50: CPT | Mod: GC

## 2020-05-09 RX ORDER — PIPERACILLIN AND TAZOBACTAM 4; .5 G/20ML; G/20ML
4.5 INJECTION, POWDER, LYOPHILIZED, FOR SOLUTION INTRAVENOUS EVERY 6 HOURS
Refills: 0 | Status: DISCONTINUED | OUTPATIENT
Start: 2020-05-09 | End: 2020-05-09

## 2020-05-09 RX ORDER — ACETAMINOPHEN 500 MG
1000 TABLET ORAL ONCE
Refills: 0 | Status: COMPLETED | OUTPATIENT
Start: 2020-05-09 | End: 2020-05-09

## 2020-05-09 RX ORDER — TOCILIZUMAB 20 MG/ML
400 INJECTION, SOLUTION, CONCENTRATE INTRAVENOUS ONCE
Refills: 0 | Status: COMPLETED | OUTPATIENT
Start: 2020-05-09 | End: 2020-05-09

## 2020-05-09 RX ORDER — SODIUM CHLORIDE 9 MG/ML
1000 INJECTION, SOLUTION INTRAVENOUS
Refills: 0 | Status: DISCONTINUED | OUTPATIENT
Start: 2020-05-09 | End: 2020-05-11

## 2020-05-09 RX ORDER — MAGNESIUM SULFATE 500 MG/ML
2 VIAL (ML) INJECTION ONCE
Refills: 0 | Status: COMPLETED | OUTPATIENT
Start: 2020-05-09 | End: 2020-05-09

## 2020-05-09 RX ORDER — BACLOFEN 100 %
5 POWDER (GRAM) MISCELLANEOUS EVERY 12 HOURS
Refills: 0 | Status: DISCONTINUED | OUTPATIENT
Start: 2020-05-09 | End: 2020-05-14

## 2020-05-09 RX ORDER — OXYCODONE HYDROCHLORIDE 5 MG/1
5 TABLET ORAL ONCE
Refills: 0 | Status: DISCONTINUED | OUTPATIENT
Start: 2020-05-09 | End: 2020-05-09

## 2020-05-09 RX ADMIN — Medication 650 MILLIGRAM(S): at 00:22

## 2020-05-09 RX ADMIN — GABAPENTIN 400 MILLIGRAM(S): 400 CAPSULE ORAL at 05:29

## 2020-05-09 RX ADMIN — LATANOPROST 1 DROP(S): 0.05 SOLUTION/ DROPS OPHTHALMIC; TOPICAL at 21:09

## 2020-05-09 RX ADMIN — FAMOTIDINE 20 MILLIGRAM(S): 10 INJECTION INTRAVENOUS at 05:29

## 2020-05-09 RX ADMIN — TOCILIZUMAB 100 MILLIGRAM(S): 20 INJECTION, SOLUTION, CONCENTRATE INTRAVENOUS at 12:31

## 2020-05-09 RX ADMIN — OXYCODONE HYDROCHLORIDE 5 MILLIGRAM(S): 5 TABLET ORAL at 23:01

## 2020-05-09 RX ADMIN — APIXABAN 5 MILLIGRAM(S): 2.5 TABLET, FILM COATED ORAL at 11:39

## 2020-05-09 RX ADMIN — Medication 650 MILLIGRAM(S): at 06:30

## 2020-05-09 RX ADMIN — DORZOLAMIDE HYDROCHLORIDE TIMOLOL MALEATE 1 DROP(S): 20; 5 SOLUTION/ DROPS OPHTHALMIC at 05:49

## 2020-05-09 RX ADMIN — Medication 100 GRAM(S): at 21:42

## 2020-05-09 RX ADMIN — Medication 400 MILLIGRAM(S): at 08:32

## 2020-05-09 RX ADMIN — LIDOCAINE 2 PATCH: 4 CREAM TOPICAL at 09:00

## 2020-05-09 RX ADMIN — Medication 81 MILLIGRAM(S): at 21:15

## 2020-05-09 RX ADMIN — Medication 5 MILLIGRAM(S): at 21:29

## 2020-05-09 RX ADMIN — SODIUM CHLORIDE 200 MILLILITER(S): 9 INJECTION, SOLUTION INTRAVENOUS at 21:17

## 2020-05-09 RX ADMIN — OXYCODONE HYDROCHLORIDE 5 MILLIGRAM(S): 5 TABLET ORAL at 12:46

## 2020-05-09 RX ADMIN — OXYCODONE HYDROCHLORIDE 5 MILLIGRAM(S): 5 TABLET ORAL at 04:06

## 2020-05-09 RX ADMIN — LIDOCAINE 2 PATCH: 4 CREAM TOPICAL at 21:28

## 2020-05-09 RX ADMIN — SODIUM CHLORIDE 150 MILLILITER(S): 9 INJECTION, SOLUTION INTRAVENOUS at 00:29

## 2020-05-09 RX ADMIN — DORZOLAMIDE HYDROCHLORIDE TIMOLOL MALEATE 1 DROP(S): 20; 5 SOLUTION/ DROPS OPHTHALMIC at 17:24

## 2020-05-09 RX ADMIN — Medication 1 MILLIGRAM(S): at 11:38

## 2020-05-09 RX ADMIN — LIDOCAINE 2 PATCH: 4 CREAM TOPICAL at 05:30

## 2020-05-09 RX ADMIN — SODIUM CHLORIDE 200 MILLILITER(S): 9 INJECTION, SOLUTION INTRAVENOUS at 01:36

## 2020-05-09 RX ADMIN — APIXABAN 5 MILLIGRAM(S): 2.5 TABLET, FILM COATED ORAL at 21:15

## 2020-05-09 RX ADMIN — OXYCODONE HYDROCHLORIDE 5 MILLIGRAM(S): 5 TABLET ORAL at 06:30

## 2020-05-09 RX ADMIN — SODIUM CHLORIDE 200 MILLILITER(S): 9 INJECTION, SOLUTION INTRAVENOUS at 08:58

## 2020-05-09 RX ADMIN — OXYCODONE HYDROCHLORIDE 5 MILLIGRAM(S): 5 TABLET ORAL at 18:57

## 2020-05-09 RX ADMIN — GABAPENTIN 400 MILLIGRAM(S): 400 CAPSULE ORAL at 21:16

## 2020-05-09 RX ADMIN — GABAPENTIN 400 MILLIGRAM(S): 400 CAPSULE ORAL at 15:22

## 2020-05-09 NOTE — PROGRESS NOTE ADULT - PROBLEM SELECTOR PLAN 3
Pt with baseline Cr 1.1-1.2 presented with Cr 1.88 and BUN 52 likely due to prerenal azotemia likely due to dehydration following having diarrhea for few days and not eating and drinking well. responded to fluid therapy and decreased to 1.68. Urine lytes also were compatible with pre-renal causes, s/p 1.5 L of IVF in ED  - improving this AM (Cr 1.33)  - avoid nephrotoxic meds at this time   - monitor kidney function    #Hematuria:   Patient with compliant of blood in urine and UA demonstrating large blood,  likely due to rhabdomyolysis   - will monitor the urine for gross hematuria   - c/w home Eliquis 5mg BID

## 2020-05-09 NOTE — PROGRESS NOTE ADULT - ASSESSMENT
70y Male w/ hx of lumbar spondylosis and right L4 radiculopathy with right leg weakness, s/p lumbar decompression at [facility] 1/2019 with improvement, and admitted for severe leg and back pain on 5/6. MRI T and L spine negative. COVID + EMG done 5/8 with mild progression of modertae sensorimotor polyneuropathy, and right lumbar plexopathy in comparison to a year ago. No evidence of myositis / myopathy.     Recommendations:  - Please initiate baclofen 5mg BID for leg spasms  -Continue EEG given relative altered / fluctuating mental status  -Continue to trend CK; elevation likely related to covid infection given lack of myopathy on EMG; less likely rhabdomyolysis as CK is rising while hospitalized, however agree with hydration for now   -will continue to follow 70y Male w/ hx of lumbar spondylosis and right L4 radiculopathy with right leg weakness, s/p lumbar decompression at [facility] 1/2019 with improvement, and admitted for severe leg and back pain on 5/6. MRI T and L spine negative. COVID + EMG done 5/8 with mild progression of modertae sensorimotor polyneuropathy, and right lumbar plexopathy in comparison to a year ago. No evidence of myositis / myopathy.     Recommendations:  - Please initiate baclofen 5mg BID for leg spasms, may increase as needed.  -Continue EEG given relative altered / fluctuating mental status  -Continue to trend CK; elevation likely related to covid infection given lack of myopathy on EMG; less likely rhabdomyolysis as CK is rising while hospitalized, however agree with hydration for now   -will continue to follow

## 2020-05-09 NOTE — PROGRESS NOTE ADULT - PROBLEM SELECTOR PLAN 9
Fluids: LR  Electrolytes-replete as needed  Nutrition - DASH/TLC  Code- Full Code  DVT ppx: Eliquis   GI ppx: none needed  DIspo: RMF    DISCHARGE PLANNING:  Medical readiness goals: improvement in CK; KEVIN placement   Anticipated d/c: 24-48 hours     PCP:     Pharmacy:

## 2020-05-09 NOTE — PROGRESS NOTE ADULT - SUBJECTIVE AND OBJECTIVE BOX
INTERVAL HPI/OVERNIGHT EVENTS: uptrending CK, increased LR 150cc to 200cc    SUBJECTIVE: Patient seen and examined at bedside. Patient remains febrile. He is awake and alert. He knows his name and city. Didn't know the hospital name or year. Continues to have muscle pain. Remains on VEEG.     OBJECTIVE:    VITAL SIGNS:  ICU Vital Signs Last 24 Hrs  T(C): 37.8 (09 May 2020 07:19), Max: 38.9 (09 May 2020 00:20)  T(F): 100.1 (09 May 2020 07:19), Max: 102 (09 May 2020 00:20)  HR: 101 (09 May 2020 07:19) (93 - 108)  BP: 114/77 (09 May 2020 05:56) (110/70 - 114/77)  BP(mean): --  ABP: --  ABP(mean): --  RR: 20 (09 May 2020 05:56) (18 - 20)  SpO2: 94% (09 May 2020 05:56) (94% - 95%)        05-08 @ 07:01  -  05-09 @ 07:00  --------------------------------------------------------  IN: 600 mL / OUT: 1100 mL / NET: -500 mL      CAPILLARY BLOOD GLUCOSE      POCT Blood Glucose.: 104 mg/dL (09 May 2020 07:49)      PHYSICAL EXAM:    General: NAD  HEENT: NC/AT; PERRL, clear conjunctiva  Neck: supple  Respiratory: CTA b/l - no crackles noted  Cardiovascular: +S1/S2; RRR  Abdomen: soft, NT/mild Distension; +BS x4  Extremities: WWP, 2+ peripheral pulses b/l; no LE edema, has upper and LE muscle tenderness b/l  Skin: normal color and turgor; no rash  Neurological: AAOx2, no focal deficits    MEDICATIONS:  MEDICATIONS  (STANDING):  apixaban 5 milliGRAM(s) Oral every 12 hours  aspirin  chewable 81 milliGRAM(s) Oral every 24 hours  dorzolamide 2%/timolol 0.5% Ophthalmic Solution 1 Drop(s) Both EYES every 12 hours  famotidine    Tablet 20 milliGRAM(s) Oral every 24 hours  folic acid 1 milliGRAM(s) Oral daily  gabapentin 400 milliGRAM(s) Oral three times a day  lactated ringers. 1000 milliLiter(s) (200 mL/Hr) IV Continuous <Continuous>  latanoprost 0.005% Ophthalmic Solution 1 Drop(s) Both EYES at bedtime  lidocaine   Patch 2 Patch Transdermal every 24 hours    MEDICATIONS  (PRN):  acetaminophen   Tablet .. 650 milliGRAM(s) Oral every 6 hours PRN Temp greater or equal to 38.5C (101.3F)  oxyCODONE    IR 5 milliGRAM(s) Oral every 4 hours PRN Moderate Pain (4 - 6)      ALLERGIES:  Allergies    No Known Allergies    Intolerances        LABS:                        12.6   4.19  )-----------( 63       ( 09 May 2020 07:18 )             39.0     05-09    146<H>  |  108  |  20  ----------------------------<  110<H>  4.4   |  24  |  1.27    Ca    8.8      09 May 2020 07:18  Phos  2.5     05-09  Mg     1.6     05-09    TPro  7.4  /  Alb  3.5  /  TBili  2.3<H>  /  DBili  0.9<H>  /  AST  125<H>  /  ALT  47<H>  /  AlkPhos  36<L>  05-09          RADIOLOGY & ADDITIONAL TESTS: Reviewed.

## 2020-05-09 NOTE — PROGRESS NOTE ADULT - PROBLEM SELECTOR PLAN 7
Pt with hx of DVT and PE, on Eliquis 5 daily at home. DVT reported in 2018, unclear indication for AC at this time.   - c/w home Eliquis 5mg BID qd     #Thrombocytopenia  Patient with baseline plt 110s, 84 on arrival and decreasing to 61 this AM. Possibly 2/2 viral infection and sepsis. No evidence of bleeding.  - hold home Aspirin 81mg  - f/u blue top platelets  - Hep C +  - consent for HIV   - monitor for signs of bleeding  - hematology following appreciate recs

## 2020-05-09 NOTE — PROGRESS NOTE ADULT - PROBLEM SELECTOR PLAN 4
Pt with sore throat and runny nose and mild diarrhea for the past few days but no SOB or cough, was found to be positive for COVID. Had fever and tachycardia in ED but no O2 desaturation. currently sating 98% on RA. CXR clear.   COVID positive, Ferritin 2537, CRP 3.33  - Pt mildly symptomatic and does not require O2 supplement -> no indication for azithromycin for CAP or COVID-19 management   - Monitor O2 saturation and symptoms  - Trending inflammatory markers daily  - tylenol for fever  - symptomtic management   - contact precaustions: isolation and droplet  - avoid NSAID/ACE-I/ARB       #Hyperbilirubinemia   Pt with bili 2.7 and elevated AST and ALT but normal [id number] ultrasound demonstrating gallbladder with multiple gallstones. Benign abdominal exam. Negative cage sign.  Likely 2/2 COVID infection or due to gallbladder stones. Improved with fluid therapy. Patient Hep C positive.   - monitor LFT  - f/u hep panel

## 2020-05-09 NOTE — PROGRESS NOTE ADULT - PROBLEM SELECTOR PLAN 2
Sepsis of unknown etiology. Patient with severe sepsis criteria on admission (fever, tachycardia and elevated lactate), with complaints of URI and diarrhea. Found to COVID-19 positive. No evidence of leukocytosis, procalcitonin 0.28.  WBC was normal. procal is 0.28. Sepsis is likely due to COVID as well as dehydration. Right knee with erythema, no evidence of effusion. Gallstones present, Perdue sign negative.Still with fever (102) and leukopenia. Differential diagnosis autoinflammatory state 2/2 COVID vs autoimmune vs bacterial process.   - Blood cultures NGTD  - UA negative   - Fever management with tylenol   - Knee xray w/o evidence of septic joint   - Tylenol for fever  - COVID management as below  - started zosyn for HAP on 5/9/2020 given persistent fevers, elevated procal and newly consolidation on CXR Sepsis of unknown etiology. Patient with severe sepsis criteria on admission (fever, tachycardia and elevated lactate), with complaints of URI and diarrhea. Found to COVID-19 positive. No evidence of leukocytosis, procalcitonin 0.28.  WBC was normal. procal is 0.28. Sepsis is likely due to COVID as well as dehydration. Right knee with erythema, no evidence of effusion. Gallstones present, Perdue sign negative.Still with fever (102) and leukopenia. Differential diagnosis autoinflammatory state 2/2 COVID vs autoimmune vs bacterial process.   - Blood cultures NGTD  - UA negative   - Fever management with tylenol   - Knee xray w/o evidence of septic joint   - Tylenol for fever  - COVID management as below  - will consider tocilizumab given increasing inflammatory markers  - f/u CT chest to evaluate for consolidation

## 2020-05-09 NOTE — PROGRESS NOTE ADULT - PROBLEM SELECTOR PLAN 5
Has a chronic neuropathy likely secondary to disc herniation. on gabapentin 400 tid at home. Currently in worsening back pain and tingling and numbness. Follows with Dr. Brito outpatient.   - LE dopplers negative for DVT  - CPK elevated -> concern for myositis as underlying etiology   - trend CPK   - EMG unchanged from prior   - c/w home gabapentin 400 tid    #Fall  Patient with reported fall and multiple previous falls. Denies LOC or trauma to head.   Ambulates with walker. Unwilling to cooperate with neurological exam 2/2 to reported pain.   - PT evaluation -> recommendation of KEVIN   - neurology following    #Lethargy   Patient with increased lethargy, less arousable compared to baseline.   - EEG to be placed   - neurology following appreciate recs  #Glaucoma  Patient with history of glaucoma.   - c/w home latanoprost 0.005%

## 2020-05-09 NOTE — PROGRESS NOTE ADULT - SUBJECTIVE AND OBJECTIVE BOX
Neurology Progress Note    Review of Systems:    MEDICATIONS  (STANDING):  apixaban 5 milliGRAM(s) Oral every 12 hours  aspirin  chewable 81 milliGRAM(s) Oral every 24 hours  dorzolamide 2%/timolol 0.5% Ophthalmic Solution 1 Drop(s) Both EYES every 12 hours  famotidine    Tablet 20 milliGRAM(s) Oral every 24 hours  folic acid 1 milliGRAM(s) Oral daily  gabapentin 400 milliGRAM(s) Oral three times a day  lactated ringers. 1000 milliLiter(s) (200 mL/Hr) IV Continuous <Continuous>  latanoprost 0.005% Ophthalmic Solution 1 Drop(s) Both EYES at bedtime  lidocaine   Patch 2 Patch Transdermal every 24 hours    MEDICATIONS  (PRN):  acetaminophen   Tablet .. 650 milliGRAM(s) Oral every 6 hours PRN Temp greater or equal to 38.5C (101.3F)  oxyCODONE    IR 5 milliGRAM(s) Oral every 4 hours PRN Moderate Pain (4 - 6)    Allergies  No Known Allergies    Vital Signs Last 24 Hrs  T(C): 37.8 (09 May 2020 07:19), Max: 38.9 (09 May 2020 00:20)  T(F): 100.1 (09 May 2020 07:19), Max: 102 (09 May 2020 00:20)  HR: 101 (09 May 2020 07:19) (93 - 106)  BP: 114/77 (09 May 2020 05:56) (110/70 - 114/77)  BP(mean): --  RR: 20 (09 May 2020 05:56) (19 - 20)  SpO2: 94% (09 May 2020 05:56) (94% - 95%)    Physical exam:    COVID LABS:   D-Dimer Assay, Quantitative: 294 (05-09-20)  D-Dimer Assay, Quantitative: 248 (05-08-20)  D-Dimer Assay, Quantitative: 212 (05-07-20)      Ferritin, Serum: 9946 (05-09 @ 07:18)  Ferritin, Serum: 2218 (05-07 @ 07:55)  Ferritin, Serum: 2537 (05-06 @ 21:40)      C-Reactive Protein, Serum: 4.92 (05-09 @ 07:18)  C-Reactive Protein, Serum: 4.55 (05-08 @ 06:05)  C-Reactive Protein, Serum: 2.82 (05-07 @ 07:38)  C-Reactive Protein, Serum: 3.33 (05-06 @ 12:45)                            12.6   4.19  )-----------( 63       ( 09 May 2020 07:18 )             39.0     05-09    147<H>  |  113<H>  |  20  ----------------------------<  113<H>  4.4   |  23  |  1.23    Ca    8.3<L>      09 May 2020 10:49  Phos  2.6     05-09  Mg     1.6     05-09    TPro  6.6  /  Alb  3.2<L>  /  TBili  2.1<H>  /  DBili  x   /  AST  120<H>  /  ALT  42  /  AlkPhos  33<L>  05-09 Neurology Progress Note  Patient seen and examined at bedside by Dr Khan, and reported severe bilateral leg pain requiring frequent doses of oxycodone.     Review of Systems:  Unable to obtain 2/2 AMS    MEDICATIONS  (STANDING):  apixaban 5 milliGRAM(s) Oral every 12 hours  aspirin  chewable 81 milliGRAM(s) Oral every 24 hours  dorzolamide 2%/timolol 0.5% Ophthalmic Solution 1 Drop(s) Both EYES every 12 hours  famotidine    Tablet 20 milliGRAM(s) Oral every 24 hours  folic acid 1 milliGRAM(s) Oral daily  gabapentin 400 milliGRAM(s) Oral three times a day  lactated ringers. 1000 milliLiter(s) (200 mL/Hr) IV Continuous <Continuous>  latanoprost 0.005% Ophthalmic Solution 1 Drop(s) Both EYES at bedtime  lidocaine   Patch 2 Patch Transdermal every 24 hours    MEDICATIONS  (PRN):  acetaminophen   Tablet .. 650 milliGRAM(s) Oral every 6 hours PRN Temp greater or equal to 38.5C (101.3F)  oxyCODONE    IR 5 milliGRAM(s) Oral every 4 hours PRN Moderate Pain (4 - 6)    Allergies  No Known Allergies    Vital Signs Last 24 Hrs  T(C): 37.8 (09 May 2020 07:19), Max: 38.9 (09 May 2020 00:20)  T(F): 100.1 (09 May 2020 07:19), Max: 102 (09 May 2020 00:20)  HR: 101 (09 May 2020 07:19) (93 - 106)  BP: 114/77 (09 May 2020 05:56) (110/70 - 114/77)  BP(mean): --  RR: 20 (09 May 2020 05:56) (19 - 20)  SpO2: 94% (09 May 2020 05:56) (94% - 95%)    Physical exam:  Alert today  able to lift extremities antigravity.   Bilateral leg spasms noted.     COVID LABS:   D-Dimer Assay, Quantitative: 294 (05-09-20)  D-Dimer Assay, Quantitative: 248 (05-08-20)  D-Dimer Assay, Quantitative: 212 (05-07-20)      Ferritin, Serum: 9946 (05-09 @ 07:18)  Ferritin, Serum: 2218 (05-07 @ 07:55)  Ferritin, Serum: 2537 (05-06 @ 21:40)      C-Reactive Protein, Serum: 4.92 (05-09 @ 07:18)  C-Reactive Protein, Serum: 4.55 (05-08 @ 06:05)  C-Reactive Protein, Serum: 2.82 (05-07 @ 07:38)  C-Reactive Protein, Serum: 3.33 (05-06 @ 12:45)                            12.6   4.19  )-----------( 63       ( 09 May 2020 07:18 )             39.0     05-09    147<H>  |  113<H>  |  20  ----------------------------<  113<H>  4.4   |  23  |  1.23    Ca    8.3<L>      09 May 2020 10:49  Phos  2.6     05-09  Mg     1.6     05-09    TPro  6.6  /  Alb  3.2<L>  /  TBili  2.1<H>  /  DBili  x   /  AST  120<H>  /  ALT  42  /  AlkPhos  33<L>  05-09 Neurology Progress Note  Patient seen and examined at bedside by Dr Khan, and reported severe bilateral leg pain, spasms and sharp pain that is continuous.  He has been requiring frequent doses of oxycodone.     Review of Systems:  Reports pain and weakness in the legs.  No respiratory, CV or GI issues.    MEDICATIONS  (STANDING):  apixaban 5 milliGRAM(s) Oral every 12 hours  aspirin  chewable 81 milliGRAM(s) Oral every 24 hours  dorzolamide 2%/timolol 0.5% Ophthalmic Solution 1 Drop(s) Both EYES every 12 hours  famotidine    Tablet 20 milliGRAM(s) Oral every 24 hours  folic acid 1 milliGRAM(s) Oral daily  gabapentin 400 milliGRAM(s) Oral three times a day  lactated ringers. 1000 milliLiter(s) (200 mL/Hr) IV Continuous <Continuous>  latanoprost 0.005% Ophthalmic Solution 1 Drop(s) Both EYES at bedtime  lidocaine   Patch 2 Patch Transdermal every 24 hours    MEDICATIONS  (PRN):  acetaminophen   Tablet .. 650 milliGRAM(s) Oral every 6 hours PRN Temp greater or equal to 38.5C (101.3F)  oxyCODONE    IR 5 milliGRAM(s) Oral every 4 hours PRN Moderate Pain (4 - 6)    Allergies  No Known Allergies    Vital Signs Last 24 Hrs  T(C): 37.8 (09 May 2020 07:19), Max: 38.9 (09 May 2020 00:20)  T(F): 100.1 (09 May 2020 07:19), Max: 102 (09 May 2020 00:20)  HR: 101 (09 May 2020 07:19) (93 - 106)  BP: 114/77 (09 May 2020 05:56) (110/70 - 114/77)  BP(mean): --  RR: 20 (09 May 2020 05:56) (19 - 20)  SpO2: 94% (09 May 2020 05:56) (94% - 95%)    Physical exam:  Alert today  able to lift extremities antigravity.   Bilateral leg spasms noted.     COVID LABS:   D-Dimer Assay, Quantitative: 294 (05-09-20)  D-Dimer Assay, Quantitative: 248 (05-08-20)  D-Dimer Assay, Quantitative: 212 (05-07-20)      Ferritin, Serum: 9946 (05-09 @ 07:18)  Ferritin, Serum: 2218 (05-07 @ 07:55)  Ferritin, Serum: 2537 (05-06 @ 21:40)      C-Reactive Protein, Serum: 4.92 (05-09 @ 07:18)  C-Reactive Protein, Serum: 4.55 (05-08 @ 06:05)  C-Reactive Protein, Serum: 2.82 (05-07 @ 07:38)  C-Reactive Protein, Serum: 3.33 (05-06 @ 12:45)                            12.6   4.19  )-----------( 63       ( 09 May 2020 07:18 )             39.0     05-09    147<H>  |  113<H>  |  20  ----------------------------<  113<H>  4.4   |  23  |  1.23    Ca    8.3<L>      09 May 2020 10:49  Phos  2.6     05-09  Mg     1.6     05-09    TPro  6.6  /  Alb  3.2<L>  /  TBili  2.1<H>  /  DBili  x   /  AST  120<H>  /  ALT  42  /  AlkPhos  33<L>  05-09

## 2020-05-10 LAB
ALBUMIN SERPL ELPH-MCNC: 2.9 G/DL — LOW (ref 3.3–5)
ALBUMIN SERPL ELPH-MCNC: 3 G/DL — LOW (ref 3.3–5)
ALBUMIN SERPL ELPH-MCNC: 3.3 G/DL — SIGNIFICANT CHANGE UP (ref 3.3–5)
ALP SERPL-CCNC: 32 U/L — LOW (ref 40–120)
ALP SERPL-CCNC: 35 U/L — LOW (ref 40–120)
ALP SERPL-CCNC: 38 U/L — LOW (ref 40–120)
ALT FLD-CCNC: 44 U/L — SIGNIFICANT CHANGE UP (ref 10–45)
ALT FLD-CCNC: 45 U/L — SIGNIFICANT CHANGE UP (ref 10–45)
ALT FLD-CCNC: 45 U/L — SIGNIFICANT CHANGE UP (ref 10–45)
ANION GAP SERPL CALC-SCNC: 10 MMOL/L — SIGNIFICANT CHANGE UP (ref 5–17)
ANION GAP SERPL CALC-SCNC: 12 MMOL/L — SIGNIFICANT CHANGE UP (ref 5–17)
ANION GAP SERPL CALC-SCNC: 13 MMOL/L — SIGNIFICANT CHANGE UP (ref 5–17)
APTT BLD: 29.8 SEC — SIGNIFICANT CHANGE UP (ref 27.5–36.3)
AST SERPL-CCNC: 136 U/L — HIGH (ref 10–40)
AST SERPL-CCNC: 137 U/L — HIGH (ref 10–40)
AST SERPL-CCNC: 137 U/L — HIGH (ref 10–40)
BASOPHILS # BLD AUTO: 0.01 K/UL — SIGNIFICANT CHANGE UP (ref 0–0.2)
BASOPHILS NFR BLD AUTO: 0.2 % — SIGNIFICANT CHANGE UP (ref 0–2)
BILIRUB SERPL-MCNC: 1.9 MG/DL — HIGH (ref 0.2–1.2)
BILIRUB SERPL-MCNC: 2 MG/DL — HIGH (ref 0.2–1.2)
BILIRUB SERPL-MCNC: 2 MG/DL — HIGH (ref 0.2–1.2)
BUN SERPL-MCNC: 18 MG/DL — SIGNIFICANT CHANGE UP (ref 7–23)
BUN SERPL-MCNC: 19 MG/DL — SIGNIFICANT CHANGE UP (ref 7–23)
BUN SERPL-MCNC: 19 MG/DL — SIGNIFICANT CHANGE UP (ref 7–23)
CALCIUM SERPL-MCNC: 8.2 MG/DL — LOW (ref 8.4–10.5)
CALCIUM SERPL-MCNC: 8.4 MG/DL — SIGNIFICANT CHANGE UP (ref 8.4–10.5)
CALCIUM SERPL-MCNC: 8.6 MG/DL — SIGNIFICANT CHANGE UP (ref 8.4–10.5)
CHLORIDE SERPL-SCNC: 110 MMOL/L — HIGH (ref 96–108)
CHLORIDE SERPL-SCNC: 112 MMOL/L — HIGH (ref 96–108)
CHLORIDE SERPL-SCNC: 112 MMOL/L — HIGH (ref 96–108)
CK SERPL-CCNC: 4959 U/L — HIGH (ref 30–200)
CK SERPL-CCNC: 5401 U/L — HIGH (ref 30–200)
CK SERPL-CCNC: 5446 U/L — HIGH (ref 30–200)
CO2 SERPL-SCNC: 20 MMOL/L — LOW (ref 22–31)
CO2 SERPL-SCNC: 23 MMOL/L — SIGNIFICANT CHANGE UP (ref 22–31)
CO2 SERPL-SCNC: 23 MMOL/L — SIGNIFICANT CHANGE UP (ref 22–31)
CREAT SERPL-MCNC: 1.06 MG/DL — SIGNIFICANT CHANGE UP (ref 0.5–1.3)
CREAT SERPL-MCNC: 1.13 MG/DL — SIGNIFICANT CHANGE UP (ref 0.5–1.3)
CREAT SERPL-MCNC: 1.15 MG/DL — SIGNIFICANT CHANGE UP (ref 0.5–1.3)
CRP SERPL-MCNC: 5.45 MG/DL — HIGH (ref 0–0.4)
D DIMER BLD IA.RAPID-MCNC: 409 NG/ML DDU — HIGH
EOSINOPHIL # BLD AUTO: 0.01 K/UL — SIGNIFICANT CHANGE UP (ref 0–0.5)
EOSINOPHIL NFR BLD AUTO: 0.2 % — SIGNIFICANT CHANGE UP (ref 0–6)
ERYTHROCYTE [SEDIMENTATION RATE] IN BLOOD: 50 MM/HR — HIGH
FERRITIN SERPL-MCNC: HIGH NG/ML (ref 30–400)
GLUCOSE BLDC GLUCOMTR-MCNC: 90 MG/DL — SIGNIFICANT CHANGE UP (ref 70–99)
GLUCOSE BLDC GLUCOMTR-MCNC: 99 MG/DL — SIGNIFICANT CHANGE UP (ref 70–99)
GLUCOSE SERPL-MCNC: 93 MG/DL — SIGNIFICANT CHANGE UP (ref 70–99)
GLUCOSE SERPL-MCNC: 95 MG/DL — SIGNIFICANT CHANGE UP (ref 70–99)
GLUCOSE SERPL-MCNC: 96 MG/DL — SIGNIFICANT CHANGE UP (ref 70–99)
HCT VFR BLD CALC: 34.6 % — LOW (ref 39–50)
HGB BLD-MCNC: 11.2 G/DL — LOW (ref 13–17)
IMM GRANULOCYTES NFR BLD AUTO: 0.5 % — SIGNIFICANT CHANGE UP (ref 0–1.5)
INR BLD: 2.09 — HIGH (ref 0.88–1.16)
LYMPHOCYTES # BLD AUTO: 0.45 K/UL — LOW (ref 1–3.3)
LYMPHOCYTES # BLD AUTO: 8 % — LOW (ref 13–44)
MAGNESIUM SERPL-MCNC: 1.8 MG/DL — SIGNIFICANT CHANGE UP (ref 1.6–2.6)
MAGNESIUM SERPL-MCNC: 2 MG/DL — SIGNIFICANT CHANGE UP (ref 1.6–2.6)
MCHC RBC-ENTMCNC: 30.1 PG — SIGNIFICANT CHANGE UP (ref 27–34)
MCHC RBC-ENTMCNC: 32.4 GM/DL — SIGNIFICANT CHANGE UP (ref 32–36)
MCV RBC AUTO: 93 FL — SIGNIFICANT CHANGE UP (ref 80–100)
MONOCYTES # BLD AUTO: 0.11 K/UL — SIGNIFICANT CHANGE UP (ref 0–0.9)
MONOCYTES NFR BLD AUTO: 2 % — SIGNIFICANT CHANGE UP (ref 2–14)
NEUTROPHILS # BLD AUTO: 5.01 K/UL — SIGNIFICANT CHANGE UP (ref 1.8–7.4)
NEUTROPHILS NFR BLD AUTO: 89.1 % — HIGH (ref 43–77)
NRBC # BLD: 0 /100 WBCS — SIGNIFICANT CHANGE UP (ref 0–0)
NT-PROBNP SERPL-SCNC: 402 PG/ML — HIGH (ref 0–300)
PHOSPHATE SERPL-MCNC: 2.6 MG/DL — SIGNIFICANT CHANGE UP (ref 2.5–4.5)
PHOSPHATE SERPL-MCNC: 2.8 MG/DL — SIGNIFICANT CHANGE UP (ref 2.5–4.5)
PLATELET # BLD AUTO: 74 K/UL — LOW (ref 150–400)
POTASSIUM SERPL-MCNC: 4.2 MMOL/L — SIGNIFICANT CHANGE UP (ref 3.5–5.3)
POTASSIUM SERPL-MCNC: 4.3 MMOL/L — SIGNIFICANT CHANGE UP (ref 3.5–5.3)
POTASSIUM SERPL-MCNC: 4.4 MMOL/L — SIGNIFICANT CHANGE UP (ref 3.5–5.3)
POTASSIUM SERPL-SCNC: 4.2 MMOL/L — SIGNIFICANT CHANGE UP (ref 3.5–5.3)
POTASSIUM SERPL-SCNC: 4.3 MMOL/L — SIGNIFICANT CHANGE UP (ref 3.5–5.3)
POTASSIUM SERPL-SCNC: 4.4 MMOL/L — SIGNIFICANT CHANGE UP (ref 3.5–5.3)
PROT SERPL-MCNC: 6.3 G/DL — SIGNIFICANT CHANGE UP (ref 6–8.3)
PROT SERPL-MCNC: 6.3 G/DL — SIGNIFICANT CHANGE UP (ref 6–8.3)
PROT SERPL-MCNC: 6.7 G/DL — SIGNIFICANT CHANGE UP (ref 6–8.3)
PROTHROM AB SERPL-ACNC: 24.4 SEC — HIGH (ref 10–12.9)
RBC # BLD: 3.72 M/UL — LOW (ref 4.2–5.8)
RBC # FLD: 11.7 % — SIGNIFICANT CHANGE UP (ref 10.3–14.5)
SODIUM SERPL-SCNC: 143 MMOL/L — SIGNIFICANT CHANGE UP (ref 135–145)
SODIUM SERPL-SCNC: 145 MMOL/L — SIGNIFICANT CHANGE UP (ref 135–145)
SODIUM SERPL-SCNC: 147 MMOL/L — HIGH (ref 135–145)
TROPONIN T SERPL-MCNC: 0.03 NG/ML — HIGH (ref 0–0.01)
WBC # BLD: 5.62 K/UL — SIGNIFICANT CHANGE UP (ref 3.8–10.5)
WBC # FLD AUTO: 5.62 K/UL — SIGNIFICANT CHANGE UP (ref 3.8–10.5)

## 2020-05-10 PROCEDURE — 95720 EEG PHY/QHP EA INCR W/VEEG: CPT

## 2020-05-10 PROCEDURE — 99233 SBSQ HOSP IP/OBS HIGH 50: CPT | Mod: GC

## 2020-05-10 RX ORDER — MAGNESIUM SULFATE 500 MG/ML
1 VIAL (ML) INJECTION ONCE
Refills: 0 | Status: COMPLETED | OUTPATIENT
Start: 2020-05-10 | End: 2020-05-10

## 2020-05-10 RX ADMIN — OXYCODONE HYDROCHLORIDE 5 MILLIGRAM(S): 5 TABLET ORAL at 22:22

## 2020-05-10 RX ADMIN — LIDOCAINE 2 PATCH: 4 CREAM TOPICAL at 05:22

## 2020-05-10 RX ADMIN — Medication 5 MILLIGRAM(S): at 21:34

## 2020-05-10 RX ADMIN — FAMOTIDINE 20 MILLIGRAM(S): 10 INJECTION INTRAVENOUS at 05:23

## 2020-05-10 RX ADMIN — GABAPENTIN 400 MILLIGRAM(S): 400 CAPSULE ORAL at 05:23

## 2020-05-10 RX ADMIN — APIXABAN 5 MILLIGRAM(S): 2.5 TABLET, FILM COATED ORAL at 21:33

## 2020-05-10 RX ADMIN — LATANOPROST 1 DROP(S): 0.05 SOLUTION/ DROPS OPHTHALMIC; TOPICAL at 21:34

## 2020-05-10 RX ADMIN — OXYCODONE HYDROCHLORIDE 5 MILLIGRAM(S): 5 TABLET ORAL at 09:41

## 2020-05-10 RX ADMIN — OXYCODONE HYDROCHLORIDE 5 MILLIGRAM(S): 5 TABLET ORAL at 18:17

## 2020-05-10 RX ADMIN — OXYCODONE HYDROCHLORIDE 5 MILLIGRAM(S): 5 TABLET ORAL at 14:00

## 2020-05-10 RX ADMIN — SODIUM CHLORIDE 200 MILLILITER(S): 9 INJECTION, SOLUTION INTRAVENOUS at 09:16

## 2020-05-10 RX ADMIN — LIDOCAINE 2 PATCH: 4 CREAM TOPICAL at 21:32

## 2020-05-10 RX ADMIN — Medication 650 MILLIGRAM(S): at 09:41

## 2020-05-10 RX ADMIN — Medication 100 GRAM(S): at 18:19

## 2020-05-10 RX ADMIN — Medication 81 MILLIGRAM(S): at 21:33

## 2020-05-10 RX ADMIN — OXYCODONE HYDROCHLORIDE 5 MILLIGRAM(S): 5 TABLET ORAL at 05:23

## 2020-05-10 RX ADMIN — GABAPENTIN 400 MILLIGRAM(S): 400 CAPSULE ORAL at 13:04

## 2020-05-10 RX ADMIN — Medication 1 MILLIGRAM(S): at 11:13

## 2020-05-10 RX ADMIN — GABAPENTIN 400 MILLIGRAM(S): 400 CAPSULE ORAL at 21:33

## 2020-05-10 RX ADMIN — DORZOLAMIDE HYDROCHLORIDE TIMOLOL MALEATE 1 DROP(S): 20; 5 SOLUTION/ DROPS OPHTHALMIC at 05:22

## 2020-05-10 RX ADMIN — Medication 650 MILLIGRAM(S): at 15:43

## 2020-05-10 RX ADMIN — SODIUM CHLORIDE 200 MILLILITER(S): 9 INJECTION, SOLUTION INTRAVENOUS at 16:30

## 2020-05-10 RX ADMIN — Medication 5 MILLIGRAM(S): at 09:16

## 2020-05-10 RX ADMIN — LIDOCAINE 2 PATCH: 4 CREAM TOPICAL at 09:17

## 2020-05-10 RX ADMIN — APIXABAN 5 MILLIGRAM(S): 2.5 TABLET, FILM COATED ORAL at 11:13

## 2020-05-10 RX ADMIN — DORZOLAMIDE HYDROCHLORIDE TIMOLOL MALEATE 1 DROP(S): 20; 5 SOLUTION/ DROPS OPHTHALMIC at 18:08

## 2020-05-10 NOTE — PROGRESS NOTE ADULT - PROBLEM SELECTOR PLAN 2
Sepsis of unknown etiology. Patient with severe sepsis criteria on admission (fever, tachycardia and elevated lactate), with complaints of URI and diarrhea. Found to COVID-19 positive. No evidence of leukocytosis, procalcitonin 0.28.  WBC was normal. procal is 0.28. Sepsis is likely due to COVID as well as dehydration. Right knee with erythema, no evidence of effusion. Gallstones present, Perdue sign negative. Still with fever (102) and leukopenia. Differential diagnosis autoinflammatory state 2/2 COVID-19. Patient desaturating to 87% on RA, requiring 6L oxygen, weaned to 2L.   - Blood cultures NGTD  - UA negative   - Fever management with tylenol   - Knee xray w/o evidence of septic joint   - Tylenol for fever  - COVID management as below  - s/p tocilizumab (5/9)    - CT chest obtained to evaluate for ?consolidation demonstrating basilar predominant groundglass opacities bilaterally; consistent with COVID-19.

## 2020-05-10 NOTE — PROGRESS NOTE ADULT - SUBJECTIVE AND OBJECTIVE BOX
Neurology Progress Note    INTERVAL HPI/OVERNIGHT EVENTS:  Patient seen and examined.     MEDICATIONS  (STANDING):  apixaban 5 milliGRAM(s) Oral every 12 hours  aspirin  chewable 81 milliGRAM(s) Oral every 24 hours  baclofen 5 milliGRAM(s) Oral every 12 hours  dorzolamide 2%/timolol 0.5% Ophthalmic Solution 1 Drop(s) Both EYES every 12 hours  famotidine    Tablet 20 milliGRAM(s) Oral every 24 hours  folic acid 1 milliGRAM(s) Oral daily  gabapentin 400 milliGRAM(s) Oral three times a day  lactated ringers. 1000 milliLiter(s) (200 mL/Hr) IV Continuous <Continuous>  latanoprost 0.005% Ophthalmic Solution 1 Drop(s) Both EYES at bedtime  lidocaine   Patch 2 Patch Transdermal every 24 hours    MEDICATIONS  (PRN):  acetaminophen   Tablet .. 650 milliGRAM(s) Oral every 6 hours PRN Temp greater or equal to 38.5C (101.3F)  oxyCODONE    IR 5 milliGRAM(s) Oral every 4 hours PRN Moderate Pain (4 - 6)      Allergies    No Known Allergies    Intolerances        Vital Signs Last 24 Hrs  T(C): 37.7 (10 May 2020 12:10), Max: 38.4 (10 May 2020 09:41)  T(F): 99.9 (10 May 2020 12:10), Max: 101.2 (10 May 2020 09:41)  HR: 79 (10 May 2020 12:10) (73 - 89)  BP: 132/79 (10 May 2020 12:10) (131/88 - 155/88)  BP(mean): --  RR: 18 (10 May 2020 13:16) (18 - 20)  SpO2: 94% (10 May 2020 13:16) (86% - 95%)    Physical exam:  -Mental status: Awake, alert, oriented to person, place, and time. Speech is fluent with intact naming, repetition, and comprehension, no dysarthria. Recent and remote memory intact. Follows commands. Attention/concentration intact. Fund of knowledge appropriate.  -Cranial nerves:   II: Visual fields are full to confrontation.  III, IV, VI: Extraocular movements are intact without nystagmus. Pupils equally round and reactive to light  V:  Facial sensation V1-V3 equal and intact   VII: Face is symmetric with normal eye closure and smile  VIII: Hearing is bilaterally intact to finger rub  IX, X: Uvula is midline and soft palate rises symmetrically  XI: Head turning and shoulder shrug are intact.  XII: Tongue protrudes midline  Motor: Normal bulk and tone. No pronator drift. Strength bilateral upper extremity 5/5, bilateral lower extremities 5/5.  Rapid alternating movements intact and symmetric  Sensation: Intact to light touch bilaterally. No neglect or extinction on double simultaneous testing.  Coordination: No dysmetria on finger-to-nose and heel-to-shin bilaterally  Reflexes: Downgoing toes bilaterally   Gait: Narrow gait and steady    COVID LABS:   D-Dimer Assay, Quantitative: 409 (05-10-20)  D-Dimer Assay, Quantitative: 294 (20)  D-Dimer Assay, Quantitative: 248 (20)  D-Dimer Assay, Quantitative: 212 (20)      Ferritin, Serum: 40792 (05-10 @ 10:26)  Ferritin, Serum: 9946 ( 07:18)  Ferritin, Serum: 2218 ( @ 07:55)  Ferritin, Serum: 2537 ( @ 21:40)      C-Reactive Protein, Serum: 5.45 (05-10 @ 10:26)  C-Reactive Protein, Serum: 4.92 ( @ 07:18)  C-Reactive Protein, Serum: 4.55 ( @ 06:05)  C-Reactive Protein, Serum: 2.82 ( @ 07:38)  C-Reactive Protein, Serum: 3.33 ( @ 12:45)                            11.2   5.62  )-----------( 74       ( 10 May 2020 10:26 )             34.6     05-10    143  |  110<H>  |  19  ----------------------------<  93  4.4   |  20<L>  |  1.06    Ca    8.4      10 May 2020 10:26  Phos  2.6       Mg     1.6         TPro  6.3  /  Alb  3.0<L>  /  TBili  1.9<H>  /  DBili  x   /  AST  136<H>  /  ALT  44  /  AlkPhos  32<L>  05-10    PT/INR - ( 10 May 2020 10:26 )   PT: 24.4 sec;   INR: 2.09          PTT - ( 10 May 2020 10:26 )  PTT:29.8 sec  Urinalysis Basic - ( 09 May 2020 18:06 )    Color: Yellow / Appearance: Clear / S.020 / pH: x  Gluc: x / Ketone: NEGATIVE  / Bili: Negative / Urobili: >=8.0 E.U./dL   Blood: x / Protein: 100 mg/dL / Nitrite: NEGATIVE   Leuk Esterase: NEGATIVE / RBC: < 5 /HPF / WBC < 5 /HPF   Sq Epi: x / Non Sq Epi: 0-5 /HPF / Bacteria: Present /HPF        RADIOLOGY & ADDITIONAL TESTS:      Assessment and Plan  70y Male    - obtain HCT  - obtain vEEG  - start seroquel 12.5mg BID for agitation, if Qtc is prolonged or ineffective consider olanzapine 2.5mg BID instead  - hold MRI brain +/- COVID protocl pending HCT results  - consider LP and/or steroids at later date pending workup  - continue to trend C-Reactive Protein, Ferritin, D-Dimer  - inpatient management per primary team    COVID course:  - symptom onset:  - admission date:  - intubation date:  - extubation date:    COVID Labs  Peak: D-Dimer    4/ , CRP   4/ , ferritin  4/ Neurology Progress Note:    Review of Systems:     MEDICATIONS  (STANDING):  apixaban 5 milliGRAM(s) Oral every 12 hours  aspirin  chewable 81 milliGRAM(s) Oral every 24 hours  baclofen 5 milliGRAM(s) Oral every 12 hours  dorzolamide 2%/timolol 0.5% Ophthalmic Solution 1 Drop(s) Both EYES every 12 hours  famotidine    Tablet 20 milliGRAM(s) Oral every 24 hours  folic acid 1 milliGRAM(s) Oral daily  gabapentin 400 milliGRAM(s) Oral three times a day  lactated ringers. 1000 milliLiter(s) (200 mL/Hr) IV Continuous <Continuous>  latanoprost 0.005% Ophthalmic Solution 1 Drop(s) Both EYES at bedtime  lidocaine   Patch 2 Patch Transdermal every 24 hours    MEDICATIONS  (PRN):  acetaminophen   Tablet .. 650 milliGRAM(s) Oral every 6 hours PRN Temp greater or equal to 38.5C (101.3F)  oxyCODONE    IR 5 milliGRAM(s) Oral every 4 hours PRN Moderate Pain (4 - 6)    Allergies  No Known Allergies    Vital Signs Last 24 Hrs  T(C): 37.7 (10 May 2020 12:10), Max: 38.4 (10 May 2020 09:41)  T(F): 99.9 (10 May 2020 12:10), Max: 101.2 (10 May 2020 09:41)  HR: 79 (10 May 2020 12:10) (73 - 89)  BP: 132/79 (10 May 2020 12:10) (131/88 - 155/88)  BP(mean): --  RR: 18 (10 May 2020 13:16) (18 - 20)  SpO2: 94% (10 May 2020 13:16) (86% - 95%)    Physical exam:    COVID LABS:   D-Dimer Assay, Quantitative: 409 (05-10-20)  D-Dimer Assay, Quantitative: 294 (20)  D-Dimer Assay, Quantitative: 248 (20)  D-Dimer Assay, Quantitative: 212 (20)      Ferritin, Serum: 09320 (05-10 @ 10:26)  Ferritin, Serum: 9946 ( @ 07:18)  Ferritin, Serum: 2218 ( @ 07:55)  Ferritin, Serum: 2537 (05-06 @ 21:40)      C-Reactive Protein, Serum: 5.45 (05-10 @ 10:26)  C-Reactive Protein, Serum: 4.92 ( @ 07:18)  C-Reactive Protein, Serum: 4.55 ( @ 06:05)  C-Reactive Protein, Serum: 2.82 ( @ 07:38)  C-Reactive Protein, Serum: 3.33 ( @ 12:45)                            11.2   5.62  )-----------( 74       ( 10 May 2020 10:26 )             34.6     05-10    143  |  110<H>  |  19  ----------------------------<  93  4.4   |  20<L>  |  1.06    Ca    8.4      10 May 2020 10:26  Phos  2.6       Mg     1.6         TPro  6.3  /  Alb  3.0<L>  /  TBili  1.9<H>  /  DBili  x   /  AST  136<H>  /  ALT  44  /  AlkPhos  32<L>  05-10    PT/INR - ( 10 May 2020 10:26 )   PT: 24.4 sec;   INR: 2.09          PTT - ( 10 May 2020 10:26 )  PTT:29.8 sec  Urinalysis Basic - ( 09 May 2020 18:06 )    Color: Yellow / Appearance: Clear / S.020 / pH: x  Gluc: x / Ketone: NEGATIVE  / Bili: Negative / Urobili: >=8.0 E.U./dL   Blood: x / Protein: 100 mg/dL / Nitrite: NEGATIVE   Leuk Esterase: NEGATIVE / RBC: < 5 /HPF / WBC < 5 /HPF   Sq Epi: x / Non Sq Epi: 0-5 /HPF / Bacteria: Present /HPF Neurology Progress Note:    Review of Systems:     MEDICATIONS  (STANDING):  apixaban 5 milliGRAM(s) Oral every 12 hours  aspirin  chewable 81 milliGRAM(s) Oral every 24 hours  baclofen 5 milliGRAM(s) Oral every 12 hours  dorzolamide 2%/timolol 0.5% Ophthalmic Solution 1 Drop(s) Both EYES every 12 hours  famotidine    Tablet 20 milliGRAM(s) Oral every 24 hours  folic acid 1 milliGRAM(s) Oral daily  gabapentin 400 milliGRAM(s) Oral three times a day  lactated ringers. 1000 milliLiter(s) (200 mL/Hr) IV Continuous <Continuous>  latanoprost 0.005% Ophthalmic Solution 1 Drop(s) Both EYES at bedtime  lidocaine   Patch 2 Patch Transdermal every 24 hours    MEDICATIONS  (PRN):  acetaminophen   Tablet .. 650 milliGRAM(s) Oral every 6 hours PRN Temp greater or equal to 38.5C (101.3F)  oxyCODONE    IR 5 milliGRAM(s) Oral every 4 hours PRN Moderate Pain (4 - 6)    Allergies  No Known Allergies    Vital Signs Last 24 Hrs  T(C): 37.7 (10 May 2020 12:10), Max: 38.4 (10 May 2020 09:41)  T(F): 99.9 (10 May 2020 12:10), Max: 101.2 (10 May 2020 09:41)  HR: 79 (10 May 2020 12:10) (73 - 89)  BP: 132/79 (10 May 2020 12:10) (131/88 - 155/88)  BP(mean): --  RR: 18 (10 May 2020 13:16) (18 - 20)  SpO2: 94% (10 May 2020 13:16) (86% - 95%)    Physical exam:  Alert today, sitting up in bed. Follow commands appropriately.  Hand  5/5  R dorsiflexion 3/5, R plantar flexion 4/5. L dorsi/plantar flexion and extension 4/5.     COVID LABS:   D-Dimer Assay, Quantitative: 409 (05-10-20)  D-Dimer Assay, Quantitative: 294 (20)  D-Dimer Assay, Quantitative: 248 (20)  D-Dimer Assay, Quantitative: 212 (20)    Ferritin, Serum: 15634 (05-10 @ 10:26)  Ferritin, Serum: 9946 ( @ 07:18)  Ferritin, Serum: 2218 ( @ 07:55)  Ferritin, Serum: 2537 ( @ 21:40)    C-Reactive Protein, Serum: 5.45 (05-10 @ 10:26)  C-Reactive Protein, Serum: 4.92 ( @ 07:18)  C-Reactive Protein, Serum: 4.55 ( @ 06:05)  C-Reactive Protein, Serum: 2.82 ( @ 07:38)  C-Reactive Protein, Serum: 3.33 ( @ 12:45)                            11.2   5.62  )-----------( 74       ( 10 May 2020 10:26 )             34.6     05-10    143  |  110<H>  |  19  ----------------------------<  93  4.4   |  20<L>  |  1.06    Ca    8.4      10 May 2020 10:26  Phos  2.6     05-  Mg     1.6         TPro  6.3  /  Alb  3.0<L>  /  TBili  1.9<H>  /  DBili  x   /  AST  136<H>  /  ALT  44  /  AlkPhos  32<L>  05-10    PT/INR - ( 10 May 2020 10:26 )   PT: 24.4 sec;   INR: 2.09          PTT - ( 10 May 2020 10:26 )  PTT:29.8 sec  Urinalysis Basic - ( 09 May 2020 18:06 )    Color: Yellow / Appearance: Clear / S.020 / pH: x  Gluc: x / Ketone: NEGATIVE  / Bili: Negative / Urobili: >=8.0 E.U./dL   Blood: x / Protein: 100 mg/dL / Nitrite: NEGATIVE   Leuk Esterase: NEGATIVE / RBC: < 5 /HPF / WBC < 5 /HPF   Sq Epi: x / Non Sq Epi: 0-5 /HPF / Bacteria: Present /HPF Neurology Progress Note:  Patient seen and examined at bedside by Dr Sauceda. No acute events overnight. CK still uptrending, today 5292.     Review of Systems:   CONSTITUTIONAL:  No weight loss, fever, chills, weakness or fatigue.  HEENT:  Eyes:  No visual loss, blurred vision, double vision or yellow sclerae. Ears, Nose, Throat:  No hearing loss, sneezing, congestion, runny nose or sore throat.  SKIN:  No rash or itching.  CARDIOVASCULAR:  No chest pain, chest pressure or chest discomfort. No palpitations or edema.  RESPIRATORY:  No shortness of breath, cough or sputum.  GASTROINTESTINAL:  No anorexia, nausea, vomiting or diarrhea. No abdominal pain or blood.  GENITOURINARY: No Burning on urination.   NEUROLOGICAL:  see HPI  MUSCULOSKELETAL:  No muscle, back pain, joint pain or stiffness.  HEMATOLOGIC:  No anemia, bleeding or bruising.  LYMPHATICS:  No enlarged nodes. No history of splenectomy.  PSYCHIATRIC:  No history of depression or anxiety.  ENDOCRINOLOGIC:  No reports of sweating, cold or heat intolerance. No polyuria or polydipsia.  ALLERGIES:  No history of asthma, hives, eczema or rhinitis.    MEDICATIONS  (STANDING):  apixaban 5 milliGRAM(s) Oral every 12 hours  aspirin  chewable 81 milliGRAM(s) Oral every 24 hours  baclofen 5 milliGRAM(s) Oral every 12 hours  dorzolamide 2%/timolol 0.5% Ophthalmic Solution 1 Drop(s) Both EYES every 12 hours  famotidine    Tablet 20 milliGRAM(s) Oral every 24 hours  folic acid 1 milliGRAM(s) Oral daily  gabapentin 400 milliGRAM(s) Oral three times a day  lactated ringers. 1000 milliLiter(s) (200 mL/Hr) IV Continuous <Continuous>  latanoprost 0.005% Ophthalmic Solution 1 Drop(s) Both EYES at bedtime  lidocaine   Patch 2 Patch Transdermal every 24 hours    MEDICATIONS  (PRN):  acetaminophen   Tablet .. 650 milliGRAM(s) Oral every 6 hours PRN Temp greater or equal to 38.5C (101.3F)  oxyCODONE    IR 5 milliGRAM(s) Oral every 4 hours PRN Moderate Pain (4 - 6)    Allergies  No Known Allergies    Vital Signs Last 24 Hrs  T(C): 37.7 (10 May 2020 12:10), Max: 38.4 (10 May 2020 09:41)  T(F): 99.9 (10 May 2020 12:10), Max: 101.2 (10 May 2020 09:41)  HR: 79 (10 May 2020 12:10) (73 - 89)  BP: 132/79 (10 May 2020 12:10) (131/88 - 155/88)  BP(mean): --  RR: 18 (10 May 2020 13:16) (18 - 20)  SpO2: 94% (10 May 2020 13:16) (86% - 95%)    Physical exam:  Alert today, sitting up in bed. Follow commands appropriately.  Hand  5/5  R dorsiflexion 3/5, R plantar flexion 4/5. L dorsi/plantar flexion and extension 4/5.     COVID LABS:   D-Dimer Assay, Quantitative: 409 (05-10-20)  D-Dimer Assay, Quantitative: 294 (20)  D-Dimer Assay, Quantitative: 248 (20)  D-Dimer Assay, Quantitative: 212 (20)    Ferritin, Serum: 84859 (05-10 @ 10:26)  Ferritin, Serum: 9946 ( @ 07:18)  Ferritin, Serum: 2218 ( @ 07:55)  Ferritin, Serum: 2537 ( @ 21:40)    C-Reactive Protein, Serum: 5.45 (05-10 @ 10:26)  C-Reactive Protein, Serum: 4.92 ( @ 07:18)  C-Reactive Protein, Serum: 4.55 ( @ 06:05)  C-Reactive Protein, Serum: 2.82 ( @ 07:38)  C-Reactive Protein, Serum: 3.33 ( @ 12:45)                            11.2   5.62  )-----------( 74       ( 10 May 2020 10:26 )             34.6     10    143  |  110<H>  |  19  ----------------------------<  93  4.4   |  20<L>  |  1.06    Ca    8.4      10 May 2020 10:26  Phos  2.6       Mg     1.6         TPro  6.3  /  Alb  3.0<L>  /  TBili  1.9<H>  /  DBili  x   /  AST  136<H>  /  ALT  44  /  AlkPhos  32<L>  05-10    PT/INR - ( 10 May 2020 10:26 )   PT: 24.4 sec;   INR: 2.09          PTT - ( 10 May 2020 10:26 )  PTT:29.8 sec  Urinalysis Basic - ( 09 May 2020 18:06 )    Color: Yellow / Appearance: Clear / S.020 / pH: x  Gluc: x / Ketone: NEGATIVE  / Bili: Negative / Urobili: >=8.0 E.U./dL   Blood: x / Protein: 100 mg/dL / Nitrite: NEGATIVE   Leuk Esterase: NEGATIVE / RBC: < 5 /HPF / WBC < 5 /HPF   Sq Epi: x / Non Sq Epi: 0-5 /HPF / Bacteria: Present /HPF    EEG Daily Summary:    Mild generalized hemisphere slowing suggestive of a similar degree of diffuse or multifocal  dysfunction.  No epileptiform activity and no significant clinical events occurred.    Read by:  Isiah Khan MD  Electronic Signatures:  Isiah Khan)  (Signed 10-May-2020 11:49)  	Authored: EEG REPORT Neurology Progress Note:  Patient seen and examined at bedside by Dr Sauceda. No acute events overnight. CK still uptrending, today 5292.  Still c/o pain but reports no increased weakness. Also states that his pain has been going on for several weeks    Review of Systems:   CONSTITUTIONAL:  No weight loss, fever, chills, weakness or fatigue.  HEENT:  Eyes:  No visual loss, blurred vision, double vision or yellow sclerae. Ears, Nose, Throat:  No hearing loss, sneezing, congestion, runny nose or sore throat.  SKIN:  No rash or itching.  CARDIOVASCULAR:  No chest pain, chest pressure or chest discomfort. No palpitations or edema.  RESPIRATORY:  No shortness of breath, cough or sputum.  GASTROINTESTINAL:  No anorexia, nausea, vomiting or diarrhea. No abdominal pain or blood.  GENITOURINARY: No Burning on urination.   NEUROLOGICAL:  see HPI  MUSCULOSKELETAL:  No muscle, back pain, joint pain or stiffness.  HEMATOLOGIC:  No anemia, bleeding or bruising.  LYMPHATICS:  No enlarged nodes. No history of splenectomy.  PSYCHIATRIC:  No history of depression or anxiety.  ENDOCRINOLOGIC:  No reports of sweating, cold or heat intolerance. No polyuria or polydipsia.  ALLERGIES:  No history of asthma, hives, eczema or rhinitis.    MEDICATIONS  (STANDING):  apixaban 5 milliGRAM(s) Oral every 12 hours  aspirin  chewable 81 milliGRAM(s) Oral every 24 hours  baclofen 5 milliGRAM(s) Oral every 12 hours  dorzolamide 2%/timolol 0.5% Ophthalmic Solution 1 Drop(s) Both EYES every 12 hours  famotidine    Tablet 20 milliGRAM(s) Oral every 24 hours  folic acid 1 milliGRAM(s) Oral daily  gabapentin 400 milliGRAM(s) Oral three times a day  lactated ringers. 1000 milliLiter(s) (200 mL/Hr) IV Continuous <Continuous>  latanoprost 0.005% Ophthalmic Solution 1 Drop(s) Both EYES at bedtime  lidocaine   Patch 2 Patch Transdermal every 24 hours    MEDICATIONS  (PRN):  acetaminophen   Tablet .. 650 milliGRAM(s) Oral every 6 hours PRN Temp greater or equal to 38.5C (101.3F)  oxyCODONE    IR 5 milliGRAM(s) Oral every 4 hours PRN Moderate Pain (4 - 6)    Allergies  No Known Allergies    Vital Signs Last 24 Hrs  T(C): 37.7 (10 May 2020 12:10), Max: 38.4 (10 May 2020 09:41)  T(F): 99.9 (10 May 2020 12:10), Max: 101.2 (10 May 2020 09:41)  HR: 79 (10 May 2020 12:10) (73 - 89)  BP: 132/79 (10 May 2020 12:10) (131/88 - 155/88)  BP(mean): --  RR: 18 (10 May 2020 13:16) (18 - 20)  SpO2: 94% (10 May 2020 13:16) (86% - 95%)    Physical exam:  Alert today, sitting up in bed. Follow commands appropriately.  Bilateral Hand  5/5  R dorsiflexion 3/5, R plantar flexion 4/5. L dorsi/plantar flexion and extension 4/5.     COVID LABS:   D-Dimer Assay, Quantitative: 409 (05-10-20)  D-Dimer Assay, Quantitative: 294 (20)  D-Dimer Assay, Quantitative: 248 (20)  D-Dimer Assay, Quantitative: 212 (20)    Ferritin, Serum: 61700 (05-10 @ 10:26)  Ferritin, Serum: 9946 ( @ 07:18)  Ferritin, Serum: 2218 ( @ 07:55)  Ferritin, Serum: 2537 ( @ 21:40)    C-Reactive Protein, Serum: 5.45 (05-10 @ 10:26)  C-Reactive Protein, Serum: 4.92 ( @ 07:18)  C-Reactive Protein, Serum: 4.55 ( @ 06:05)  C-Reactive Protein, Serum: 2.82 ( @ 07:38)  C-Reactive Protein, Serum: 3.33 ( @ 12:45)                            11.2   5.62  )-----------( 74       ( 10 May 2020 10:26 )             34.6     05-10    143  |  110<H>  |  19  ----------------------------<  93  4.4   |  20<L>  |  1.06    Ca    8.4      10 May 2020 10:26  Phos  2.6     05-09  Mg     1.6     05-09    TPro  6.3  /  Alb  3.0<L>  /  TBili  1.9<H>  /  DBili  x   /  AST  136<H>  /  ALT  44  /  AlkPhos  32<L>  05-10    PT/INR - ( 10 May 2020 10:26 )   PT: 24.4 sec;   INR: 2.09          PTT - ( 10 May 2020 10:26 )  PTT:29.8 sec  Urinalysis Basic - ( 09 May 2020 18:06 )    Color: Yellow / Appearance: Clear / S.020 / pH: x  Gluc: x / Ketone: NEGATIVE  / Bili: Negative / Urobili: >=8.0 E.U./dL   Blood: x / Protein: 100 mg/dL / Nitrite: NEGATIVE   Leuk Esterase: NEGATIVE / RBC: < 5 /HPF / WBC < 5 /HPF   Sq Epi: x / Non Sq Epi: 0-5 /HPF / Bacteria: Present /HPF    EEG Daily Summary:    Mild generalized hemisphere slowing suggestive of a similar degree of diffuse or multifocal  dysfunction.  No epileptiform activity and no significant clinical events occurred.    Read by:  Isiah Khan MD  Electronic Signatures:  Isiah Khan (MD)  (Signed 10-May-2020 11:49)  	Authored: EEG REPORT

## 2020-05-10 NOTE — PROGRESS NOTE ADULT - SUBJECTIVE AND OBJECTIVE BOX
OVERNIGHT EVENTS: No acute events overnight. Febrile to 101.2 this AM. Desaturating to 87-91% on RA     SUBJECTIVE / INTERVAL HPI: Patient seen and examined at bedside.     VITAL SIGNS:  Vital Signs Last 24 Hrs  T(C): 38.4 (10 May 2020 09:41), Max: 38.4 (10 May 2020 09:41)  T(F): 101.2 (10 May 2020 09:41), Max: 101.2 (10 May 2020 09:41)  HR: 89 (10 May 2020 09:41) (73 - 89)  BP: 155/88 (10 May 2020 09:41) (131/88 - 155/88)  BP(mean): --  RR: 20 (10 May 2020 09:41) (18 - 20)  SpO2: 95% (10 May 2020 09:41) (86% - 95%)    PHYSICAL EXAM:    General: WDWN  HEENT: NC/AT; PERRL, anicteric sclera; MMM  Neck: supple  Cardiovascular: +S1/S2; RRR  Respiratory: CTA B/L; no W/R/R  Gastrointestinal: soft, NT/ND; +BSx4  Extremities: WWP; no edema, clubbing or cyanosis  Vascular: 2+ radial, DP/PT pulses B/L  Neurological: AAOx3; no focal deficits    MEDICATIONS:  MEDICATIONS  (STANDING):  apixaban 5 milliGRAM(s) Oral every 12 hours  aspirin  chewable 81 milliGRAM(s) Oral every 24 hours  baclofen 5 milliGRAM(s) Oral every 12 hours  dorzolamide 2%/timolol 0.5% Ophthalmic Solution 1 Drop(s) Both EYES every 12 hours  famotidine    Tablet 20 milliGRAM(s) Oral every 24 hours  folic acid 1 milliGRAM(s) Oral daily  gabapentin 400 milliGRAM(s) Oral three times a day  lactated ringers. 1000 milliLiter(s) (200 mL/Hr) IV Continuous <Continuous>  latanoprost 0.005% Ophthalmic Solution 1 Drop(s) Both EYES at bedtime  lidocaine   Patch 2 Patch Transdermal every 24 hours    MEDICATIONS  (PRN):  acetaminophen   Tablet .. 650 milliGRAM(s) Oral every 6 hours PRN Temp greater or equal to 38.5C (101.3F)  oxyCODONE    IR 5 milliGRAM(s) Oral every 4 hours PRN Moderate Pain (4 - 6)      ALLERGIES:  Allergies    No Known Allergies    Intolerances        LABS:                        11.2   5.62  )-----------( 74       ( 10 May 2020 10:26 )             34.6     05-09    148<H>  |  112<H>  |  19  ----------------------------<  96  4.5   |  26  |  1.22    Ca    8.5      09 May 2020 20:34  Phos  2.6     05-09  Mg     1.6     05-09    TPro  6.8  /  Alb  3.3  /  TBili  1.9<H>  /  DBili  x   /  AST  130<H>  /  ALT  45  /  AlkPhos  33<L>  05-09    PT/INR - ( 10 May 2020 10:26 )   PT: 24.4 sec;   INR: 2.09          PTT - ( 10 May 2020 10:26 )  PTT:29.8 sec  Urinalysis Basic - ( 09 May 2020 18:06 )    Color: Yellow / Appearance: Clear / S.020 / pH: x  Gluc: x / Ketone: NEGATIVE  / Bili: Negative / Urobili: >=8.0 E.U./dL   Blood: x / Protein: 100 mg/dL / Nitrite: NEGATIVE   Leuk Esterase: NEGATIVE / RBC: < 5 /HPF / WBC < 5 /HPF   Sq Epi: x / Non Sq Epi: 0-5 /HPF / Bacteria: Present /HPF      CAPILLARY BLOOD GLUCOSE      POCT Blood Glucose.: 92 mg/dL (09 May 2020 22:20)      RADIOLOGY & ADDITIONAL TESTS: Reviewed.    ASSESSMENT:    PLAN: OVERNIGHT EVENTS: No acute events overnight. Febrile to 101.2 this AM. Desaturating to 87-91% on RA     SUBJECTIVE / INTERVAL HPI: Patient seen and examined at bedside this AM. Patient more conversive and willing to participate in ROS. Patient still with compliant of persistent generalized pain, worse in back in right leg. Patient denies fever, SOB, cough, chills, abdominal pain, diarrhea or constipation 12 point ROS otherwise negative,     VITAL SIGNS:  Vital Signs Last 24 Hrs  T(C): 38.4 (10 May 2020 09:41), Max: 38.4 (10 May 2020 09:41)  T(F): 101.2 (10 May 2020 09:41), Max: 101.2 (10 May 2020 09:41)  HR: 89 (10 May 2020 09:41) (73 - 89)  BP: 155/88 (10 May 2020 09:41) (131/88 - 155/88)  BP(mean): --  RR: 20 (10 May 2020 09:41) (18 - 20)  SpO2: 95% (10 May 2020 09:41) (86% - 95%)    PHYSICAL EXAM:    General: elderly male sitting up in bed; NAD   HEENT: NC/AT; PERRL, anicteric sclera; MMM; poor oral hygiene   Neck: supple; no JVD; no lymphadenopathy   Cardiovascular: +S1/S2; RRR  Respiratory: CTA B/L; no wheezing, rhonchi; no increased work of breathing, comfortable on RA, saturating 87%, 94% on 6L   Gastrointestinal: overly nourished; soft, NT/ND; +BSx4  Extremities: WWP; no peripheral edema   Vascular: 2+ radial, DP/PT pulses B/L  Derm: No evidence of rash or broken skin or erythema   Neurological: AAOx3; no focal deficits    MEDICATIONS:  MEDICATIONS  (STANDING):  apixaban 5 milliGRAM(s) Oral every 12 hours  aspirin  chewable 81 milliGRAM(s) Oral every 24 hours  baclofen 5 milliGRAM(s) Oral every 12 hours  dorzolamide 2%/timolol 0.5% Ophthalmic Solution 1 Drop(s) Both EYES every 12 hours  famotidine    Tablet 20 milliGRAM(s) Oral every 24 hours  folic acid 1 milliGRAM(s) Oral daily  gabapentin 400 milliGRAM(s) Oral three times a day  lactated ringers. 1000 milliLiter(s) (200 mL/Hr) IV Continuous <Continuous>  latanoprost 0.005% Ophthalmic Solution 1 Drop(s) Both EYES at bedtime  lidocaine   Patch 2 Patch Transdermal every 24 hours    MEDICATIONS  (PRN):  acetaminophen   Tablet .. 650 milliGRAM(s) Oral every 6 hours PRN Temp greater or equal to 38.5C (101.3F)  oxyCODONE    IR 5 milliGRAM(s) Oral every 4 hours PRN Moderate Pain (4 - 6)      ALLERGIES:  Allergies    No Known Allergies    Intolerances        LABS:                        11.2   5.62  )-----------( 74       ( 10 May 2020 10:26 )             34.6     05-09    148<H>  |  112<H>  |  19  ----------------------------<  96  4.5   |  26  |  1.22    Ca    8.5      09 May 2020 20:34  Phos  2.6     05-09  Mg     1.6     05-09    TPro  6.8  /  Alb  3.3  /  TBili  1.9<H>  /  DBili  x   /  AST  130<H>  /  ALT  45  /  AlkPhos  33<L>  05-09    PT/INR - ( 10 May 2020 10:26 )   PT: 24.4 sec;   INR: 2.09          PTT - ( 10 May 2020 10:26 )  PTT:29.8 sec  Urinalysis Basic - ( 09 May 2020 18:06 )    Color: Yellow / Appearance: Clear / S.020 / pH: x  Gluc: x / Ketone: NEGATIVE  / Bili: Negative / Urobili: >=8.0 E.U./dL   Blood: x / Protein: 100 mg/dL / Nitrite: NEGATIVE   Leuk Esterase: NEGATIVE / RBC: < 5 /HPF / WBC < 5 /HPF   Sq Epi: x / Non Sq Epi: 0-5 /HPF / Bacteria: Present /HPF      CAPILLARY BLOOD GLUCOSE      POCT Blood Glucose.: 92 mg/dL (09 May 2020 22:20)      RADIOLOGY & ADDITIONAL TESTS: Reviewed.    ASSESSMENT:    PLAN:

## 2020-05-10 NOTE — PROGRESS NOTE ADULT - ASSESSMENT
68 y/o M with PMH of progressive lower extremity weakness, s/p lumbar decompression in 1/2019. 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

## 2020-05-10 NOTE — PROGRESS NOTE ADULT - PROBLEM SELECTOR PLAN 3
RESOLVED   Pt with baseline Cr 1.1-1.2 presented with Cr 1.88 and BUN 52 likely due to prerenal azotemia likely due to dehydration following having diarrhea for few days and not eating and drinking well. responded to fluid therapy and decreased to 1.68. Urine lytes also were compatible with pre-renal causes, s/p 1.5 L of IVF in ED  - improving this AM 1.06 (baseline)  - avoid nephrotoxic meds at this time   - monitor kidney function    #Hematuria: RESOLVED   Patient with compliant of blood in urine and UA demonstrating large blood,  likely due to rhabdomyolysis   - will monitor the urine for gross hematuria   - c/w home Eliquis 5mg BID

## 2020-05-10 NOTE — EEG REPORT - NS EEG TEXT BOX
[facility] Department of Neurology  Inpatient Continuous video-Electroencephalography Report    Patient Name:	BELKIS BOLDEN    :	1950  MRN:	[id number]    Study Start Date/Time:  2020, 1:14:08 PM    Referred by: Tam Brito MD    Brief Clinical History:  BELKIS BOLDEN is a 70 year old Male w/ hx of lumbar spondylosis and right L4 radiculopathy with right leg weakness, s/p lumbar decompression at S 2019 with improvement, and admitted for severe leg and back pain on . MRI T and L spine negative. COVID + EMG done  with mild progression of modertae sensorimotor polyneuropathy, and right lumbar plexopathy in comparison to a year ago. No evidence of myositis / myopathy.    Diagnosis Code:   R56.9 convulsions/seizure  CPT: 78534 EEG with video 12-26h    Pertinent Medications on Initiation      Acquisition Details:  Electroencephalography was acquired using a minimum of 21 channels on an [employer] system v 8.5.1 with electrode placement according to the standard International 10-20 system following ACNS (American Clinical Neurophysiology Society) guidelines for Long-Term Video EEG monitoring.  Anterior temporal T1 and T2 electrodes were utilized whenever possible.   The [employer] automated spike & seizure detections were all reviewed in detail, in addition to extensive portions of raw EEG.    Day 1: 2020 @ 1:14:08 PM to next morning @ 07:00 am  Background:  continuous, with predominantly alpha/theta frequencies.  Symmetry:  No persistent asymmetries of voltage or frequency.  Posterior Dominant Rhythm:  8-9Hz symmetric, well-organized, and well-modulated.  Organization: Present.  Voltage:  Normal (20+ uV)  Variability: Yes. 		Reactivity: Yes.  N2 sleep: Symmetric, synchronous spindles and K complexes.  Spontaneous Activity:  No epileptiform discharges.  Periodic/rhythmic activity:  None.  Events:  No electrographic seizures or significant clinical events.  Provocations:  Hyperventilation and Photic stimulation: was not performed.    Daily Summary:    Mild generalized hemisphere slowing suggestive of a similar degree of diffuse or multifocal  dysfunction.  No epileptiform activity and no significant clinical events occurred.  Read by:  Isiah Khan MD

## 2020-05-10 NOTE — PROGRESS NOTE ADULT - PROBLEM SELECTOR PLAN 4
Pt with sore throat and runny nose and mild diarrhea for the past few days but no SOB or cough, was found to be positive for COVID. Had fever and tachycardia in ED but no O2 desaturation. currently sating 94% on 2L. CXR clear.   COVID positive, Ferritin 2537, CRP 3.33  - Monitor O2 saturation and symptoms  - Trending inflammatory markers daily  - tylenol for fever  - symptomtic management   - contact precaustions: isolation and droplet  - avoid NSAID/ACE-I/ARB     #Hyperbilirubinemia   Pt with bili 2.7 and elevated AST and ALT but normal Alk-p.RUQ ultrasound demonstrating gallbladder with multiple gallstones. Benign abdominal exam. Negative cage sign.  Likely 2/2 COVID infection or due to gallbladder stones. Improved with fluid therapy. Patient Hep C positive.   - monitor LFT  - Hepatitis panel negative

## 2020-05-10 NOTE — PROGRESS NOTE ADULT - PROBLEM SELECTOR PLAN 5
Has a chronic neuropathy likely secondary to disc herniation. on gabapentin 400 tid at home. Currently in worsening back pain and tingling and numbness. Follows with Dr. Brito outpatient.   - LE dopplers negative for DVT  - CPK elevated -> concern for myositis as underlying etiology   - RF 22 -> mildly elevated  - f/u AHMET, anti-dsDNA, anti-Isaura   - trend CPK   - EMG unchanged from prior   - c/w home gabapentin 400 tid    #Fall  Patient with reported fall and multiple previous falls. Denies LOC or trauma to head.   Ambulates with walker. Unwilling to cooperate with neurological exam 2/2 to reported pain.   - PT evaluation -> recommendation of KEVIN   - neurology following    #Lethargy   Patient with increased lethargy, less arousable compared to baseline. AAOx3 this AM.   - EEG w/ generalized slowing

## 2020-05-10 NOTE — PROGRESS NOTE ADULT - ASSESSMENT
70y Male w/ hx of lumbar spondylosis and right L4 radiculopathy with right leg weakness, s/p lumbar decompression at S 1/2019 with improvement, and admitted for severe leg and back pain on 5/6. MRI T and L spine negative. COVID + EMG done 5/8 with mild progression of modertae sensorimotor polyneuropathy, and right lumbar plexopathy in comparison to a year ago. No evidence of myositis / myopathy.     Recommendations:  - Please initiate baclofen 5mg BID for leg spasms, may increase as needed.  -Continue EEG given relative altered / fluctuating mental status  -Continue to trend CK; elevation likely related to covid infection given lack of myopathy on EMG; less likely rhabdomyolysis as CK is rising while hospitalized, however agree with hydration for now   -will continue to follow 70y Male w/ hx of lumbar spondylosis and right L4 radiculopathy with right leg weakness, s/p lumbar decompression at S 1/2019 with improvement, and admitted for severe leg and back pain on 5/6. MRI T and L spine negative. COVID + EMG done 5/8 with mild progression of modertae sensorimotor polyneuropathy, and right lumbar plexopathy in comparison to a year ago. No evidence of myositis / myopathy. EEG obtained on 5/9 showed no seizures. CK elevation likely related to covid infection given lack of myopathy on EMG; and no response to fluid hydration. TOCI administered on 5/9.     Recommendations:  - D/C vEEG monitoring  - Continue baclofen 5mg BID for leg spasms, may increase as needed.  - Continue to trend CK  - Continue to trend CRP, D-Dimer, Ferritin  - Please obtain Thiamine, B1 level  - will continue to follow 70y Male w/ hx of lumbar spondylosis and right L4 radiculopathy with right leg weakness, s/p lumbar decompression at S 1/2019 with improvement, and admitted for severe leg and back pain on 5/6. MRI T and L spine negative. COVID + EMG done 5/8 with mild progression of moderate sensorimotor polyneuropathy, and right lumbar plexopathy in comparison to a year ago. No evidence of myositis / myopathy. EEG obtained on 5/9 showed no seizures. CK elevation likely related to covid infection given lack of myopathy on EMG; and no response to fluid hydration. TOCI administered on 5/9.     Recommendations:  - D/C vEEG monitoring  - Continue baclofen 5mg BID for leg spasms, may increase as needed.  - Continue to trend CK  - Continue to trend CRP, D-Dimer, Ferritin  - Please obtain Thiamine, B1 level  - will continue to follow

## 2020-05-10 NOTE — PROGRESS NOTE ADULT - PROBLEM SELECTOR PLAN 8
Pt with hx of HTN on lisinopril 10 at home  Currently with symptoms of sepsis, dehydration, SETH   - hold home lisinopril  - monitor BPs  - Will resume when more stable and has high BP    #Glaucoma  Patient with history of glaucoma. c/w home latanoprost 0.005%

## 2020-05-11 LAB
ALBUMIN SERPL ELPH-MCNC: 3.1 G/DL — LOW (ref 3.3–5)
ALBUMIN SERPL ELPH-MCNC: 3.2 G/DL — LOW (ref 3.3–5)
ALP SERPL-CCNC: 39 U/L — LOW (ref 40–120)
ALP SERPL-CCNC: 45 U/L — SIGNIFICANT CHANGE UP (ref 40–120)
ALT FLD-CCNC: 48 U/L — HIGH (ref 10–45)
ALT FLD-CCNC: 59 U/L — HIGH (ref 10–45)
ANA TITR SER: NEGATIVE — SIGNIFICANT CHANGE UP
ANION GAP SERPL CALC-SCNC: 11 MMOL/L — SIGNIFICANT CHANGE UP (ref 5–17)
ANION GAP SERPL CALC-SCNC: 11 MMOL/L — SIGNIFICANT CHANGE UP (ref 5–17)
ANION GAP SERPL CALC-SCNC: 15 MMOL/L — SIGNIFICANT CHANGE UP (ref 5–17)
APPEARANCE UR: CLEAR — SIGNIFICANT CHANGE UP
AST SERPL-CCNC: 154 U/L — HIGH (ref 10–40)
AST SERPL-CCNC: 174 U/L — HIGH (ref 10–40)
BASE EXCESS BLDA CALC-SCNC: 3.2 MMOL/L — HIGH (ref -2–3)
BASOPHILS # BLD AUTO: 0 K/UL — SIGNIFICANT CHANGE UP (ref 0–0.2)
BASOPHILS NFR BLD AUTO: 0 % — SIGNIFICANT CHANGE UP (ref 0–2)
BILIRUB DIRECT SERPL-MCNC: 1 MG/DL — HIGH (ref 0–0.2)
BILIRUB INDIRECT FLD-MCNC: 1.5 MG/DL — HIGH (ref 0.2–1.2)
BILIRUB SERPL-MCNC: 2.4 MG/DL — HIGH (ref 0.2–1.2)
BILIRUB SERPL-MCNC: 2.5 MG/DL — HIGH (ref 0.2–1.2)
BILIRUB SERPL-MCNC: 2.7 MG/DL — HIGH (ref 0.2–1.2)
BILIRUB UR-MCNC: NEGATIVE — SIGNIFICANT CHANGE UP
BUN SERPL-MCNC: 17 MG/DL — SIGNIFICANT CHANGE UP (ref 7–23)
BUN SERPL-MCNC: 18 MG/DL — SIGNIFICANT CHANGE UP (ref 7–23)
BUN SERPL-MCNC: 20 MG/DL — SIGNIFICANT CHANGE UP (ref 7–23)
BURR CELLS BLD QL SMEAR: PRESENT — SIGNIFICANT CHANGE UP
CALCIUM SERPL-MCNC: 8.5 MG/DL — SIGNIFICANT CHANGE UP (ref 8.4–10.5)
CALCIUM SERPL-MCNC: 8.7 MG/DL — SIGNIFICANT CHANGE UP (ref 8.4–10.5)
CALCIUM SERPL-MCNC: 8.9 MG/DL — SIGNIFICANT CHANGE UP (ref 8.4–10.5)
CHLORIDE SERPL-SCNC: 105 MMOL/L — SIGNIFICANT CHANGE UP (ref 96–108)
CHLORIDE SERPL-SCNC: 108 MMOL/L — SIGNIFICANT CHANGE UP (ref 96–108)
CHLORIDE SERPL-SCNC: 111 MMOL/L — HIGH (ref 96–108)
CK SERPL-CCNC: 4112 U/L — HIGH (ref 30–200)
CK SERPL-CCNC: 4469 U/L — HIGH (ref 30–200)
CK SERPL-CCNC: 4559 U/L — HIGH (ref 30–200)
CO2 SERPL-SCNC: 22 MMOL/L — SIGNIFICANT CHANGE UP (ref 22–31)
CO2 SERPL-SCNC: 23 MMOL/L — SIGNIFICANT CHANGE UP (ref 22–31)
CO2 SERPL-SCNC: 23 MMOL/L — SIGNIFICANT CHANGE UP (ref 22–31)
COLOR SPEC: YELLOW — SIGNIFICANT CHANGE UP
CREAT ?TM UR-MCNC: 36 MG/DL — SIGNIFICANT CHANGE UP
CREAT SERPL-MCNC: 1.06 MG/DL — SIGNIFICANT CHANGE UP (ref 0.5–1.3)
CREAT SERPL-MCNC: 1.17 MG/DL — SIGNIFICANT CHANGE UP (ref 0.5–1.3)
CREAT SERPL-MCNC: 1.28 MG/DL — SIGNIFICANT CHANGE UP (ref 0.5–1.3)
CRP SERPL-MCNC: 3.59 MG/DL — HIGH (ref 0–0.4)
CULTURE RESULTS: SIGNIFICANT CHANGE UP
CULTURE RESULTS: SIGNIFICANT CHANGE UP
D DIMER BLD IA.RAPID-MCNC: 506 NG/ML DDU — HIGH
DACRYOCYTES BLD QL SMEAR: SLIGHT — SIGNIFICANT CHANGE UP
DIFF PNL FLD: ABNORMAL
EOSINOPHIL # BLD AUTO: 0 K/UL — SIGNIFICANT CHANGE UP (ref 0–0.5)
EOSINOPHIL NFR BLD AUTO: 0 % — SIGNIFICANT CHANGE UP (ref 0–6)
FERRITIN SERPL-MCNC: HIGH NG/ML (ref 30–400)
GIANT PLATELETS BLD QL SMEAR: PRESENT — SIGNIFICANT CHANGE UP
GLUCOSE SERPL-MCNC: 156 MG/DL — HIGH (ref 70–99)
GLUCOSE SERPL-MCNC: 88 MG/DL — SIGNIFICANT CHANGE UP (ref 70–99)
GLUCOSE SERPL-MCNC: 91 MG/DL — SIGNIFICANT CHANGE UP (ref 70–99)
GLUCOSE UR QL: NEGATIVE — SIGNIFICANT CHANGE UP
HCO3 BLDA-SCNC: 26 MMOL/L — SIGNIFICANT CHANGE UP (ref 21–28)
HCT VFR BLD CALC: 35.2 % — LOW (ref 39–50)
HGB BLD-MCNC: 11.3 G/DL — LOW (ref 13–17)
KETONES UR-MCNC: NEGATIVE — SIGNIFICANT CHANGE UP
LDH SERPL L TO P-CCNC: 1282 U/L — HIGH (ref 50–242)
LEUKOCYTE ESTERASE UR-ACNC: NEGATIVE — SIGNIFICANT CHANGE UP
LYMPHOCYTES # BLD AUTO: 0.14 K/UL — LOW (ref 1–3.3)
LYMPHOCYTES # BLD AUTO: 1.7 % — LOW (ref 13–44)
MACROCYTES BLD QL: SLIGHT — SIGNIFICANT CHANGE UP
MAGNESIUM SERPL-MCNC: 1.5 MG/DL — LOW (ref 1.6–2.6)
MAGNESIUM SERPL-MCNC: 1.6 MG/DL — SIGNIFICANT CHANGE UP (ref 1.6–2.6)
MAGNESIUM SERPL-MCNC: 1.7 MG/DL — SIGNIFICANT CHANGE UP (ref 1.6–2.6)
MANUAL SMEAR VERIFICATION: SIGNIFICANT CHANGE UP
MCHC RBC-ENTMCNC: 30.1 PG — SIGNIFICANT CHANGE UP (ref 27–34)
MCHC RBC-ENTMCNC: 32.1 GM/DL — SIGNIFICANT CHANGE UP (ref 32–36)
MCV RBC AUTO: 93.6 FL — SIGNIFICANT CHANGE UP (ref 80–100)
MICROCYTES BLD QL: SLIGHT — SIGNIFICANT CHANGE UP
MONOCYTES # BLD AUTO: 0.07 K/UL — SIGNIFICANT CHANGE UP (ref 0–0.9)
MONOCYTES NFR BLD AUTO: 0.9 % — LOW (ref 2–14)
NEUTROPHILS # BLD AUTO: 7.89 K/UL — HIGH (ref 1.8–7.4)
NEUTROPHILS NFR BLD AUTO: 90.5 % — HIGH (ref 43–77)
NEUTS BAND # BLD: 6.9 % — SIGNIFICANT CHANGE UP (ref 0–8)
NITRITE UR-MCNC: POSITIVE
OSMOLALITY UR: 355 MOSMOL/KG — SIGNIFICANT CHANGE UP (ref 100–650)
OVALOCYTES BLD QL SMEAR: SLIGHT — SIGNIFICANT CHANGE UP
PCO2 BLDA: 33 MMHG — LOW (ref 35–48)
PH BLDA: 7.51 — HIGH (ref 7.35–7.45)
PH UR: 6 — SIGNIFICANT CHANGE UP (ref 5–8)
PHOSPHATE SERPL-MCNC: 2.6 MG/DL — SIGNIFICANT CHANGE UP (ref 2.5–4.5)
PHOSPHATE SERPL-MCNC: 3.2 MG/DL — SIGNIFICANT CHANGE UP (ref 2.5–4.5)
PHOSPHATE SERPL-MCNC: 4 MG/DL — SIGNIFICANT CHANGE UP (ref 2.5–4.5)
PLAT MORPH BLD: ABNORMAL
PLATELET # BLD AUTO: 74 K/UL — LOW (ref 150–400)
PO2 BLDA: 62 MMHG — LOW (ref 83–108)
POTASSIUM SERPL-MCNC: 4.2 MMOL/L — SIGNIFICANT CHANGE UP (ref 3.5–5.3)
POTASSIUM SERPL-MCNC: 4.2 MMOL/L — SIGNIFICANT CHANGE UP (ref 3.5–5.3)
POTASSIUM SERPL-MCNC: 4.3 MMOL/L — SIGNIFICANT CHANGE UP (ref 3.5–5.3)
POTASSIUM SERPL-SCNC: 4.2 MMOL/L — SIGNIFICANT CHANGE UP (ref 3.5–5.3)
POTASSIUM SERPL-SCNC: 4.2 MMOL/L — SIGNIFICANT CHANGE UP (ref 3.5–5.3)
POTASSIUM SERPL-SCNC: 4.3 MMOL/L — SIGNIFICANT CHANGE UP (ref 3.5–5.3)
PROCALCITONIN SERPL-MCNC: 0.22 NG/ML — HIGH (ref 0.02–0.1)
PROT SERPL-MCNC: 6.4 G/DL — SIGNIFICANT CHANGE UP (ref 6–8.3)
PROT SERPL-MCNC: 7.1 G/DL — SIGNIFICANT CHANGE UP (ref 6–8.3)
PROT UR-MCNC: 30 MG/DL
RBC # BLD: 3.76 M/UL — LOW (ref 4.2–5.8)
RBC # FLD: 11.6 % — SIGNIFICANT CHANGE UP (ref 10.3–14.5)
RBC BLD AUTO: ABNORMAL
SAO2 % BLDA: 92 % — LOW (ref 95–100)
SODIUM SERPL-SCNC: 142 MMOL/L — SIGNIFICANT CHANGE UP (ref 135–145)
SODIUM SERPL-SCNC: 143 MMOL/L — SIGNIFICANT CHANGE UP (ref 135–145)
SODIUM SERPL-SCNC: 144 MMOL/L — SIGNIFICANT CHANGE UP (ref 135–145)
SODIUM UR-SCNC: 96 MMOL/L — SIGNIFICANT CHANGE UP
SP GR SPEC: 1.01 — SIGNIFICANT CHANGE UP (ref 1–1.03)
SPECIMEN SOURCE: SIGNIFICANT CHANGE UP
SPECIMEN SOURCE: SIGNIFICANT CHANGE UP
SPHEROCYTES BLD QL SMEAR: SLIGHT — SIGNIFICANT CHANGE UP
UROBILINOGEN FLD QL: 2 E.U./DL
VIT B12 SERPL-MCNC: >2000 PG/ML — HIGH (ref 232–1245)
WBC # BLD: 8.1 K/UL — SIGNIFICANT CHANGE UP (ref 3.8–10.5)
WBC # FLD AUTO: 8.1 K/UL — SIGNIFICANT CHANGE UP (ref 3.8–10.5)

## 2020-05-11 PROCEDURE — 95720 EEG PHY/QHP EA INCR W/VEEG: CPT

## 2020-05-11 PROCEDURE — 71045 X-RAY EXAM CHEST 1 VIEW: CPT | Mod: 26,CS,77

## 2020-05-11 PROCEDURE — 71045 X-RAY EXAM CHEST 1 VIEW: CPT | Mod: 26,CS

## 2020-05-11 PROCEDURE — 99233 SBSQ HOSP IP/OBS HIGH 50: CPT | Mod: CS,GC

## 2020-05-11 RX ORDER — MAGNESIUM SULFATE 500 MG/ML
2 VIAL (ML) INJECTION ONCE
Refills: 0 | Status: COMPLETED | OUTPATIENT
Start: 2020-05-11 | End: 2020-05-11

## 2020-05-11 RX ORDER — FUROSEMIDE 40 MG
20 TABLET ORAL ONCE
Refills: 0 | Status: COMPLETED | OUTPATIENT
Start: 2020-05-11 | End: 2020-05-11

## 2020-05-11 RX ORDER — SODIUM CHLORIDE 9 MG/ML
1000 INJECTION INTRAMUSCULAR; INTRAVENOUS; SUBCUTANEOUS
Refills: 0 | Status: DISCONTINUED | OUTPATIENT
Start: 2020-05-11 | End: 2020-05-13

## 2020-05-11 RX ORDER — VANCOMYCIN HCL 1 G
1250 VIAL (EA) INTRAVENOUS EVERY 12 HOURS
Refills: 0 | Status: COMPLETED | OUTPATIENT
Start: 2020-05-11 | End: 2020-05-12

## 2020-05-11 RX ORDER — PIPERACILLIN AND TAZOBACTAM 4; .5 G/20ML; G/20ML
4.5 INJECTION, POWDER, LYOPHILIZED, FOR SOLUTION INTRAVENOUS EVERY 6 HOURS
Refills: 0 | Status: DISCONTINUED | OUTPATIENT
Start: 2020-05-11 | End: 2020-05-18

## 2020-05-11 RX ORDER — MORPHINE SULFATE 50 MG/1
1 CAPSULE, EXTENDED RELEASE ORAL ONCE
Refills: 0 | Status: DISCONTINUED | OUTPATIENT
Start: 2020-05-11 | End: 2020-05-11

## 2020-05-11 RX ADMIN — DORZOLAMIDE HYDROCHLORIDE TIMOLOL MALEATE 1 DROP(S): 20; 5 SOLUTION/ DROPS OPHTHALMIC at 19:30

## 2020-05-11 RX ADMIN — Medication 40 MILLIGRAM(S): at 17:06

## 2020-05-11 RX ADMIN — Medication 166.67 MILLIGRAM(S): at 19:30

## 2020-05-11 RX ADMIN — GABAPENTIN 400 MILLIGRAM(S): 400 CAPSULE ORAL at 14:42

## 2020-05-11 RX ADMIN — SODIUM CHLORIDE 200 MILLILITER(S): 9 INJECTION, SOLUTION INTRAVENOUS at 06:28

## 2020-05-11 RX ADMIN — Medication 40 MILLIGRAM(S): at 21:22

## 2020-05-11 RX ADMIN — Medication 20 MILLIGRAM(S): at 16:00

## 2020-05-11 RX ADMIN — APIXABAN 5 MILLIGRAM(S): 2.5 TABLET, FILM COATED ORAL at 11:47

## 2020-05-11 RX ADMIN — Medication 20 MILLIGRAM(S): at 16:27

## 2020-05-11 RX ADMIN — LIDOCAINE 2 PATCH: 4 CREAM TOPICAL at 23:12

## 2020-05-11 RX ADMIN — SODIUM CHLORIDE 180 MILLILITER(S): 9 INJECTION, SOLUTION INTRAVENOUS at 11:13

## 2020-05-11 RX ADMIN — Medication 81 MILLIGRAM(S): at 21:25

## 2020-05-11 RX ADMIN — GABAPENTIN 400 MILLIGRAM(S): 400 CAPSULE ORAL at 05:44

## 2020-05-11 RX ADMIN — Medication 20 MILLIGRAM(S): at 09:54

## 2020-05-11 RX ADMIN — OXYCODONE HYDROCHLORIDE 5 MILLIGRAM(S): 5 TABLET ORAL at 02:29

## 2020-05-11 RX ADMIN — GABAPENTIN 400 MILLIGRAM(S): 400 CAPSULE ORAL at 21:22

## 2020-05-11 RX ADMIN — PIPERACILLIN AND TAZOBACTAM 200 GRAM(S): 4; .5 INJECTION, POWDER, LYOPHILIZED, FOR SOLUTION INTRAVENOUS at 23:13

## 2020-05-11 RX ADMIN — Medication 5 MILLIGRAM(S): at 23:12

## 2020-05-11 RX ADMIN — Medication 1 MILLIGRAM(S): at 11:47

## 2020-05-11 RX ADMIN — Medication 650 MILLIGRAM(S): at 07:11

## 2020-05-11 RX ADMIN — PIPERACILLIN AND TAZOBACTAM 200 GRAM(S): 4; .5 INJECTION, POWDER, LYOPHILIZED, FOR SOLUTION INTRAVENOUS at 18:45

## 2020-05-11 RX ADMIN — APIXABAN 5 MILLIGRAM(S): 2.5 TABLET, FILM COATED ORAL at 23:12

## 2020-05-11 RX ADMIN — Medication 5 MILLIGRAM(S): at 09:54

## 2020-05-11 RX ADMIN — FAMOTIDINE 20 MILLIGRAM(S): 10 INJECTION INTRAVENOUS at 05:44

## 2020-05-11 RX ADMIN — OXYCODONE HYDROCHLORIDE 5 MILLIGRAM(S): 5 TABLET ORAL at 06:29

## 2020-05-11 RX ADMIN — MORPHINE SULFATE 1 MILLIGRAM(S): 50 CAPSULE, EXTENDED RELEASE ORAL at 16:26

## 2020-05-11 RX ADMIN — Medication 650 MILLIGRAM(S): at 18:40

## 2020-05-11 RX ADMIN — OXYCODONE HYDROCHLORIDE 5 MILLIGRAM(S): 5 TABLET ORAL at 18:40

## 2020-05-11 RX ADMIN — DORZOLAMIDE HYDROCHLORIDE TIMOLOL MALEATE 1 DROP(S): 20; 5 SOLUTION/ DROPS OPHTHALMIC at 05:45

## 2020-05-11 RX ADMIN — LIDOCAINE 2 PATCH: 4 CREAM TOPICAL at 09:45

## 2020-05-11 RX ADMIN — LIDOCAINE 2 PATCH: 4 CREAM TOPICAL at 05:45

## 2020-05-11 NOTE — CONSULT NOTE ADULT - SUBJECTIVE AND OBJECTIVE BOX
Pt is a 69 y/o female w hx of polyneuropathy (follows w/ Dr. 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 Brito's office with complaint of worsening lower back pain and lower extremity weakness after a fall 1 week ago. He presented to ED on  and was found to be COVID+, meeting sepsis criteria. MRI L-spine and EMG were negative and nerve deficits are at baseline, however patient found to have muscle pain and rising CK as well as rising inflammatory markers for which he is being treated for rhabdomyolysis with LR @200cc/hr. Today patient developed increasing shortness of breath and oxygen demands.     SUBJECTIVE / INTERVAL HPI: Patient seen and examined at bedside.     VITAL SIGNS:  Vital Signs Last 24 Hrs  T(C): 37 (11 May 2020 14:45), Max: 38.9 (11 May 2020 06:33)  T(F): 98.6 (11 May 2020 14:45), Max: 102 (11 May 2020 06:33)  HR: 90 (11 May 2020 14:45) (76 - 90)  BP: 148/90 (11 May 2020 14:45) (123/75 - 154/77)  BP(mean): --  RR: 20 (11 May 2020 14:45) (19 - 20)  SpO2: 90% (11 May 2020 14:45) (90% - 94%)    REVIEW OF SYSTEMS:    CONSTITUTIONAL: No weakness, fevers or chills.   EYES/ENT: No visual changes;  No vertigo or throat pain   NECK: No pain or stiffness  RESPIRATORY: No cough, wheezing, hemoptysis; No shortness of breath  CARDIOVASCULAR: No chest pain or palpitations  GASTROINTESTINAL: No abdominal or epigastric pain. No nausea, vomiting, or hematemesis; No diarrhea or constipation. No melena or hematochezia.  GENITOURINARY: No dysuria, frequency or hematuria  NEUROLOGICAL: No numbness or weakness  SKIN: No itching, burning, rashes, or lesions   All other review of systems is negative unless indicated above.    PHYSICAL EXAM:  General: WDWN  HEENT: NC/AT; PERRL, clear conjunctiva  Neck: supple, no JVD  Cardiovascular: +S1/S2; RRR, no M/R/G  Respiratory: CTA b/l; no W/R/R  Gastrointestinal: soft, NT/ND; +BSx4  Extremities: WWP; 2+ peripheral pulses; no edema   Neurological: AAOx3; no focal deficits    MEDICATIONS:  MEDICATIONS  (STANDING):  apixaban 5 milliGRAM(s) Oral every 12 hours  aspirin  chewable 81 milliGRAM(s) Oral every 24 hours  baclofen 5 milliGRAM(s) Oral every 12 hours  dorzolamide 2%/timolol 0.5% Ophthalmic Solution 1 Drop(s) Both EYES every 12 hours  famotidine    Tablet 20 milliGRAM(s) Oral every 24 hours  folic acid 1 milliGRAM(s) Oral daily  furosemide   Injectable 20 milliGRAM(s) IV Push once  gabapentin 400 milliGRAM(s) Oral three times a day  latanoprost 0.005% Ophthalmic Solution 1 Drop(s) Both EYES at bedtime  lidocaine   Patch 2 Patch Transdermal every 24 hours    MEDICATIONS  (PRN):  acetaminophen   Tablet .. 650 milliGRAM(s) Oral every 6 hours PRN Temp greater or equal to 38.5C (101.3F)  morphine  - Injectable 1 milliGRAM(s) IV Push once PRN pain  oxyCODONE    IR 5 milliGRAM(s) Oral every 4 hours PRN Moderate Pain (4 - 6)      ALLERGIES:  Allergies    No Known Allergies    Intolerances        LABS:                        11.3   8.10  )-----------( 74       ( 11 May 2020 06:44 )             35.2     05-11    142  |  108  |  18  ----------------------------<  91  4.2   |  23  |  1.06    Ca    8.7      11 May 2020 12:46  Phos  3.2     05-  Mg     1.6     -    TPro  6.4  /  Alb  3.1<L>  /  TBili  2.5<H>  /  DBili  1.0<H>  /  AST  154<H>  /  ALT  48<H>  /  AlkPhos  39<L>  05-11    PT/INR - ( 10 May 2020 10:26 )   PT: 24.4 sec;   INR: 2.09          PTT - ( 10 May 2020 10:26 )  PTT:29.8 sec  Urinalysis Basic - ( 09 May 2020 18:06 )    Color: Yellow / Appearance: Clear / S.020 / pH: x  Gluc: x / Ketone: NEGATIVE  / Bili: Negative / Urobili: >=8.0 E.U./dL   Blood: x / Protein: 100 mg/dL / Nitrite: NEGATIVE   Leuk Esterase: NEGATIVE / RBC: < 5 /HPF / WBC < 5 /HPF   Sq Epi: x / Non Sq Epi: 0-5 /HPF / Bacteria: Present /HPF      CAPILLARY BLOOD GLUCOSE      POCT Blood Glucose.: 90 mg/dL (10 May 2020 21:30)      RADIOLOGY & ADDITIONAL TESTS: Reviewed. Pt is a 71 y/o female w hx of polyneuropathy (follows w/ Dr. 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 Brito's office with complaint of worsening lower back pain and lower extremity weakness after a fall 1 week ago. He presented to ED on  and was found to be COVID+, meeting sepsis criteria. MRI L-spine and EMG were negative and nerve deficits are at baseline, however patient found to have muscle pain and rising CK as well as rising inflammatory markers for which he is being treated for rhabdomyolysis with LR @200cc/hr. Today patient developed increasing shortness of breath and oxygen demands. He received 40mg IV lasix with 3L urine output, however remained tachypneic and required 15L NRB to maintain O2 >90%. ICU consutled for worsening hypoxic respiratory failure.     VITAL SIGNS:  Vital Signs Last 24 Hrs  T(C): 37 (11 May 2020 14:45), Max: 38.9 (11 May 2020 06:33)  T(F): 98.6 (11 May 2020 14:45), Max: 102 (11 May 2020 06:33)  HR: 90 (11 May 2020 14:45) (76 - 90)  BP: 148/90 (11 May 2020 14:45) (123/75 - 154/77)  BP(mean): --  RR: 20 (11 May 2020 14:45) (19 - 20)  SpO2: 90% (11 May 2020 14:45) (90% - 94%)    REVIEW OF SYSTEMS:  CONSTITUTIONAL: Generalized weakness and fevers. No chills.   EYES/ENT: No visual changes;  No vertigo or throat pain   NECK: No pain or stiffness  RESPIRATORY: Shortness of breath. No cough, wheezing, hemoptysis;   CARDIOVASCULAR: Chest pain. No palpitations  GASTROINTESTINAL: No abdominal or epigastric pain. No nausea, vomiting, or hematemesis; No diarrhea or constipation. No melena or hematochezia.  GENITOURINARY: No dysuria, frequency or hematuria  NEUROLOGICAL: No numbness or weakness  SKIN: No itching, burning, rashes, or lesions   All other review of systems is negative unless indicated above.    PHYSICAL EXAM:  General: WDWN chronically ill appearing male in moderate distress.   HEENT: NC/AT; PERRL, clear conjunctiva  Neck: supple, no JVD  Cardiovascular: +S1/S2; RRR, no M/R/G. Tenderness over chest wall.   Respiratory: Tachypenic on NRB mask. Not using accessory muscles at this time. CTA b/l; no W/R/R  Gastrointestinal: soft, NT/ND; +BSx4  Extremities: WWP; 2+ peripheral pulses; no edema noted.   Neurological: AAOx3; 4/5 strength LLE, 3/5 strength RLE. Sensory intact. 4+/5 strength b/l UE.     MEDICATIONS:  MEDICATIONS  (STANDING):  apixaban 5 milliGRAM(s) Oral every 12 hours  aspirin  chewable 81 milliGRAM(s) Oral every 24 hours  baclofen 5 milliGRAM(s) Oral every 12 hours  dorzolamide 2%/timolol 0.5% Ophthalmic Solution 1 Drop(s) Both EYES every 12 hours  famotidine    Tablet 20 milliGRAM(s) Oral every 24 hours  folic acid 1 milliGRAM(s) Oral daily  furosemide   Injectable 20 milliGRAM(s) IV Push once  gabapentin 400 milliGRAM(s) Oral three times a day  latanoprost 0.005% Ophthalmic Solution 1 Drop(s) Both EYES at bedtime  lidocaine   Patch 2 Patch Transdermal every 24 hours    MEDICATIONS  (PRN):  acetaminophen   Tablet .. 650 milliGRAM(s) Oral every 6 hours PRN Temp greater or equal to 38.5C (101.3F)  morphine  - Injectable 1 milliGRAM(s) IV Push once PRN pain  oxyCODONE    IR 5 milliGRAM(s) Oral every 4 hours PRN Moderate Pain (4 - 6)      ALLERGIES:  Allergies    No Known Allergies    Intolerances        LABS:                        11.3   8.10  )-----------( 74       ( 11 May 2020 06:44 )             35.2     05-11    142  |  108  |  18  ----------------------------<  91  4.2   |  23  |  1.06    Ca    8.7      11 May 2020 12:46  Phos  3.2     05-11  Mg     1.6     05-11    TPro  6.4  /  Alb  3.1<L>  /  TBili  2.5<H>  /  DBili  1.0<H>  /  AST  154<H>  /  ALT  48<H>  /  AlkPhos  39<L>  05-11    PT/INR - ( 10 May 2020 10:26 )   PT: 24.4 sec;   INR: 2.09          PTT - ( 10 May 2020 10:26 )  PTT:29.8 sec  Urinalysis Basic - ( 09 May 2020 18:06 )    Color: Yellow / Appearance: Clear / S.020 / pH: x  Gluc: x / Ketone: NEGATIVE  / Bili: Negative / Urobili: >=8.0 E.U./dL   Blood: x / Protein: 100 mg/dL / Nitrite: NEGATIVE   Leuk Esterase: NEGATIVE / RBC: < 5 /HPF / WBC < 5 /HPF   Sq Epi: x / Non Sq Epi: 0-5 /HPF / Bacteria: Present /HPF      CAPILLARY BLOOD GLUCOSE      POCT Blood Glucose.: 90 mg/dL (10 May 2020 21:30)      RADIOLOGY & ADDITIONAL TESTS: Reviewed. Pt is a 69 y/o female w hx of polyneuropathy (follows w/ Dr. 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 Brito's office with complaint of worsening lower back pain and lower extremity weakness after a fall 1 week ago. He presented to ED on  and was found to be COVID+, meeting sepsis criteria. MRI L-spine and EMG were negative and nerve deficits are at baseline, however patient found to have muscle pain and rising CK as well as rising inflammatory markers for which he is being treated for rhabdomyolysis with LR @200cc/hr. Today patient developed increasing shortness of breath and oxygen demands. He received 40mg IV lasix with 3L urine output, however remained tachypneic and required 15L NRB to maintain O2 >90%. ICU consutled for worsening hypoxic respiratory failure.     VITAL SIGNS:  Vital Signs Last 24 Hrs  T(C): 37 (11 May 2020 14:45), Max: 38.9 (11 May 2020 06:33)  T(F): 98.6 (11 May 2020 14:45), Max: 102 (11 May 2020 06:33)  HR: 90 (11 May 2020 14:45) (76 - 90)  BP: 148/90 (11 May 2020 14:45) (123/75 - 154/77)  BP(mean): --  RR: 20 (11 May 2020 14:45) (19 - 20)  SpO2: 90% (11 May 2020 14:45) (90% - 94%)    REVIEW OF SYSTEMS:  CONSTITUTIONAL: Generalized weakness and fevers. No chills.   EYES/ENT: No visual changes;  No vertigo or throat pain   NECK: No pain or stiffness  RESPIRATORY: Shortness of breath. No cough, wheezing, hemoptysis;   CARDIOVASCULAR: Chest pain. No palpitations  GASTROINTESTINAL: No abdominal or epigastric pain. No nausea, vomiting, or hematemesis; No diarrhea or constipation. No melena or hematochezia.  GENITOURINARY: No dysuria, frequency or hematuria  NEUROLOGICAL: No numbness or weakness  SKIN: No itching, burning, rashes, or lesions   All other review of systems is negative unless indicated above.    PHYSICAL EXAM:  General: WDWN chronically ill appearing male in moderate distress.   HEENT: NC/AT; PERRL, clear conjunctiva  Neck: supple, no JVD  Cardiovascular: +S1/S2; RRR, no M/R/G. Tenderness over chest wall.   Respiratory: Tachypneic on NRB mask. Not using accessory muscles at this time. Diminished breath sounds b/l; no W/R/R  Gastrointestinal: soft, NT/ND; +BSx4  Extremities: WWP; 2+ peripheral pulses; no edema noted.   Neurological: AAOx3; 4/5 strength LLE, 3/5 strength RLE. Sensory intact. 4+/5 strength b/l UE.     MEDICATIONS:  MEDICATIONS  (STANDING):  apixaban 5 milliGRAM(s) Oral every 12 hours  aspirin  chewable 81 milliGRAM(s) Oral every 24 hours  baclofen 5 milliGRAM(s) Oral every 12 hours  dorzolamide 2%/timolol 0.5% Ophthalmic Solution 1 Drop(s) Both EYES every 12 hours  famotidine    Tablet 20 milliGRAM(s) Oral every 24 hours  folic acid 1 milliGRAM(s) Oral daily  furosemide   Injectable 20 milliGRAM(s) IV Push once  gabapentin 400 milliGRAM(s) Oral three times a day  latanoprost 0.005% Ophthalmic Solution 1 Drop(s) Both EYES at bedtime  lidocaine   Patch 2 Patch Transdermal every 24 hours    MEDICATIONS  (PRN):  acetaminophen   Tablet .. 650 milliGRAM(s) Oral every 6 hours PRN Temp greater or equal to 38.5C (101.3F)  morphine  - Injectable 1 milliGRAM(s) IV Push once PRN pain  oxyCODONE    IR 5 milliGRAM(s) Oral every 4 hours PRN Moderate Pain (4 - 6)      ALLERGIES:  Allergies    No Known Allergies    Intolerances        LABS:                        11.3   8.10  )-----------( 74       ( 11 May 2020 06:44 )             35.2     05-11    142  |  108  |  18  ----------------------------<  91  4.2   |  23  |  1.06    Ca    8.7      11 May 2020 12:46  Phos  3.2     05-11  Mg     1.6     05-11    TPro  6.4  /  Alb  3.1<L>  /  TBili  2.5<H>  /  DBili  1.0<H>  /  AST  154<H>  /  ALT  48<H>  /  AlkPhos  39<L>  05-11    PT/INR - ( 10 May 2020 10:26 )   PT: 24.4 sec;   INR: 2.09          PTT - ( 10 May 2020 10:26 )  PTT:29.8 sec  Urinalysis Basic - ( 09 May 2020 18:06 )    Color: Yellow / Appearance: Clear / S.020 / pH: x  Gluc: x / Ketone: NEGATIVE  / Bili: Negative / Urobili: >=8.0 E.U./dL   Blood: x / Protein: 100 mg/dL / Nitrite: NEGATIVE   Leuk Esterase: NEGATIVE / RBC: < 5 /HPF / WBC < 5 /HPF   Sq Epi: x / Non Sq Epi: 0-5 /HPF / Bacteria: Present /HPF      CAPILLARY BLOOD GLUCOSE      POCT Blood Glucose.: 90 mg/dL (10 May 2020 21:30)      RADIOLOGY & ADDITIONAL TESTS: Reviewed.

## 2020-05-11 NOTE — EEG REPORT - NS EEG TEXT BOX
Continuous EEG Report  5/10/2020 @ 7 AM to 5/11/2020 @ 7 AM    Background:  continuous, with predominantly alpha/theta frequencies.  Symmetry:  No persistent asymmetries of voltage or frequency.  Posterior Dominant Rhythm:  8 Hz symmetric, well-organized, and well-modulated.  Organization: Present.  Voltage:  Normal (20+ uV)  Variability: Yes. 		Reactivity: Yes.  N2 sleep: Symmetric, synchronous spindles and K complexes.  Spontaneous Activity:  No epileptiform discharges.  Periodic/rhythmic activity: None.  Events:  No electrographic seizures or significant clinical events.  Provocations:  Hyperventilation and Photic stimulation: was not performed.      Daily Summary:    1)	Mild generalized background slowing suggestive of a similar degree of diffuse or multifocal  dysfunction.  2)	No epileptiform activity and no significant clinical events occurred.    Read by:  rTan Patel MD

## 2020-05-11 NOTE — PROGRESS NOTE ADULT - SUBJECTIVE AND OBJECTIVE BOX
HOSPITAL COURSE: 68 y/o male with chronic pain, progressive lower extremity weakness and atrophy, DVT/PE, HTN and mitral regurgitation sent in from Dr. Brito's office s/p fall for fever and tachypnea found to be COVID positive, saturating well on RA on admission. Patient reports presentation 2/2 to fall with hematuria prior to admission, found to have SETH.  Patient also with uptrending CK, troponin minimally elevated, trended to peak. EKG sinus tachycardia, cardiology consulted with concern for cardiomyopathy, ruled out in setting of minimally elevated troponins in comparison to rise in CK. Concern for rhabdomyolysis given fall, reported hematuria and SETH in setting of uptrending CK. Patient started on LR 200ml/hr. EMG ruled out myositis. EEG placed for increasing lethargy with generalized slowing. Patient remained febrile, received tociluzumab (). Patient with increasing oxygen requirements 5/10, requiring 2-6L in setting of fluid overload. Received lasix 20mg. Patient found to be saturating 79% on RA -> 81& 6L NC -> 84 - 85% 15L NRB, with increased work of breathing. Received additional Lasix 20mg IV and morphine 1mg for pain. Stepped up to ICU for increased level of care.       SUBJECTIVE: Patient seen and examined at bedside. MIRIAM over AM/afternoon. Pt states he feels SOB, otherwise no other complaints      VITAL SIGNS:  ICU Vital Signs Last 24 Hrs  T(C): 37 (11 May 2020 14:45), Max: 38.9 (11 May 2020 06:33)  T(F): 98.6 (11 May 2020 14:45), Max: 102 (11 May 2020 06:33)  HR: 90 (11 May 2020 14:45) (76 - 90)  BP: 148/90 (11 May 2020 14:45) (123/75 - 154/77)  BP(mean): --  ABP: --  ABP(mean): --  RR: 20 (11 May 2020 14:45) (19 - 20)  SpO2: 90% (11 May 2020 14:45) (90% - 94%)      Plateau pressure:   P/F ratio:     05-10 @ 07:01  -  05-11 @ 07:00  --------------------------------------------------------  IN: 3200 mL / OUT: 3000 mL / NET: 200 mL     @ 07:01  -   @ 17:43  --------------------------------------------------------  IN: 0 mL / OUT: 3000 mL / NET: -3000 mL      CAPILLARY BLOOD GLUCOSE      POCT Blood Glucose.: 90 mg/dL (10 May 2020 21:30)    ECG:    PHYSICAL EXAM:    PHYSICAL EXAM:    General: In bed, w increased work of breathing noted     Neck: supple; no JVD  Cardiovascular: on monitor no arrhythmia noted  Respiratory: on NRB, increased work of noted  Gastrointestinal: soft, NT/ND;   Extremities: WWP;   Vascular: 2+ radial, DP/PT pulses B/L  Neurological: alert and oriented, lethargic but arousable    MEDICATIONS:  MEDICATIONS  (STANDING):  apixaban 5 milliGRAM(s) Oral every 12 hours  aspirin  chewable 81 milliGRAM(s) Oral every 24 hours  baclofen 5 milliGRAM(s) Oral every 12 hours  dorzolamide 2%/timolol 0.5% Ophthalmic Solution 1 Drop(s) Both EYES every 12 hours  famotidine    Tablet 20 milliGRAM(s) Oral every 24 hours  folic acid 1 milliGRAM(s) Oral daily  gabapentin 400 milliGRAM(s) Oral three times a day  latanoprost 0.005% Ophthalmic Solution 1 Drop(s) Both EYES at bedtime  lidocaine   Patch 2 Patch Transdermal every 24 hours  methylPREDNISolone sodium succinate Injectable 40 milliGRAM(s) IV Push every 12 hours    MEDICATIONS  (PRN):  acetaminophen   Tablet .. 650 milliGRAM(s) Oral every 6 hours PRN Temp greater or equal to 38.5C (101.3F)  oxyCODONE    IR 5 milliGRAM(s) Oral every 4 hours PRN Moderate Pain (4 - 6)      ALLERGIES:  Allergies    No Known Allergies    Intolerances        LABS:                        11.3   8.10  )-----------( 74       ( 11 May 2020 06:44 )             35.2     05-11    142  |  108  |  18  ----------------------------<  91  4.2   |  23  |  1.06    Ca    8.7      11 May 2020 12:46  Phos  3.2     05-  Mg     1.6     05-11    TPro  6.4  /  Alb  3.1<L>  /  TBili  2.5<H>  /  DBili  1.0<H>  /  AST  154<H>  /  ALT  48<H>  /  AlkPhos  39<L>  05-11    PT/INR - ( 10 May 2020 10:26 )   PT: 24.4 sec;   INR: 2.09          PTT - ( 10 May 2020 10:26 )  PTT:29.8 sec  Urinalysis Basic - ( 09 May 2020 18:06 )    Color: Yellow / Appearance: Clear / S.020 / pH: x  Gluc: x / Ketone: NEGATIVE  / Bili: Negative / Urobili: >=8.0 E.U./dL   Blood: x / Protein: 100 mg/dL / Nitrite: NEGATIVE   Leuk Esterase: NEGATIVE / RBC: < 5 /HPF / WBC < 5 /HPF   Sq Epi: x / Non Sq Epi: 0-5 /HPF / Bacteria: Present /HPF        RADIOLOGY & ADDITIONAL TESTS: Reviewed.      · Assessment		  68 y/o M with PMH of progressive lower extremity weakness, s/p lumbar decompression in 2019. s/p sural nerve biopsy on  for lower extremity weakness and atrophy, cardiomegaly, DVT/PE in 2018 (on Eliquis), HTN, MR, and galucoma presenting with severe back pain s/p fall before presentation with SETH and rising CK, now with concern for rhabdomyolysis vs myositis.           Problem/Plan - 1:  ·  Problem: R/O Rhabdomyolysis.  Plan: Patient with rising CK in setting of fall and SETH, and reported hematuria prior to arrival. Also, with hypophosphatemia, concern for rhabdomyolysis. CK downtrending slowly with improvement in Cr.   -  cc/hr   - trend CK q8hr   - monitor Cr.      Problem/Plan - 2:  ·  Problem: Severe sepsis.  Plan: Sepsis of unknown etiology. Patient with severe sepsis criteria on admission (fever, tachycardia and elevated lactate), with complaints of URI and diarrhea. Found to COVID-19 positive. No evidence of leukocytosis, procalcitonin 0.28.  WBC was normal. procal is 0.28. Sepsis is likely due to COVID as well as dehydration. Right knee with erythema, no evidence of effusion. Gallstones present, Perdue sign negative. Still with fever (102) and leukopenia. Differential diagnosis autoinflammatory state 2/2 COVID-19. Patient desaturating to 87% on RA, requiring 6L oxygen, weaned to 2L.   - Blood cultures NGTD  - UA negative   - Fever management with tylenol   - Knee xray w/o evidence of septic joint   - Tylenol for fever  - COVID management as below  - s/p tocilizumab ()    - CT chest obtained to evaluate for ?consolidation demonstrating basilar predominant groundglass opacities bilaterally; consistent with COVID-19.      Problem/Plan - 3:  ·  Problem: SETH (acute kidney injury).  Plan: RESOLVED   Pt with baseline Cr 1.1-1.2 presented with Cr 1.88 and BUN 52 likely due to prerenal azotemia likely due to dehydration following having diarrhea for few days and not eating and drinking well. responded to fluid therapy and decreased to 1.68. Urine lytes also were compatible with pre-renal causes, s/p 1.5 L of IVF in ED  - improving this AM 1.06 (baseline)  - avoid nephrotoxic meds at this time   - monitor kidney function    #Hematuria: RESOLVED   Patient with compliant of blood in urine and UA demonstrating large blood,  likely due to rhabdomyolysis   - will monitor the urine for gross hematuria   - c/w home Eliquis 5mg BID.      Problem/Plan - 4:  ·  Problem: Pneumonia due to COVID-19 virus.  Plan: Pt with sore throat and runny nose and mild diarrhea for the past few days but no SOB or cough, was found to be positive for COVID. Had fever and tachycardia in ED but no O2 desaturation. currently sating 94% on 2L. CXR clear.   COVID positive, Ferritin 2537, CRP 3.33  - Monitor O2 saturation and symptoms  - Trending inflammatory markers daily  - tylenol for fever  - symptomtic management   - contact precaustions: isolation and droplet  - avoid NSAID/ACE-I/ARB    #Acute Hypoxic Resp Failure 2/2 COVID-19  Patient with increased O2 requirements (79% on RA) s/p lasix 20mg. CXR without evidence of congestion. Currently on 15L NRB  - s/p tociluzumab ()  - initiated solumedrol 40mg BID -> taper as per pulm  - wean O2 as tolerated  - patient is not a candidate for awake proning, unable to tolerate 2/2 to chronic pain and previous spinal surgeries.      Problem/Plan - 5:  ·  Problem: Neuropathy.  Plan: Has a chronic neuropathy likely secondary to disc herniation. on gabapentin 400 tid at home. Currently in worsening back pain and tingling and numbness. Follows with Dr. Brito outpatient.   - LE dopplers negative for DVT  - CPK elevated -> concern for myositis as underlying etiology   - RF 22 -> mildly elevated  - f/u AHMET, anti-dsDNA, anti-Isaura   - trend CPK   - EMG unchanged from prior   - c/w home gabapentin 400 tid    #Fall  Patient with reported fall and multiple previous falls. Denies LOC or trauma to head.   Ambulates with walker. Unwilling to cooperate with neurological exam 2/2 to reported pain.   - PT evaluation -> recommendation of KEVIN   - neurology following    #Lethargy   Patient with increased lethargy, less arousable compared to baseline. AAOx3 this AM.   - EEG w/ generalized slowing.      Problem/Plan - 6:  Problem: Acute midline low back pain, unspecified whether sciatica present. Plan: Pt with chronic radiculopathy and low back pain due to disc herniation s/p lumbar decompression in 2019. s/p sural nerve biopsy on  due to muscle weakness, presented with worsening low back pain after fall few days before presentation as well as worsening weakness in the LEs mainly in the right side and few weeks of urinary incontinence. exam shows weakness of the LEs.   MRI spine showed Multilevel disc herniations with multilevel neural foraminal narrowing but no signs of acute cord compression.   Likely worsening of the chronic radiculopathy due to worsening of spinal stenosis due to disc herniations.   - Continue his home dose of oxycodone 5 q4 PRN  - Lidocaine patch 2 patches every day  - holding home Nucenta ER 100mg   - c/w home Gabapentin 400 tid  - No neurosurgical intervention at this time   - evaluated by Dr. Brito (outpatient neurologist) -> EMG without evidence of myopathy or myositis, unchanged from prior (2019)   - neurology following apperciate recs   - PT/OT evaluation -> recommend KEVIN     #Right Knee Arthritis   Pt complains of pain in the right knee with unknown duration. Has mild swelling as well as tenderness and redness in the right knee.   WBC is normal, has CRP 3.33 that can be due to COVID infection, porcal is mildly elevated 0.28. uric acid is 10.1.   Can be gout or pseudogout. Septic joint on DDx as well  - Knee xray without evidence of effusion   - PT eval -> KEVIN.     Problem/Plan - 7:  ·  Problem: DVT (deep venous thrombosis).  Plan: Pt with hx of DVT and PE, on Eliquis 5 daily at home. DVT reported in 2018, unclear indication for AC at this time.   - c/w home Eliquis 5mg BID qd     #Thrombocytopenia  Patient with baseline plt 110s, 84 on arrival and decreasing to 61 this AM. Possibly 2/2 viral infection and sepsis. No evidence of bleeding.  - hold home Aspirin 81mg  - Hematology consulted -> likely 2/2 COVID-19   - Hep C +  - monitor for signs of bleeding.      Problem/Plan - 8:  ·  Problem: HTN (hypertension).  Plan: Pt with hx of HTN on lisinopril 10 at home  Currently with symptoms of sepsis, dehydration, SETH   - hold home lisinopril  - monitor BPs  - Will resume when more stable and has high BP    #Hyperbilirubinemia   Pt with bili 2.7 and elevated AST and ALT but normal [email or web address] ultrasound demonstrating gallbladder with multiple gallstones. Benign abdominal exam. Negative perdue sign.  Likely 2/2 COVID infection or due to gallbladder stones. Improved with fluid therapy. Patient Hep C positive.   - monitor LFT  - Hepatitis panel negative    #Glaucoma  Patient with history of glaucoma. c/w home latanoprost 0.005%.      Problem/Plan - 9:  ·  Problem: Prophylactic measure.  Plan: Fluids: LR  Electrolytes-replete as needed  Nutrition - DASH/TLC  Code- Full Code  DVT ppx: Eliquis   GI ppx: none needed  DIspo: RMF    DISCHARGE PLANNING:  Medical readiness goals: improvement in CK; KEVIN placement   Anticipated d/c: 24-48 hours HOSPITAL COURSE: 70 y/o male with chronic pain, progressive lower extremity weakness and atrophy, DVT/PE, HTN and mitral regurgitation sent in from Dr. Brito's office s/p fall for fever and tachypnea found to be COVID positive, saturating well on RA on admission. Patient reports presentation 2/2 to fall with hematuria prior to admission, found to have SETH.  Patient also with uptrending CK, troponin minimally elevated, trended to peak. EKG sinus tachycardia, cardiology consulted with concern for cardiomyopathy, ruled out in setting of minimally elevated troponins in comparison to rise in CK. Concern for rhabdomyolysis given fall, reported hematuria and SETH in setting of uptrending CK. Patient started on LR 200ml/hr. EMG ruled out myositis. EEG placed for increasing lethargy with generalized slowing. Patient remained febrile, received tociluzumab (). Patient with increasing oxygen requirements 5/10, requiring 2-6L in setting of fluid overload. Received lasix 20mg. Patient found to be saturating 79% on RA -> 81& 6L NC -> 84 - 85% 15L NRB, with increased work of breathing. Received additional Lasix 20mg IV and morphine 1mg for pain. Stepped up to ICU for increased level of care.       SUBJECTIVE: Patient seen and examined at bedside. MIRIAM over AM/afternoon. Pt states he feels SOB, otherwise no other complaints      VITAL SIGNS:  ICU Vital Signs Last 24 Hrs  T(C): 37 (11 May 2020 14:45), Max: 38.9 (11 May 2020 06:33)  T(F): 98.6 (11 May 2020 14:45), Max: 102 (11 May 2020 06:33)  HR: 90 (11 May 2020 14:45) (76 - 90)  BP: 148/90 (11 May 2020 14:45) (123/75 - 154/77)  BP(mean): --  ABP: --  ABP(mean): --  RR: 20 (11 May 2020 14:45) (19 - 20)  SpO2: 90% (11 May 2020 14:45) (90% - 94%)      Plateau pressure:   P/F ratio:     05-10 @ 07:01  -  05-11 @ 07:00  --------------------------------------------------------  IN: 3200 mL / OUT: 3000 mL / NET: 200 mL     @ 07:01  -   @ 17:43  --------------------------------------------------------  IN: 0 mL / OUT: 3000 mL / NET: -3000 mL      CAPILLARY BLOOD GLUCOSE      POCT Blood Glucose.: 90 mg/dL (10 May 2020 21:30)    ECG:    PHYSICAL EXAM:    PHYSICAL EXAM:    General: In bed, w increased work of breathing noted     Neck: supple; no JVD  Cardiovascular: on monitor no arrhythmia noted  Respiratory: on NRB, increased work of noted  Gastrointestinal: soft, NT/ND;   Extremities: WWP;   Vascular: 2+ radial, DP/PT pulses B/L  Neurological: alert and oriented, lethargic but arousable    MEDICATIONS:  MEDICATIONS  (STANDING):  apixaban 5 milliGRAM(s) Oral every 12 hours  aspirin  chewable 81 milliGRAM(s) Oral every 24 hours  baclofen 5 milliGRAM(s) Oral every 12 hours  dorzolamide 2%/timolol 0.5% Ophthalmic Solution 1 Drop(s) Both EYES every 12 hours  famotidine    Tablet 20 milliGRAM(s) Oral every 24 hours  folic acid 1 milliGRAM(s) Oral daily  gabapentin 400 milliGRAM(s) Oral three times a day  latanoprost 0.005% Ophthalmic Solution 1 Drop(s) Both EYES at bedtime  lidocaine   Patch 2 Patch Transdermal every 24 hours  methylPREDNISolone sodium succinate Injectable 40 milliGRAM(s) IV Push every 12 hours    MEDICATIONS  (PRN):  acetaminophen   Tablet .. 650 milliGRAM(s) Oral every 6 hours PRN Temp greater or equal to 38.5C (101.3F)  oxyCODONE    IR 5 milliGRAM(s) Oral every 4 hours PRN Moderate Pain (4 - 6)      ALLERGIES:  Allergies    No Known Allergies    Intolerances        LABS:                        11.3   8.10  )-----------( 74       ( 11 May 2020 06:44 )             35.2     05-11    142  |  108  |  18  ----------------------------<  91  4.2   |  23  |  1.06    Ca    8.7      11 May 2020 12:46  Phos  3.2     05-11  Mg     1.6     05-11    TPro  6.4  /  Alb  3.1<L>  /  TBili  2.5<H>  /  DBili  1.0<H>  /  AST  154<H>  /  ALT  48<H>  /  AlkPhos  39<L>  05-11    PT/INR - ( 10 May 2020 10:26 )   PT: 24.4 sec;   INR: 2.09          PTT - ( 10 May 2020 10:26 )  PTT:29.8 sec  Urinalysis Basic - ( 09 May 2020 18:06 )    Color: Yellow / Appearance: Clear / S.020 / pH: x  Gluc: x / Ketone: NEGATIVE  / Bili: Negative / Urobili: >=8.0 E.U./dL   Blood: x / Protein: 100 mg/dL / Nitrite: NEGATIVE   Leuk Esterase: NEGATIVE / RBC: < 5 /HPF / WBC < 5 /HPF   Sq Epi: x / Non Sq Epi: 0-5 /HPF / Bacteria: Present /HPF        RADIOLOGY & ADDITIONAL TESTS: Reviewed.      · Assessment		  70 y/o M with PMH of progressive lower extremity weakness, s/p lumbar decompression in 2019. s/p sural nerve biopsy on  for lower extremity weakness and atrophy, cardiomegaly, DVT/PE in 2018 (on Eliquis), HTN, MR, and galucoma presenting with severe back pain s/p fall before presentation with SETH and rising CK, now with concern for rhabdomyolysis vs myositis.         Problem/Plan - 1:  ·  Problem: Pneumonia due to COVID-19 virus.  Plan: Pt with sore throat and runny nose and mild diarrhea for the past few days but no SOB or cough, was found to be positive for COVID. Had fever and tachycardia in ED but no O2 desaturation. currently sating 94% on 2L. CXR clear.   COVID positive, Ferritin 2537, CRP 3.33  - Monitor O2 saturation and symptoms  - Trending inflammatory markers daily  - tylenol for fever  - symptomtic management   - contact precaustions: isolation and droplet  - avoid NSAID/ACE-I/ARB  - Possible add on to [facility] trial     #Acute Hypoxic Resp Failure 2/2 COVID-19  Patient with increased O2 requirements (79% on RA) s/p lasix 20mg. CXR without evidence of congestion. Currently on 15L NRB  - s/p tociluzumab ()  - initiated solumedrol 40mg BID -> taper as per pulm  - wean O2 as tolerated  - patient is not a candidate for awake proning, unable to tolerate 2/2 to chronic pain and previous spinal surgeries.      Problem/Plan - 2:  ·  Problem: Severe sepsis.  Plan: Sepsis of unknown etiology. Patient with severe sepsis criteria on admission (fever, tachycardia and elevated lactate), with complaints of URI and diarrhea. Found to COVID-19 positive. No evidence of leukocytosis, procalcitonin 0.28.  WBC was normal. procal is 0.28. Sepsis is likely due to COVID as well as dehydration. Right knee with erythema, no evidence of effusion. Gallstones present, Perdue sign negative. Still with fever (102) and leukopenia. Differential diagnosis autoinflammatory state 2/2 COVID-19. Patient desaturating to 87% on RA, requiring 6L oxygen, weaned to 2L.   - Blood cultures NGTD  - UA negative   - Fever management with tylenol   - Knee xray w/o evidence of septic joint   - Tylenol for fever  - COVID management as below  - s/p tocilizumab ()    - CT chest obtained to evaluate for ?consolidation demonstrating basilar predominant groundglass opacities bilaterally; consistent with COVID-19.   - repeat blood cultures drawn        Problem/Plan - 3:  ·  Problem: R/O Rhabdomyolysis.  Plan: Patient with rising CK in setting of fall and SETH, and reported hematuria prior to arrival. Also, with hypophosphatemia, concern for rhabdomyolysis. CK downtrending slowly with improvement in Cr.   -  cc/hr   - trend CK q8hr   - monitor Cr.          Problem/Plan - 4:  ·  Problem: SETH (acute kidney injury).  Plan: RESOLVED   Pt with baseline Cr 1.1-1.2 presented with Cr 1.88 and BUN 52 likely due to prerenal azotemia likely due to dehydration following having diarrhea for few days and not eating and drinking well. responded to fluid therapy and decreased to 1.68. Urine lytes also were compatible with pre-renal causes, s/p 1.5 L of IVF in ED  - improving this AM 1.06 (baseline)  - avoid nephrotoxic meds at this time   - monitor kidney function    #Hematuria: RESOLVED   Patient with compliant of blood in urine and UA demonstrating large blood,  likely due to rhabdomyolysis   - will monitor the urine for gross hematuria   - c/w home Eliquis 5mg BID.        Problem/Plan - 5:  ·  Problem: Neuropathy.  Plan: Has a chronic neuropathy likely secondary to disc herniation. on gabapentin 400 tid at home. Currently in worsening back pain and tingling and numbness. Follows with Dr. Brito outpatient.   - LE dopplers negative for DVT  - CPK elevated -> concern for myositis as underlying etiology   - RF 22 -> mildly elevated  - f/u AHMET, anti-dsDNA, anti-Isaura   - trend CPK   - EMG unchanged from prior   - c/w home gabapentin 400 tid    #Fall  Patient with reported fall and multiple previous falls. Denies LOC or trauma to head.   Ambulates with walker. Unwilling to cooperate with neurological exam 2/2 to reported pain.   - PT evaluation -> recommendation of KEVIN   - neurology following    #Lethargy   Patient with increased lethargy, less arousable compared to baseline. AAOx3 this AM.   - EEG w/ generalized slowing.      Problem/Plan - 6:  Problem: Acute midline low back pain, unspecified whether sciatica present. Plan: Pt with chronic radiculopathy and low back pain due to disc herniation s/p lumbar decompression in 2019. s/p sural nerve biopsy on  due to muscle weakness, presented with worsening low back pain after fall few days before presentation as well as worsening weakness in the LEs mainly in the right side and few weeks of urinary incontinence. exam shows weakness of the LEs.   MRI spine showed Multilevel disc herniations with multilevel neural foraminal narrowing but no signs of acute cord compression.   Likely worsening of the chronic radiculopathy due to worsening of spinal stenosis due to disc herniations.   - Continue his home dose of oxycodone 5 q4 PRN  - Lidocaine patch 2 patches every day  - holding home Nucenta ER 100mg   - c/w home Gabapentin 400 tid  - No neurosurgical intervention at this time   - evaluated by Dr. Brito (outpatient neurologist) -> EMG without evidence of myopathy or myositis, unchanged from prior (2019)   - neurology following apperciate recs   - PT/OT evaluation -> recommend KEVIN     #Right Knee Arthritis   Pt complains of pain in the right knee with unknown duration. Has mild swelling as well as tenderness and redness in the right knee.   WBC is normal, has CRP 3.33 that can be due to COVID infection, porcal is mildly elevated 0.28. uric acid is 10.1.   Can be gout or pseudogout. Septic joint on DDx as well  - Knee xray without evidence of effusion   - PT eval -> KEVIN.     Problem/Plan - 7:  ·  Problem: DVT (deep venous thrombosis).  Plan: Pt with hx of DVT and PE, on Eliquis 5 daily at home. DVT reported in 2018, unclear indication for AC at this time.   - c/w home Eliquis 5mg BID qd     #Thrombocytopenia  Patient with baseline plt 110s, 84 on arrival and decreasing to 61 this AM. Possibly 2/2 viral infection and sepsis. No evidence of bleeding.  - hold home Aspirin 81mg  - Hematology consulted -> likely 2/2 COVID-19   - Hep C +  - monitor for signs of bleeding.      Problem/Plan - 8:  ·  Problem: HTN (hypertension).  Plan: Pt with hx of HTN on lisinopril 10 at home  Currently with symptoms of sepsis, dehydration, SETH   - hold home lisinopril  - monitor BPs  - Will resume when more stable and has high BP    #Hyperbilirubinemia   Pt with bili 2.7 and elevated AST and ALT but normal [id number] ultrasound demonstrating gallbladder with multiple gallstones. Benign abdominal exam. Negative perdue sign.  Likely 2/2 COVID infection or due to gallbladder stones. Improved with fluid therapy. Patient Hep C positive.   - monitor LFT  - Hepatitis panel negative    #Glaucoma  Patient with history of glaucoma. c/w home latanoprost 0.005%.      Problem/Plan - 9:  ·  Problem: Prophylactic measure.  Plan: Fluids: LR  Electrolytes-replete as needed  Nutrition - DASH/TLC  Code- Full Code  DVT ppx: Eliquis   GI ppx: none needed  DIspo: RMF    DISCHARGE PLANNING:  Medical readiness goals: improvement in CK; KEVIN placement   Anticipated d/c: 24-48 hours

## 2020-05-11 NOTE — PROGRESS NOTE ADULT - SUBJECTIVE AND OBJECTIVE BOX
OVERNIGHT EVENTS:    SUBJECTIVE / INTERVAL HPI: Patient seen and examined at bedside.     VITAL SIGNS:  Vital Signs Last 24 Hrs  T(C): 38.9 (11 May 2020 06:33), Max: 38.9 (11 May 2020 06:33)  T(F): 102 (11 May 2020 06:33), Max: 102 (11 May 2020 06:33)  HR: 89 (11 May 2020 06:33) (76 - 89)  BP: 154/77 (11 May 2020 06:33) (123/75 - 154/77)  BP(mean): --  RR: 20 (11 May 2020 06:33) (18 - 20)  SpO2: 90% (11 May 2020 06:33) (90% - 94%)    PHYSICAL EXAM:    General: WDWN  HEENT: NC/AT; PERRL, anicteric sclera; MMM  Neck: supple  Cardiovascular: +S1/S2; RRR  Respiratory: CTA B/L; no W/R/R  Gastrointestinal: soft, NT/ND; +BSx4  Extremities: WWP; no edema, clubbing or cyanosis  Vascular: 2+ radial, DP/PT pulses B/L  Neurological: AAOx3; no focal deficits    MEDICATIONS:  MEDICATIONS  (STANDING):  apixaban 5 milliGRAM(s) Oral every 12 hours  aspirin  chewable 81 milliGRAM(s) Oral every 24 hours  baclofen 5 milliGRAM(s) Oral every 12 hours  dorzolamide 2%/timolol 0.5% Ophthalmic Solution 1 Drop(s) Both EYES every 12 hours  famotidine    Tablet 20 milliGRAM(s) Oral every 24 hours  folic acid 1 milliGRAM(s) Oral daily  gabapentin 400 milliGRAM(s) Oral three times a day  lactated ringers. 1000 milliLiter(s) (180 mL/Hr) IV Continuous <Continuous>  latanoprost 0.005% Ophthalmic Solution 1 Drop(s) Both EYES at bedtime  lidocaine   Patch 2 Patch Transdermal every 24 hours    MEDICATIONS  (PRN):  acetaminophen   Tablet .. 650 milliGRAM(s) Oral every 6 hours PRN Temp greater or equal to 38.5C (101.3F)  oxyCODONE    IR 5 milliGRAM(s) Oral every 4 hours PRN Moderate Pain (4 - 6)      ALLERGIES:  Allergies    No Known Allergies    Intolerances        LABS:                        11.3   8.10  )-----------( 74       ( 11 May 2020 06:44 )             35.2     05-11    144  |  111<H>  |  17  ----------------------------<  88  4.2   |  22  |  1.17    Ca    8.5      11 May 2020 06:44  Phos  2.6     05-11  Mg     1.7     05-11    TPro  6.4  /  Alb  3.1<L>  /  TBili  2.5<H>  /  DBili  1.0<H>  /  AST  154<H>  /  ALT  48<H>  /  AlkPhos  39<L>  05-11    PT/INR - ( 10 May 2020 10:26 )   PT: 24.4 sec;   INR: 2.09          PTT - ( 10 May 2020 10:26 )  PTT:29.8 sec  Urinalysis Basic - ( 09 May 2020 18:06 )    Color: Yellow / Appearance: Clear / S.020 / pH: x  Gluc: x / Ketone: NEGATIVE  / Bili: Negative / Urobili: >=8.0 E.U./dL   Blood: x / Protein: 100 mg/dL / Nitrite: NEGATIVE   Leuk Esterase: NEGATIVE / RBC: < 5 /HPF / WBC < 5 /HPF   Sq Epi: x / Non Sq Epi: 0-5 /HPF / Bacteria: Present /HPF      CAPILLARY BLOOD GLUCOSE      POCT Blood Glucose.: 90 mg/dL (10 May 2020 21:30)      RADIOLOGY & ADDITIONAL TESTS: Reviewed.    ASSESSMENT:    PLAN: HOSPITAL COURSE: 70 y/o male with chronic pain, progressive lower extremity weakness and atrophy, DVT/PE, HTN and mitral regurgitation sent in from Dr. Brito's office s/p fall for fever and tachypnea found to be COVID positive, saturating well on RA on admission. Patient reports presentation 2/2 to fall with hematuria prior to admission, found to have SETH.  Patient also with uptrending CK, troponin minimally elevated, trended to peak. EKG sinus tachycardia, cardiology consulted with concern for cardiomyopathy, ruled out in setting of minimally elevated troponins in comparison to rise in CK. Concern for rhabdomyolysis given fall, reported hematuria and SETH in setting of uptrending CK. Patient started on LR 200ml/hr. EMG ruled out myositis. EEG placed for increasing lethargy with generalized slowing. Patient remained febrile, received tociluzumab (). Patient with increasing oxygen requirements 5/10, requiring 2-6L in setting of fluid overload. Received lasix 20mg. Patient found to be saturating 79% on RA -> 81& 6L NC -> 84 - 85% 15L NRB, with increased work of breathing. Received additional Lasix 20mg IV and morphine 1mg for pain. Stepped up to ICU for increased level of care.     OVERNIGHT EVENTS: Downtrending CK. Afebrile. Saturating 90-94% on 2L NC.     SUBJECTIVE / INTERVAL HPI: Patient seen and examined at bedside this AM. Less arousalable and conversant compared to yesterday. Continues to endorse pain. Denies SOB, chills, diarrhea, dizziness or weakness. ROS otherwise negative.     VITAL SIGNS:  Vital Signs Last 24 Hrs  T(C): 38.9 (11 May 2020 06:33), Max: 38.9 (11 May 2020 06:33)  T(F): 102 (11 May 2020 06:33), Max: 102 (11 May 2020 06:33)  HR: 89 (11 May 2020 06:33) (76 - 89)  BP: 154/77 (11 May 2020 06:33) (123/75 - 154/77)  BP(mean): --  RR: 20 (11 May 2020 06:33) (18 - 20)  SpO2: 90% (11 May 2020 06:33) (90% - 94%)    PHYSICAL EXAM:    General: elderly male sitting up in bed; less conversant   HEENT: NC/AT; PERRL, anicteric sclera; MMM; poor oral hygiene   Neck: supple; no JVD  Cardiovascular: +S1/S2; RRR  Respiratory: CTA B/L; no wheezin  Gastrointestinal: overly nourished; soft, NT/ND; +BSx4  Extremities: WWP; no peripheral edema; atrophy of right thigh muscle  Vascular: 2+ radial, DP/PT pulses B/L  Neurological: alert and oriented, however more lethargic       MEDICATIONS:  MEDICATIONS  (STANDING):  apixaban 5 milliGRAM(s) Oral every 12 hours  aspirin  chewable 81 milliGRAM(s) Oral every 24 hours  baclofen 5 milliGRAM(s) Oral every 12 hours  dorzolamide 2%/timolol 0.5% Ophthalmic Solution 1 Drop(s) Both EYES every 12 hours  famotidine    Tablet 20 milliGRAM(s) Oral every 24 hours  folic acid 1 milliGRAM(s) Oral daily  gabapentin 400 milliGRAM(s) Oral three times a day  lactated ringers. 1000 milliLiter(s) (180 mL/Hr) IV Continuous <Continuous>  latanoprost 0.005% Ophthalmic Solution 1 Drop(s) Both EYES at bedtime  lidocaine   Patch 2 Patch Transdermal every 24 hours    MEDICATIONS  (PRN):  acetaminophen   Tablet .. 650 milliGRAM(s) Oral every 6 hours PRN Temp greater or equal to 38.5C (101.3F)  oxyCODONE    IR 5 milliGRAM(s) Oral every 4 hours PRN Moderate Pain (4 - 6)      ALLERGIES:  Allergies    No Known Allergies    Intolerances        LABS:                        11.3   8.10  )-----------( 74       ( 11 May 2020 06:44 )             35.2     05-11    144  |  111<H>  |  17  ----------------------------<  88  4.2   |  22  |  1.17    Ca    8.5      11 May 2020 06:44  Phos  2.6     05-11  Mg     1.7         TPro  6.4  /  Alb  3.1<L>  /  TBili  2.5<H>  /  DBili  1.0<H>  /  AST  154<H>  /  ALT  48<H>  /  AlkPhos  39<L>  05-11    PT/INR - ( 10 May 2020 10:26 )   PT: 24.4 sec;   INR: 2.09          PTT - ( 10 May 2020 10:26 )  PTT:29.8 sec  Urinalysis Basic - ( 09 May 2020 18:06 )    Color: Yellow / Appearance: Clear / S.020 / pH: x  Gluc: x / Ketone: NEGATIVE  / Bili: Negative / Urobili: >=8.0 E.U./dL   Blood: x / Protein: 100 mg/dL / Nitrite: NEGATIVE   Leuk Esterase: NEGATIVE / RBC: < 5 /HPF / WBC < 5 /HPF   Sq Epi: x / Non Sq Epi: 0-5 /HPF / Bacteria: Present /HPF      CAPILLARY BLOOD GLUCOSE      POCT Blood Glucose.: 90 mg/dL (10 May 2020 21:30)      RADIOLOGY & ADDITIONAL TESTS: Reviewed.    ASSESSMENT:    PLAN: TRANSFER NOTE FROM COVID-RMF to 3W (COVID-TELE) (in setting of inc O2 requirements)     HOSPITAL COURSE: 70 y/o male with chronic pain, progressive lower extremity weakness and atrophy, DVT/PE, HTN and mitral regurgitation sent in from Dr. Brito's office s/p fall for fever and tachypnea found to be COVID positive, saturating well on RA on admission. Patient reports presentation 2/2 to fall with hematuria prior to admission, found to have SETH.  Patient also with uptrending CK, troponin minimally elevated, trended to peak. EKG sinus tachycardia, cardiology consulted with concern for cardiomyopathy, ruled out in setting of minimally elevated troponins in comparison to rise in CK. Concern for rhabdomyolysis given fall, reported hematuria and SETH in setting of uptrending CK. Patient started on LR 200ml/hr. EMG ruled out myositis. EEG placed for increasing lethargy with generalized slowing. Patient remained febrile, received tociluzumab (). Patient with increasing oxygen requirements 5/10, requiring 2-6L in setting of fluid overload. Received lasix 20mg. Patient found to be saturating 79% on RA -> 81& 6L NC -> 84 - 85% 15L NRB, with increased work of breathing. Received additional Lasix 20mg IV and morphine 1mg for pain. Stepped up to ICU for increased level of care.     OVERNIGHT EVENTS: Downtrending CK. Afebrile. Saturating 90-94% on 2L NC.     SUBJECTIVE / INTERVAL HPI: Patient seen and examined at bedside this AM. Less arousalable and conversant compared to yesterday. Continues to endorse pain. Denies SOB, chills, diarrhea, dizziness or weakness. ROS otherwise negative.     VITAL SIGNS:  Vital Signs Last 24 Hrs  T(C): 38.9 (11 May 2020 06:33), Max: 38.9 (11 May 2020 06:33)  T(F): 102 (11 May 2020 06:33), Max: 102 (11 May 2020 06:33)  HR: 89 (11 May 2020 06:33) (76 - 89)  BP: 154/77 (11 May 2020 06:33) (123/75 - 154/77)  BP(mean): --  RR: 20 (11 May 2020 06:33) (18 - 20)  SpO2: 90% (11 May 2020 06:33) (90% - 94%)    PHYSICAL EXAM:    General: elderly male sitting up in bed; less conversant   HEENT: NC/AT; PERRL, anicteric sclera; MMM; poor oral hygiene   Neck: supple; no JVD  Cardiovascular: +S1/S2; RRR  Respiratory: CTA B/L; no wheezin  Gastrointestinal: overly nourished; soft, NT/ND; +BSx4  Extremities: WWP; no peripheral edema; atrophy of right thigh muscle  Vascular: 2+ radial, DP/PT pulses B/L  Neurological: alert and oriented, however more lethargic       MEDICATIONS:  MEDICATIONS  (STANDING):  apixaban 5 milliGRAM(s) Oral every 12 hours  aspirin  chewable 81 milliGRAM(s) Oral every 24 hours  baclofen 5 milliGRAM(s) Oral every 12 hours  dorzolamide 2%/timolol 0.5% Ophthalmic Solution 1 Drop(s) Both EYES every 12 hours  famotidine    Tablet 20 milliGRAM(s) Oral every 24 hours  folic acid 1 milliGRAM(s) Oral daily  gabapentin 400 milliGRAM(s) Oral three times a day  lactated ringers. 1000 milliLiter(s) (180 mL/Hr) IV Continuous <Continuous>  latanoprost 0.005% Ophthalmic Solution 1 Drop(s) Both EYES at bedtime  lidocaine   Patch 2 Patch Transdermal every 24 hours    MEDICATIONS  (PRN):  acetaminophen   Tablet .. 650 milliGRAM(s) Oral every 6 hours PRN Temp greater or equal to 38.5C (101.3F)  oxyCODONE    IR 5 milliGRAM(s) Oral every 4 hours PRN Moderate Pain (4 - 6)      ALLERGIES:  Allergies    No Known Allergies    Intolerances        LABS:                        11.3   8.10  )-----------( 74       ( 11 May 2020 06:44 )             35.2     05-11    144  |  111<H>  |  17  ----------------------------<  88  4.2   |  22  |  1.17    Ca    8.5      11 May 2020 06:44  Phos  2.6     05-11  Mg     1.7     05-11    TPro  6.4  /  Alb  3.1<L>  /  TBili  2.5<H>  /  DBili  1.0<H>  /  AST  154<H>  /  ALT  48<H>  /  AlkPhos  39<L>  05-11    PT/INR - ( 10 May 2020 10:26 )   PT: 24.4 sec;   INR: 2.09          PTT - ( 10 May 2020 10:26 )  PTT:29.8 sec  Urinalysis Basic - ( 09 May 2020 18:06 )    Color: Yellow / Appearance: Clear / S.020 / pH: x  Gluc: x / Ketone: NEGATIVE  / Bili: Negative / Urobili: >=8.0 E.U./dL   Blood: x / Protein: 100 mg/dL / Nitrite: NEGATIVE   Leuk Esterase: NEGATIVE / RBC: < 5 /HPF / WBC < 5 /HPF   Sq Epi: x / Non Sq Epi: 0-5 /HPF / Bacteria: Present /HPF      CAPILLARY BLOOD GLUCOSE      POCT Blood Glucose.: 90 mg/dL (10 May 2020 21:30)      RADIOLOGY & ADDITIONAL TESTS: Reviewed.    ASSESSMENT:    PLAN:

## 2020-05-11 NOTE — PROGRESS NOTE ADULT - ASSESSMENT
per Internal Medicine    70 y/o M with PMH of progressive lower extremity weakness, s/p lumbar decompression in 1/2019. 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         Problem/Plan - 1:  ·  Problem: R/O Rhabdomyolysis.  Plan: Patient with rising CK in setting of fall and SETH, and reported hematuria prior to arrival. Also, with hypophosphatemia, concern for rhabdomyolysis.   -  cc/hr   - trend CK q8hr   - monitor Cr.     Problem/Plan - 2:  ·  Problem: Severe sepsis.  Plan: Sepsis of unknown etiology. Patient with severe sepsis criteria on admission (fever, tachycardia and elevated lactate), with complaints of URI and diarrhea. Found to COVID-19 positive. No evidence of leukocytosis, procalcitonin 0.28.  WBC was normal. procal is 0.28. Sepsis is likely due to COVID as well as dehydration. Right knee with erythema, no evidence of effusion. Gallstones present, Perdue sign negative. Still with fever (102) and leukopenia. Differential diagnosis autoinflammatory state 2/2 COVID-19. Patient desaturating to 87% on RA, requiring 6L oxygen, weaned to 2L.   - Blood cultures NGTD  - UA negative   - Fever management with tylenol   - Knee xray w/o evidence of septic joint   - Tylenol for fever  - COVID management as below  - s/p tocilizumab (5/9)    - CT chest obtained to evaluate for ?consolidation demonstrating basilar predominant groundglass opacities bilaterally; consistent with COVID-19.     Problem/Plan - 3:  ·  Problem: SETH (acute kidney injury).  Plan: RESOLVED   Pt with baseline Cr 1.1-1.2 presented with Cr 1.88 and BUN 52 likely due to prerenal azotemia likely due to dehydration following having diarrhea for few days and not eating and drinking well. responded to fluid therapy and decreased to 1.68. Urine lytes also were compatible with pre-renal causes, s/p 1.5 L of IVF in ED  - improving this AM 1.06 (baseline)  - avoid nephrotoxic meds at this time   - monitor kidney function    #Hematuria: RESOLVED   Patient with compliant of blood in urine and UA demonstrating large blood,  likely due to rhabdomyolysis   - will monitor the urine for gross hematuria   - c/w home Eliquis 5mg BID.     Problem/Plan - 4:  ·  Problem: Pneumonia due to COVID-19 virus.  Plan: Pt with sore throat and runny nose and mild diarrhea for the past few days but no SOB or cough, was found to be positive for COVID. Had fever and tachycardia in ED but no O2 desaturation. currently sating 94% on 2L. CXR clear.   COVID positive, Ferritin 2537, CRP 3.33  - Monitor O2 saturation and symptoms  - Trending inflammatory markers daily  - tylenol for fever  - symptomtic management   - contact precaustions: isolation and droplet  - avoid NSAID/ACE-I/ARB     #Hyperbilirubinemia   Pt with bili 2.7 and elevated AST and ALT but normal Alk-p.RUQ ultrasound demonstrating gallbladder with multiple gallstones. Benign abdominal exam. Negative perdue sign.  Likely 2/2 COVID infection or due to gallbladder stones. Improved with fluid therapy. Patient Hep C positive.   - monitor LFT  - Hepatitis panel negative.     Problem/Plan - 5:  ·  Problem: Neuropathy.  Plan: Has a chronic neuropathy likely secondary to disc herniation. on gabapentin 400 tid at home. Currently in worsening back pain and tingling and numbness. Follows with Dr. Brito outpatient.   - LE dopplers negative for DVT  - CPK elevated -> concern for myositis as underlying etiology   - RF 22 -> mildly elevated  - f/u AHMET, anti-dsDNA, anti-Isaura   - trend CPK   - EMG unchanged from prior   - c/w home gabapentin 400 tid    #Fall  Patient with reported fall and multiple previous falls. Denies LOC or trauma to head.   Ambulates with walker. Unwilling to cooperate with neurological exam 2/2 to reported pain.   - PT evaluation -> recommendation of KEVIN   - neurology following    #Lethargy   Patient with increased lethargy, less arousable compared to baseline. AAOx3 this AM.   - EEG w/ generalized slowing.     Problem/Plan - 6:  Problem: Acute midline low back pain, unspecified whether sciatica present. Plan: Pt with chronic radiculopathy and low back pain due to disc herniation s/p lumbar decompression in 1/2019. s/p sural nerve biopsy on 7/25 due to muscle weakness, presented with worsening low back pain after fall few days before presentation as well as worsening weakness in the LEs mainly in the right side and few weeks of urinary incontinence. exam shows weakness of the LEs.   MRI spine showed Multilevel disc herniations with multilevel neural foraminal narrowing but no signs of acute cord compression.   Likely worsening of the chronic radiculopathy due to worsening of spinal stenosis due to disc herniations.   - Continue his home dose of oxycodone 5 q4 PRN  - Lidocaine patch 2 patches every day  - holding home Nucenta ER 100mg   - c/w home Gabapentin 400 tid  - No neurosurgical intervention at this time   - evaluated by Dr. Brito (outpatient neurologist) -> EMG without evidence of myopathy or myositis, unchanged from prior (2019)   - neurology following apperciate recs   - PT/OT evaluation -> recommend KEVIN     #Right Knee Arthritis   Pt complains of pain in the right knee with unknown duration. Has mild swelling as well as tenderness and redness in the right knee.   WBC is normal, has CRP 3.33 that can be due to COVID infection, porcal is mildly elevated 0.28. uric acid is 10.1.   Can be gout or pseudogout. Septic joint on DDx as well  - Knee xray without evidence of effusion   - PT eval -> KEVIN.    Problem/Plan - 7:  ·  Problem: DVT (deep venous thrombosis).  Plan: Pt with hx of DVT and PE, on Eliquis 5 daily at home. DVT reported in 2018, unclear indication for AC at this time.   - c/w home Eliquis 5mg BID qd     #Thrombocytopenia  Patient with baseline plt 110s, 84 on arrival and decreasing to 61 this AM. Possibly 2/2 viral infection and sepsis. No evidence of bleeding.  - hold home Aspirin 81mg  - f/u blue top platelets  - Hep C +  - consent for HIV   - monitor for signs of bleeding  - hematology following appreciate recs.     Problem/Plan - 8:  ·  Problem: HTN (hypertension).  Plan: Pt with hx of HTN on lisinopril 10 at home  Currently with symptoms of sepsis, dehydration, SETH   - hold home lisinopril  - monitor BPs  - Will resume when more stable and has high BP    #Glaucoma  Patient with history of glaucoma. c/w home latanoprost 0.005%.     Problem/Plan - 9:  ·  Problem: Prophylactic measure.  Plan: Fluids: LR  Electrolytes-replete as needed  Nutrition - DASH/TLC  Code- Full Code  DVT ppx: Eliquis   GI ppx: none needed  DIspo: RMF    DISCHARGE PLANNING:  Medical readiness goals: improvement in CK; KEVIN placement   Anticipated d/c: 24-48 hours

## 2020-05-11 NOTE — PROGRESS NOTE ADULT - PROBLEM SELECTOR PLAN 1
Patient with rising CK in setting of fall and SETH, and reported hematuria prior to arrival. Also, with hypophosphatemia, concern for rhabdomyolysis. CK downtrending slowly with improvement in Cr.   -  cc/hr   - trend CK q8hr   - monitor Cr

## 2020-05-11 NOTE — PROGRESS NOTE ADULT - ATTENDING COMMENTS
continuing IV fluids but decrease rate today given worsening congestion  continue fluids for rhabdo (ck improving)  monitor respiratory status closely -- giving 1 dose IV lasix 20 today  can give another dose overnight if o2 requirements worsen  currently on 2-3L nc

## 2020-05-11 NOTE — PROGRESS NOTE ADULT - SUBJECTIVE AND OBJECTIVE BOX
Neurology Progress Note    INTERVAL HPI/OVERNIGHT EVENTS:  Patient seen and examined at bedside. CK still uptrendin4559. Pain still remains- been going on for several weeks but says some improvement.      MEDICATIONS  (STANDING):  apixaban 5 milliGRAM(s) Oral every 12 hours  aspirin  chewable 81 milliGRAM(s) Oral every 24 hours  baclofen 5 milliGRAM(s) Oral every 12 hours  dorzolamide 2%/timolol 0.5% Ophthalmic Solution 1 Drop(s) Both EYES every 12 hours  famotidine    Tablet 20 milliGRAM(s) Oral every 24 hours  folic acid 1 milliGRAM(s) Oral daily  furosemide   Injectable 20 milliGRAM(s) IV Push once  gabapentin 400 milliGRAM(s) Oral three times a day  latanoprost 0.005% Ophthalmic Solution 1 Drop(s) Both EYES at bedtime  lidocaine   Patch 2 Patch Transdermal every 24 hours    MEDICATIONS  (PRN):  acetaminophen   Tablet .. 650 milliGRAM(s) Oral every 6 hours PRN Temp greater or equal to 38.5C (101.3F)  oxyCODONE    IR 5 milliGRAM(s) Oral every 4 hours PRN Moderate Pain (4 - 6)      Allergies    No Known Allergies    Intolerances    ROS:   CONSTITUTIONAL:  No weight loss, fever, chills, weakness or fatigue.  HEENT:  Eyes:  No visual loss, blurred vision, double vision or yellow sclerae. Ears, Nose, Throat:  No hearing loss, sneezing, congestion, runny nose or sore throat.  SKIN:  No rash or itching.  CARDIOVASCULAR:  No chest pain, chest pressure or chest discomfort. No palpitations or edema.  RESPIRATORY:  No shortness of breath, cough or sputum.  GASTROINTESTINAL:  No anorexia, nausea, vomiting or diarrhea. No abdominal pain or blood.  GENITOURINARY: No Burning on urination.   NEUROLOGICAL:  see HPI  MUSCULOSKELETAL:  No muscle, back pain, joint pain or stiffness.  HEMATOLOGIC:  No anemia, bleeding or bruising.  LYMPHATICS:  No enlarged nodes. No history of splenectomy.  PSYCHIATRIC:  No history of depression or anxiety.  ENDOCRINOLOGIC:  No reports of sweating, cold or heat intolerance. No polyuria or polydipsia.  ALLERGIES:  No history of asthma, hives, eczema or rhinitis.    Vital Signs Last 24 Hrs  T(C): 37 (11 May 2020 14:45), Max: 38.9 (11 May 2020 06:33)  T(F): 98.6 (11 May 2020 14:45), Max: 102 (11 May 2020 06:33)  HR: 90 (11 May 2020 14:45) (76 - 90)  BP: 148/90 (11 May 2020 14:45) (123/75 - 154/77)  BP(mean): --  RR: 20 (11 May 2020 14:45) (19 - 20)  SpO2: 90% (11 May 2020 14:45) (90% - 94%)        Neurologic:  Mental status: awake, alert, oriented to person, place, and time. Speech is fluent. Follows commands. Attention/concentration intact. No dysarthria, no aphasia.  Cranial nerves:   II: visual fields are full to confrontation. pupils equally round and reactive to light,   III, IV, VI: EOMI without nystagmus  V:  V1-V3 sensation intact,   VII: no facial droop, facie is symmetric with normal eye closure and smile  VIII: hearing is intact to finger rub  IX, X: Uvula is midline and soft palate rises symmetrically  XI: head turning and shoulder shrug are intact.  XII: tongue midline  Motor: Normal bulk and tone.  Sensation: intact to light touch.           LABS:                        11.3   8.10  )-----------( 74       ( 11 May 2020 06:44 )             35.2     05-11    142  |  108  |  18  ----------------------------<  91  4.2   |  23  |  1.06    Ca    8.7      11 May 2020 12:46  Phos  3.2     05-11  Mg     1.6     0511    TPro  6.4  /  Alb  3.1<L>  /  TBili  2.5<H>  /  DBili  1.0<H>  /  AST  154<H>  /  ALT  48<H>  /  AlkPhos  39<L>  0511    PT/INR - ( 10 May 2020 10:26 )   PT: 24.4 sec;   INR: 2.09          PTT - ( 10 May 2020 10:26 )  PTT:29.8 sec  Urinalysis Basic - ( 09 May 2020 18:06 )    Color: Yellow / Appearance: Clear / S.020 / pH: x  Gluc: x / Ketone: NEGATIVE  / Bili: Negative / Urobili: >=8.0 E.U./dL   Blood: x / Protein: 100 mg/dL / Nitrite: NEGATIVE   Leuk Esterase: NEGATIVE / RBC: < 5 /HPF / WBC < 5 /HPF   Sq Epi: x / Non Sq Epi: 0-5 /HPF / Bacteria: Present /HPF        RADIOLOGY & ADDITIONAL TESTS:  Continuous EEG Report  5/10/2020 @ 7 AM to 2020 @ 7 AM  Daily Summary:    1)	Mild generalized background slowing suggestive of a similar degree of diffuse or multifocal  dysfunction.  2)	No epileptiform activity and no significant clinical events occurred.

## 2020-05-11 NOTE — PROGRESS NOTE ADULT - PROBLEM SELECTOR PLAN 7
Pt with hx of DVT and PE, on Eliquis 5 daily at home. DVT reported in 2018, unclear indication for AC at this time.   - c/w home Eliquis 5mg BID qd     #Thrombocytopenia  Patient with baseline plt 110s, 84 on arrival and decreasing to 61 this AM. Possibly 2/2 viral infection and sepsis. No evidence of bleeding.  - hold home Aspirin 81mg  - Hematology consulted -> likely 2/2 COVID-19   - Hep C +  - monitor for signs of bleeding

## 2020-05-11 NOTE — PROGRESS NOTE ADULT - PROBLEM SELECTOR PLAN 8
Pt with hx of HTN on lisinopril 10 at home  Currently with symptoms of sepsis, dehydration, SETH   - hold home lisinopril  - monitor BPs  - Will resume when more stable and has high BP    #Hyperbilirubinemia   Pt with bili 2.7 and elevated AST and ALT but normal Alk-p.RUQ ultrasound demonstrating gallbladder with multiple gallstones. Benign abdominal exam. Negative cage sign.  Likely 2/2 COVID infection or due to gallbladder stones. Improved with fluid therapy. Patient Hep C positive.   - monitor LFT  - Hepatitis panel negative    #Glaucoma  Patient with history of glaucoma. c/w home latanoprost 0.005%

## 2020-05-11 NOTE — PROGRESS NOTE ADULT - PROBLEM SELECTOR PROBLEM 9
Neuropathy
Prophylactic measure
Goals of care, counseling/discussion
Prophylactic measure
Prophylactic measure

## 2020-05-11 NOTE — PROGRESS NOTE ADULT - PROBLEM SELECTOR PLAN 4
Pt with sore throat and runny nose and mild diarrhea for the past few days but no SOB or cough, was found to be positive for COVID. Had fever and tachycardia in ED but no O2 desaturation. currently sating 94% on 2L. CXR clear.   COVID positive, Ferritin 2537, CRP 3.33  - Monitor O2 saturation and symptoms  - Trending inflammatory markers daily  - tylenol for fever  - symptomtic management   - contact precaustions: isolation and droplet  - avoid NSAID/ACE-I/ARB    #Acute Hypoxic Resp Failure 2/2 COVID-19  Patient with increased O2 requirements (79% on RA) s/p lasix 20mg. CXR without evidence of congestion. Currently on 15L NRB  - s/p tociluzumab (5/9)  - initiated solumedrol 40mg BID -> taper as per pulm  - wean O2 as tolerated  - patient is not a candidate for awake proning, unable to tolerate 2/2 to chronic pain and previous spinal surgeries.

## 2020-05-11 NOTE — PROGRESS NOTE ADULT - ASSESSMENT
70y Male w/ hx of lumbar spondylosis and right L4 radiculopathy with right leg weakness, s/p lumbar decompression at [facility] 1/2019 with improvement, and admitted for severe leg and back pain on 5/6. MRI T and L spine negative. COVID + EMG done 5/8 with mild progression of moderate sensorimotor polyneuropathy, and right lumbar plexopathy in comparison to a year ago. No evidence of myositis / myopathy. EEG obtained on 5/9 showed no seizures. CK elevation likely related to covid infection given lack of myopathy on EMG; and no response to fluid hydration. TOCI administered on 5/9.     Recommendations:  - D/C vEEG monitoring  - Continue baclofen 5mg BID for leg spasms, may increase as needed.  - Continue to trend CK  - Continue to trend CRP, D-Dimer, Ferritin  - Please obtain Thiamine, B1 level 70y Male w/ hx of lumbar spondylosis and right L4 radiculopathy with right leg weakness, s/p lumbar decompression at [facility] 1/2019 with improvement, and admitted for severe leg and back pain on 5/6. MRI T and L spine negative. COVID + EMG done 5/8 with mild progression of moderate sensorimotor polyneuropathy, and right lumbar plexopathy in comparison to a year ago. No evidence of myositis / myopathy. EEG obtained for fluctuating MS on 5/9 showed no seizures. CK elevation likely related to covid infection given lack of myopathy on EMG; and no response to fluid hydration. TOCI administered on 5/9.     Recommendations:  - D/C vEEG monitoring  - Continue baclofen 5mg BID for leg spasms, may increase as needed.  - Continue to trend CK  - Continue to trend CRP, D-Dimer, Ferritin  - Please obtain Thiamine, B1 level

## 2020-05-11 NOTE — PROGRESS NOTE ADULT - ASSESSMENT
70 y/o M with PMH of progressive lower extremity weakness, s/p lumbar decompression in 1/2019. 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

## 2020-05-11 NOTE — CONSULT NOTE ADULT - ASSESSMENT
Pt is a 71 y/o female w hx of polyneuropathy (follows w/ Dr. Brito, s/p lumbar decompression Jan '19, sural biopsy), cardiomegaly, DVT/PE 2018 on Eliquis, HTN, glaucoma who initially presented to Dr. Brito's office with complaint of worsening lower back pain and lower extremity weakness after a fall 1 week ago. ICU consulted for increasing O2 demands.    NEURO    PULMONARY    CV    ID    RENAL    HEME/ONC    ENDO    FEN    CODE:     Above plan discussed with... Pt is a 71 y/o female w hx of polyneuropathy (follows w/ Dr. Brito, s/p lumbar decompression Jan '19, sural biopsy), cardiomegaly, DVT/PE 2018 on Eliquis, HTN, glaucoma who initially presented to Dr. Brito's office with complaint of worsening lower back pain and lower extremity weakness after a fall 1 week ago. ICU consulted for increasing O2 demands.    #Acute hypoxic respiratory failure 2/2 COVID-19 viral pneumonia  9 days since the onset of symptoms, now with worsening respiratory failure, from saturating well on room air to requiring 15L NRB.   - S/p tocilizumab 5/9  - Start course of methylprednisolone 40mg iv q12h x5 days  - No need for abx coverage at this time  - s/p 40mg IV lasix, appears euvolemic, repeat CXR reviewed. Follow strict I+O's.   - Continue supplemental O2 as needed. Unable to tolerate proning per primary team.   - Start noninvasive positive pressure ventilation and obtain ABG after 30 minutes  - Recommend transfer to negative pressure room in MICU or [facility] for closer monitoring.     Above plan discussed with pulmonology fellow.

## 2020-05-12 LAB
ALBUMIN SERPL ELPH-MCNC: 3.1 G/DL — LOW (ref 3.3–5)
ALDOLASE SERPL-CCNC: 15.8 U/L — HIGH (ref 3.3–10.3)
ALP SERPL-CCNC: 51 U/L — SIGNIFICANT CHANGE UP (ref 40–120)
ALT FLD-CCNC: 58 U/L — HIGH (ref 10–45)
ANION GAP SERPL CALC-SCNC: 17 MMOL/L — SIGNIFICANT CHANGE UP (ref 5–17)
ANISOCYTOSIS BLD QL: SLIGHT — SIGNIFICANT CHANGE UP
AST SERPL-CCNC: 152 U/L — HIGH (ref 10–40)
BASOPHILS # BLD AUTO: 0.01 K/UL — SIGNIFICANT CHANGE UP (ref 0–0.2)
BASOPHILS NFR BLD AUTO: 0.1 % — SIGNIFICANT CHANGE UP (ref 0–2)
BILIRUB SERPL-MCNC: 2.4 MG/DL — HIGH (ref 0.2–1.2)
BUN SERPL-MCNC: 22 MG/DL — SIGNIFICANT CHANGE UP (ref 7–23)
BURR CELLS BLD QL SMEAR: PRESENT — SIGNIFICANT CHANGE UP
CALCIUM SERPL-MCNC: 8.5 MG/DL — SIGNIFICANT CHANGE UP (ref 8.4–10.5)
CHLORIDE SERPL-SCNC: 106 MMOL/L — SIGNIFICANT CHANGE UP (ref 96–108)
CK SERPL-CCNC: 1905 U/L — HIGH (ref 30–200)
CK SERPL-CCNC: 2351 U/L — HIGH (ref 30–200)
CK SERPL-CCNC: 3482 U/L — HIGH (ref 30–200)
CO2 SERPL-SCNC: 21 MMOL/L — LOW (ref 22–31)
CREAT SERPL-MCNC: 1.23 MG/DL — SIGNIFICANT CHANGE UP (ref 0.5–1.3)
CRP SERPL-MCNC: 2.45 MG/DL — HIGH (ref 0–0.4)
D DIMER BLD IA.RAPID-MCNC: 462 NG/ML DDU — HIGH
DSDNA AB SER-ACNC: <12 IU/ML — SIGNIFICANT CHANGE UP
ENA JO1 AB SER-ACNC: <0.2 AI — SIGNIFICANT CHANGE UP
EOSINOPHIL # BLD AUTO: 0 K/UL — SIGNIFICANT CHANGE UP (ref 0–0.5)
EOSINOPHIL NFR BLD AUTO: 0 % — SIGNIFICANT CHANGE UP (ref 0–6)
FERRITIN SERPL-MCNC: HIGH NG/ML (ref 30–400)
GLUCOSE BLDC GLUCOMTR-MCNC: 129 MG/DL — HIGH (ref 70–99)
GLUCOSE BLDC GLUCOMTR-MCNC: 135 MG/DL — HIGH (ref 70–99)
GLUCOSE BLDC GLUCOMTR-MCNC: 196 MG/DL — HIGH (ref 70–99)
GLUCOSE SERPL-MCNC: 158 MG/DL — HIGH (ref 70–99)
HCT VFR BLD CALC: 35.8 % — LOW (ref 39–50)
HGB BLD-MCNC: 11.6 G/DL — LOW (ref 13–17)
IMM GRANULOCYTES NFR BLD AUTO: 0.4 % — SIGNIFICANT CHANGE UP (ref 0–1.5)
LYMPHOCYTES # BLD AUTO: 0.61 K/UL — LOW (ref 1–3.3)
LYMPHOCYTES # BLD AUTO: 8.1 % — LOW (ref 13–44)
MAGNESIUM SERPL-MCNC: 2.3 MG/DL — SIGNIFICANT CHANGE UP (ref 1.6–2.6)
MANUAL SMEAR VERIFICATION: SIGNIFICANT CHANGE UP
MCHC RBC-ENTMCNC: 30 PG — SIGNIFICANT CHANGE UP (ref 27–34)
MCHC RBC-ENTMCNC: 32.4 GM/DL — SIGNIFICANT CHANGE UP (ref 32–36)
MCV RBC AUTO: 92.5 FL — SIGNIFICANT CHANGE UP (ref 80–100)
MONOCYTES # BLD AUTO: 0.11 K/UL — SIGNIFICANT CHANGE UP (ref 0–0.9)
MONOCYTES NFR BLD AUTO: 1.5 % — LOW (ref 2–14)
NEUTROPHILS # BLD AUTO: 6.8 K/UL — SIGNIFICANT CHANGE UP (ref 1.8–7.4)
NEUTROPHILS NFR BLD AUTO: 89.9 % — HIGH (ref 43–77)
NRBC # BLD: 0 /100 WBCS — SIGNIFICANT CHANGE UP (ref 0–0)
OVALOCYTES BLD QL SMEAR: SLIGHT — SIGNIFICANT CHANGE UP
PHOSPHATE SERPL-MCNC: 4.9 MG/DL — HIGH (ref 2.5–4.5)
PLAT MORPH BLD: NORMAL — SIGNIFICANT CHANGE UP
PLATELET # BLD AUTO: 92 K/UL — LOW (ref 150–400)
POTASSIUM SERPL-MCNC: 4.7 MMOL/L — SIGNIFICANT CHANGE UP (ref 3.5–5.3)
POTASSIUM SERPL-SCNC: 4.7 MMOL/L — SIGNIFICANT CHANGE UP (ref 3.5–5.3)
PROCALCITONIN SERPL-MCNC: 0.17 NG/ML — HIGH (ref 0.02–0.1)
PROT SERPL-MCNC: 7 G/DL — SIGNIFICANT CHANGE UP (ref 6–8.3)
RBC # BLD: 3.87 M/UL — LOW (ref 4.2–5.8)
RBC # FLD: 11.8 % — SIGNIFICANT CHANGE UP (ref 10.3–14.5)
RBC BLD AUTO: ABNORMAL
SODIUM SERPL-SCNC: 144 MMOL/L — SIGNIFICANT CHANGE UP (ref 135–145)
TROPONIN T SERPL-MCNC: <0.01 NG/ML — SIGNIFICANT CHANGE UP (ref 0–0.01)
WBC # BLD: 7.56 K/UL — SIGNIFICANT CHANGE UP (ref 3.8–10.5)
WBC # FLD AUTO: 7.56 K/UL — SIGNIFICANT CHANGE UP (ref 3.8–10.5)

## 2020-05-12 PROCEDURE — 71045 X-RAY EXAM CHEST 1 VIEW: CPT | Mod: 26

## 2020-05-12 PROCEDURE — 99291 CRITICAL CARE FIRST HOUR: CPT

## 2020-05-12 RX ORDER — INSULIN LISPRO 100/ML
VIAL (ML) SUBCUTANEOUS AT BEDTIME
Refills: 0 | Status: DISCONTINUED | OUTPATIENT
Start: 2020-05-12 | End: 2020-05-19

## 2020-05-12 RX ORDER — DEXTROSE 50 % IN WATER 50 %
15 SYRINGE (ML) INTRAVENOUS ONCE
Refills: 0 | Status: DISCONTINUED | OUTPATIENT
Start: 2020-05-12 | End: 2020-05-20

## 2020-05-12 RX ORDER — INSULIN LISPRO 100/ML
VIAL (ML) SUBCUTANEOUS
Refills: 0 | Status: DISCONTINUED | OUTPATIENT
Start: 2020-05-12 | End: 2020-05-19

## 2020-05-12 RX ORDER — DEXTROSE 50 % IN WATER 50 %
25 SYRINGE (ML) INTRAVENOUS ONCE
Refills: 0 | Status: DISCONTINUED | OUTPATIENT
Start: 2020-05-12 | End: 2020-05-20

## 2020-05-12 RX ORDER — DEXTROSE 50 % IN WATER 50 %
12.5 SYRINGE (ML) INTRAVENOUS ONCE
Refills: 0 | Status: DISCONTINUED | OUTPATIENT
Start: 2020-05-12 | End: 2020-05-20

## 2020-05-12 RX ORDER — GLUCAGON INJECTION, SOLUTION 0.5 MG/.1ML
1 INJECTION, SOLUTION SUBCUTANEOUS ONCE
Refills: 0 | Status: DISCONTINUED | OUTPATIENT
Start: 2020-05-12 | End: 2020-05-20

## 2020-05-12 RX ORDER — SODIUM CHLORIDE 9 MG/ML
1000 INJECTION, SOLUTION INTRAVENOUS
Refills: 0 | Status: DISCONTINUED | OUTPATIENT
Start: 2020-05-12 | End: 2020-05-20

## 2020-05-12 RX ADMIN — Medication 5 MILLIGRAM(S): at 08:24

## 2020-05-12 RX ADMIN — SODIUM CHLORIDE 150 MILLILITER(S): 9 INJECTION INTRAMUSCULAR; INTRAVENOUS; SUBCUTANEOUS at 17:15

## 2020-05-12 RX ADMIN — Medication 81 MILLIGRAM(S): at 22:20

## 2020-05-12 RX ADMIN — Medication 166.67 MILLIGRAM(S): at 06:39

## 2020-05-12 RX ADMIN — Medication 166.67 MILLIGRAM(S): at 16:12

## 2020-05-12 RX ADMIN — LIDOCAINE 2 PATCH: 4 CREAM TOPICAL at 11:00

## 2020-05-12 RX ADMIN — PIPERACILLIN AND TAZOBACTAM 200 GRAM(S): 4; .5 INJECTION, POWDER, LYOPHILIZED, FOR SOLUTION INTRAVENOUS at 16:11

## 2020-05-12 RX ADMIN — Medication 40 MILLIGRAM(S): at 05:25

## 2020-05-12 RX ADMIN — APIXABAN 5 MILLIGRAM(S): 2.5 TABLET, FILM COATED ORAL at 11:00

## 2020-05-12 RX ADMIN — OXYCODONE HYDROCHLORIDE 5 MILLIGRAM(S): 5 TABLET ORAL at 05:25

## 2020-05-12 RX ADMIN — Medication 40 MILLIGRAM(S): at 22:23

## 2020-05-12 RX ADMIN — LIDOCAINE 2 PATCH: 4 CREAM TOPICAL at 22:22

## 2020-05-12 RX ADMIN — GABAPENTIN 400 MILLIGRAM(S): 400 CAPSULE ORAL at 12:20

## 2020-05-12 RX ADMIN — PIPERACILLIN AND TAZOBACTAM 200 GRAM(S): 4; .5 INJECTION, POWDER, LYOPHILIZED, FOR SOLUTION INTRAVENOUS at 22:24

## 2020-05-12 RX ADMIN — Medication 40 MILLIGRAM(S): at 12:21

## 2020-05-12 RX ADMIN — LIDOCAINE 2 PATCH: 4 CREAM TOPICAL at 06:40

## 2020-05-12 RX ADMIN — LATANOPROST 1 DROP(S): 0.05 SOLUTION/ DROPS OPHTHALMIC; TOPICAL at 05:44

## 2020-05-12 RX ADMIN — DORZOLAMIDE HYDROCHLORIDE TIMOLOL MALEATE 1 DROP(S): 20; 5 SOLUTION/ DROPS OPHTHALMIC at 16:12

## 2020-05-12 RX ADMIN — GABAPENTIN 400 MILLIGRAM(S): 400 CAPSULE ORAL at 05:25

## 2020-05-12 RX ADMIN — PIPERACILLIN AND TAZOBACTAM 200 GRAM(S): 4; .5 INJECTION, POWDER, LYOPHILIZED, FOR SOLUTION INTRAVENOUS at 05:25

## 2020-05-12 RX ADMIN — Medication 5 MILLIGRAM(S): at 23:06

## 2020-05-12 RX ADMIN — Medication 2: at 11:46

## 2020-05-12 RX ADMIN — APIXABAN 5 MILLIGRAM(S): 2.5 TABLET, FILM COATED ORAL at 23:06

## 2020-05-12 RX ADMIN — LATANOPROST 1 DROP(S): 0.05 SOLUTION/ DROPS OPHTHALMIC; TOPICAL at 22:23

## 2020-05-12 RX ADMIN — Medication 50 GRAM(S): at 00:16

## 2020-05-12 RX ADMIN — FAMOTIDINE 20 MILLIGRAM(S): 10 INJECTION INTRAVENOUS at 05:25

## 2020-05-12 RX ADMIN — OXYCODONE HYDROCHLORIDE 5 MILLIGRAM(S): 5 TABLET ORAL at 09:20

## 2020-05-12 RX ADMIN — Medication 1 MILLIGRAM(S): at 11:00

## 2020-05-12 RX ADMIN — SODIUM CHLORIDE 150 MILLILITER(S): 9 INJECTION INTRAMUSCULAR; INTRAVENOUS; SUBCUTANEOUS at 06:40

## 2020-05-12 RX ADMIN — PIPERACILLIN AND TAZOBACTAM 200 GRAM(S): 4; .5 INJECTION, POWDER, LYOPHILIZED, FOR SOLUTION INTRAVENOUS at 11:00

## 2020-05-12 RX ADMIN — GABAPENTIN 400 MILLIGRAM(S): 400 CAPSULE ORAL at 22:19

## 2020-05-12 RX ADMIN — DORZOLAMIDE HYDROCHLORIDE TIMOLOL MALEATE 1 DROP(S): 20; 5 SOLUTION/ DROPS OPHTHALMIC at 06:41

## 2020-05-12 NOTE — SWALLOW BEDSIDE ASSESSMENT ADULT - SLP GENERAL OBSERVATIONS
Pt received awake and alert in bed. Mild WOB noted at rest. Voice was dysphonic. Pt followed 1-step directives.

## 2020-05-12 NOTE — PROGRESS NOTE ADULT - ATTENDING COMMENTS
Acute Hypoxic Resp Failure 2/2 COVID-19 PNA  - cont steroids  - wean as tolerated  - f/u speech/swallow consult  - cont fluids for rhabdo

## 2020-05-12 NOTE — CHART NOTE - NSCHARTNOTEFT_GEN_A_CORE
Admitting Diagnosis:   Patient is a 70y old  Male who presents with a chief complaint of back pain (12 May 2020 10:52)      Consult: Yes [   ]  No [  x ]    Reason for Initial Nutrition Assessment:  ICU Length Of Stay     PAST MEDICAL & SURGICAL HISTORY:  Mitral valve insufficiency  Cardiomegaly  Glaucoma  Neuropathy  DVT (deep venous thrombosis): left leg, 2018  Pulmonary embolism  HTN (hypertension)  History of appendectomy  History of tonsillectomy  History of lumbar laminectomy: 1/2019  Degeneration of intervertebral disc of thoracic region: 2015  History of foot surgery: right foot  H/O cervical spine surgery: with hardware      Current Nutrition Order:   Mechanical soft (5/12)   Swtiched from regular after SLP eval today    PO Intake: Excellent (%) [   ]  Good (50-75%) [   ]  Fair (25-50%) [ x  ]  Poor (<25%) [   ]    GI Issues:   +1 BM 5/11  Ordered for pepcid  Not on a bowel regimen    Pain:   Tylenol PRN  Oxycodone    Skin Integrity:   Stage 2 PU b/l buttocks  Stage 2 PU intergluteal cleft        Labs:   05-12    144  |  106  |  22  ----------------------------<  158<H>  4.7   |  21<L>  |  1.23    Ca    8.5      12 May 2020 05:22  Phos  4.9     05-12  Mg     2.3     05-12    TPro  7.0  /  Alb  3.1<L>  /  TBili  2.4<H>  /  DBili  x   /  AST  152<H>  /  ALT  58<H>  /  AlkPhos  51  05-12    CAPILLARY BLOOD GLUCOSE      POCT Blood Glucose.: 196 mg/dL (12 May 2020 11:19)    Nutritionally Pertinent Lab Values:    Medications:  MEDICATIONS  (STANDING):  apixaban 5 milliGRAM(s) Oral every 12 hours  aspirin  chewable 81 milliGRAM(s) Oral every 24 hours  baclofen 5 milliGRAM(s) Oral every 12 hours  dextrose 5%. 1000 milliLiter(s) (50 mL/Hr) IV Continuous <Continuous>  dextrose 50% Injectable 12.5 Gram(s) IV Push once  dextrose 50% Injectable 25 Gram(s) IV Push once  dextrose 50% Injectable 25 Gram(s) IV Push once  dorzolamide 2%/timolol 0.5% Ophthalmic Solution 1 Drop(s) Both EYES every 12 hours  famotidine    Tablet 20 milliGRAM(s) Oral every 24 hours  folic acid 1 milliGRAM(s) Oral daily  gabapentin 400 milliGRAM(s) Oral three times a day  insulin lispro (HumaLOG) corrective regimen sliding scale   SubCutaneous three times a day before meals  insulin lispro (HumaLOG) corrective regimen sliding scale   SubCutaneous at bedtime  latanoprost 0.005% Ophthalmic Solution 1 Drop(s) Both EYES at bedtime  lidocaine   Patch 2 Patch Transdermal every 24 hours  methylPREDNISolone sodium succinate Injectable 40 milliGRAM(s) IV Push every 8 hours  piperacillin/tazobactam IVPB.. 4.5 Gram(s) IV Intermittent every 6 hours  sodium chloride 0.9%. 1000 milliLiter(s) (150 mL/Hr) IV Continuous <Continuous>  vancomycin  IVPB 1250 milliGRAM(s) IV Intermittent every 12 hours    MEDICATIONS  (PRN):  acetaminophen   Tablet .. 650 milliGRAM(s) Oral every 6 hours PRN Temp greater or equal to 38.5C (101.3F)  dextrose 40% Gel 15 Gram(s) Oral once PRN Blood Glucose LESS THAN 70 milliGRAM(s)/deciliter  glucagon  Injectable 1 milliGRAM(s) IntraMuscular once PRN Glucose LESS THAN 70 milligrams/deciliter  oxyCODONE    IR 5 milliGRAM(s) Oral every 4 hours PRN Moderate Pain (4 - 6)        Admitted Anthropometrics:  Height: 6'2", IBW +/- 10% 190lbs, 111% IBW, BMI 26.9  Weight 210lbs (5/6)    Weight Change:   No known wt changes PTA. Please obtain updated weight for trending.  Per last admission 8/2019 pt had reported UBW was 212lbs +/- 5lbs.    Nutrition Focused Physical Exam: Completed [   ]  Unable to complete [  x ]  Muscle Wasting: N/A  Subcutaneous Fat Wasting: N/A  Unable to conduct a face to face interview or nutrition-focused physical exam due to limited contact restrictions related to the pt's medical condition and isolation precautions.     Estimated energy needs:   ABW (95.3kg) used for calculations as pt between % of IBW.   Nutrient needs based on [facility] standards of care for maintenance in older adults.   Needs adjusted based on age and on hypermetabolic state due to viral infection  2382-2859kcal/day (25-30kcal/kg)  114-133g pro/day (1.2-1.4g pro/kg)  Fluids per team.    Subjective:   68yo M with PMH of progressive lower extremity weakness, s/p lumbar decompression in 1/2019. 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 On 5/10 pt had increasing O2 requirements requiring 2-6L in the setting of fluid overload. Undergoing lasix therapy. Pt was upgraded to NRB w/ increased WOB. Care escalated to ICU this morning as pt required progression to HFNC w/ NRB. Unable to conduct a face to face interview or nutrition-focused physical exam due to limited contact restrictions related to the pt's medical condition and isolation precautions. This morning attempted to discuss w/ RN patients medical status however RN was in room w/ team as pt was desatting. Per MD, there was concern for pt choking on food and aspirating. Now s/p swallow eval w/ SLP this morning 5/12 w/ no overt signs of aspirattion noted- diet was switched from regular to mechanical soft per SLP recs. No apparent GI distress; last BM 5/11, not on bowel regimen. Multiple pressure ulcers noted in flowsheet. Pt prefers to be on vegetarian, halal diet (updated on  software). Please honor pts food preferences w/in dietary restrictions. RD to follow.     Previous Nutrition Diagnosis:  Increased nutrient needs (kcal/pro) r/t increased demand for nutrients AEB COVID+/infection/inflammation.  Active [ x  ]  Resolved [   ]    If resolved, new PES:     Goal: Pt to consistently meet at least 75% estimated nutrient needs     Recommendations:  1. Mechanical soft (per SLP recs), Halal, Vegetarian  2. Recommend EnsureEnlive BID (700kcal, 40g pro) if intake falls <50%  3. Please encourage small bites when pt is comfortable off NRB.   4. Monitor lytes and replete prn.   5. Pain and bowel regimens per team. Pt is receiving various pain meds, may lead to constipation.    Education: N/A- unable to reach patient at this time. Will f/u.    Risk Level: High [ x  ] Moderate [   ] Low [   ]. Admitting Diagnosis:   Patient is a 70y old  Male who presents with a chief complaint of back pain (12 May 2020 10:52)      Consult: Yes [   ]  No [  x ]    Reason for Initial Nutrition Assessment:  ICU Length Of Stay     PAST MEDICAL & SURGICAL HISTORY:  Mitral valve insufficiency  Cardiomegaly  Glaucoma  Neuropathy  DVT (deep venous thrombosis): left leg, 2018  Pulmonary embolism  HTN (hypertension)  History of appendectomy  History of tonsillectomy  History of lumbar laminectomy: 1/2019  Degeneration of intervertebral disc of thoracic region: 2015  History of foot surgery: right foot  H/O cervical spine surgery: with hardware      Current Nutrition Order:   Mechanical soft (5/12)   Switched from regular after SLP eval today    PO Intake: Excellent (%) [   ]  Good (50-75%) [   ]  Fair (25-50%) [ x  ]  Poor (<25%) [   ]    GI Issues:   +1 BM 5/11  Ordered for pepcid  Not on a bowel regimen    Pain:   Tylenol PRN  Oxycodone    Skin Integrity:   Stage 2 PU b/l buttocks  Stage 2 PU intergluteal cleft        Labs:   05-12    144  |  106  |  22  ----------------------------<  158<H>  4.7   |  21<L>  |  1.23    Ca    8.5      12 May 2020 05:22  Phos  4.9     05-12  Mg     2.3     05-12    TPro  7.0  /  Alb  3.1<L>  /  TBili  2.4<H>  /  DBili  x   /  AST  152<H>  /  ALT  58<H>  /  AlkPhos  51  05-12    CAPILLARY BLOOD GLUCOSE      POCT Blood Glucose.: 196 mg/dL (12 May 2020 11:19)    Nutritionally Pertinent Lab Values:    Medications:  MEDICATIONS  (STANDING):  apixaban 5 milliGRAM(s) Oral every 12 hours  aspirin  chewable 81 milliGRAM(s) Oral every 24 hours  baclofen 5 milliGRAM(s) Oral every 12 hours  dextrose 5%. 1000 milliLiter(s) (50 mL/Hr) IV Continuous <Continuous>  dextrose 50% Injectable 12.5 Gram(s) IV Push once  dextrose 50% Injectable 25 Gram(s) IV Push once  dextrose 50% Injectable 25 Gram(s) IV Push once  dorzolamide 2%/timolol 0.5% Ophthalmic Solution 1 Drop(s) Both EYES every 12 hours  famotidine    Tablet 20 milliGRAM(s) Oral every 24 hours  folic acid 1 milliGRAM(s) Oral daily  gabapentin 400 milliGRAM(s) Oral three times a day  insulin lispro (HumaLOG) corrective regimen sliding scale   SubCutaneous three times a day before meals  insulin lispro (HumaLOG) corrective regimen sliding scale   SubCutaneous at bedtime  latanoprost 0.005% Ophthalmic Solution 1 Drop(s) Both EYES at bedtime  lidocaine   Patch 2 Patch Transdermal every 24 hours  methylPREDNISolone sodium succinate Injectable 40 milliGRAM(s) IV Push every 8 hours  piperacillin/tazobactam IVPB.. 4.5 Gram(s) IV Intermittent every 6 hours  sodium chloride 0.9%. 1000 milliLiter(s) (150 mL/Hr) IV Continuous <Continuous>  vancomycin  IVPB 1250 milliGRAM(s) IV Intermittent every 12 hours    MEDICATIONS  (PRN):  acetaminophen   Tablet .. 650 milliGRAM(s) Oral every 6 hours PRN Temp greater or equal to 38.5C (101.3F)  dextrose 40% Gel 15 Gram(s) Oral once PRN Blood Glucose LESS THAN 70 milliGRAM(s)/deciliter  glucagon  Injectable 1 milliGRAM(s) IntraMuscular once PRN Glucose LESS THAN 70 milligrams/deciliter  oxyCODONE    IR 5 milliGRAM(s) Oral every 4 hours PRN Moderate Pain (4 - 6)        Admitted Anthropometrics:  Height: 6'2", IBW +/- 10% 190lbs, 111% IBW, BMI 26.9  Weight 210lbs (5/6)    Weight Change:   No known wt changes PTA. Please obtain updated weight for trending.  Per last admission 8/2019 pt had reported UBW was 212lbs +/- 5lbs.    Nutrition Focused Physical Exam: Completed [   ]  Unable to complete [  x ]  Muscle Wasting: N/A  Subcutaneous Fat Wasting: N/A  Unable to conduct a face to face interview or nutrition-focused physical exam due to limited contact restrictions related to the pt's medical condition and isolation precautions.     Estimated energy needs:   ABW (95.3kg) used for calculations as pt between % of IBW.   Nutrient needs based on [facility] standards of care for maintenance in older adults.   Needs adjusted based on age and on hypermetabolic state due to viral infection  2382-2859kcal/day (25-30kcal/kg)  114-133g pro/day (1.2-1.4g pro/kg)  Fluids per team.    Subjective:   68yo M with PMH of progressive lower extremity weakness, s/p lumbar decompression in 1/2019. 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 On 5/10 pt had increasing O2 requirements requiring 2-6L in the setting of fluid overload. Undergoing lasix therapy. Pt was upgraded to NRB w/ increased WOB. Care escalated to ICU this morning as pt required progression to HFNC w/ NRB. Unable to conduct a face to face interview or nutrition-focused physical exam due to limited contact restrictions related to the pt's medical condition and isolation precautions. This morning attempted to discuss w/ RN patients medical status however RN was in room w/ team as pt was desatting. Per MD, there was concern for pt choking on food and aspirating. Now s/p swallow eval w/ SLP this morning 5/12 w/ no overt signs of aspirattion noted- diet was switched from regular to mechanical soft per SLP recs. No apparent GI distress; last BM 5/11, not on bowel regimen. Multiple pressure ulcers noted in flowsheet. Pt prefers to be on vegetarian, halal diet (updated on  software). Please honor pts food preferences w/in dietary restrictions. RD to follow.     Previous Nutrition Diagnosis:  Increased nutrient needs (kcal/pro) r/t increased demand for nutrients AEB COVID+/infection/inflammation.  Active [ x  ]  Resolved [   ]    If resolved, new PES:     Goal: Pt to consistently meet at least 75% estimated nutrient needs     Recommendations:  1. Mechanical soft (per SLP recs), Halal, Vegetarian  2. Recommend EnsureEnlive BID (700kcal, 40g pro) if intake falls <50%  3. Please encourage small bites when pt is comfortable off NRB.   4. Monitor lytes and replete prn.   5. Pain and bowel regimens per team. Pt is receiving various pain meds, may lead to constipation.    Education: N/A- unable to reach patient at this time. Will f/u.    Risk Level: High [ x  ] Moderate [   ] Low [   ].

## 2020-05-12 NOTE — SWALLOW BEDSIDE ASSESSMENT ADULT - SWALLOW EVAL: DIAGNOSIS
Mild oral dysphagia characterized by prolonged and reduced bolus manipulation. Given clinical presentation, respiratory insufficiency, and dysphonia, [facility] regular solid [facility]. Strict aspiration precautions. This SVC will f/u to assess tolerance.

## 2020-05-12 NOTE — SWALLOW BEDSIDE ASSESSMENT ADULT - ASR SWALLOW ASPIRATION MONITOR
change of breathing pattern/position upright (90Y)/throat clearing/oral hygiene/fever/gurgly voice/pneumonia/upper respiratory infection/cough

## 2020-05-12 NOTE — SWALLOW BEDSIDE ASSESSMENT ADULT - NS SPL SWALLOW CLINIC TRIAL FT
NRB removed and replaced for each trial; O2 sat ~90-92%. Oral phase characterized by prolonged and reduced bolus manipulation and transport, worse w/ hard vs soft solids. No overt signs of aspiration noted. Hyolaryngeal elevation consistently appreciated upon palpation.

## 2020-05-12 NOTE — SWALLOW BEDSIDE ASSESSMENT ADULT - SWALLOW EVAL: RECOMMENDED FEEDING/EATING TECHNIQUES
allow for swallow between intakes/position upright (90 degrees)/small sips/bites/alternate food with liquid/*Take frequent rest breaks throughout meal./no straws/oral hygiene/maintain upright posture during/after eating for 30 mins

## 2020-05-12 NOTE — PROGRESS NOTE ADULT - SUBJECTIVE AND OBJECTIVE BOX
69 y male with chronic pain, progressive lower extremity weakness and atrophy, DVT/PE, HTN and mitral regurgitation sent in from Dr. Brito's office s/p fall for fever and tachypnea found to be COVID positive, saturating well on RA on admission. Patient reports presentation 2/2 to fall with hematuria prior to admission, found to have SETH.  Patient also with uptrending CK, troponin minimally elevated, trended to peak. EKG sinus tachycardia, cardiology consulted with concern for cardiomyopathy, ruled out in setting of minimally elevated troponins in comparison to rise in CK. Concern for rhabdomyolysis given fall, reported hematuria and SETH in setting of uptrending CK. Patient started on LR 200ml/hr. EMG ruled out myositis. EEG placed for increasing lethargy with generalized slowing. Patient remained febrile, received tociluzumab (). Patient with increasing oxygen requirements 5/10, requiring 2-6L in setting of fluid overload. Received lasix 20mg. Patient found to be saturating 79% on RA -> 81& 6L NC -> 84 - 85% 15L NRB, with increased work of breathing. Received additional Lasix 20mg IV and morphine 1mg for pain. Stepped up to ICU for increased level of care. on  pt required progression to HFNC 2/2 to desaturation on NRB to low 80s.       INTERVAL HPI/OVERNIGHT EVENTS:    SUBJECTIVE: Patient seen and examined at bedside. overnight pts status improved, saturation >93% on NRB. This AM pt deated ~83% on NC stepped up to HFNC, satting > 95%.  Discussion was had w patient w regard to health care proxy, states his proxy is his Imaam who is on file also as his emergency contact. notes to not have great relationship with son who does not live nearby.      VITAL SIGNS:  ICU Vital Signs Last 24 Hrs  T(C): 36.6 (12 May 2020 08:30), Max: 39 (11 May 2020 17:45)  T(F): 97.9 (12 May 2020 08:30), Max: 102.2 (11 May 2020 17:45)  HR: 66 (12 May 2020 10:00) (55 - 94)  BP: 129/69 (12 May 2020 10:00) (105/69 - 148/90)  BP(mean): 105 (11 May 2020 18:00) (105 - 105)  ABP: --  ABP(mean): --  RR: 29 (12 May 2020 10:00) (18 - 30)  SpO2: 99% (12 May 2020 10:00) (90% - 100%)      Plateau pressure:   P/F ratio:     05-11 @ 07:  -   @ 07:00  --------------------------------------------------------  IN: 2069 mL / OUT: 5950 mL / NET: -3881 mL     @ 07: @ 10:53  --------------------------------------------------------  IN: 700 mL / OUT: 250 mL / NET: 450 mL      CAPILLARY BLOOD GLUCOSE      POCT Blood Glucose.: 90 mg/dL (10 May 2020 21:30)    ECG:    PHYSICAL EXAM:  Assesed by attending   General: noted to have some increased work of breathing, speaking full sentences,   CV: on monitor NSR    Resp: on NRB, advance to NC sating well, work of breathing decreased  abdomen: Slight distention noted on exam, ww ttp over both L and R lower quadrants, no rebound tenderness  : no rayo in place, urinating w no issues   MSK: TTP over R lower leg, + active and passive motion of all lower limb joints. 2+ DP pulse bilaterally. 2+ radial pulse bilaterally   Neuro: AAOx3  	    MEDICATIONS:  MEDICATIONS  (STANDING):  apixaban 5 milliGRAM(s) Oral every 12 hours  aspirin  chewable 81 milliGRAM(s) Oral every 24 hours  baclofen 5 milliGRAM(s) Oral every 12 hours  dextrose 5%. 1000 milliLiter(s) (50 mL/Hr) IV Continuous <Continuous>  dextrose 50% Injectable 12.5 Gram(s) IV Push once  dextrose 50% Injectable 25 Gram(s) IV Push once  dextrose 50% Injectable 25 Gram(s) IV Push once  dorzolamide 2%/timolol 0.5% Ophthalmic Solution 1 Drop(s) Both EYES every 12 hours  famotidine    Tablet 20 milliGRAM(s) Oral every 24 hours  folic acid 1 milliGRAM(s) Oral daily  gabapentin 400 milliGRAM(s) Oral three times a day  insulin lispro (HumaLOG) corrective regimen sliding scale   SubCutaneous three times a day before meals  insulin lispro (HumaLOG) corrective regimen sliding scale   SubCutaneous at bedtime  latanoprost 0.005% Ophthalmic Solution 1 Drop(s) Both EYES at bedtime  lidocaine   Patch 2 Patch Transdermal every 24 hours  methylPREDNISolone sodium succinate Injectable 40 milliGRAM(s) IV Push every 8 hours  piperacillin/tazobactam IVPB.. 4.5 Gram(s) IV Intermittent every 6 hours  sodium chloride 0.9%. 1000 milliLiter(s) (150 mL/Hr) IV Continuous <Continuous>  vancomycin  IVPB 1250 milliGRAM(s) IV Intermittent every 12 hours    MEDICATIONS  (PRN):  acetaminophen   Tablet .. 650 milliGRAM(s) Oral every 6 hours PRN Temp greater or equal to 38.5C (101.3F)  dextrose 40% Gel 15 Gram(s) Oral once PRN Blood Glucose LESS THAN 70 milliGRAM(s)/deciliter  glucagon  Injectable 1 milliGRAM(s) IntraMuscular once PRN Glucose LESS THAN 70 milligrams/deciliter  oxyCODONE    IR 5 milliGRAM(s) Oral every 4 hours PRN Moderate Pain (4 - 6)      ALLERGIES:  Allergies    No Known Allergies    Intolerances        LABS:                        11.6   7.56  )-----------( 92       ( 12 May 2020 05:22 )             35.8     05-12    144  |  106  |  22  ----------------------------<  158<H>  4.7   |  21<L>  |  1.23    Ca    8.5      12 May 2020 05:22  Phos  4.9     05-12  Mg     2.3     05-12    TPro  7.0  /  Alb  3.1<L>  /  TBili  2.4<H>  /  DBili  x   /  AST  152<H>  /  ALT  58<H>  /  AlkPhos  51  05-12      Urinalysis Basic - ( 11 May 2020 21:50 )    Color: Yellow / Appearance: Clear / S.010 / pH: x  Gluc: x / Ketone: NEGATIVE  / Bili: Negative / Urobili: 2.0 E.U./dL   Blood: x / Protein: 30 mg/dL / Nitrite: POSITIVE   Leuk Esterase: NEGATIVE / RBC: < 5 /HPF / WBC < 5 /HPF   Sq Epi: x / Non Sq Epi: 0-5 /HPF / Bacteria: Many /HPF        RADIOLOGY & ADDITIONAL TESTS: Reviewed.        · Assessment		  70 y/o M with PMH of progressive lower extremity weakness, s/p lumbar decompression in 2019. s/p sural nerve biopsy on  for lower extremity weakness and atrophy, cardiomegaly, DVT/PE in 2018 (on Eliquis), HTN, MR, and galucoma presenting with severe back pain s/p fall before presentation with SETH and rising CK, now with concern for rhabdomyolysis vs myositis.         Problem/Plan - 1:  ·  Problem: Pneumonia due to COVID-19 virus.  Plan: Pt with sore throat and runny nose and mild diarrhea for the past few days but no SOB or cough, was found to be positive for COVID. Had fever and tachycardia in ED but no O2 desaturation. currently sating 94% on 2L. CXR clear.   COVID positive, Ferritin 2537, CRP 3.33  - Monitor O2 saturation and symptoms  - Trending inflammatory markers daily  - tylenol for fever  - symptomtic management   - contact precaustions: isolation and droplet  - avoid NSAID/ACE-I/ARB  - Odyssey trial   - Solumedrol x5 days  - Advanced from NRB to HFNC  2/2 to desat to low 80s       #Acute Hypoxic Resp Failure 2/2 COVID-19  Patient with increased O2 requirements (79% on RA) s/p lasix 20mg. CXR without evidence of congestion. Currently on 15L NRB  - s/p tociluzumab ()  - initiated solumedrol 40mg BID -> taper as per pulm  - wean O2 as tolerated  - patient is not a candidate for awake proning, unable to tolerate 2/2 to chronic pain and previous spinal surgeries.      Problem/Plan - 2:  ·  Problem: Severe sepsis.  Plan: Sepsis of unknown etiology. Patient with severe sepsis criteria on admission (fever, tachycardia and elevated lactate), with complaints of URI and diarrhea. Found to COVID-19 positive. No evidence of leukocytosis, procalcitonin 0.28.  WBC was normal. procal is 0.28. Sepsis is likely due to COVID as well as dehydration. Right knee with erythema, no evidence of effusion. Gallstones present, Perdue sign negative. Still with fever (102) and leukopenia. Differential diagnosis autoinflammatory state 2/2 COVID-19. Patient desaturating to 87% on RA, requiring 6L oxygen, weaned to 2L.   - Blood cultures NGTD  - UA negative   - Fever management with tylenol   - Knee xray w/o evidence of septic joint   - Tylenol for fever  - COVID management as below  - s/p tocilizumab ()    - CT chest obtained to evaluate for ?consolidation demonstrating basilar predominant groundglass opacities bilaterally; consistent with COVID-19.   - repeat blood cultures drawn  - NGTD  - empirically started on Vanc and Zosyn   - UA + pt asymptomatic, likely not source of infeciton       Problem/Plan - 3:  ·  Problem: R/O Rhabdomyolysis.  Plan: Patient with rising CK in setting of fall and SETH, and reported hematuria prior to arrival. Also, with hypophosphatemia, concern for rhabdomyolysis. CK downtrending slowly with improvement in Cr.   -  cc/hr   - trend CK q8hr   - monitor Cr.          Problem/Plan - 4:  ·  Problem: SETH (acute kidney injury).  Plan: RESOLVED   Pt with baseline Cr 1.1-1.2 presented with Cr 1.88 and BUN 52 likely due to prerenal azotemia likely due to dehydration following having diarrhea for few days and not eating and drinking well. responded to fluid therapy and decreased to 1.68. Urine lytes also were compatible with pre-renal causes, s/p 1.5 L of IVF in ED  - Slight increase to 1.23   	Will monitor for increases in setting of new Vanco + net - status  - monitor kidney function    #Hematuria: RESOLVED   Patient with compliant of blood in urine and UA demonstrating large blood,  likely due to rhabdomyolysis   - will monitor the urine for gross hematuria   - c/w home Eliquis 5mg BID.        Problem/Plan - 5:  ·  Problem: Neuropathy.  Plan: Has a chronic neuropathy likely secondary to disc herniation. on gabapentin 400 tid at home. Currently in worsening back pain and tingling and numbness. Follows with Dr. Brito outpatient.   - LE dopplers negative for DVT  - CPK elevated -> concern for myositis as underlying etiology   - RF 22 -> mildly elevated  - f/u AHMET, anti-dsDNA, anti-Isaura   - trend CPK   - EMG unchanged from prior   - c/w home gabapentin 400 tid    #Fall  Patient with reported fall and multiple previous falls. Denies LOC or trauma to head.   Ambulates with walker. Unwilling to cooperate with neurological exam 2/2 to reported pain.   - PT evaluation -> recommendation of KEVIN   - neurology following    #Lethargy   Patient with increased lethargy, less arousable compared to baseline. AAOx3 this AM.   - EEG w/ generalized slowing.      Problem/Plan - 6:  Problem: Acute midline low back pain, unspecified whether sciatica present. Plan: Pt with chronic radiculopathy and low back pain due to disc herniation s/p lumbar decompression in 2019. s/p sural nerve biopsy on  due to muscle weakness, presented with worsening low back pain after fall few days before presentation as well as worsening weakness in the LEs mainly in the right side and few weeks of urinary incontinence. exam shows weakness of the LEs.   MRI spine showed Multilevel disc herniations with multilevel neural foraminal narrowing but no signs of acute cord compression.   Likely worsening of the chronic radiculopathy due to worsening of spinal stenosis due to disc herniations.   - Continue his home dose of oxycodone 5 q4 PRN  - Lidocaine patch 2 patches every day  - holding home Nucenta ER 100mg   - c/w home Gabapentin 400 tid  - No neurosurgical intervention at this time   - evaluated by Dr. Brito (outpatient neurologist) -> EMG without evidence of myopathy or myositis, unchanged from prior (2019)   - neurology following apperciate recs   - PT/OT evaluation -> recommend KEVIN     #Right Knee Arthritis   Pt complains of pain in the right knee with unknown duration. Has mild swelling as well as tenderness and redness in the right knee.   WBC is normal, has CRP 3.33 that can be due to COVID infection, porcal is mildly elevated 0.28. uric acid is 10.1.   Can be gout or pseudogout. Septic joint on DDx as well  - Knee xray without evidence of effusion   - PT eval -> KEVIN.     Problem/Plan - 7:  ·  Problem: DVT (deep venous thrombosis).  Plan: Pt with hx of DVT and PE, on Eliquis 5 daily at home. DVT reported in 2018, unclear indication for AC at this time.   - c/w home Eliquis 5mg BID qd     #Thrombocytopenia  Patient with baseline plt 110s, 84 on arrival and decreasing to 61 on admission. Possibly 2/2 viral infection and sepsis. No evidence of bleeding.  - Hematology consulted -> likely 2/2 COVID-19   - Hep C +  - monitor for signs of bleeding.   - Uptrending 92 this AM      Problem/Plan - 8:  ·  Problem: HTN (hypertension).  Plan: Pt with hx of HTN on lisinopril 10 at home  Currently with symptoms of sepsis, dehydration, SETH   - hold home lisinopril  - monitor BPs  - Will resume when more stable and has high BP    #Hyperbilirubinemia   Pt with bili 2.7 and elevated AST and ALT but normal Alk-p.RUQ ultrasound demonstrating gallbladder with multiple gallstones. Benign abdominal exam. Negative perdue sign.  Likely 2/2 COVID infection or due to gallbladder stones. Improved with fluid therapy. Patient Hep C positive.   - monitor LFT  - Hepatitis panel negative  - Bili and LFTs downtrending this AM    #Glaucoma  Patient with history of glaucoma. c/w home latanoprost 0.005%.      Problem/Plan - 9:  ·  GI  PPX famotidine   Diet switched to mechanical soft 2/2 poss aspiration on solids  Speech and swallow eval     Problem/Plan - 10:  ·  Problem: Prophylactic measure.  Plan: Fluids: LR  Electrolytes-replete as needed  Nutrition - DASH/TLC  Code- Full Code  DVT ppx: Eliquis   GI ppx: none needed  DIspo: F    DISCHARGE PLANNING:  [facility]IN vs Home  Pending improvement of current status, likely home vs KEVIN

## 2020-05-13 LAB
A1C WITH ESTIMATED AVERAGE GLUCOSE RESULT: 4.9 % — SIGNIFICANT CHANGE UP (ref 4–5.6)
ALBUMIN SERPL ELPH-MCNC: 3 G/DL — LOW (ref 3.3–5)
ALP SERPL-CCNC: 42 U/L — SIGNIFICANT CHANGE UP (ref 40–120)
ALT FLD-CCNC: 58 U/L — HIGH (ref 10–45)
ANION GAP SERPL CALC-SCNC: 11 MMOL/L — SIGNIFICANT CHANGE UP (ref 5–17)
ANION GAP SERPL CALC-SCNC: 13 MMOL/L — SIGNIFICANT CHANGE UP (ref 5–17)
AST SERPL-CCNC: 109 U/L — HIGH (ref 10–40)
BILIRUB SERPL-MCNC: 2.2 MG/DL — HIGH (ref 0.2–1.2)
BUN SERPL-MCNC: 22 MG/DL — SIGNIFICANT CHANGE UP (ref 7–23)
BUN SERPL-MCNC: 24 MG/DL — HIGH (ref 7–23)
CALCIUM SERPL-MCNC: 7.7 MG/DL — LOW (ref 8.4–10.5)
CALCIUM SERPL-MCNC: 7.9 MG/DL — LOW (ref 8.4–10.5)
CHLORIDE SERPL-SCNC: 110 MMOL/L — HIGH (ref 96–108)
CHLORIDE SERPL-SCNC: 114 MMOL/L — HIGH (ref 96–108)
CK SERPL-CCNC: 1449 U/L — HIGH (ref 30–200)
CO2 SERPL-SCNC: 21 MMOL/L — LOW (ref 22–31)
CO2 SERPL-SCNC: 23 MMOL/L — SIGNIFICANT CHANGE UP (ref 22–31)
CREAT SERPL-MCNC: 1.08 MG/DL — SIGNIFICANT CHANGE UP (ref 0.5–1.3)
CREAT SERPL-MCNC: 1.15 MG/DL — SIGNIFICANT CHANGE UP (ref 0.5–1.3)
CRP SERPL-MCNC: 1.06 MG/DL — HIGH (ref 0–0.4)
ESTIMATED AVERAGE GLUCOSE: 94 MG/DL — SIGNIFICANT CHANGE UP (ref 68–114)
FERRITIN SERPL-MCNC: 5283 NG/ML — HIGH (ref 30–400)
GLUCOSE BLDC GLUCOMTR-MCNC: 115 MG/DL — HIGH (ref 70–99)
GLUCOSE BLDC GLUCOMTR-MCNC: 116 MG/DL — HIGH (ref 70–99)
GLUCOSE BLDC GLUCOMTR-MCNC: 118 MG/DL — HIGH (ref 70–99)
GLUCOSE BLDC GLUCOMTR-MCNC: 92 MG/DL — SIGNIFICANT CHANGE UP (ref 70–99)
GLUCOSE SERPL-MCNC: 105 MG/DL — HIGH (ref 70–99)
GLUCOSE SERPL-MCNC: 175 MG/DL — HIGH (ref 70–99)
HCT VFR BLD CALC: 30 % — LOW (ref 39–50)
HGB BLD-MCNC: 9.8 G/DL — LOW (ref 13–17)
MAGNESIUM SERPL-MCNC: 2 MG/DL — SIGNIFICANT CHANGE UP (ref 1.6–2.6)
MCHC RBC-ENTMCNC: 30.3 PG — SIGNIFICANT CHANGE UP (ref 27–34)
MCHC RBC-ENTMCNC: 32.7 GM/DL — SIGNIFICANT CHANGE UP (ref 32–36)
MCV RBC AUTO: 92.9 FL — SIGNIFICANT CHANGE UP (ref 80–100)
NRBC # BLD: 0 /100 WBCS — SIGNIFICANT CHANGE UP (ref 0–0)
PHOSPHATE SERPL-MCNC: 2.2 MG/DL — LOW (ref 2.5–4.5)
PLATELET # BLD AUTO: 90 K/UL — LOW (ref 150–400)
POTASSIUM SERPL-MCNC: 4 MMOL/L — SIGNIFICANT CHANGE UP (ref 3.5–5.3)
POTASSIUM SERPL-MCNC: 4.2 MMOL/L — SIGNIFICANT CHANGE UP (ref 3.5–5.3)
POTASSIUM SERPL-SCNC: 4 MMOL/L — SIGNIFICANT CHANGE UP (ref 3.5–5.3)
POTASSIUM SERPL-SCNC: 4.2 MMOL/L — SIGNIFICANT CHANGE UP (ref 3.5–5.3)
PROT SERPL-MCNC: 5.9 G/DL — LOW (ref 6–8.3)
RBC # BLD: 3.23 M/UL — LOW (ref 4.2–5.8)
RBC # FLD: 11.7 % — SIGNIFICANT CHANGE UP (ref 10.3–14.5)
SODIUM SERPL-SCNC: 144 MMOL/L — SIGNIFICANT CHANGE UP (ref 135–145)
SODIUM SERPL-SCNC: 148 MMOL/L — HIGH (ref 135–145)
TROPONIN T SERPL-MCNC: <0.01 NG/ML — SIGNIFICANT CHANGE UP (ref 0–0.01)
WBC # BLD: 9.23 K/UL — SIGNIFICANT CHANGE UP (ref 3.8–10.5)
WBC # FLD AUTO: 9.23 K/UL — SIGNIFICANT CHANGE UP (ref 3.8–10.5)

## 2020-05-13 PROCEDURE — 99291 CRITICAL CARE FIRST HOUR: CPT

## 2020-05-13 PROCEDURE — 71045 X-RAY EXAM CHEST 1 VIEW: CPT | Mod: 26,CS

## 2020-05-13 RX ORDER — FUROSEMIDE 40 MG
20 TABLET ORAL ONCE
Refills: 0 | Status: COMPLETED | OUTPATIENT
Start: 2020-05-13 | End: 2020-05-13

## 2020-05-13 RX ORDER — SODIUM CHLORIDE 9 MG/ML
1000 INJECTION INTRAMUSCULAR; INTRAVENOUS; SUBCUTANEOUS
Refills: 0 | Status: DISCONTINUED | OUTPATIENT
Start: 2020-05-13 | End: 2020-05-14

## 2020-05-13 RX ORDER — OLANZAPINE 15 MG/1
5 TABLET, FILM COATED ORAL AT BEDTIME
Refills: 0 | Status: DISCONTINUED | OUTPATIENT
Start: 2020-05-13 | End: 2020-05-15

## 2020-05-13 RX ORDER — PANTOPRAZOLE SODIUM 20 MG/1
40 TABLET, DELAYED RELEASE ORAL
Refills: 0 | Status: DISCONTINUED | OUTPATIENT
Start: 2020-05-13 | End: 2020-05-18

## 2020-05-13 RX ADMIN — DORZOLAMIDE HYDROCHLORIDE TIMOLOL MALEATE 1 DROP(S): 20; 5 SOLUTION/ DROPS OPHTHALMIC at 06:19

## 2020-05-13 RX ADMIN — Medication 40 MILLIGRAM(S): at 22:24

## 2020-05-13 RX ADMIN — OLANZAPINE 5 MILLIGRAM(S): 15 TABLET, FILM COATED ORAL at 22:24

## 2020-05-13 RX ADMIN — PIPERACILLIN AND TAZOBACTAM 200 GRAM(S): 4; .5 INJECTION, POWDER, LYOPHILIZED, FOR SOLUTION INTRAVENOUS at 17:49

## 2020-05-13 RX ADMIN — GABAPENTIN 400 MILLIGRAM(S): 400 CAPSULE ORAL at 06:26

## 2020-05-13 RX ADMIN — Medication 20 MILLIGRAM(S): at 11:10

## 2020-05-13 RX ADMIN — LATANOPROST 1 DROP(S): 0.05 SOLUTION/ DROPS OPHTHALMIC; TOPICAL at 22:26

## 2020-05-13 RX ADMIN — Medication 5 MILLIGRAM(S): at 11:10

## 2020-05-13 RX ADMIN — APIXABAN 5 MILLIGRAM(S): 2.5 TABLET, FILM COATED ORAL at 11:06

## 2020-05-13 RX ADMIN — Medication 5 MILLIGRAM(S): at 22:24

## 2020-05-13 RX ADMIN — DORZOLAMIDE HYDROCHLORIDE TIMOLOL MALEATE 1 DROP(S): 20; 5 SOLUTION/ DROPS OPHTHALMIC at 17:47

## 2020-05-13 RX ADMIN — PIPERACILLIN AND TAZOBACTAM 200 GRAM(S): 4; .5 INJECTION, POWDER, LYOPHILIZED, FOR SOLUTION INTRAVENOUS at 11:06

## 2020-05-13 RX ADMIN — Medication 81 MILLIGRAM(S): at 22:25

## 2020-05-13 RX ADMIN — PIPERACILLIN AND TAZOBACTAM 200 GRAM(S): 4; .5 INJECTION, POWDER, LYOPHILIZED, FOR SOLUTION INTRAVENOUS at 06:18

## 2020-05-13 RX ADMIN — FAMOTIDINE 20 MILLIGRAM(S): 10 INJECTION INTRAVENOUS at 06:26

## 2020-05-13 RX ADMIN — Medication 1 MILLIGRAM(S): at 11:06

## 2020-05-13 RX ADMIN — LIDOCAINE 2 PATCH: 4 CREAM TOPICAL at 05:10

## 2020-05-13 RX ADMIN — OXYCODONE HYDROCHLORIDE 5 MILLIGRAM(S): 5 TABLET ORAL at 11:09

## 2020-05-13 RX ADMIN — LIDOCAINE 2 PATCH: 4 CREAM TOPICAL at 11:04

## 2020-05-13 RX ADMIN — SODIUM CHLORIDE 150 MILLILITER(S): 9 INJECTION INTRAMUSCULAR; INTRAVENOUS; SUBCUTANEOUS at 11:10

## 2020-05-13 RX ADMIN — OXYCODONE HYDROCHLORIDE 5 MILLIGRAM(S): 5 TABLET ORAL at 22:23

## 2020-05-13 RX ADMIN — GABAPENTIN 400 MILLIGRAM(S): 400 CAPSULE ORAL at 22:25

## 2020-05-13 RX ADMIN — Medication 40 MILLIGRAM(S): at 14:06

## 2020-05-13 RX ADMIN — Medication 40 MILLIGRAM(S): at 06:18

## 2020-05-13 NOTE — PROGRESS NOTE ADULT - ASSESSMENT
per Internal Medicine    70 y/o M with PMH of progressive lower extremity weakness, s/p lumbar decompression in 1/2019. 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         Problem/Plan - 1:  ·  Problem: Pneumonia due to COVID-19 virus.  Plan: Pt with sore throat and runny nose and mild diarrhea for the past few days but no SOB or cough, was found to be positive for COVID. Had fever and tachycardia in ED but no O2 desaturation. currently sating 94% on 2L. CXR clear.   COVID positive, Ferritin 2537, CRP 3.33  - Monitor O2 saturation and symptoms  - Trending inflammatory markers daily  - tylenol for fever  - symptomtic management   - contact precaustions: isolation and droplet  - avoid NSAID/ACE-I/ARB  - Odyssey trial   - Solumedrol x5 days  - Advanced from NRB to HFNC 5/12 2/2 to desat to low 80s - will try and decrease settings on HFNC        #Acute Hypoxic Resp Failure 2/2 COVID-19  Patient with increased O2 requirements (79% on RA) s/p lasix 20mg. CXR without evidence of congestion. Currently on 15L NRB  - s/p tociluzumab (5/9)  - initiated solumedrol 40mg BID -> taper as per pulm  - wean O2 as tolerated  - patient is not a candidate for awake proning, unable to tolerate 2/2 to chronic pain and previous spinal surgeries.      Problem/Plan - 2:  ·  Problem: Severe sepsis.  Plan: Sepsis of unknown etiology. Patient with severe sepsis criteria on admission (fever, tachycardia and elevated lactate), with complaints of URI and diarrhea. Found to COVID-19 positive. No evidence of leukocytosis, procalcitonin 0.28.  WBC was normal. procal is 0.28. Sepsis is likely due to COVID as well as dehydration. Right knee with erythema, no evidence of effusion. Gallstones present, Perdue sign negative. Still with fever (102) and leukopenia. Differential diagnosis autoinflammatory state 2/2 COVID-19. Patient desaturating to 87% on RA, requiring 6L oxygen, weaned to 2L.   - Blood cultures NGTD  - UA negative   - Fever management with tylenol   - Knee xray w/o evidence of septic joint   - Tylenol for fever  - COVID management as below  - s/p tocilizumab (5/9)    - CT chest obtained to evaluate for ?consolidation demonstrating basilar predominant groundglass opacities bilaterally; consistent with COVID-19.   - repeat blood cultures drawn 5/11 - NGTD  - empirically started on Vanc and Zosyn 5/11;   - UA + pt asymptomatic, likely not source of infeciton  - Vanco discontinued due to negative cultures, Zosyn will continue for 7 days (5/11 - 5/18)       Problem/Plan - 3:  ·  Problem: R/O Rhabdomyolysis.  Plan: Patient with rising CK in setting of fall and SETH, and reported hematuria prior to arrival. Also, with hypophosphatemia, concern for rhabdomyolysis. CK downtrending slowly with improvement in Cr.   -  cc/hr   - trend CK q8hr, decreasing daily,   - monitor Cr decreasing          Problem/Plan - 4:  ·  Problem: SETH (acute kidney injury).  Plan: RESOLVED   Pt with baseline Cr 1.1-1.2 presented with Cr 1.88 and BUN 52 likely due to prerenal azotemia likely due to dehydration following having diarrhea for few days and not eating and drinking well. responded to fluid therapy and decreased to 1.68. Urine lytes also were compatible with pre-renal causes, s/p 1.5 L of IVF in ED  - decreasing, last level 1.15  	Will monitor for increases in setting of new Vanco + net - status  - monitor kidney function  - Will get 6pm BMP 5/13    #Hematuria: RESOLVED   Patient with compliant of blood in urine and UA demonstrating large blood,  likely due to rhabdomyolysis   - will monitor the urine for gross hematuria   - c/w home Eliquis 5mg BID.        Problem/Plan - 5:  ·  Problem: Neuropathy.  Plan: Has a chronic neuropathy likely secondary to disc herniation. on gabapentin 400 tid at home. Currently in worsening back pain and tingling and numbness. Follows with Dr. Brito outpatient.   - LE dopplers negative for DVT  - CPK elevated -> concern for myositis as underlying etiology   - RF 22 -> mildly elevated  - f/u AHMET, anti-dsDNA, anti-Isaura   - trend CPK   - EMG unchanged from prior   - c/w home gabapentin 400 tid    #Fall  Patient with reported fall and multiple previous falls. Denies LOC or trauma to head.   Ambulates with walker. Unwilling to cooperate with neurological exam 2/2 to reported pain.   - PT evaluation -> recommendation of KEVIN   - neurology following    #Lethargy   Patient with increased lethargy, less arousable compared to baseline. AAOx3 this AM.   - EEG w/ generalized slowing.      Problem/Plan - 6:  Problem: Acute midline low back pain, unspecified whether sciatica present. Plan: Pt with chronic radiculopathy and low back pain due to disc herniation s/p lumbar decompression in 1/2019. s/p sural nerve biopsy on 7/25 due to muscle weakness, presented with worsening low back pain after fall few days before presentation as well as worsening weakness in the LEs mainly in the right side and few weeks of urinary incontinence. exam shows weakness of the LEs.   MRI spine showed Multilevel disc herniations with multilevel neural foraminal narrowing but no signs of acute cord compression.   Likely worsening of the chronic radiculopathy due to worsening of spinal stenosis due to disc herniations.   - Continue his home dose of oxycodone 5 q4 PRN  - Lidocaine patch 2 patches every day  - holding home Nucenta ER 100mg   - c/w home Gabapentin 400 tid  - No neurosurgical intervention at this time   - evaluated by Dr. Brito (outpatient neurologist) -> EMG without evidence of myopathy or myositis, unchanged from prior (2019)   - neurology following apperciate recs   - PT/OT evaluation -> recommend KEVIN     #Right Knee Arthritis   Pt complains of pain in the right knee with unknown duration. Has mild swelling as well as tenderness and redness in the right knee.   WBC is normal, has CRP 3.33 that can be due to COVID infection, porcal is mildly elevated 0.28. uric acid is 10.1.   Can be gout or pseudogout. Septic joint on DDx as well  - Knee xray without evidence of effusion   - PT eval -> KEVIN.     Problem/Plan - 7:  ·  Problem: DVT (deep venous thrombosis).  Plan: Pt with hx of DVT and PE, on Eliquis 5 daily at home. DVT reported in 2018, unclear indication for AC at this time.   - c/w home Eliquis 5mg BID qd     #Thrombocytopenia  Patient with baseline plt 110s, 84 on arrival and decreasing to 61 on admission. Possibly 2/2 viral infection and sepsis. No evidence of bleeding.  - Hematology consulted -> likely 2/2 COVID-19   - Hep C +  - monitor for signs of bleeding.   - Pending AM cbc     Problem/Plan - 8:  ·  Problem: HTN (hypertension).  Plan: Pt with hx of HTN on lisinopril 10 at home  Currently with symptoms of sepsis, dehydration, SETH   - hold home lisinopril  - monitor BPs  - Will resume when more stable and has high BP    #Hyperbilirubinemia   Pt with bili 2.7 and elevated AST and ALT but normal Alk-p.RUQ ultrasound demonstrating gallbladder with multiple gallstones. Benign abdominal exam. Negative perdue sign.  Likely 2/2 COVID infection or due to gallbladder stones. Improved with fluid therapy. Patient Hep C positive.   - monitor LFT  - Hepatitis panel negative  - Bili and LFTs downtrending this AM    #Glaucoma  Patient with history of glaucoma. c/w home latanoprost 0.005%.      Problem/Plan - 9:  ·  GI  PPX famotidine   Diet switched to mechanical soft 2/2 poss aspiration on solids  Speech and swallow eval - Pending eval      Problem/Plan - 10:  Problem: Prophylactic measure.  Plan: Fluids: NS  Electrolytes-replete as needed  Nutrition - Soft Mechanical det  Code- Full Code  DVT ppx: Eliquis   GI ppx: none needed  DIspo: [facility]

## 2020-05-13 NOTE — CHART NOTE - NSCHARTNOTEFT_GEN_A_CORE
DAILY ASSESSMENTS (Required on all days, including Day 1 and Discharge Day)    Day: 2    NRS cough: Please rate the cough severity by selecting the number from 0 to 10 that best describes your worst level of cough in the past 24 hours: 0    NRS nausea: Please rate the worst severity of your nausea from 0 to 5 during the past 24 hours: 0    Hypercapnic respiratory failure?  NO     SpO2:   95%    FiO2 or LPM: NRB @ 15L/min, HFNC O2 93% @ 60L/min    Vital signs (if not already collected by nurse): temperature, blood pressure, pulse, respiratory rate, oxygen saturation  See progress note    If this is day 1 or discharge day: CXR, and EKG as noted below      < from: Xray Chest 1 View- PORTABLE-Urgent (05.13.20 @ 08:29) >  EXAM:  XR CHEST PORTABLE URGENT 1V                        PROCEDURE DATE:  05/13/2020    INTERPRETATION:  XR CHEST URGENT dated 5/13/2020 8:29 AM  CLINICAL INFORMATION: covid PNA  COMPARISON: May 12, 2020    FINDINGS: Stable heart size.No pleural effusion or pneumothorax. Bilateral airspace disease, right greater than left, increased since the prior examination.    IMPRESSION: Bilateral airspace disease, right greater than left, increased since the prior examination.    DISCRETE X-RAY DATA:  Percent of LEFT lung opacification: 1-33%  Percent of RIGHT lung opacification: 34-66%  Change in lung opacification from most recent x-ray (<=3 days): Increase  Change from prior dated 3 or more days (same admission): Increase    Thank you for the opportunity to participate in the care of this patient.  YURY PARNELL M.D., ATTENDING RADIOLOGIST  This document has been electronically signed. May 13 2020  9:18AM      ____________________________________________________  Patient discussed with Dr. Padron.     Christy Webber MD  Graduate Physician - COVID Emergency Events

## 2020-05-13 NOTE — PROGRESS NOTE ADULT - SUBJECTIVE AND OBJECTIVE BOX
Physical Medicine and Rehabilitation Progress Note:    Patient is a 70y old  Male who presents with a chief complaint of back pain (13 May 2020 10:27)      HPI:  A 69 years old M with PMH of progressive lower extremity weakness, s/p lumbar decompression in 1/2019. 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 Pt states that on the weekend while he was walking with his walker, his leg gave away and he fell and since then he has a severe pain in his low back with radiation to both legs. Pt has chronic back pain and is following with a pain specialist and Dr. Brito neurologist as outpt and is taking several pain medications but since fall the pain has been worsening. Pt also states that it has been few weeks that he cannot control his urine and gets soaked and wet without noticing but denies bowel incontinence. He also complains that the weakness of the lower extremities is worse mainly in the right leg and he is not able to move the right leg both because of pain and because of weakness. His tingling and numbness in the LEs is also worsening.     Pt denied headache, dizziness, nausea, vomiting, abdominal pain, rash, fever, chills,. But stated that it has been more than a week that he has sore throat and runny nose as well as few days of diarrhea few times a day. He also complains of pain in his right knee but does not know when the pain in the knee started. Complains of bloody urine and slight burning with urination. denied recent travel or sick contact.     In the ED his Temp was 101.9,  that increased to 121, /71, RR 19 with 93% saturation on RA that then increased to 98% on RA. WBC 4.08, Hb 15.3, Plt 84, Na 144, K 4.9, BUN 52 and Cr 1.88 (baseline 1.1-1.2), INR 2.57, Bili 2.7, AST 54, ALT 35, Alk 102, lactate 2.7 that then cleared to 1.8, UA showed a large blood otherwise clear, CT abd/pelvis showed two non-obstructing tiny left renal calculi. US of abdomen showed Gallbladder full of gallstones.MRI of the spine showed no cord compression but multiple disc herniation. COVID test was positive.     Neurosurgery was consulted for back pain that cleared the pt for any surgical intervention at this time. (06 May 2020 20:52)                            9.8    9.23  )-----------( 90       ( 13 May 2020 04:36 )             30.0       05-13    148<H>  |  114<H>  |  22  ----------------------------<  105<H>  4.0   |  23  |  1.15    Ca    7.7<L>      13 May 2020 04:36  Phos  2.2     05-13  Mg     2.0     05-13    TPro  5.9<L>  /  Alb  3.0<L>  /  TBili  2.2<H>  /  DBili  x   /  AST  109<H>  /  ALT  58<H>  /  AlkPhos  42  05-13    Vital Signs Last 24 Hrs  T(C): 36.6 (13 May 2020 09:15), Max: 36.6 (13 May 2020 00:00)  T(F): 97.9 (13 May 2020 09:15), Max: 97.9 (13 May 2020 00:00)  HR: 74 (13 May 2020 09:44) (55 - 78)  BP: 114/57 (13 May 2020 09:15) (97/58 - 143/77)  BP(mean): 67 (13 May 2020 06:00) (67 - 103)  RR: 24 (13 May 2020 09:44) (18 - 31)  SpO2: 98% (13 May 2020 09:44) (92% - 100%)    MEDICATIONS  (STANDING):  apixaban 5 milliGRAM(s) Oral every 12 hours  aspirin  chewable 81 milliGRAM(s) Oral every 24 hours  baclofen 5 milliGRAM(s) Oral every 12 hours  dextrose 5%. 1000 milliLiter(s) (50 mL/Hr) IV Continuous <Continuous>  dextrose 50% Injectable 12.5 Gram(s) IV Push once  dextrose 50% Injectable 25 Gram(s) IV Push once  dextrose 50% Injectable 25 Gram(s) IV Push once  dorzolamide 2%/timolol 0.5% Ophthalmic Solution 1 Drop(s) Both EYES every 12 hours  famotidine    Tablet 20 milliGRAM(s) Oral every 24 hours  folic acid 1 milliGRAM(s) Oral daily  gabapentin 400 milliGRAM(s) Oral three times a day  insulin lispro (HumaLOG) corrective regimen sliding scale   SubCutaneous three times a day before meals  insulin lispro (HumaLOG) corrective regimen sliding scale   SubCutaneous at bedtime  latanoprost 0.005% Ophthalmic Solution 1 Drop(s) Both EYES at bedtime  lidocaine   Patch 2 Patch Transdermal every 24 hours  methylPREDNISolone sodium succinate Injectable 40 milliGRAM(s) IV Push every 8 hours  piperacillin/tazobactam IVPB.. 4.5 Gram(s) IV Intermittent every 6 hours  sodium chloride 0.9%. 1000 milliLiter(s) (125 mL/Hr) IV Continuous <Continuous>  tradipitant vs placebo ([id number]) 85 milliGRAM(s) Oral every 12 hours    MEDICATIONS  (PRN):  acetaminophen   Tablet .. 650 milliGRAM(s) Oral every 6 hours PRN Temp greater or equal to 38.5C (101.3F)  dextrose 40% Gel 15 Gram(s) Oral once PRN Blood Glucose LESS THAN 70 milliGRAM(s)/deciliter  glucagon  Injectable 1 milliGRAM(s) IntraMuscular once PRN Glucose LESS THAN 70 milligrams/deciliter  oxyCODONE    IR 5 milliGRAM(s) Oral every 4 hours PRN Moderate Pain (4 - 6)    Currently Undergoing Physical Therapy at bedside.    Functional Status Assessment: per PT/OT note 5/12/2020      Therapeutic Interventions      Bed Mobility  Bed Mobility Training Rehab Potential: fair, will monitor progress closely  Bed Mobility Training Symptoms Noted During/After Treatment: increased pain;  dizziness;  chest pain  Bed Mobility Training Rolling/Turning: maximum assist (25% patient effort);  1 person assist  Bed Mobility Training Scooting: maximum assist (25% patient effort);  2 person assist  Bed Mobility Training Sit-to-Supine: maximum assist (25% patient effort);  2 person assist  Bed Mobility Training Supine-to-Sit: maximum assist (25% patient effort);  2 person assist  Bed Mobility Training Limitations: decreased strength;  impaired balance;  pain;  decreased ability to use arms for pushing/pulling;  decreased ability to use legs for bridging/pushing    Sit-Stand Transfer Training  Sit-to-Stand Transfer Training Rehab Potential: fair, will monitor progress closely  Sit-to-Stand Transfer Training Symptoms Noted During/After Treatment: fatigue;  shortness of breath  Transfer Training Sit-to-Stand Transfer: maximum assist (25% patient effort);  2 person assist;  rolling walker  Transfer Training Stand-to-Sit Transfer: maximum assist (25% patient effort);  2 person assist;  rolling walker  Sit-to-Stand Transfer Training Transfer Safety Analysis: decreased balance;  stand x 2 min for toileting, crouch like, max tactile and VCs for erect posture;  decreased strength;  impaired balance;  pain    Gait Training  Gait Training: unable to perform    Therapeutic Exercise  Therapeutic Exercise Detail: supine therapeutic exercise: AAROM heel slides, ankle pumps, AAROM SLR, quad sets x 10dangled at EOB x CS: seated therapeutic exercise: long arc quads x 5             PM&R Impression: as above    Current Disposition Plan Recommendations: subacute rehab placement

## 2020-05-13 NOTE — PROGRESS NOTE ADULT - SUBJECTIVE AND OBJECTIVE BOX
INTERVAL HPI/OVERNIGHT EVENTS:    SUBJECTIVE: Patient seen and examined at bedside. ED. Saturating well with the HFNC.       VITAL SIGNS:  ICU Vital Signs Last 24 Hrs  T(C): 36.6 (13 May 2020 09:15), Max: 36.6 (13 May 2020 00:00)  T(F): 97.9 (13 May 2020 09:15), Max: 97.9 (13 May 2020 00:00)  HR: 76 (13 May 2020 09:15) (55 - 78)  BP: 114/57 (13 May 2020 09:15) (97/58 - 143/77)  BP(mean): 67 (13 May 2020 06:00) (67 - 103)  ABP: --  ABP(mean): --  RR: 31 (13 May 2020 09:15) (18 - 31)  SpO2: 94% (13 May 2020 09:15) (92% - 100%)      Plateau pressure:   P/F ratio:     05-12 @ 07: @ 07:00  --------------------------------------------------------  IN: 4150 mL / OUT: 1755 mL / NET: 2395 mL     @ 07: @ 10:27  --------------------------------------------------------  IN: 780 mL / OUT: 450 mL / NET: 330 mL      CAPILLARY BLOOD GLUCOSE      POCT Blood Glucose.: 92 mg/dL (13 May 2020 07:28)    ECG:    PHYSICAL EXAM:  Seen and examined by the attending     MEDICATIONS:  MEDICATIONS  (STANDING):  apixaban 5 milliGRAM(s) Oral every 12 hours  aspirin  chewable 81 milliGRAM(s) Oral every 24 hours  baclofen 5 milliGRAM(s) Oral every 12 hours  dextrose 5%. 1000 milliLiter(s) (50 mL/Hr) IV Continuous <Continuous>  dextrose 50% Injectable 12.5 Gram(s) IV Push once  dextrose 50% Injectable 25 Gram(s) IV Push once  dextrose 50% Injectable 25 Gram(s) IV Push once  dorzolamide 2%/timolol 0.5% Ophthalmic Solution 1 Drop(s) Both EYES every 12 hours  famotidine    Tablet 20 milliGRAM(s) Oral every 24 hours  folic acid 1 milliGRAM(s) Oral daily  gabapentin 400 milliGRAM(s) Oral three times a day  insulin lispro (HumaLOG) corrective regimen sliding scale   SubCutaneous three times a day before meals  insulin lispro (HumaLOG) corrective regimen sliding scale   SubCutaneous at bedtime  latanoprost 0.005% Ophthalmic Solution 1 Drop(s) Both EYES at bedtime  lidocaine   Patch 2 Patch Transdermal every 24 hours  methylPREDNISolone sodium succinate Injectable 40 milliGRAM(s) IV Push every 8 hours  piperacillin/tazobactam IVPB.. 4.5 Gram(s) IV Intermittent every 6 hours  sodium chloride 0.9%. 1000 milliLiter(s) (150 mL/Hr) IV Continuous <Continuous>  tradipitant vs placebo ([id number]) 85 milliGRAM(s) Oral every 12 hours    MEDICATIONS  (PRN):  acetaminophen   Tablet .. 650 milliGRAM(s) Oral every 6 hours PRN Temp greater or equal to 38.5C (101.3F)  dextrose 40% Gel 15 Gram(s) Oral once PRN Blood Glucose LESS THAN 70 milliGRAM(s)/deciliter  glucagon  Injectable 1 milliGRAM(s) IntraMuscular once PRN Glucose LESS THAN 70 milligrams/deciliter  oxyCODONE    IR 5 milliGRAM(s) Oral every 4 hours PRN Moderate Pain (4 - 6)      ALLERGIES:  Allergies    No Known Allergies    Intolerances        LABS:                        11.6   7.56  )-----------( 92       ( 12 May 2020 05:22 )             35.8     05-13    148<H>  |  114<H>  |  22  ----------------------------<  105<H>  4.0   |  23  |  1.15    Ca    7.7<L>      13 May 2020 04:36  Phos  2.2     05-13  Mg     2.0     05-13    TPro  5.9<L>  /  Alb  3.0<L>  /  TBili  2.2<H>  /  DBili  x   /  AST  109<H>  /  ALT  58<H>  /  AlkPhos  42  05-13      Urinalysis Basic - ( 11 May 2020 21:50 )    Color: Yellow / Appearance: Clear / S.010 / pH: x  Gluc: x / Ketone: NEGATIVE  / Bili: Negative / Urobili: 2.0 E.U./dL   Blood: x / Protein: 30 mg/dL / Nitrite: POSITIVE   Leuk Esterase: NEGATIVE / RBC: < 5 /HPF / WBC < 5 /HPF   Sq Epi: x / Non Sq Epi: 0-5 /HPF / Bacteria: Many /HPF        RADIOLOGY & ADDITIONAL TESTS: Reviewed.      · Assessment	  68 y/o M with PMH of progressive lower extremity weakness, s/p lumbar decompression in 2019. s/p sural nerve biopsy on  for lower extremity weakness and atrophy, cardiomegaly, DVT/PE in  (on Eliquis), HTN, MR, and galucoma presenting with severe back pain s/p fall before presentation with SETH and rising CK, now with concern for rhabdomyolysis vs myositis.         Problem/Plan - 1:  ·  Problem: Pneumonia due to COVID-19 virus.  Plan: Pt with sore throat and runny nose and mild diarrhea for the past few days but no SOB or cough, was found to be positive for COVID. Had fever and tachycardia in ED but no O2 desaturation. currently sating 94% on 2L. CXR clear.   COVID positive, Ferritin 2537, CRP 3.33  - Monitor O2 saturation and symptoms  - Trending inflammatory markers daily  - tylenol for fever  - symptomtic management   - contact precaustions: isolation and droplet  - avoid NSAID/ACE-I/ARB  - Odyssey trial   - Solumedrol x5 days  - Advanced from NRB to HFNC  2/2 to desat to low 80s - will try and decrease settings on HFNC        #Acute Hypoxic Resp Failure / COVID-19  Patient with increased O2 requirements (79% on RA) s/p lasix 20mg. CXR without evidence of congestion. Currently on 15L NRB  - s/p tociluzumab ()  - initiated solumedrol 40mg BID -> taper as per pulm  - wean O2 as tolerated  - patient is not a candidate for awake proning, unable to tolerate 2/2 to chronic pain and previous spinal surgeries.      Problem/Plan - 2:  ·  Problem: Severe sepsis.  Plan: Sepsis of unknown etiology. Patient with severe sepsis criteria on admission (fever, tachycardia and elevated lactate), with complaints of URI and diarrhea. Found to COVID-19 positive. No evidence of leukocytosis, procalcitonin 0.28.  WBC was normal. procal is 0.28. Sepsis is likely due to COVID as well as dehydration. Right knee with erythema, no evidence of effusion. Gallstones present, Perdue sign negative. Still with fever (102) and leukopenia. Differential diagnosis autoinflammatory state 2/2 COVID-19. Patient desaturating to 87% on RA, requiring 6L oxygen, weaned to 2L.   - Blood cultures NGTD  - UA negative   - Fever management with tylenol   - Knee xray w/o evidence of septic joint   - Tylenol for fever  - COVID management as below  - s/p tocilizumab ()    - CT chest obtained to evaluate for ?consolidation demonstrating basilar predominant groundglass opacities bilaterally; consistent with COVID-19.   - repeat blood cultures drawn  - NGTD  - empirically started on Vanc and Zosyn ;   - UA + pt asymptomatic, likely not source of infeciton  - Vanco discontinued due to negative cultures, Zosyn will continue for 7 days ( - )       Problem/Plan - 3:  ·  Problem: R/O Rhabdomyolysis.  Plan: Patient with rising CK in setting of fall and SETH, and reported hematuria prior to arrival. Also, with hypophosphatemia, concern for rhabdomyolysis. CK downtrending slowly with improvement in Cr.   -  cc/hr   - trend CK q8hr, decreasing daily,   - monitor Cr decreasing          Problem/Plan - 4:  ·  Problem: SETH (acute kidney injury).  Plan: RESOLVED   Pt with baseline Cr 1.1-1.2 presented with Cr 1.88 and BUN 52 likely due to prerenal azotemia likely due to dehydration following having diarrhea for few days and not eating and drinking well. responded to fluid therapy and decreased to 1.68. Urine lytes also were compatible with pre-renal causes, s/p 1.5 L of IVF in ED  - decreasing, last level 1.15  	Will monitor for increases in setting of new Vanco + net - status  - monitor kidney function  - Will get 6pm BMP     #Hematuria: RESOLVED   Patient with compliant of blood in urine and UA demonstrating large blood,  likely due to rhabdomyolysis   - will monitor the urine for gross hematuria   - c/w home Eliquis 5mg BID.        Problem/Plan - 5:  ·  Problem: Neuropathy.  Plan: Has a chronic neuropathy likely secondary to disc herniation. on gabapentin 400 tid at home. Currently in worsening back pain and tingling and numbness. Follows with Dr. Brito outpatient.   - LE dopplers negative for DVT  - CPK elevated -> concern for myositis as underlying etiology   - RF 22 -> mildly elevated  - f/u AHMET, anti-dsDNA, anti-Isaura   - trend CPK   - EMG unchanged from prior   - c/w home gabapentin 400 tid    #Fall  Patient with reported fall and multiple previous falls. Denies LOC or trauma to head.   Ambulates with walker. Unwilling to cooperate with neurological exam 2/2 to reported pain.   - PT evaluation -> recommendation of [facility]   - neurology following    #Lethargy   Patient with increased lethargy, less arousable compared to baseline. AAOx3 this AM.   - EEG w/ generalized slowing.      Problem/Plan - 6:  Problem: Acute midline low back pain, unspecified whether sciatica present. Plan: Pt with chronic radiculopathy and low back pain due to disc herniation s/p lumbar decompression in 2019. s/p sural nerve biopsy on  due to muscle weakness, presented with worsening low back pain after fall few days before presentation as well as worsening weakness in the LEs mainly in the right side and few weeks of urinary incontinence. exam shows weakness of the LEs.   MRI spine showed Multilevel disc herniations with multilevel neural foraminal narrowing but no signs of acute cord compression.   Likely worsening of the chronic radiculopathy due to worsening of spinal stenosis due to disc herniations.   - Continue his home dose of oxycodone 5 q4 PRN  - Lidocaine patch 2 patches every day  - holding home Nucenta ER 100mg   - c/w home Gabapentin 400 tid  - No neurosurgical intervention at this time   - evaluated by Dr. Brito (outpatient neurologist) -> EMG without evidence of myopathy or myositis, unchanged from prior (2019)   - neurology following apperciate recs   - PT/OT evaluation -> recommend [facility]     #Right Knee Arthritis   Pt complains of pain in the right knee with unknown duration. Has mild swelling as well as tenderness and redness in the right knee.   WBC is normal, has CRP 3.33 that can be due to COVID infection, porcal is mildly elevated 0.28. uric acid is 10.1.   Can be gout or pseudogout. Septic joint on DDx as well  - Knee xray without evidence of effusion   - PT eval -> [facility].     Problem/Plan - 7:  ·  Problem: DVT (deep venous thrombosis).  Plan: Pt with hx of DVT and PE, on Eliquis 5 daily at home. DVT reported in 2018, unclear indication for AC at this time.   - c/w home Eliquis 5mg BID qd     #Thrombocytopenia  Patient with baseline plt 110s, 84 on arrival and decreasing to 61 on admission. Possibly 2/2 viral infection and sepsis. No evidence of bleeding.  - Hematology consulted -> likely 2/2 COVID-19   - Hep C +  - monitor for signs of bleeding.   - Pending AM cbc     Problem/Plan - 8:  ·  Problem: HTN (hypertension).  Plan: Pt with hx of HTN on lisinopril 10 at home  Currently with symptoms of sepsis, dehydration, SETH   - hold home lisinopril  - monitor BPs  - Will resume when more stable and has high BP    #Hyperbilirubinemia   Pt with bili 2.7 and elevated AST and ALT but normal Alk-p.RUQ ultrasound demonstrating gallbladder with multiple gallstones. Benign abdominal exam. Negative perdue sign.  Likely 2/2 COVID infection or due to gallbladder stones. Improved with fluid therapy. Patient Hep C positive.   - monitor LFT  - Hepatitis panel negative  - Bili and LFTs downtrending this AM    #Glaucoma  Patient with history of glaucoma. c/w home latanoprost 0.005%.      Problem/Plan - 9:  ·  GI  PPX famotidine   Diet switched to mechanical soft 2/2 poss aspiration on solids  Speech and swallow eval - Pending eval      Problem/Plan - 10:  Problem: Prophylactic measure.  Plan: Fluids: NS  Electrolytes-replete as needed  Nutrition - Soft Mechanical det  Code- Full Code  DVT ppx: Eliquis   GI ppx: none needed  DIspo: RMF    DISCHARGE PLANNING:  [facility] vs Home

## 2020-05-13 NOTE — PROGRESS NOTE ADULT - SUBJECTIVE AND OBJECTIVE BOX
Neurology Progress Note    INTERVAL HPI/OVERNIGHT EVENTS:  Patient seen and examined by Dr. Tan. Patient improving with downtrending CK.     MEDICATIONS  (STANDING):  apixaban 5 milliGRAM(s) Oral every 12 hours  aspirin  chewable 81 milliGRAM(s) Oral every 24 hours  baclofen 5 milliGRAM(s) Oral every 12 hours  dextrose 5%. 1000 milliLiter(s) (50 mL/Hr) IV Continuous <Continuous>  dextrose 50% Injectable 12.5 Gram(s) IV Push once  dextrose 50% Injectable 25 Gram(s) IV Push once  dextrose 50% Injectable 25 Gram(s) IV Push once  dorzolamide 2%/timolol 0.5% Ophthalmic Solution 1 Drop(s) Both EYES every 12 hours  famotidine    Tablet 20 milliGRAM(s) Oral every 24 hours  folic acid 1 milliGRAM(s) Oral daily  gabapentin 400 milliGRAM(s) Oral three times a day  insulin lispro (HumaLOG) corrective regimen sliding scale   SubCutaneous three times a day before meals  insulin lispro (HumaLOG) corrective regimen sliding scale   SubCutaneous at bedtime  latanoprost 0.005% Ophthalmic Solution 1 Drop(s) Both EYES at bedtime  lidocaine   Patch 2 Patch Transdermal every 24 hours  methylPREDNISolone sodium succinate Injectable 40 milliGRAM(s) IV Push every 8 hours  OLANZapine 5 milliGRAM(s) Oral at bedtime  piperacillin/tazobactam IVPB.. 4.5 Gram(s) IV Intermittent every 6 hours  sodium chloride 0.9%. 1000 milliLiter(s) (125 mL/Hr) IV Continuous <Continuous>  tradipitant vs placebo ([id number]) 85 milliGRAM(s) Oral every 12 hours    MEDICATIONS  (PRN):  acetaminophen   Tablet .. 650 milliGRAM(s) Oral every 6 hours PRN Temp greater or equal to 38.5C (101.3F)  dextrose 40% Gel 15 Gram(s) Oral once PRN Blood Glucose LESS THAN 70 milliGRAM(s)/deciliter  glucagon  Injectable 1 milliGRAM(s) IntraMuscular once PRN Glucose LESS THAN 70 milligrams/deciliter  oxyCODONE    IR 5 milliGRAM(s) Oral every 4 hours PRN Moderate Pain (4 - 6)      Allergies    No Known Allergies    Intolerances        Vital Signs Last 24 Hrs  T(C): 36.2 (13 May 2020 12:06), Max: 36.6 (13 May 2020 00:00)  T(F): 97.2 (13 May 2020 12:06), Max: 97.9 (13 May 2020 00:00)  HR: 68 (13 May 2020 12:) (55 - 78)  BP: 119/63 (13 May 2020 12:) (97/58 - 143/77)  BP(mean): 67 (13 May 2020 06:00) (67 - 103)  RR: 22 (13 May 2020 12:) (18 - 31)  SpO2: 95% (13 May 2020 12:) (92% - 100%)    Physical exam:  Neurologic:  Mental status: awake, alert, oriented to person, place, and time. Speech is fluent. Follows commands. Attention/concentration intact. No dysarthria, no aphasia. Voice slightly hoarse  Cranial nerves:   II: visual fields are full to confrontation. pupils equally round and reactive to light,   III, IV, VI: EOMI without nystagmus  VII: no facial droop, facie is symmetric with normal eye closure and smile  Motor: Normal bulk and tone. moves all extremities to antigravity.   Sensation: intact to light touch.     COVID LABS:   D-Dimer Assay, Quantitative: 462 (20)  D-Dimer Assay, Quantitative: 506 (20)  D-Dimer Assay, Quantitative: 409 (05-10-20)  D-Dimer Assay, Quantitative: 294 (20)  D-Dimer Assay, Quantitative: 248 (20)  D-Dimer Assay, Quantitative: 212 (20)      Ferritin, Serum: 5283 ( @ 04:36)  Ferritin, Serum: 25860 ( @ 05:22)  Ferritin, Serum: 40790 ( @ 06:44)  Ferritin, Serum: 92496 (05-10 @ 10:26)  Ferritin, Serum: 9946 ( @ 07:18)  Ferritin, Serum: 2218 ( @ 07:55)  Ferritin, Serum: 2537 ( @ 21:40)      C-Reactive Protein, Serum: 1.06 ( @ 04:36)  C-Reactive Protein, Serum: 2.45 ( @ 05:22)  C-Reactive Protein, Serum: 3.59 ( @ 06:44)  C-Reactive Protein, Serum: 5.45 (05-10 @ 10:26)  C-Reactive Protein, Serum: 4.92 ( @ 07:18)  C-Reactive Protein, Serum: 4.55 ( @ 06:05)  C-Reactive Protein, Serum: 2.82 ( @ 07:38)  C-Reactive Protein, Serum: 3.33 ( @ 12:45)                            9.8    9.23  )-----------( 90       ( 13 May 2020 04:36 )             30.0     05    148<H>  |  114<H>  |  22  ----------------------------<  105<H>  4.0   |  23  |  1.15    Ca    7.7<L>      13 May 2020 04:36  Phos  2.2       Mg     2.0         TPro  5.9<L>  /  Alb  3.0<L>  /  TBili  2.2<H>  /  DBili  x   /  AST  109<H>  /  ALT  58<H>  /  AlkPhos  42  05-13      Urinalysis Basic - ( 11 May 2020 21:50 )    Color: Yellow / Appearance: Clear / S.010 / pH: x  Gluc: x / Ketone: NEGATIVE  / Bili: Negative / Urobili: 2.0 E.U./dL   Blood: x / Protein: 30 mg/dL / Nitrite: POSITIVE   Leuk Esterase: NEGATIVE / RBC: < 5 /HPF / WBC < 5 /HPF   Sq Epi: x / Non Sq Epi: 0-5 /HPF / Bacteria: Many /HPF        RADIOLOGY & ADDITIONAL TESTS:

## 2020-05-13 NOTE — PROGRESS NOTE ADULT - ASSESSMENT
70y Male w/ hx of lumbar spondylosis and right L4 radiculopathy with right leg weakness, s/p lumbar decompression at [facility] 1/2019 with improvement, and admitted for severe leg and back pain on 5/6. MRI T and L spine negative. COVID + EMG done 5/8 with mild progression of moderate sensorimotor polyneuropathy, and right lumbar plexopathy in comparison to a year ago. No evidence of myositis / myopathy. EEG obtained for fluctuating MS on 5/9 showed no seizures. CK elevation likely related to covid infection given lack of myopathy on EMG; and no response to fluid hydration. TOCI administered on 5/9. Patient's CK downtrending and patient's exam is improving.     - Continue baclofen 5mg BID for leg spasms, may increase as needed.  - f/u Thiamine, B1 level  - Thank you for allowing us to participate in the care of this patient, please call Neurology as needed.

## 2020-05-13 NOTE — PROGRESS NOTE ADULT - ATTENDING COMMENTS
Acute Hypoxic Resp Failure 2/2 COVID-19 PNA  - cont steroids  - on HFNC, wean as tolerated  - speech/swallow consult appreciated  - encourage PO intake   - cont fluids for rhabdo (improving)

## 2020-05-14 LAB
ALBUMIN SERPL ELPH-MCNC: 2.6 G/DL — LOW (ref 3.3–5)
ALP SERPL-CCNC: 45 U/L — SIGNIFICANT CHANGE UP (ref 40–120)
ALT FLD-CCNC: 76 U/L — HIGH (ref 10–45)
ANION GAP SERPL CALC-SCNC: 12 MMOL/L — SIGNIFICANT CHANGE UP (ref 5–17)
AST SERPL-CCNC: 95 U/L — HIGH (ref 10–40)
BASOPHILS # BLD AUTO: 0 K/UL — SIGNIFICANT CHANGE UP (ref 0–0.2)
BASOPHILS NFR BLD AUTO: 0 % — SIGNIFICANT CHANGE UP (ref 0–2)
BILIRUB SERPL-MCNC: 1.7 MG/DL — HIGH (ref 0.2–1.2)
BUN SERPL-MCNC: 19 MG/DL — SIGNIFICANT CHANGE UP (ref 7–23)
CALCIUM SERPL-MCNC: 6.7 MG/DL — LOW (ref 8.4–10.5)
CHLORIDE SERPL-SCNC: 119 MMOL/L — HIGH (ref 96–108)
CK SERPL-CCNC: 469 U/L — HIGH (ref 30–200)
CO2 SERPL-SCNC: 19 MMOL/L — LOW (ref 22–31)
CREAT SERPL-MCNC: 0.97 MG/DL — SIGNIFICANT CHANGE UP (ref 0.5–1.3)
CRP SERPL-MCNC: 0.67 MG/DL — HIGH (ref 0–0.4)
CULTURE RESULTS: SIGNIFICANT CHANGE UP
D DIMER BLD IA.RAPID-MCNC: 3529 NG/ML DDU — HIGH
EOSINOPHIL # BLD AUTO: 0 K/UL — SIGNIFICANT CHANGE UP (ref 0–0.5)
EOSINOPHIL NFR BLD AUTO: 0 % — SIGNIFICANT CHANGE UP (ref 0–6)
FERRITIN SERPL-MCNC: 2472 NG/ML — HIGH (ref 30–400)
GLUCOSE BLDC GLUCOMTR-MCNC: 109 MG/DL — HIGH (ref 70–99)
GLUCOSE BLDC GLUCOMTR-MCNC: 126 MG/DL — HIGH (ref 70–99)
GLUCOSE BLDC GLUCOMTR-MCNC: 87 MG/DL — SIGNIFICANT CHANGE UP (ref 70–99)
GLUCOSE SERPL-MCNC: 112 MG/DL — HIGH (ref 70–99)
HCT VFR BLD CALC: 31.4 % — LOW (ref 39–50)
HGB BLD-MCNC: 9.9 G/DL — LOW (ref 13–17)
LYMPHOCYTES # BLD AUTO: 0.11 K/UL — LOW (ref 1–3.3)
LYMPHOCYTES # BLD AUTO: 0.9 % — LOW (ref 13–44)
MCHC RBC-ENTMCNC: 30 PG — SIGNIFICANT CHANGE UP (ref 27–34)
MCHC RBC-ENTMCNC: 31.5 GM/DL — LOW (ref 32–36)
MCV RBC AUTO: 95.2 FL — SIGNIFICANT CHANGE UP (ref 80–100)
MONOCYTES # BLD AUTO: 0.11 K/UL — SIGNIFICANT CHANGE UP (ref 0–0.9)
MONOCYTES NFR BLD AUTO: 0.9 % — LOW (ref 2–14)
NEUTROPHILS # BLD AUTO: 11.74 K/UL — HIGH (ref 1.8–7.4)
NEUTROPHILS NFR BLD AUTO: 96.5 % — HIGH (ref 43–77)
PLATELET # BLD AUTO: 108 K/UL — LOW (ref 150–400)
POTASSIUM SERPL-MCNC: 4 MMOL/L — SIGNIFICANT CHANGE UP (ref 3.5–5.3)
POTASSIUM SERPL-SCNC: 4 MMOL/L — SIGNIFICANT CHANGE UP (ref 3.5–5.3)
PROT SERPL-MCNC: 5.4 G/DL — LOW (ref 6–8.3)
RBC # BLD: 3.3 M/UL — LOW (ref 4.2–5.8)
RBC # FLD: 11.6 % — SIGNIFICANT CHANGE UP (ref 10.3–14.5)
SODIUM SERPL-SCNC: 150 MMOL/L — HIGH (ref 135–145)
SPECIMEN SOURCE: SIGNIFICANT CHANGE UP
TROPONIN T SERPL-MCNC: <0.01 NG/ML — SIGNIFICANT CHANGE UP (ref 0–0.01)
VIT B1 SERPL-MCNC: 67.1 NMOL/L — SIGNIFICANT CHANGE UP (ref 66.5–200)
WBC # BLD: 11.96 K/UL — HIGH (ref 3.8–10.5)
WBC # FLD AUTO: 11.96 K/UL — HIGH (ref 3.8–10.5)

## 2020-05-14 PROCEDURE — 71045 X-RAY EXAM CHEST 1 VIEW: CPT | Mod: 26,CS

## 2020-05-14 PROCEDURE — 93970 EXTREMITY STUDY: CPT | Mod: 26

## 2020-05-14 PROCEDURE — 99291 CRITICAL CARE FIRST HOUR: CPT

## 2020-05-14 RX ORDER — BACLOFEN 100 %
5 POWDER (GRAM) MISCELLANEOUS EVERY 8 HOURS
Refills: 0 | Status: DISCONTINUED | OUTPATIENT
Start: 2020-05-14 | End: 2020-05-17

## 2020-05-14 RX ORDER — SODIUM CHLORIDE 9 MG/ML
1000 INJECTION, SOLUTION INTRAVENOUS
Refills: 0 | Status: DISCONTINUED | OUTPATIENT
Start: 2020-05-14 | End: 2020-05-14

## 2020-05-14 RX ORDER — SODIUM CHLORIDE 9 MG/ML
1000 INJECTION, SOLUTION INTRAVENOUS
Refills: 0 | Status: DISCONTINUED | OUTPATIENT
Start: 2020-05-14 | End: 2020-05-16

## 2020-05-14 RX ORDER — THIAMINE MONONITRATE (VIT B1) 100 MG
100 TABLET ORAL EVERY 24 HOURS
Refills: 0 | Status: DISCONTINUED | OUTPATIENT
Start: 2020-05-14 | End: 2020-05-20

## 2020-05-14 RX ORDER — OXYCODONE HYDROCHLORIDE 5 MG/1
5 TABLET ORAL ONCE
Refills: 0 | Status: DISCONTINUED | OUTPATIENT
Start: 2020-05-14 | End: 2020-05-14

## 2020-05-14 RX ORDER — BACLOFEN 100 %
5 POWDER (GRAM) MISCELLANEOUS ONCE
Refills: 0 | Status: COMPLETED | OUTPATIENT
Start: 2020-05-14 | End: 2020-05-14

## 2020-05-14 RX ORDER — ACETAMINOPHEN 500 MG
650 TABLET ORAL EVERY 6 HOURS
Refills: 0 | Status: DISCONTINUED | OUTPATIENT
Start: 2020-05-14 | End: 2020-05-20

## 2020-05-14 RX ORDER — SENNA PLUS 8.6 MG/1
2 TABLET ORAL AT BEDTIME
Refills: 0 | Status: DISCONTINUED | OUTPATIENT
Start: 2020-05-14 | End: 2020-05-20

## 2020-05-14 RX ORDER — OXYCODONE HYDROCHLORIDE 5 MG/1
5 TABLET ORAL EVERY 4 HOURS
Refills: 0 | Status: DISCONTINUED | OUTPATIENT
Start: 2020-05-14 | End: 2020-05-20

## 2020-05-14 RX ADMIN — PIPERACILLIN AND TAZOBACTAM 200 GRAM(S): 4; .5 INJECTION, POWDER, LYOPHILIZED, FOR SOLUTION INTRAVENOUS at 23:17

## 2020-05-14 RX ADMIN — Medication 40 MILLIGRAM(S): at 16:19

## 2020-05-14 RX ADMIN — LIDOCAINE 2 PATCH: 4 CREAM TOPICAL at 19:58

## 2020-05-14 RX ADMIN — Medication 650 MILLIGRAM(S): at 06:23

## 2020-05-14 RX ADMIN — OXYCODONE HYDROCHLORIDE 5 MILLIGRAM(S): 5 TABLET ORAL at 03:25

## 2020-05-14 RX ADMIN — OLANZAPINE 5 MILLIGRAM(S): 15 TABLET, FILM COATED ORAL at 21:33

## 2020-05-14 RX ADMIN — PIPERACILLIN AND TAZOBACTAM 200 GRAM(S): 4; .5 INJECTION, POWDER, LYOPHILIZED, FOR SOLUTION INTRAVENOUS at 05:56

## 2020-05-14 RX ADMIN — Medication 40 MILLIGRAM(S): at 21:32

## 2020-05-14 RX ADMIN — GABAPENTIN 400 MILLIGRAM(S): 400 CAPSULE ORAL at 05:56

## 2020-05-14 RX ADMIN — Medication 5 MILLIGRAM(S): at 21:33

## 2020-05-14 RX ADMIN — Medication 650 MILLIGRAM(S): at 23:12

## 2020-05-14 RX ADMIN — DORZOLAMIDE HYDROCHLORIDE TIMOLOL MALEATE 1 DROP(S): 20; 5 SOLUTION/ DROPS OPHTHALMIC at 05:55

## 2020-05-14 RX ADMIN — PIPERACILLIN AND TAZOBACTAM 200 GRAM(S): 4; .5 INJECTION, POWDER, LYOPHILIZED, FOR SOLUTION INTRAVENOUS at 19:36

## 2020-05-14 RX ADMIN — APIXABAN 5 MILLIGRAM(S): 2.5 TABLET, FILM COATED ORAL at 03:24

## 2020-05-14 RX ADMIN — APIXABAN 5 MILLIGRAM(S): 2.5 TABLET, FILM COATED ORAL at 23:08

## 2020-05-14 RX ADMIN — Medication 5 MILLIGRAM(S): at 05:56

## 2020-05-14 RX ADMIN — LIDOCAINE 2 PATCH: 4 CREAM TOPICAL at 05:57

## 2020-05-14 RX ADMIN — Medication 81 MILLIGRAM(S): at 23:08

## 2020-05-14 RX ADMIN — LIDOCAINE 2 PATCH: 4 CREAM TOPICAL at 05:58

## 2020-05-14 RX ADMIN — OXYCODONE HYDROCHLORIDE 5 MILLIGRAM(S): 5 TABLET ORAL at 22:01

## 2020-05-14 RX ADMIN — GABAPENTIN 400 MILLIGRAM(S): 400 CAPSULE ORAL at 21:32

## 2020-05-14 RX ADMIN — PANTOPRAZOLE SODIUM 40 MILLIGRAM(S): 20 TABLET, DELAYED RELEASE ORAL at 06:25

## 2020-05-14 RX ADMIN — Medication 40 MILLIGRAM(S): at 05:57

## 2020-05-14 RX ADMIN — PIPERACILLIN AND TAZOBACTAM 200 GRAM(S): 4; .5 INJECTION, POWDER, LYOPHILIZED, FOR SOLUTION INTRAVENOUS at 10:55

## 2020-05-14 RX ADMIN — OXYCODONE HYDROCHLORIDE 5 MILLIGRAM(S): 5 TABLET ORAL at 23:12

## 2020-05-14 RX ADMIN — DORZOLAMIDE HYDROCHLORIDE TIMOLOL MALEATE 1 DROP(S): 20; 5 SOLUTION/ DROPS OPHTHALMIC at 19:59

## 2020-05-14 RX ADMIN — PIPERACILLIN AND TAZOBACTAM 200 GRAM(S): 4; .5 INJECTION, POWDER, LYOPHILIZED, FOR SOLUTION INTRAVENOUS at 03:14

## 2020-05-14 RX ADMIN — LIDOCAINE 2 PATCH: 4 CREAM TOPICAL at 21:31

## 2020-05-14 RX ADMIN — SENNA PLUS 2 TABLET(S): 8.6 TABLET ORAL at 21:33

## 2020-05-14 RX ADMIN — GABAPENTIN 400 MILLIGRAM(S): 400 CAPSULE ORAL at 19:57

## 2020-05-14 NOTE — PROGRESS NOTE ADULT - SUBJECTIVE AND OBJECTIVE BOX
INTERVAL HPI/OVERNIGHT EVENTS:  NAEON  tolerating CPAP, O2>92 on CPAP  started Zyprexa on 5/13    SUBJECTIVE: Patient seen and examined at bedside. Denies any pain, fevers, chils, nausea, vomiting. Does endorse diminished appetite.         VITAL SIGNS:  ICU Vital Signs Last 24 Hrs  T(C): 36.2 (13 May 2020 22:31), Max: 37 (13 May 2020 17:00)  T(F): 97.1 (13 May 2020 22:31), Max: 98.6 (13 May 2020 17:00)  HR: 69 (14 May 2020 12:00) (58 - 92)  BP: 140/89 (14 May 2020 12:00) (116/57 - 188/96)  BP(mean): 110 (14 May 2020 12:00) (96 - 115)  ABP: --  ABP(mean): --  RR: 20 (14 May 2020 12:00) (20 - 28)  SpO2: 92% (14 May 2020 12:00) (83% - 99%)      Plateau pressure:   P/F ratio:     05-13 @ 07:01  -  05-14 @ 07:00  --------------------------------------------------------  IN: 2360 mL / OUT: 3925 mL / NET: -1565 mL      CAPILLARY BLOOD GLUCOSE      POCT Blood Glucose.: 126 mg/dL (14 May 2020 11:26)    ECG:    PHYSICAL EXAM:    General:   HEENT:   Neck:   Respiratory:   Cardiovascular:   Abdomen:   Extremities:  Neurological:    MEDICATIONS:  MEDICATIONS  (STANDING):  apixaban 5 milliGRAM(s) Oral every 12 hours  aspirin  chewable 81 milliGRAM(s) Oral every 24 hours  baclofen 5 milliGRAM(s) Oral every 8 hours  dextrose 5% + sodium chloride 0.45%. 1000 milliLiter(s) (125 mL/Hr) IV Continuous <Continuous>  dextrose 5%. 1000 milliLiter(s) (50 mL/Hr) IV Continuous <Continuous>  dextrose 50% Injectable 12.5 Gram(s) IV Push once  dextrose 50% Injectable 25 Gram(s) IV Push once  dextrose 50% Injectable 25 Gram(s) IV Push once  dorzolamide 2%/timolol 0.5% Ophthalmic Solution 1 Drop(s) Both EYES every 12 hours  folic acid 1 milliGRAM(s) Oral daily  gabapentin 400 milliGRAM(s) Oral three times a day  insulin lispro (HumaLOG) corrective regimen sliding scale   SubCutaneous three times a day before meals  insulin lispro (HumaLOG) corrective regimen sliding scale   SubCutaneous at bedtime  latanoprost 0.005% Ophthalmic Solution 1 Drop(s) Both EYES at bedtime  lidocaine   Patch 2 Patch Transdermal every 24 hours  methylPREDNISolone sodium succinate Injectable 40 milliGRAM(s) IV Push every 8 hours  OLANZapine 5 milliGRAM(s) Oral at bedtime  pantoprazole    Tablet 40 milliGRAM(s) Oral before breakfast  piperacillin/tazobactam IVPB.. 4.5 Gram(s) IV Intermittent every 6 hours  senna 2 Tablet(s) Oral at bedtime  tradipitant vs placebo ([id number]) 85 milliGRAM(s) Oral every 12 hours    MEDICATIONS  (PRN):  acetaminophen   Tablet .. 650 milliGRAM(s) Oral every 6 hours PRN Temp greater or equal to 38.5C (101.3F)  acetaminophen   Tablet .. 650 milliGRAM(s) Oral every 6 hours PRN Mild Pain (1 - 3)  dextrose 40% Gel 15 Gram(s) Oral once PRN Blood Glucose LESS THAN 70 milliGRAM(s)/deciliter  glucagon  Injectable 1 milliGRAM(s) IntraMuscular once PRN Glucose LESS THAN 70 milligrams/deciliter  oxyCODONE    IR 5 milliGRAM(s) Oral every 4 hours PRN Moderate Pain (4 - 6)      ALLERGIES:  Allergies    No Known Allergies    Intolerances        LABS:                        9.9    11.96 )-----------( 108      ( 14 May 2020 05:54 )             31.4     05-14    150<H>  |  119<H>  |  19  ----------------------------<  112<H>  4.0   |  19<L>  |  0.97    Ca    6.7<L>      14 May 2020 05:54  Phos  2.2     05-13  Mg     2.0     05-13    TPro  5.4<L>  /  Alb  2.6<L>  /  TBili  1.7<H>  /  DBili  x   /  AST  95<H>  /  ALT  76<H>  /  AlkPhos  45  05-14          RADIOLOGY & ADDITIONAL TESTS: Reviewed.      · Assessment	  68 y/o M with PMH of progressive lower extremity weakness, s/p lumbar decompression in 1/2019. 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        Neuro:   On oxycodone 5mg home med  on Baclofen increased to q8h  Tylenol for fevers or pain  Chronic back - Pt endorses pain however not different from baseline, No new associated neurologic deficits.     Neuropathy.  Plan: Has a chronic neuropathy likely secondary to disc herniation. on gabapentin 400 tid at home. Currently in worsening back pain and tingling and numbness. Follows with Dr. Brito outpatient.   - LE dopplers negative for DVT  - CPK elevated -> concern for myositis as underlying etiology   - RF 22 -> mildly elevated  - f/u AHMET, anti-dsDNA, anti-Isaura   - trend CPK   - EMG unchanged from prior   - c/w home gabapentin 400 tid        Respiratory:   ·  Problem: Pneumonia due to COVID-19 virus.  Plan: Pt with sore throat and runny nose and mild diarrhea for the past few days but no SOB or cough, was found to be positive for COVID. Had fever and tachycardia in ED but no O2 desaturation. currently sating 94% on 2L. CXR clear.   COVID positive, Ferritin 2537, CRP 3.33  - Monitor O2 saturation and symptoms  - Trending inflammatory markers daily  - tylenol for fever  - symptomtic management   - contact precaustions: isolation and droplet  - avoid NSAID/ACE-I/ARB  - Odyssey trial   - Solumedrol x5 days  - Advanced from NRB to HFNC 5/12 2/2 to desat to low 80s - will try and decrease settings on HFNC   - Started on CPAP 5/13 ON, continues with CPAP on 5/15 saturating in low 90s -        #Acute Hypoxic Resp Failure 2/2 COVID-19  Patient with increased O2 requirements (79% on RA) s/p lasix 20mg. CXR without evidence of congestion. Currently on 15L NRB  - s/p tociluzumab (5/9)  - initiated solumedrol 40mg BID -> taper as per pulm  - wean O2 as tolerated  - patient is not a candidate for awake proning, unable to tolerate 2/2 to chronic pain and previous spinal surgeries.     Cardiovascular:    ·  Problem: HTN (hypertension).  Plan: Pt with hx of HTN on lisinopril 10 at home  Currently with symptoms of sepsis, dehydration, SETH   - hold home lisinopril  - monitor BPs  - Will resume when more stable and has high BP    ID  ·  Problem: Severe sepsis.  Plan: Sepsis of unknown etiology. Patient with severe sepsis criteria on admission (fever, tachycardia and elevated lactate), with complaints of URI and diarrhea. Found to COVID-19 positive. No evidence of leukocytosis, procalcitonin 0.28.  WBC was normal. procal is 0.28. Sepsis is likely due to COVID as well as dehydration. Right knee with erythema, no evidence of effusion. Gallstones present, Perdue sign negative. Still with fever (102) and leukopenia. Differential diagnosis autoinflammatory state 2/2 COVID-19. Patient desaturating to 87% on RA, requiring 6L oxygen, weaned to 2L.   - Blood cultures NGTD  - UA negative   - Fever management with tylenol   - Knee xray w/o evidence of septic joint   - Tylenol for fever  - COVID management as below  - s/p tocilizumab (5/9)    - CT chest obtained to evaluate for ?consolidation demonstrating basilar predominant groundglass opacities bilaterally; consistent with   - Will continue to monitor infectious markers,  of note WBC rising but no fevers.     COVID-19.   - repeat blood cultures drawn 5/11 - NGTD  - empirically started on Vanc and Zosyn 5/11;   - UA + pt asymptomatic, likely not source of infeciton  - Vanco discontinued due to negative cultures, Zosyn will continue for 7 days (5/11 - 5/18)        ·  Problem: R/O Rhabdomyolysis.  Plan: Patient with rising CK in setting of fall and SETH, and reported hematuria prior to arrival. Also, with hypophosphatemia, concern for rhabdomyolysis. CK downtrending slowly with improvement in Cr.   -  cc/hr   - trend CK q8hr, decreasing daily,   - monitor Cr decreasing     ·  Problem: SETH (acute kidney injury).  Plan: RESOLVED   Pt with baseline Cr 1.1-1.2 presented with Cr 1.88 and BUN 52 likely due to prerenal azotemia likely due to dehydration following having diarrhea for few days and not eating and drinking well. responded to fluid therapy and decreased to 1.68. Urine lytes also were compatible with pre-renal causes, s/p 1.5 L of IVF in ED  - decreasing, last level 1.15  	Will monitor for increases in setting of new Vanco + net - status  - monitor kidney function  - Will get 6pm BMP 5/13      Heme  #Hematuria: RESOLVED   Patient with compliant of blood in urine and UA demonstrating large blood,  likely due to rhabdomyolysis   - will monitor the urine for gross hematuria   - c/w home Eliquis 5mg BID.     ·  Problem: DVT (deep venous thrombosis).  Plan: Pt with hx of DVT and PE, on Eliquis 5 daily at home. DVT reported in 2018, unclear indication for AC at this time.   - c/w home Eliquis 5mg BID qd       #Thrombocytopenia  Patient with baseline plt 110s, 84 on arrival and decreasing to 61 on admission. Possibly 2/2 viral infection and sepsis. No evidence of bleeding.  - Hematology consulted -> likely 2/2 COVID-19   - Hep C +  - monitor for signs of bleeding.   - Platelets uptrending, 5/14    Endo  #Hyperbilirubinemia   Pt with bili 2.7 and elevated AST and ALT but normal Alk-[email or web address] ultrasound demonstrating gallbladder with multiple gallstones. Benign abdominal exam. Negative perdue sign.  Likely 2/2 COVID infection or due to gallbladder stones. Improved with fluid therapy. Patient Hep C positive.   - monitor LFT  - Hepatitis panel negative  - Bili and LFTs downtrending this AM  - Hypernatremia - Started on D5 1/2 NS @ 125ccs     ·GI  PPX famotidine   Diet switched to mechanical soft 2/2 poss aspiration on solids  Speech and swallow eval - Soft mechanical diet, NG tube if pt not eating.        Problem: Prophylactic measure.  Plan: Fluids: D5 1/2 NS @125 due to hypernatremia  Electrolytes-replete as needed  Nutrition - Soft Mechanical det  Code- Full Code  DVT ppx: Eliquis   GI ppx: none needed  DIspo: RMF    DISCHARGE PLANNING:  [facility] vs Home

## 2020-05-14 NOTE — CHART NOTE - NSCHARTNOTEFT_GEN_A_CORE
DAILY ASSESSMENTS (Required on all days, including Day 1 and Discharge Day)    Day: 3    NRS cough: Please rate the cough severity by selecting the number from 0 to 10 that best describes your worst level of cough in the past 24 hours: 0    NRS nausea: Please rate the worst severity of your nausea from 0 to 5 during the past 24 hours: 0    Hypercapnic respiratory failure?  NO     SpO2:   92%    FiO2 or LPM: CPAP O2 80%, PEEP 5    Vital signs (if not already collected by nurse): temperature, blood pressure, pulse, respiratory rate, oxygen saturation  See progress note    If this is day 1 or discharge day: CXR, and EKG as noted below    ____________________________________________________  Patient discussed with Dr. Padron.     Christy Webber MD  Graduate Physician - COVID Emergency Events

## 2020-05-14 NOTE — PROGRESS NOTE ADULT - ATTENDING COMMENTS
Acute Hypoxic Resp Failure 2/2 COVID-19 PNA  - hypoxic on max HFNC therefore transitioned to BIPAP overnight, wean as tolerated  - cont steroids   - change IVF t0 1/2 NS given hypernatremia  - speech/swallow consult appreciated  - cont to encourage PO intake   - cont fluids for rhabdo (improving)   - LE US r/o DVT

## 2020-05-15 LAB
ALBUMIN SERPL ELPH-MCNC: 3.4 G/DL — SIGNIFICANT CHANGE UP (ref 3.3–5)
ALP SERPL-CCNC: 93 U/L — SIGNIFICANT CHANGE UP (ref 40–120)
ALT FLD-CCNC: 104 U/L — HIGH (ref 10–45)
ANION GAP SERPL CALC-SCNC: 13 MMOL/L — SIGNIFICANT CHANGE UP (ref 5–17)
ANISOCYTOSIS BLD QL: SLIGHT — SIGNIFICANT CHANGE UP
AST SERPL-CCNC: 101 U/L — HIGH (ref 10–40)
BASOPHILS # BLD AUTO: 0 K/UL — SIGNIFICANT CHANGE UP (ref 0–0.2)
BASOPHILS NFR BLD AUTO: 0 % — SIGNIFICANT CHANGE UP (ref 0–2)
BILIRUB SERPL-MCNC: 2 MG/DL — HIGH (ref 0.2–1.2)
BUN SERPL-MCNC: 20 MG/DL — SIGNIFICANT CHANGE UP (ref 7–23)
CALCIUM SERPL-MCNC: 8.2 MG/DL — LOW (ref 8.4–10.5)
CHLORIDE SERPL-SCNC: 112 MMOL/L — HIGH (ref 96–108)
CK SERPL-CCNC: 227 U/L — HIGH (ref 30–200)
CO2 SERPL-SCNC: 21 MMOL/L — LOW (ref 22–31)
CREAT SERPL-MCNC: 0.92 MG/DL — SIGNIFICANT CHANGE UP (ref 0.5–1.3)
CRP SERPL-MCNC: 1.07 MG/DL — HIGH (ref 0–0.4)
EOSINOPHIL # BLD AUTO: 0 K/UL — SIGNIFICANT CHANGE UP (ref 0–0.5)
EOSINOPHIL NFR BLD AUTO: 0 % — SIGNIFICANT CHANGE UP (ref 0–6)
GIANT PLATELETS BLD QL SMEAR: PRESENT — SIGNIFICANT CHANGE UP
GLUCOSE BLDC GLUCOMTR-MCNC: 105 MG/DL — HIGH (ref 70–99)
GLUCOSE BLDC GLUCOMTR-MCNC: 117 MG/DL — HIGH (ref 70–99)
GLUCOSE BLDC GLUCOMTR-MCNC: 120 MG/DL — HIGH (ref 70–99)
GLUCOSE BLDC GLUCOMTR-MCNC: 198 MG/DL — HIGH (ref 70–99)
GLUCOSE SERPL-MCNC: 126 MG/DL — HIGH (ref 70–99)
HCT VFR BLD CALC: 33.9 % — LOW (ref 39–50)
HGB BLD-MCNC: 11.1 G/DL — LOW (ref 13–17)
HYPOCHROMIA BLD QL: SLIGHT — SIGNIFICANT CHANGE UP
LYMPHOCYTES # BLD AUTO: 1.02 K/UL — SIGNIFICANT CHANGE UP (ref 1–3.3)
LYMPHOCYTES # BLD AUTO: 6.1 % — LOW (ref 13–44)
MACROCYTES BLD QL: SLIGHT — SIGNIFICANT CHANGE UP
MAGNESIUM SERPL-MCNC: 1.8 MG/DL — SIGNIFICANT CHANGE UP (ref 1.6–2.6)
MANUAL SMEAR VERIFICATION: SIGNIFICANT CHANGE UP
MCHC RBC-ENTMCNC: 30.3 PG — SIGNIFICANT CHANGE UP (ref 27–34)
MCHC RBC-ENTMCNC: 32.7 GM/DL — SIGNIFICANT CHANGE UP (ref 32–36)
MCV RBC AUTO: 92.6 FL — SIGNIFICANT CHANGE UP (ref 80–100)
MONOCYTES # BLD AUTO: 0.15 K/UL — SIGNIFICANT CHANGE UP (ref 0–0.9)
MONOCYTES NFR BLD AUTO: 0.9 % — LOW (ref 2–14)
NEUTROPHILS # BLD AUTO: 15.59 K/UL — HIGH (ref 1.8–7.4)
NEUTROPHILS NFR BLD AUTO: 93 % — HIGH (ref 43–77)
OVALOCYTES BLD QL SMEAR: SLIGHT — SIGNIFICANT CHANGE UP
PHOSPHATE SERPL-MCNC: 2.1 MG/DL — LOW (ref 2.5–4.5)
PLAT MORPH BLD: ABNORMAL
PLATELET # BLD AUTO: 125 K/UL — LOW (ref 150–400)
POIKILOCYTOSIS BLD QL AUTO: SLIGHT — SIGNIFICANT CHANGE UP
POLYCHROMASIA BLD QL SMEAR: SLIGHT — SIGNIFICANT CHANGE UP
POTASSIUM SERPL-MCNC: 4.1 MMOL/L — SIGNIFICANT CHANGE UP (ref 3.5–5.3)
POTASSIUM SERPL-SCNC: 4.1 MMOL/L — SIGNIFICANT CHANGE UP (ref 3.5–5.3)
PROT SERPL-MCNC: 6.6 G/DL — SIGNIFICANT CHANGE UP (ref 6–8.3)
RBC # BLD: 3.66 M/UL — LOW (ref 4.2–5.8)
RBC # FLD: 11.6 % — SIGNIFICANT CHANGE UP (ref 10.3–14.5)
RBC BLD AUTO: ABNORMAL
SODIUM SERPL-SCNC: 146 MMOL/L — HIGH (ref 135–145)
WBC # BLD: 16.76 K/UL — HIGH (ref 3.8–10.5)
WBC # FLD AUTO: 16.76 K/UL — HIGH (ref 3.8–10.5)

## 2020-05-15 PROCEDURE — 71045 X-RAY EXAM CHEST 1 VIEW: CPT | Mod: 26,CS

## 2020-05-15 PROCEDURE — 99291 CRITICAL CARE FIRST HOUR: CPT

## 2020-05-15 RX ORDER — AMLODIPINE BESYLATE 2.5 MG/1
5 TABLET ORAL DAILY
Refills: 0 | Status: DISCONTINUED | OUTPATIENT
Start: 2020-05-15 | End: 2020-05-16

## 2020-05-15 RX ADMIN — LATANOPROST 1 DROP(S): 0.05 SOLUTION/ DROPS OPHTHALMIC; TOPICAL at 21:31

## 2020-05-15 RX ADMIN — PANTOPRAZOLE SODIUM 40 MILLIGRAM(S): 20 TABLET, DELAYED RELEASE ORAL at 05:53

## 2020-05-15 RX ADMIN — Medication 40 MILLIGRAM(S): at 05:52

## 2020-05-15 RX ADMIN — GABAPENTIN 400 MILLIGRAM(S): 400 CAPSULE ORAL at 05:52

## 2020-05-15 RX ADMIN — Medication 100 MILLIGRAM(S): at 12:36

## 2020-05-15 RX ADMIN — APIXABAN 5 MILLIGRAM(S): 2.5 TABLET, FILM COATED ORAL at 12:35

## 2020-05-15 RX ADMIN — LIDOCAINE 2 PATCH: 4 CREAM TOPICAL at 12:37

## 2020-05-15 RX ADMIN — GABAPENTIN 400 MILLIGRAM(S): 400 CAPSULE ORAL at 16:26

## 2020-05-15 RX ADMIN — SENNA PLUS 2 TABLET(S): 8.6 TABLET ORAL at 21:27

## 2020-05-15 RX ADMIN — GABAPENTIN 400 MILLIGRAM(S): 400 CAPSULE ORAL at 21:27

## 2020-05-15 RX ADMIN — PIPERACILLIN AND TAZOBACTAM 200 GRAM(S): 4; .5 INJECTION, POWDER, LYOPHILIZED, FOR SOLUTION INTRAVENOUS at 19:02

## 2020-05-15 RX ADMIN — Medication 81 MILLIGRAM(S): at 21:29

## 2020-05-15 RX ADMIN — PIPERACILLIN AND TAZOBACTAM 200 GRAM(S): 4; .5 INJECTION, POWDER, LYOPHILIZED, FOR SOLUTION INTRAVENOUS at 22:18

## 2020-05-15 RX ADMIN — APIXABAN 5 MILLIGRAM(S): 2.5 TABLET, FILM COATED ORAL at 22:18

## 2020-05-15 RX ADMIN — Medication 5 MILLIGRAM(S): at 12:37

## 2020-05-15 RX ADMIN — DORZOLAMIDE HYDROCHLORIDE TIMOLOL MALEATE 1 DROP(S): 20; 5 SOLUTION/ DROPS OPHTHALMIC at 19:02

## 2020-05-15 RX ADMIN — AMLODIPINE BESYLATE 5 MILLIGRAM(S): 2.5 TABLET ORAL at 17:00

## 2020-05-15 RX ADMIN — PIPERACILLIN AND TAZOBACTAM 200 GRAM(S): 4; .5 INJECTION, POWDER, LYOPHILIZED, FOR SOLUTION INTRAVENOUS at 05:52

## 2020-05-15 RX ADMIN — DORZOLAMIDE HYDROCHLORIDE TIMOLOL MALEATE 1 DROP(S): 20; 5 SOLUTION/ DROPS OPHTHALMIC at 05:52

## 2020-05-15 RX ADMIN — Medication 5 MILLIGRAM(S): at 22:37

## 2020-05-15 RX ADMIN — LIDOCAINE 2 PATCH: 4 CREAM TOPICAL at 07:10

## 2020-05-15 RX ADMIN — LIDOCAINE 2 PATCH: 4 CREAM TOPICAL at 21:32

## 2020-05-15 RX ADMIN — Medication 1 MILLIGRAM(S): at 12:36

## 2020-05-15 RX ADMIN — Medication 5 MILLIGRAM(S): at 07:10

## 2020-05-15 RX ADMIN — Medication 40 MILLIGRAM(S): at 21:27

## 2020-05-15 RX ADMIN — LATANOPROST 1 DROP(S): 0.05 SOLUTION/ DROPS OPHTHALMIC; TOPICAL at 06:26

## 2020-05-15 RX ADMIN — PIPERACILLIN AND TAZOBACTAM 200 GRAM(S): 4; .5 INJECTION, POWDER, LYOPHILIZED, FOR SOLUTION INTRAVENOUS at 12:38

## 2020-05-15 NOTE — PROGRESS NOTE ADULT - SUBJECTIVE AND OBJECTIVE BOX
INTERVAL HPI/OVERNIGHT EVENTS:  Dopplers Negative  x1 5mg of OXY given   NAEON    SUBJECTIVE: Patient seen and examined at bedside. alert and oriented however  little drowsy this AM.       VITAL SIGNS:  ICU Vital Signs Last 24 Hrs  T(C): 36.6 (15 May 2020 04:00), Max: 36.6 (15 May 2020 04:00)  T(F): 97.8 (15 May 2020 04:00), Max: 97.8 (15 May 2020 04:00)  HR: 99 (15 May 2020 14:00) (71 - 101)  BP: 135/83 (15 May 2020 14:00) (121/78 - 168/88)  BP(mean): 104 (15 May 2020 14:00) (103 - 120)  ABP: --  ABP(mean): --  RR: 40 (15 May 2020 14:00) (20 - 40)  SpO2: 94% (15 May 2020 14:00) (87% - 97%)      Plateau pressure:   P/F ratio:     05-14 @ 07:01  -  05-15 @ 07:00  --------------------------------------------------------  IN: 2160 mL / OUT: 4100 mL / NET: -1940 mL    05-15 @ 07:01  -  05-15 @ 14:52  --------------------------------------------------------  IN: 0 mL / OUT: 400 mL / NET: -400 mL      CAPILLARY BLOOD GLUCOSE      POCT Blood Glucose.: 117 mg/dL (15 May 2020 11:10)    ECG:    PHYSICAL EXAM:    General: In bed, drowsy, however fully response following commands  HEENT: cpap in place  Respiratory: Sating 92 on cpap, no respirtory distress, speaking full sentences but requires increased repsiratory effort  Cardiovascular: NSR, occasional sinus tach  Abdomen: Soft nontender, nondistended  Extremities: 2+ DP pulse b/l, 2+ radial pulse b/l   Neurological: follow commands, alert and oriented x3    MEDICATIONS:  MEDICATIONS  (STANDING):  apixaban 5 milliGRAM(s) Oral every 12 hours  aspirin  chewable 81 milliGRAM(s) Oral every 24 hours  baclofen 5 milliGRAM(s) Oral every 8 hours  dextrose 5%. 1000 milliLiter(s) (50 mL/Hr) IV Continuous <Continuous>  dextrose 50% Injectable 12.5 Gram(s) IV Push once  dextrose 50% Injectable 25 Gram(s) IV Push once  dextrose 50% Injectable 25 Gram(s) IV Push once  dorzolamide 2%/timolol 0.5% Ophthalmic Solution 1 Drop(s) Both EYES every 12 hours  folic acid 1 milliGRAM(s) Oral daily  gabapentin 400 milliGRAM(s) Oral three times a day  insulin lispro (HumaLOG) corrective regimen sliding scale   SubCutaneous three times a day before meals  insulin lispro (HumaLOG) corrective regimen sliding scale   SubCutaneous at bedtime  latanoprost 0.005% Ophthalmic Solution 1 Drop(s) Both EYES at bedtime  lidocaine   Patch 2 Patch Transdermal every 24 hours  methylPREDNISolone sodium succinate Injectable 40 milliGRAM(s) IV Push every 8 hours  pantoprazole    Tablet 40 milliGRAM(s) Oral before breakfast  piperacillin/tazobactam IVPB.. 4.5 Gram(s) IV Intermittent every 6 hours  senna 2 Tablet(s) Oral at bedtime  sodium chloride 0.45%. 1000 milliLiter(s) (125 mL/Hr) IV Continuous <Continuous>  thiamine 100 milliGRAM(s) Oral every 24 hours  tradipitant vs placebo ([id number]) 85 milliGRAM(s) Oral every 12 hours    MEDICATIONS  (PRN):  acetaminophen   Tablet .. 650 milliGRAM(s) Oral every 6 hours PRN Temp greater or equal to 38.5C (101.3F)  acetaminophen   Tablet .. 650 milliGRAM(s) Oral every 6 hours PRN Mild Pain (1 - 3)  dextrose 40% Gel 15 Gram(s) Oral once PRN Blood Glucose LESS THAN 70 milliGRAM(s)/deciliter  glucagon  Injectable 1 milliGRAM(s) IntraMuscular once PRN Glucose LESS THAN 70 milligrams/deciliter  oxyCODONE    IR 5 milliGRAM(s) Oral every 4 hours PRN Moderate Pain (4 - 6)      ALLERGIES:  Allergies    No Known Allergies    Intolerances        LABS:                        11.1   16.76 )-----------( 125      ( 15 May 2020 06:03 )             33.9     05-15    146<H>  |  112<H>  |  20  ----------------------------<  126<H>  4.1   |  21<L>  |  0.92    Ca    8.2<L>      15 May 2020 06:03  Phos  2.1     05-15  Mg     1.8     05-15    TPro  6.6  /  Alb  3.4  /  TBili  2.0<H>  /  DBili  x   /  AST  101<H>  /  ALT  104<H>  /  AlkPhos  93  05-15          RADIOLOGY & ADDITIONAL TESTS: Reviewed.      · Assessment	  70 y/o M with PMH of progressive lower extremity weakness, s/p lumbar decompression in 1/2019. 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        Neuro:   On oxycodone 5mg home med  on Baclofen increased to q8h  Tylenol for fevers or pain  Chronic back - Pt endorses pain however not different from baseline, No new associated neurologic deficits.     Neuropathy.  Plan: Has a chronic neuropathy likely secondary to disc herniation. on gabapentin 400 tid at home. Currently in worsening back pain and tingling and numbness. Follows with Dr. Brito outpatient.   - LE dopplers negative for DVT  - CPK elevated -> concern for myositis as underlying etiology   - RF 22 -> mildly elevated  - f/u AHMET, anti-dsDNA, anti-Siaura   - trend CPK   - EMG unchanged from prior   - c/w home gabapentin 400 tid  - D/C Zyprexa due to lethargy        Respiratory:   ·  Problem: Pneumonia due to COVID-19 virus.  Plan: Pt with sore throat and runny nose and mild diarrhea for the past few days but no SOB or cough, was found to be positive for COVID. Had fever and tachycardia in ED but no O2 desaturation. currently sating 94% on 2L. CXR clear.   COVID positive, Ferritin 2537, CRP 3.33  - Monitor O2 saturation and symptoms  - Trending inflammatory markers daily  - tylenol for fever  - symptomtic management   - contact precaustions: isolation and droplet  - avoid NSAID/ACE-I/ARB  - Odyssey trial   - Solumedrol x5 days  - Advanced from NRB to HFNC 5/12 2/2 to desat to low 80s - will try and decrease settings on HFNC   - Started on CPAP 5/13 ON, continues with CPAP on 5/15 saturating in low 90s   - Unable to wean FiO2       #Acute Hypoxic Resp Failure 2/2 COVID-19  Patient with increased O2 requirements (79% on RA) s/p lasix 20mg. CXR without evidence of congestion. Currently on 15L NRB  - s/p tociluzumab (5/9)  - initiated solumedrol 40mg BID -> taper as per pulm  - wean O2 as tolerated  - patient is not a candidate for awake proning, unable to tolerate 2/2 to chronic pain and previous spinal surgeries.     Cardiovascular:    ·  Problem: HTN (hypertension).  Plan: Pt with hx of HTN on lisinopril 10 at home  Currently with symptoms of sepsis, dehydration, SETH   - hold home lisinopril  - monitor BPs  - Pt started on 5 of Norvasc 5/15    ID  ·  Problem: Severe sepsis.  Plan: Sepsis of unknown etiology. Patient with severe sepsis criteria on admission (fever, tachycardia and elevated lactate), with complaints of URI and diarrhea. Found to COVID-19 positive. No evidence of leukocytosis, procalcitonin 0.28.  WBC was normal. procal is 0.28. Sepsis is likely due to COVID as well as dehydration. Right knee with erythema, no evidence of effusion. Gallstones present, Perdue sign negative. Still with fever (102) and leukopenia. Differential diagnosis autoinflammatory state 2/2 COVID-19. Patient desaturating to 87% on RA, requiring 6L oxygen, weaned to 2L.   - Blood cultures NGTD  - UA negative   - Fever management with tylenol   - Knee xray w/o evidence of septic joint   - Tylenol for fever  - COVID management as below  - s/p tocilizumab (5/9)    - CT chest obtained to evaluate for ?consolidation demonstrating basilar predominant groundglass opacities bilaterally; consistent with   - Will continue to monitor infectious markers,  of note WBC rising but no fevers.   - WBC @16 this AM    COVID-19.   - repeat blood cultures drawn 5/11 - NGTD  - empirically started on Vanc and Zosyn 5/11;   - UA + pt asymptomatic, likely not source of infeciton  - Vanco discontinued due to negative cultures, Zosyn will continue for 7 days (5/11 - 5/18)  - Zosyn to be discontinued until 5/15        ·  Problem: R/O Rhabdomyolysis.  Plan: Patient with rising CK in setting of fall and SETH, and reported hematuria prior to arrival. Also, with hypophosphatemia, concern for rhabdomyolysis. CK downtrending slowly with improvement in Cr.   -  cc/hr   - trend CK q8hr, decreasing daily,   - monitor Cr decreasing 5/15    ·  Problem: SETH (acute kidney injury).  Plan: RESOLVED   Pt with baseline Cr 1.1-1.2 presented with Cr 1.88 and BUN 52 likely due to prerenal azotemia likely due to dehydration following having diarrhea for few days and not eating and drinking well. responded to fluid therapy and decreased to 1.68. Urine lytes also were compatible with pre-renal causes, s/p 1.5 L of IVF in ED  - decreasing, last level 1.15  	Will monitor for increases in setting of new Vanco + net - status  - monitor kidney function  - Will get 6pm BMP 5/13  - BUN/Cr WNL this AM 5/15      Heme  Problem: DVT (deep venous thrombosis).  Plan: Pt with hx of DVT and PE, on Eliquis 5 daily at home. DVT reported in 2018, unclear indication for AC at this time.   - c/w home Eliquis 5mg BID qd       #Thrombocytopenia  Patient with baseline plt 110s, 84 on arrival and decreasing to 61 on admission. Possibly 2/2 viral infection and sepsis. No evidence of bleeding.  - Hematology consulted -> likely 2/2 COVID-19   - Hep C +  - monitor for signs of bleeding.   - Platelets uptrending, 5/15    Endo  #Hyperbilirubinemia   Pt with bili 2.7 and elevated AST and ALT but normal Alk-p.RUQ ultrasound demonstrating gallbladder with multiple gallstones. Benign abdominal exam. Negative perdue sign.  Likely 2/2 COVID infection or due to gallbladder stones. Improved with fluid therapy. Patient Hep C positive.   - monitor LFT  - Hepatitis panel negative  - Bili and LFTs downtrending this AM  - Hypernatremia - Started on D5 1/2 NS @ 125ccs improving, 146 this AM t 146    ·GI  PPX famotidine   Diet switched to mechanical soft 2/2 poss aspiration on solids  Speech and swallow eval - Soft mechanical diet, NG tube if pt not eating, pt at this time denying NG tube even in setting of decreased oral intake      Problem: Prophylactic measure.  Plan: Fluids: D5 1/2 NS @125 due to hypernatremia which is improving @ 146 this AM  Electrolytes-replete as needed  Nutrition - Soft Mechanical det  Code- Full Code  DVT ppx: Eliquis   GI ppx: none needed  DIspo: RMF    DISCHARGE PLANNING:  [facility] vs Home

## 2020-05-15 NOTE — CHART NOTE - NSCHARTNOTEFT_GEN_A_CORE
DAILY ASSESSMENTS (Required on all days, including Day 1 and Discharge Day)    Day: 4    NRS cough: Please rate the cough severity by selecting the number from 0 to 10 that best describes your worst level of cough in the past 24 hours:     NRS nausea: Please rate the worst severity of your nausea from 0 to 5 during the past 24 hours:    Hypercapnic respiratory failure?  NO     SpO2:   %    FiO2 or LPM:    Vital signs (if not already collected by nurse): temperature, blood pressure, pulse, respiratory rate, oxygen saturation  See progress note    If this is day 1 or discharge day: CXR, and EKG as noted below    ____________________________________________________  Patient discussed with Dr. Padron.     Christy Webber MD     Graduate Physician - COVID Emergency Events   [id number] DAILY ASSESSMENTS (Required on all days, including Day 1 and Discharge Day)    Day: 4    NRS cough: Please rate the cough severity by selecting the number from 0 to 10 that best describes your worst level of cough in the past 24 hours: 0    NRS nausea: Please rate the worst severity of your nausea from 0 to 5 during the past 24 hours: 0    Hypercapnic respiratory failure?  NO     SpO2:   97%    FiO2 or LPM: CPAP O2 100%, PEEP 6    Vital signs (if not already collected by nurse): temperature, blood pressure, pulse, respiratory rate, oxygen saturation  See progress note    If this is day 1 or discharge day: CXR, and EKG as noted below    < from: Xray Chest 1 View- PORTABLE-Routine (05.15.20 @ 05:45) >      EXAM:  XR CHEST PORTABLE ROUTINE 1V                        PROCEDURE DATE:  05/15/2020    INTERPRETATION:  CLINICAL INDICATION: 70-year-old with COVID pneumonia.    IMPRESSION: Frontal view of the chest is compared to 5/14/2020 and demonstrates diffuse alveolar airspace infiltration throughout the lung parenchyma, slightly intervally improved in the left lower lobe of moderate to severe severity. Normal heart size. Anterior and posterior cervical fusion hardware.    Thank you for the opportunity to participate in the care of this patient.  RIC LARA M.D., ATTENDING RADIOLOGIST  This document has been electronically signed. May 15 2020  9:44AM      ____________________________________________________  Patient discussed with Dr. Padron.     Christy Webber MD     Graduate Physician - COVID Emergency Events   [phone]

## 2020-05-15 NOTE — PROGRESS NOTE ADULT - ATTENDING COMMENTS
Acute Hypoxic Resp Failure 2/2 COVID-19 PNA  - hypoxic on max HFNC therefore transitioned to BIPAP, still with sats 87-92% on 100%  - cont steroids   - changed IVF t0 1/2 NS given hypernatremia, Na improving  - speech/swallow consult appreciated  - cont to encourage PO intake   - cont fluids for rhabdo (improving)   - DC olanzapine

## 2020-05-16 LAB
ALBUMIN SERPL ELPH-MCNC: 3 G/DL — LOW (ref 3.3–5)
ALBUMIN SERPL ELPH-MCNC: 3.5 G/DL — SIGNIFICANT CHANGE UP (ref 3.3–5)
ALP SERPL-CCNC: 105 U/L — SIGNIFICANT CHANGE UP (ref 40–120)
ALP SERPL-CCNC: 116 U/L — SIGNIFICANT CHANGE UP (ref 40–120)
ALT FLD-CCNC: 54 U/L — HIGH (ref 10–45)
ALT FLD-CCNC: 86 U/L — HIGH (ref 10–45)
ANION GAP SERPL CALC-SCNC: 12 MMOL/L — SIGNIFICANT CHANGE UP (ref 5–17)
ANION GAP SERPL CALC-SCNC: 15 MMOL/L — SIGNIFICANT CHANGE UP (ref 5–17)
APPEARANCE UR: ABNORMAL
APTT BLD: 24.4 SEC — LOW (ref 27.5–36.3)
APTT BLD: 77.6 SEC — HIGH (ref 27.5–36.3)
AST SERPL-CCNC: 47 U/L — HIGH (ref 10–40)
AST SERPL-CCNC: 74 U/L — HIGH (ref 10–40)
BASE EXCESS BLDA CALC-SCNC: -5.5 MMOL/L — LOW (ref -2–3)
BASE EXCESS BLDA CALC-SCNC: -5.6 MMOL/L — LOW (ref -2–3)
BASE EXCESS BLDA CALC-SCNC: -8.1 MMOL/L — LOW (ref -2–3)
BASOPHILS # BLD AUTO: 0.02 K/UL — SIGNIFICANT CHANGE UP (ref 0–0.2)
BASOPHILS NFR BLD AUTO: 0.1 % — SIGNIFICANT CHANGE UP (ref 0–2)
BILIRUB SERPL-MCNC: 1.7 MG/DL — HIGH (ref 0.2–1.2)
BILIRUB SERPL-MCNC: 1.8 MG/DL — HIGH (ref 0.2–1.2)
BILIRUB UR-MCNC: ABNORMAL
BUN SERPL-MCNC: 21 MG/DL — SIGNIFICANT CHANGE UP (ref 7–23)
BUN SERPL-MCNC: 38 MG/DL — HIGH (ref 7–23)
CALCIUM SERPL-MCNC: 7.7 MG/DL — LOW (ref 8.4–10.5)
CALCIUM SERPL-MCNC: 8.4 MG/DL — SIGNIFICANT CHANGE UP (ref 8.4–10.5)
CHLORIDE SERPL-SCNC: 109 MMOL/L — HIGH (ref 96–108)
CHLORIDE SERPL-SCNC: 110 MMOL/L — HIGH (ref 96–108)
CK SERPL-CCNC: 127 U/L — SIGNIFICANT CHANGE UP (ref 30–200)
CO2 SERPL-SCNC: 20 MMOL/L — LOW (ref 22–31)
CO2 SERPL-SCNC: 21 MMOL/L — LOW (ref 22–31)
COLOR SPEC: YELLOW — SIGNIFICANT CHANGE UP
CREAT SERPL-MCNC: 0.92 MG/DL — SIGNIFICANT CHANGE UP (ref 0.5–1.3)
CREAT SERPL-MCNC: 2.08 MG/DL — HIGH (ref 0.5–1.3)
CRP SERPL-MCNC: 2.87 MG/DL — HIGH (ref 0–0.4)
CULTURE RESULTS: SIGNIFICANT CHANGE UP
D DIMER BLD IA.RAPID-MCNC: HIGH NG/ML DDU
DIFF PNL FLD: ABNORMAL
EOSINOPHIL # BLD AUTO: 0.04 K/UL — SIGNIFICANT CHANGE UP (ref 0–0.5)
EOSINOPHIL NFR BLD AUTO: 0.3 % — SIGNIFICANT CHANGE UP (ref 0–6)
GAS PNL BLDV: SIGNIFICANT CHANGE UP
GLUCOSE BLDC GLUCOMTR-MCNC: 108 MG/DL — HIGH (ref 70–99)
GLUCOSE BLDC GLUCOMTR-MCNC: 113 MG/DL — HIGH (ref 70–99)
GLUCOSE BLDC GLUCOMTR-MCNC: 131 MG/DL — HIGH (ref 70–99)
GLUCOSE BLDC GLUCOMTR-MCNC: 159 MG/DL — HIGH (ref 70–99)
GLUCOSE SERPL-MCNC: 120 MG/DL — HIGH (ref 70–99)
GLUCOSE SERPL-MCNC: 120 MG/DL — HIGH (ref 70–99)
GLUCOSE UR QL: NEGATIVE — SIGNIFICANT CHANGE UP
HCO3 BLDA-SCNC: 19 MMOL/L — LOW (ref 21–28)
HCO3 BLDA-SCNC: 21 MMOL/L — SIGNIFICANT CHANGE UP (ref 21–28)
HCO3 BLDA-SCNC: 23 MMOL/L — SIGNIFICANT CHANGE UP (ref 21–28)
HCT VFR BLD CALC: 36.9 % — LOW (ref 39–50)
HCT VFR BLD CALC: 38.9 % — LOW (ref 39–50)
HGB BLD-MCNC: 11.8 G/DL — LOW (ref 13–17)
HGB BLD-MCNC: 12.3 G/DL — LOW (ref 13–17)
IMM GRANULOCYTES NFR BLD AUTO: 1 % — SIGNIFICANT CHANGE UP (ref 0–1.5)
KETONES UR-MCNC: NEGATIVE — SIGNIFICANT CHANGE UP
LACTATE SERPL-SCNC: 3.7 MMOL/L — HIGH (ref 0.5–2)
LACTATE SERPL-SCNC: 3.9 MMOL/L — HIGH (ref 0.5–2)
LEUKOCYTE ESTERASE UR-ACNC: NEGATIVE — SIGNIFICANT CHANGE UP
LYMPHOCYTES # BLD AUTO: 0.54 K/UL — LOW (ref 1–3.3)
LYMPHOCYTES # BLD AUTO: 3.4 % — LOW (ref 13–44)
MAGNESIUM SERPL-MCNC: 2 MG/DL — SIGNIFICANT CHANGE UP (ref 1.6–2.6)
MCHC RBC-ENTMCNC: 30.3 PG — SIGNIFICANT CHANGE UP (ref 27–34)
MCHC RBC-ENTMCNC: 30.9 PG — SIGNIFICANT CHANGE UP (ref 27–34)
MCHC RBC-ENTMCNC: 31.6 GM/DL — LOW (ref 32–36)
MCHC RBC-ENTMCNC: 32 GM/DL — SIGNIFICANT CHANGE UP (ref 32–36)
MCV RBC AUTO: 94.6 FL — SIGNIFICANT CHANGE UP (ref 80–100)
MCV RBC AUTO: 97.7 FL — SIGNIFICANT CHANGE UP (ref 80–100)
MONOCYTES # BLD AUTO: 0.4 K/UL — SIGNIFICANT CHANGE UP (ref 0–0.9)
MONOCYTES NFR BLD AUTO: 2.5 % — SIGNIFICANT CHANGE UP (ref 2–14)
NEUTROPHILS # BLD AUTO: 14.55 K/UL — HIGH (ref 1.8–7.4)
NEUTROPHILS NFR BLD AUTO: 92.7 % — HIGH (ref 43–77)
NITRITE UR-MCNC: NEGATIVE — SIGNIFICANT CHANGE UP
NRBC # BLD: 0 /100 WBCS — SIGNIFICANT CHANGE UP (ref 0–0)
NRBC # BLD: 2 /100 WBCS — HIGH (ref 0–0)
PCO2 BLDA: 44 MMHG — SIGNIFICANT CHANGE UP (ref 35–48)
PCO2 BLDA: 47 MMHG — SIGNIFICANT CHANGE UP (ref 35–48)
PCO2 BLDA: 62 MMHG — CRITICAL HIGH (ref 35–48)
PH BLDA: 7.2 — CRITICAL LOW (ref 7.35–7.45)
PH BLDA: 7.25 — LOW (ref 7.35–7.45)
PH BLDA: 7.27 — LOW (ref 7.35–7.45)
PH UR: 5.5 — SIGNIFICANT CHANGE UP (ref 5–8)
PHOSPHATE SERPL-MCNC: 2.6 MG/DL — SIGNIFICANT CHANGE UP (ref 2.5–4.5)
PLATELET # BLD AUTO: 116 K/UL — LOW (ref 150–400)
PLATELET # BLD AUTO: 157 K/UL — SIGNIFICANT CHANGE UP (ref 150–400)
PO2 BLDA: 181 MMHG — HIGH (ref 83–108)
PO2 BLDA: 75 MMHG — LOW (ref 83–108)
PO2 BLDA: 85 MMHG — SIGNIFICANT CHANGE UP (ref 83–108)
POTASSIUM SERPL-MCNC: 4.5 MMOL/L — SIGNIFICANT CHANGE UP (ref 3.5–5.3)
POTASSIUM SERPL-MCNC: 4.7 MMOL/L — SIGNIFICANT CHANGE UP (ref 3.5–5.3)
POTASSIUM SERPL-SCNC: 4.5 MMOL/L — SIGNIFICANT CHANGE UP (ref 3.5–5.3)
POTASSIUM SERPL-SCNC: 4.7 MMOL/L — SIGNIFICANT CHANGE UP (ref 3.5–5.3)
PROT SERPL-MCNC: 5.8 G/DL — LOW (ref 6–8.3)
PROT SERPL-MCNC: 6.8 G/DL — SIGNIFICANT CHANGE UP (ref 6–8.3)
PROT UR-MCNC: 30 MG/DL
RBC # BLD: 3.9 M/UL — LOW (ref 4.2–5.8)
RBC # BLD: 3.98 M/UL — LOW (ref 4.2–5.8)
RBC # FLD: 12.2 % — SIGNIFICANT CHANGE UP (ref 10.3–14.5)
RBC # FLD: 12.9 % — SIGNIFICANT CHANGE UP (ref 10.3–14.5)
SAO2 % BLDA: 93 % — LOW (ref 95–100)
SAO2 % BLDA: 95 % — SIGNIFICANT CHANGE UP (ref 95–100)
SAO2 % BLDA: 99 % — SIGNIFICANT CHANGE UP (ref 95–100)
SODIUM SERPL-SCNC: 142 MMOL/L — SIGNIFICANT CHANGE UP (ref 135–145)
SODIUM SERPL-SCNC: 145 MMOL/L — SIGNIFICANT CHANGE UP (ref 135–145)
SP GR SPEC: 1.02 — SIGNIFICANT CHANGE UP (ref 1–1.03)
SPECIMEN SOURCE: SIGNIFICANT CHANGE UP
TROPONIN T SERPL-MCNC: 0.15 NG/ML — CRITICAL HIGH (ref 0–0.01)
TROPONIN T SERPL-MCNC: 0.32 NG/ML — CRITICAL HIGH (ref 0–0.01)
UROBILINOGEN FLD QL: 2 E.U./DL
WBC # BLD: 15.7 K/UL — HIGH (ref 3.8–10.5)
WBC # BLD: 19.97 K/UL — HIGH (ref 3.8–10.5)
WBC # FLD AUTO: 15.7 K/UL — HIGH (ref 3.8–10.5)
WBC # FLD AUTO: 19.97 K/UL — HIGH (ref 3.8–10.5)

## 2020-05-16 PROCEDURE — 99291 CRITICAL CARE FIRST HOUR: CPT | Mod: 25

## 2020-05-16 PROCEDURE — 36556 INSERT NON-TUNNEL CV CATH: CPT

## 2020-05-16 PROCEDURE — 99231 SBSQ HOSP IP/OBS SF/LOW 25: CPT | Mod: CS

## 2020-05-16 PROCEDURE — 71045 X-RAY EXAM CHEST 1 VIEW: CPT | Mod: 26,CS,76

## 2020-05-16 PROCEDURE — 36620 INSERTION CATHETER ARTERY: CPT

## 2020-05-16 RX ORDER — REMDESIVIR 5 MG/ML
INJECTION INTRAVENOUS
Refills: 0 | Status: DISCONTINUED | OUTPATIENT
Start: 2020-05-16 | End: 2020-05-17

## 2020-05-16 RX ORDER — REMDESIVIR 5 MG/ML
200 INJECTION INTRAVENOUS EVERY 24 HOURS
Refills: 0 | Status: COMPLETED | OUTPATIENT
Start: 2020-05-16 | End: 2020-05-16

## 2020-05-16 RX ORDER — FENTANYL CITRATE 50 UG/ML
25 INJECTION INTRAVENOUS ONCE
Refills: 0 | Status: DISCONTINUED | OUTPATIENT
Start: 2020-05-16 | End: 2020-05-16

## 2020-05-16 RX ORDER — VASOPRESSIN 20 [USP'U]/ML
0.04 INJECTION INTRAVENOUS
Qty: 50 | Refills: 0 | Status: DISCONTINUED | OUTPATIENT
Start: 2020-05-16 | End: 2020-05-20

## 2020-05-16 RX ORDER — PROPOFOL 10 MG/ML
1 INJECTION, EMULSION INTRAVENOUS
Qty: 1000 | Refills: 0 | Status: DISCONTINUED | OUTPATIENT
Start: 2020-05-16 | End: 2020-05-18

## 2020-05-16 RX ORDER — FENTANYL CITRATE 50 UG/ML
0.5 INJECTION INTRAVENOUS
Qty: 2500 | Refills: 0 | Status: DISCONTINUED | OUTPATIENT
Start: 2020-05-16 | End: 2020-05-18

## 2020-05-16 RX ORDER — CISATRACURIUM BESYLATE 2 MG/ML
10 INJECTION INTRAVENOUS ONCE
Refills: 0 | Status: COMPLETED | OUTPATIENT
Start: 2020-05-16 | End: 2020-05-16

## 2020-05-16 RX ORDER — NOREPINEPHRINE BITARTRATE/D5W 8 MG/250ML
0.05 PLASTIC BAG, INJECTION (ML) INTRAVENOUS
Qty: 8 | Refills: 0 | Status: DISCONTINUED | OUTPATIENT
Start: 2020-05-16 | End: 2020-05-18

## 2020-05-16 RX ORDER — REMDESIVIR 5 MG/ML
100 INJECTION INTRAVENOUS EVERY 24 HOURS
Refills: 0 | Status: DISCONTINUED | OUTPATIENT
Start: 2020-05-17 | End: 2020-05-17

## 2020-05-16 RX ORDER — CISATRACURIUM BESYLATE 2 MG/ML
3 INJECTION INTRAVENOUS
Qty: 200 | Refills: 0 | Status: DISCONTINUED | OUTPATIENT
Start: 2020-05-16 | End: 2020-05-20

## 2020-05-16 RX ORDER — HEPARIN SODIUM 5000 [USP'U]/ML
1700 INJECTION INTRAVENOUS; SUBCUTANEOUS
Qty: 25000 | Refills: 0 | Status: DISCONTINUED | OUTPATIENT
Start: 2020-05-16 | End: 2020-05-17

## 2020-05-16 RX ORDER — SODIUM CHLORIDE 9 MG/ML
1000 INJECTION INTRAMUSCULAR; INTRAVENOUS; SUBCUTANEOUS ONCE
Refills: 0 | Status: COMPLETED | OUTPATIENT
Start: 2020-05-16 | End: 2020-05-16

## 2020-05-16 RX ORDER — VANCOMYCIN HCL 1 G
1250 VIAL (EA) INTRAVENOUS EVERY 12 HOURS
Refills: 0 | Status: DISCONTINUED | OUTPATIENT
Start: 2020-05-16 | End: 2020-05-17

## 2020-05-16 RX ADMIN — OXYCODONE HYDROCHLORIDE 5 MILLIGRAM(S): 5 TABLET ORAL at 07:00

## 2020-05-16 RX ADMIN — FENTANYL CITRATE 4.77 MICROGRAM(S)/KG/HR: 50 INJECTION INTRAVENOUS at 10:45

## 2020-05-16 RX ADMIN — Medication 40 MILLIGRAM(S): at 16:00

## 2020-05-16 RX ADMIN — PROPOFOL 0.57 MICROGRAM(S)/KG/MIN: 10 INJECTION, EMULSION INTRAVENOUS at 09:30

## 2020-05-16 RX ADMIN — HEPARIN SODIUM 17 UNIT(S)/HR: 5000 INJECTION INTRAVENOUS; SUBCUTANEOUS at 10:00

## 2020-05-16 RX ADMIN — PIPERACILLIN AND TAZOBACTAM 200 GRAM(S): 4; .5 INJECTION, POWDER, LYOPHILIZED, FOR SOLUTION INTRAVENOUS at 06:07

## 2020-05-16 RX ADMIN — VASOPRESSIN 2.4 UNIT(S)/MIN: 20 INJECTION INTRAVENOUS at 21:36

## 2020-05-16 RX ADMIN — Medication 5 MILLIGRAM(S): at 23:40

## 2020-05-16 RX ADMIN — AMLODIPINE BESYLATE 5 MILLIGRAM(S): 2.5 TABLET ORAL at 04:37

## 2020-05-16 RX ADMIN — LIDOCAINE 2 PATCH: 4 CREAM TOPICAL at 10:00

## 2020-05-16 RX ADMIN — FENTANYL CITRATE 25 MICROGRAM(S): 50 INJECTION INTRAVENOUS at 09:00

## 2020-05-16 RX ADMIN — Medication 5 MILLIGRAM(S): at 04:37

## 2020-05-16 RX ADMIN — Medication 166.67 MILLIGRAM(S): at 21:34

## 2020-05-16 RX ADMIN — FENTANYL CITRATE 4.77 MICROGRAM(S)/KG/HR: 50 INJECTION INTRAVENOUS at 19:00

## 2020-05-16 RX ADMIN — LATANOPROST 1 DROP(S): 0.05 SOLUTION/ DROPS OPHTHALMIC; TOPICAL at 22:36

## 2020-05-16 RX ADMIN — PIPERACILLIN AND TAZOBACTAM 200 GRAM(S): 4; .5 INJECTION, POWDER, LYOPHILIZED, FOR SOLUTION INTRAVENOUS at 18:39

## 2020-05-16 RX ADMIN — DORZOLAMIDE HYDROCHLORIDE TIMOLOL MALEATE 1 DROP(S): 20; 5 SOLUTION/ DROPS OPHTHALMIC at 04:38

## 2020-05-16 RX ADMIN — REMDESIVIR 500 MILLIGRAM(S): 5 INJECTION INTRAVENOUS at 13:00

## 2020-05-16 RX ADMIN — PANTOPRAZOLE SODIUM 40 MILLIGRAM(S): 20 TABLET, DELAYED RELEASE ORAL at 06:08

## 2020-05-16 RX ADMIN — DORZOLAMIDE HYDROCHLORIDE TIMOLOL MALEATE 1 DROP(S): 20; 5 SOLUTION/ DROPS OPHTHALMIC at 18:40

## 2020-05-16 RX ADMIN — CISATRACURIUM BESYLATE 10 MILLIGRAM(S): 2 INJECTION INTRAVENOUS at 21:33

## 2020-05-16 RX ADMIN — Medication 5 MILLIGRAM(S): at 14:00

## 2020-05-16 RX ADMIN — PIPERACILLIN AND TAZOBACTAM 200 GRAM(S): 4; .5 INJECTION, POWDER, LYOPHILIZED, FOR SOLUTION INTRAVENOUS at 23:40

## 2020-05-16 RX ADMIN — SODIUM CHLORIDE 4000 MILLILITER(S): 9 INJECTION INTRAMUSCULAR; INTRAVENOUS; SUBCUTANEOUS at 19:00

## 2020-05-16 RX ADMIN — LIDOCAINE 2 PATCH: 4 CREAM TOPICAL at 23:40

## 2020-05-16 RX ADMIN — PROPOFOL 0.57 MICROGRAM(S)/KG/MIN: 10 INJECTION, EMULSION INTRAVENOUS at 18:38

## 2020-05-16 RX ADMIN — GABAPENTIN 400 MILLIGRAM(S): 400 CAPSULE ORAL at 04:38

## 2020-05-16 RX ADMIN — LIDOCAINE 2 PATCH: 4 CREAM TOPICAL at 06:07

## 2020-05-16 RX ADMIN — PIPERACILLIN AND TAZOBACTAM 200 GRAM(S): 4; .5 INJECTION, POWDER, LYOPHILIZED, FOR SOLUTION INTRAVENOUS at 13:42

## 2020-05-16 RX ADMIN — Medication 40 MILLIGRAM(S): at 04:39

## 2020-05-16 NOTE — PROGRESS NOTE ADULT - SUBJECTIVE AND OBJECTIVE BOX
Neurology Progress Note    INTERVAL HPI/OVERNIGHT EVENTS:  Patient seen and examined at bedside because neurology was reconsulted to make sure the patient was stable for proning.     MEDICATIONS  (STANDING):  aspirin  chewable 81 milliGRAM(s) Oral every 24 hours  baclofen 5 milliGRAM(s) Oral every 8 hours  cisatracurium Infusion 3 MICROgram(s)/kG/Min (17.2 mL/Hr) IV Continuous <Continuous>  cisatracurium Injectable 10 milliGRAM(s) IV Push once  dextrose 5%. 1000 milliLiter(s) (50 mL/Hr) IV Continuous <Continuous>  dextrose 50% Injectable 12.5 Gram(s) IV Push once  dextrose 50% Injectable 25 Gram(s) IV Push once  dextrose 50% Injectable 25 Gram(s) IV Push once  dorzolamide 2%/timolol 0.5% Ophthalmic Solution 1 Drop(s) Both EYES every 12 hours  fentaNYL   Infusion. 0.5 MICROgram(s)/kG/Hr (4.77 mL/Hr) IV Continuous <Continuous>  folic acid 1 milliGRAM(s) Oral daily  gabapentin 400 milliGRAM(s) Oral three times a day  heparin  Infusion 1700 Unit(s)/Hr (17 mL/Hr) IV Continuous <Continuous>  insulin lispro (HumaLOG) corrective regimen sliding scale   SubCutaneous three times a day before meals  insulin lispro (HumaLOG) corrective regimen sliding scale   SubCutaneous at bedtime  latanoprost 0.005% Ophthalmic Solution 1 Drop(s) Both EYES at bedtime  lidocaine   Patch 2 Patch Transdermal every 24 hours  methylPREDNISolone sodium succinate Injectable 40 milliGRAM(s) IV Push every 8 hours  norepinephrine Infusion 0.05 MICROgram(s)/kG/Min (8.93 mL/Hr) IV Continuous <Continuous>  pantoprazole    Tablet 40 milliGRAM(s) Oral before breakfast  piperacillin/tazobactam IVPB.. 4.5 Gram(s) IV Intermittent every 6 hours  propofol Infusion 1 MICROgram(s)/kG/Min (0.57 mL/Hr) IV Continuous <Continuous>  remdesivir  IVPB   IV Intermittent   senna 2 Tablet(s) Oral at bedtime  sodium chloride 0.9% Bolus 1000 milliLiter(s) IV Bolus once  thiamine 100 milliGRAM(s) Oral every 24 hours  tradipitant vs placebo ([id number]) 85 milliGRAM(s) Oral every 12 hours  vancomycin  IVPB 1250 milliGRAM(s) IV Intermittent every 12 hours  vasopressin Infusion 0.04 Unit(s)/Min (2.4 mL/Hr) IV Continuous <Continuous>    MEDICATIONS  (PRN):  acetaminophen   Tablet .. 650 milliGRAM(s) Oral every 6 hours PRN Temp greater or equal to 38.5C (101.3F)  acetaminophen   Tablet .. 650 milliGRAM(s) Oral every 6 hours PRN Mild Pain (1 - 3)  dextrose 40% Gel 15 Gram(s) Oral once PRN Blood Glucose LESS THAN 70 milliGRAM(s)/deciliter  glucagon  Injectable 1 milliGRAM(s) IntraMuscular once PRN Glucose LESS THAN 70 milligrams/deciliter  oxyCODONE    IR 5 milliGRAM(s) Oral every 4 hours PRN Moderate Pain (4 - 6)      Allergies    No Known Allergies    Intolerances        Vital Signs Last 24 Hrs  T(C): 37.1 (16 May 2020 07:00), Max: 37.1 (16 May 2020 07:00)  T(F): 98.8 (16 May 2020 07:00), Max: 98.8 (16 May 2020 07:00)  HR: 140 (16 May 2020 18:00) (92 - 141)  BP: 101/57 (16 May 2020 18:00) (91/61 - 158/99)  BP(mean): 71 (16 May 2020 18:00) (70 - 101)  RR: 10 (16 May 2020 18:00) (10 - 41)  SpO2: 94% (16 May 2020 18:00) (85% - 100%)    Physical exam:  Neurologic:  Mental status: Sedated and intubated. No spontaneous movement.   II: Pupils equally round and reactive to light     COVID LABS:   D-Dimer Assay, Quantitative: 56154 (05-16-20)  D-Dimer Assay, Quantitative: 3529 (05-14-20)  D-Dimer Assay, Quantitative: 462 (05-12-20)  D-Dimer Assay, Quantitative: 506 (05-11-20)  D-Dimer Assay, Quantitative: 409 (05-10-20)  D-Dimer Assay, Quantitative: 294 (05-09-20)  D-Dimer Assay, Quantitative: 248 (05-08-20)  D-Dimer Assay, Quantitative: 212 (05-07-20)      Ferritin, Serum: 2472 (05-14 @ 05:54)  Ferritin, Serum: 5283 (05-13 @ 04:36)  Ferritin, Serum: 81563 (05-12 @ 05:22)  Ferritin, Serum: 67667 (05-11 @ 06:44)  Ferritin, Serum: 46429 (05-10 @ 10:26)  Ferritin, Serum: 9946 (05-09 @ 07:18)  Ferritin, Serum: 2218 (05-07 @ 07:55)  Ferritin, Serum: 2537 (05-06 @ 21:40)      C-Reactive Protein, Serum: 2.87 (05-16 @ 05:20)  C-Reactive Protein, Serum: 1.07 (05-15 @ 06:03)  C-Reactive Protein, Serum: 0.67 (05-14 @ 05:54)  C-Reactive Protein, Serum: 1.06 (05-13 @ 04:36)  C-Reactive Protein, Serum: 2.45 (05-12 @ 05:22)  C-Reactive Protein, Serum: 3.59 (05-11 @ 06:44)  C-Reactive Protein, Serum: 5.45 (05-10 @ 10:26)  C-Reactive Protein, Serum: 4.92 (05-09 @ 07:18)  C-Reactive Protein, Serum: 4.55 (05-08 @ 06:05)  C-Reactive Protein, Serum: 2.82 (05-07 @ 07:38)  C-Reactive Protein, Serum: 3.33 (05-06 @ 12:45)                            12.3   19.97 )-----------( 157      ( 16 May 2020 19:21 )             38.9     05-16    142  |  109<H>  |  21  ----------------------------<  120<H>  4.5   |  21<L>  |  0.92    Ca    8.4      16 May 2020 05:20  Phos  2.6     05-16  Mg     2.0     05-16    TPro  6.8  /  Alb  3.5  /  TBili  1.7<H>  /  DBili  x   /  AST  74<H>  /  ALT  86<H>  /  AlkPhos  116  05-16    PTT - ( 16 May 2020 16:42 )  PTT:77.6 sec      RADIOLOGY & ADDITIONAL TESTS: reviewed. Neurology Progress Note    INTERVAL HPI/OVERNIGHT EVENTS:  Patient seen and examined at bedside because neurology was reconsulted to make sure the patient was stable for proning.   Hypoxia concerning.    MEDICATIONS  (STANDING):  aspirin  chewable 81 milliGRAM(s) Oral every 24 hours  baclofen 5 milliGRAM(s) Oral every 8 hours  cisatracurium Infusion 3 MICROgram(s)/kG/Min (17.2 mL/Hr) IV Continuous <Continuous>  cisatracurium Injectable 10 milliGRAM(s) IV Push once  dextrose 5%. 1000 milliLiter(s) (50 mL/Hr) IV Continuous <Continuous>  dextrose 50% Injectable 12.5 Gram(s) IV Push once  dextrose 50% Injectable 25 Gram(s) IV Push once  dextrose 50% Injectable 25 Gram(s) IV Push once  dorzolamide 2%/timolol 0.5% Ophthalmic Solution 1 Drop(s) Both EYES every 12 hours  fentaNYL   Infusion. 0.5 MICROgram(s)/kG/Hr (4.77 mL/Hr) IV Continuous <Continuous>  folic acid 1 milliGRAM(s) Oral daily  gabapentin 400 milliGRAM(s) Oral three times a day  heparin  Infusion 1700 Unit(s)/Hr (17 mL/Hr) IV Continuous <Continuous>  insulin lispro (HumaLOG) corrective regimen sliding scale   SubCutaneous three times a day before meals  insulin lispro (HumaLOG) corrective regimen sliding scale   SubCutaneous at bedtime  latanoprost 0.005% Ophthalmic Solution 1 Drop(s) Both EYES at bedtime  lidocaine   Patch 2 Patch Transdermal every 24 hours  methylPREDNISolone sodium succinate Injectable 40 milliGRAM(s) IV Push every 8 hours  norepinephrine Infusion 0.05 MICROgram(s)/kG/Min (8.93 mL/Hr) IV Continuous <Continuous>  pantoprazole    Tablet 40 milliGRAM(s) Oral before breakfast  piperacillin/tazobactam IVPB.. 4.5 Gram(s) IV Intermittent every 6 hours  propofol Infusion 1 MICROgram(s)/kG/Min (0.57 mL/Hr) IV Continuous <Continuous>  remdesivir  IVPB   IV Intermittent   senna 2 Tablet(s) Oral at bedtime  sodium chloride 0.9% Bolus 1000 milliLiter(s) IV Bolus once  thiamine 100 milliGRAM(s) Oral every 24 hours  tradipitant vs placebo ([id number]) 85 milliGRAM(s) Oral every 12 hours  vancomycin  IVPB 1250 milliGRAM(s) IV Intermittent every 12 hours  vasopressin Infusion 0.04 Unit(s)/Min (2.4 mL/Hr) IV Continuous <Continuous>    MEDICATIONS  (PRN):  acetaminophen   Tablet .. 650 milliGRAM(s) Oral every 6 hours PRN Temp greater or equal to 38.5C (101.3F)  acetaminophen   Tablet .. 650 milliGRAM(s) Oral every 6 hours PRN Mild Pain (1 - 3)  dextrose 40% Gel 15 Gram(s) Oral once PRN Blood Glucose LESS THAN 70 milliGRAM(s)/deciliter  glucagon  Injectable 1 milliGRAM(s) IntraMuscular once PRN Glucose LESS THAN 70 milligrams/deciliter  oxyCODONE    IR 5 milliGRAM(s) Oral every 4 hours PRN Moderate Pain (4 - 6)      Allergies    No Known Allergies    Intolerances        Vital Signs Last 24 Hrs  T(C): 37.1 (16 May 2020 07:00), Max: 37.1 (16 May 2020 07:00)  T(F): 98.8 (16 May 2020 07:00), Max: 98.8 (16 May 2020 07:00)  HR: 140 (16 May 2020 18:00) (92 - 141)  BP: 101/57 (16 May 2020 18:00) (91/61 - 158/99)  BP(mean): 71 (16 May 2020 18:00) (70 - 101)  RR: 10 (16 May 2020 18:00) (10 - 41)  SpO2: 94% (16 May 2020 18:00) (85% - 100%)    Physical exam:  Neurologic:  Mental status: Sedated and intubated. No spontaneous movement.   II: Pupils equally round and reactive to light   Areflexic x 4 limbs    COVID LABS:   D-Dimer Assay, Quantitative: 57938 (05-16-20)  D-Dimer Assay, Quantitative: 3529 (05-14-20)  D-Dimer Assay, Quantitative: 462 (05-12-20)  D-Dimer Assay, Quantitative: 506 (05-11-20)  D-Dimer Assay, Quantitative: 409 (05-10-20)  D-Dimer Assay, Quantitative: 294 (05-09-20)  D-Dimer Assay, Quantitative: 248 (05-08-20)  D-Dimer Assay, Quantitative: 212 (05-07-20)      Ferritin, Serum: 2472 (05-14 @ 05:54)  Ferritin, Serum: 5283 (05-13 @ 04:36)  Ferritin, Serum: 25057 (05-12 @ 05:22)  Ferritin, Serum: 58366 (05-11 @ 06:44)  Ferritin, Serum: 37826 (05-10 @ 10:26)  Ferritin, Serum: 9946 (05-09 @ 07:18)  Ferritin, Serum: 2218 (05-07 @ 07:55)  Ferritin, Serum: 2537 (05-06 @ 21:40)      C-Reactive Protein, Serum: 2.87 (05-16 @ 05:20)  C-Reactive Protein, Serum: 1.07 (05-15 @ 06:03)  C-Reactive Protein, Serum: 0.67 (05-14 @ 05:54)  C-Reactive Protein, Serum: 1.06 (05-13 @ 04:36)  C-Reactive Protein, Serum: 2.45 (05-12 @ 05:22)  C-Reactive Protein, Serum: 3.59 (05-11 @ 06:44)  C-Reactive Protein, Serum: 5.45 (05-10 @ 10:26)  C-Reactive Protein, Serum: 4.92 (05-09 @ 07:18)  C-Reactive Protein, Serum: 4.55 (05-08 @ 06:05)  C-Reactive Protein, Serum: 2.82 (05-07 @ 07:38)  C-Reactive Protein, Serum: 3.33 (05-06 @ 12:45)                            12.3   19.97 )-----------( 157      ( 16 May 2020 19:21 )             38.9     05-16    142  |  109<H>  |  21  ----------------------------<  120<H>  4.5   |  21<L>  |  0.92    Ca    8.4      16 May 2020 05:20  Phos  2.6     05-16  Mg     2.0     05-16    TPro  6.8  /  Alb  3.5  /  TBili  1.7<H>  /  DBili  x   /  AST  74<H>  /  ALT  86<H>  /  AlkPhos  116  05-16    PTT - ( 16 May 2020 16:42 )  PTT:77.6 sec      RADIOLOGY & ADDITIONAL TESTS: reviewed.

## 2020-05-16 NOTE — PROGRESS NOTE ADULT - ATTENDING COMMENTS
Hypoxic respiratory failure due to COVID-19 pneumonia, requiring emergent intubation today due to worsening wob and hypopxia on bipap.   CCM time provided.

## 2020-05-16 NOTE — PROGRESS NOTE ADULT - SUBJECTIVE AND OBJECTIVE BOX
INTERVAL HPI/OVERNIGHT EVENTS:    SUBJECTIVE: Patient seen and examined at bedside.     OBJECTIVE:    VITAL SIGNS:  ICU Vital Signs Last 24 Hrs  T(C): 36.8 (16 May 2020 05:00), Max: 36.8 (16 May 2020 05:00)  T(F): 98.2 (16 May 2020 05:00), Max: 98.2 (16 May 2020 05:00)  HR: 115 (16 May 2020 06:00) (81 - 115)  BP: 127/83 (16 May 2020 06:00) (127/83 - 156/99)  BP(mean): 101 (16 May 2020 01:00) (101 - 122)  ABP: --  ABP(mean): --  RR: 39 (16 May 2020 06:00) (30 - 41)  SpO2: 93% (16 May 2020 06:00) (89% - 100%)        05-15 @ 07:01  -  05-16 @ 07:00  --------------------------------------------------------  IN: 1550 mL / OUT: 3600 mL / NET: -2050 mL      CAPILLARY BLOOD GLUCOSE      POCT Blood Glucose.: 113 mg/dL (16 May 2020 05:36)      PHYSICAL EXAM:    General: NAD  HEENT: NC/AT; PERRL, clear conjunctiva  Neck: supple  Respiratory: CTA b/l  Cardiovascular: +S1/S2; RRR  Abdomen: soft, NT/ND; +BS x4  Extremities: WWP, 2+ peripheral pulses b/l; no LE edema  Skin: normal color and turgor; no rash  Neurological:    MEDICATIONS:  MEDICATIONS  (STANDING):  amLODIPine   Tablet 5 milliGRAM(s) Oral daily  apixaban 5 milliGRAM(s) Oral every 12 hours  aspirin  chewable 81 milliGRAM(s) Oral every 24 hours  baclofen 5 milliGRAM(s) Oral every 8 hours  dextrose 5%. 1000 milliLiter(s) (50 mL/Hr) IV Continuous <Continuous>  dextrose 50% Injectable 12.5 Gram(s) IV Push once  dextrose 50% Injectable 25 Gram(s) IV Push once  dextrose 50% Injectable 25 Gram(s) IV Push once  dorzolamide 2%/timolol 0.5% Ophthalmic Solution 1 Drop(s) Both EYES every 12 hours  folic acid 1 milliGRAM(s) Oral daily  gabapentin 400 milliGRAM(s) Oral three times a day  insulin lispro (HumaLOG) corrective regimen sliding scale   SubCutaneous three times a day before meals  insulin lispro (HumaLOG) corrective regimen sliding scale   SubCutaneous at bedtime  latanoprost 0.005% Ophthalmic Solution 1 Drop(s) Both EYES at bedtime  lidocaine   Patch 2 Patch Transdermal every 24 hours  methylPREDNISolone sodium succinate Injectable 40 milliGRAM(s) IV Push every 8 hours  pantoprazole    Tablet 40 milliGRAM(s) Oral before breakfast  piperacillin/tazobactam IVPB.. 4.5 Gram(s) IV Intermittent every 6 hours  senna 2 Tablet(s) Oral at bedtime  thiamine 100 milliGRAM(s) Oral every 24 hours  tradipitant vs placebo ([id number]) 85 milliGRAM(s) Oral every 12 hours    MEDICATIONS  (PRN):  acetaminophen   Tablet .. 650 milliGRAM(s) Oral every 6 hours PRN Temp greater or equal to 38.5C (101.3F)  acetaminophen   Tablet .. 650 milliGRAM(s) Oral every 6 hours PRN Mild Pain (1 - 3)  dextrose 40% Gel 15 Gram(s) Oral once PRN Blood Glucose LESS THAN 70 milliGRAM(s)/deciliter  glucagon  Injectable 1 milliGRAM(s) IntraMuscular once PRN Glucose LESS THAN 70 milligrams/deciliter  oxyCODONE    IR 5 milliGRAM(s) Oral every 4 hours PRN Moderate Pain (4 - 6)      ALLERGIES:  Allergies    No Known Allergies    Intolerances        LABS:                        11.8   15.70 )-----------( 116      ( 16 May 2020 05:20 )             36.9     05-16    142  |  109<H>  |  21  ----------------------------<  120<H>  4.5   |  21<L>  |  0.92    Ca    8.4      16 May 2020 05:20  Phos  2.6     05-16  Mg     2.0     05-16    TPro  6.8  /  Alb  3.5  /  TBili  1.7<H>  /  DBili  x   /  AST  74<H>  /  ALT  86<H>  /  AlkPhos  116  05-16          RADIOLOGY & ADDITIONAL TESTS: Reviewed. INTERVAL HPI/OVERNIGHT EVENTS: Patient tachypneic overnight, placed from CPAP to BIPAP. Complaining of pleuritic chest pain. Still currently tachypneic with RR>30.     SUBJECTIVE: Patient seen and examined at bedside.     OBJECTIVE:    VITAL SIGNS:  ICU Vital Signs Last 24 Hrs  T(C): 36.8 (16 May 2020 05:00), Max: 36.8 (16 May 2020 05:00)  T(F): 98.2 (16 May 2020 05:00), Max: 98.2 (16 May 2020 05:00)  HR: 115 (16 May 2020 06:00) (81 - 115)  BP: 127/83 (16 May 2020 06:00) (127/83 - 156/99)  BP(mean): 101 (16 May 2020 01:00) (101 - 122)  ABP: --  ABP(mean): --  RR: 39 (16 May 2020 06:00) (30 - 41)  SpO2: 93% (16 May 2020 06:00) (89% - 100%)        05-15 @ 07:01  -  05-16 @ 07:00  --------------------------------------------------------  IN: 1550 mL / OUT: 3600 mL / NET: -2050 mL      CAPILLARY BLOOD GLUCOSE      POCT Blood Glucose.: 113 mg/dL (16 May 2020 05:36)      PHYSICAL EXAM:    GENERAL: on BIPAP, tachypneic  HEENT: cpap in place  Respiratory: Sating 92 on BIPAP, tachypneic with increased respiratory distress  Cardiovascular: NSR, occasional sinus tach  Abdomen: Soft nontender, nondistended  Extremities: 2+ DP pulse b/l, 2+ radial pulse b/l   Neurological: follow commands, alert and oriented x3    MEDICATIONS:  MEDICATIONS  (STANDING):  amLODIPine   Tablet 5 milliGRAM(s) Oral daily  apixaban 5 milliGRAM(s) Oral every 12 hours  aspirin  chewable 81 milliGRAM(s) Oral every 24 hours  baclofen 5 milliGRAM(s) Oral every 8 hours  dextrose 5%. 1000 milliLiter(s) (50 mL/Hr) IV Continuous <Continuous>  dextrose 50% Injectable 12.5 Gram(s) IV Push once  dextrose 50% Injectable 25 Gram(s) IV Push once  dextrose 50% Injectable 25 Gram(s) IV Push once  dorzolamide 2%/timolol 0.5% Ophthalmic Solution 1 Drop(s) Both EYES every 12 hours  folic acid 1 milliGRAM(s) Oral daily  gabapentin 400 milliGRAM(s) Oral three times a day  insulin lispro (HumaLOG) corrective regimen sliding scale   SubCutaneous three times a day before meals  insulin lispro (HumaLOG) corrective regimen sliding scale   SubCutaneous at bedtime  latanoprost 0.005% Ophthalmic Solution 1 Drop(s) Both EYES at bedtime  lidocaine   Patch 2 Patch Transdermal every 24 hours  methylPREDNISolone sodium succinate Injectable 40 milliGRAM(s) IV Push every 8 hours  pantoprazole    Tablet 40 milliGRAM(s) Oral before breakfast  piperacillin/tazobactam IVPB.. 4.5 Gram(s) IV Intermittent every 6 hours  senna 2 Tablet(s) Oral at bedtime  thiamine 100 milliGRAM(s) Oral every 24 hours  tradipitant vs placebo ([id number]) 85 milliGRAM(s) Oral every 12 hours    MEDICATIONS  (PRN):  acetaminophen   Tablet .. 650 milliGRAM(s) Oral every 6 hours PRN Temp greater or equal to 38.5C (101.3F)  acetaminophen   Tablet .. 650 milliGRAM(s) Oral every 6 hours PRN Mild Pain (1 - 3)  dextrose 40% Gel 15 Gram(s) Oral once PRN Blood Glucose LESS THAN 70 milliGRAM(s)/deciliter  glucagon  Injectable 1 milliGRAM(s) IntraMuscular once PRN Glucose LESS THAN 70 milligrams/deciliter  oxyCODONE    IR 5 milliGRAM(s) Oral every 4 hours PRN Moderate Pain (4 - 6)      ALLERGIES:  Allergies    No Known Allergies    Intolerances        LABS:                        11.8   15.70 )-----------( 116      ( 16 May 2020 05:20 )             36.9     05-16    142  |  109<H>  |  21  ----------------------------<  120<H>  4.5   |  21<L>  |  0.92    Ca    8.4      16 May 2020 05:20  Phos  2.6     05-16  Mg     2.0     05-16    TPro  6.8  /  Alb  3.5  /  TBili  1.7<H>  /  DBili  x   /  AST  74<H>  /  ALT  86<H>  /  AlkPhos  116  05-16          RADIOLOGY & ADDITIONAL TESTS: Reviewed. INTERVAL HPI/OVERNIGHT EVENTS: Patient tachypneic overnight, placed from CPAP to BIPAP. Complaining of pleuritic chest pain. Still currently tachypneic with RR>30, will plan for intubation.     SUBJECTIVE: Patient seen and examined at bedside.     OBJECTIVE:    VITAL SIGNS:  ICU Vital Signs Last 24 Hrs  T(C): 36.8 (16 May 2020 05:00), Max: 36.8 (16 May 2020 05:00)  T(F): 98.2 (16 May 2020 05:00), Max: 98.2 (16 May 2020 05:00)  HR: 115 (16 May 2020 06:00) (81 - 115)  BP: 127/83 (16 May 2020 06:00) (127/83 - 156/99)  BP(mean): 101 (16 May 2020 01:00) (101 - 122)  ABP: --  ABP(mean): --  RR: 39 (16 May 2020 06:00) (30 - 41)  SpO2: 93% (16 May 2020 06:00) (89% - 100%)        05-15 @ 07:01  -  05-16 @ 07:00  --------------------------------------------------------  IN: 1550 mL / OUT: 3600 mL / NET: -2050 mL      CAPILLARY BLOOD GLUCOSE      POCT Blood Glucose.: 113 mg/dL (16 May 2020 05:36)      PHYSICAL EXAM:    GENERAL: on BIPAP, tachypneic  HEENT: cpap in place  Respiratory: Sating 92 on BIPAP, tachypneic with increased respiratory distress  Cardiovascular: NSR, occasional sinus tach  Abdomen: Soft nontender, nondistended  Extremities: 2+ DP pulse b/l, 2+ radial pulse b/l   Neurological: follow commands, alert and oriented x3    MEDICATIONS:  MEDICATIONS  (STANDING):  amLODIPine   Tablet 5 milliGRAM(s) Oral daily  apixaban 5 milliGRAM(s) Oral every 12 hours  aspirin  chewable 81 milliGRAM(s) Oral every 24 hours  baclofen 5 milliGRAM(s) Oral every 8 hours  dextrose 5%. 1000 milliLiter(s) (50 mL/Hr) IV Continuous <Continuous>  dextrose 50% Injectable 12.5 Gram(s) IV Push once  dextrose 50% Injectable 25 Gram(s) IV Push once  dextrose 50% Injectable 25 Gram(s) IV Push once  dorzolamide 2%/timolol 0.5% Ophthalmic Solution 1 Drop(s) Both EYES every 12 hours  folic acid 1 milliGRAM(s) Oral daily  gabapentin 400 milliGRAM(s) Oral three times a day  insulin lispro (HumaLOG) corrective regimen sliding scale   SubCutaneous three times a day before meals  insulin lispro (HumaLOG) corrective regimen sliding scale   SubCutaneous at bedtime  latanoprost 0.005% Ophthalmic Solution 1 Drop(s) Both EYES at bedtime  lidocaine   Patch 2 Patch Transdermal every 24 hours  methylPREDNISolone sodium succinate Injectable 40 milliGRAM(s) IV Push every 8 hours  pantoprazole    Tablet 40 milliGRAM(s) Oral before breakfast  piperacillin/tazobactam IVPB.. 4.5 Gram(s) IV Intermittent every 6 hours  senna 2 Tablet(s) Oral at bedtime  thiamine 100 milliGRAM(s) Oral every 24 hours  tradipitant vs placebo ([id number]) 85 milliGRAM(s) Oral every 12 hours    MEDICATIONS  (PRN):  acetaminophen   Tablet .. 650 milliGRAM(s) Oral every 6 hours PRN Temp greater or equal to 38.5C (101.3F)  acetaminophen   Tablet .. 650 milliGRAM(s) Oral every 6 hours PRN Mild Pain (1 - 3)  dextrose 40% Gel 15 Gram(s) Oral once PRN Blood Glucose LESS THAN 70 milliGRAM(s)/deciliter  glucagon  Injectable 1 milliGRAM(s) IntraMuscular once PRN Glucose LESS THAN 70 milligrams/deciliter  oxyCODONE    IR 5 milliGRAM(s) Oral every 4 hours PRN Moderate Pain (4 - 6)      ALLERGIES:  Allergies    No Known Allergies    Intolerances        LABS:                        11.8   15.70 )-----------( 116      ( 16 May 2020 05:20 )             36.9     05-16    142  |  109<H>  |  21  ----------------------------<  120<H>  4.5   |  21<L>  |  0.92    Ca    8.4      16 May 2020 05:20  Phos  2.6     05-16  Mg     2.0     05-16    TPro  6.8  /  Alb  3.5  /  TBili  1.7<H>  /  DBili  x   /  AST  74<H>  /  ALT  86<H>  /  AlkPhos  116  05-16          RADIOLOGY & ADDITIONAL TESTS: Reviewed.

## 2020-05-16 NOTE — PROGRESS NOTE ADULT - SUBJECTIVE AND OBJECTIVE BOX
Dr. Rucker spoke to the Legally Authorized Representative (LAR) / ([facility], Faraz Carlisle.) to obtain informed consent for enrollment into the study "Expanded Access to Convalescent Plasma for the Treatment of Patients with COVID-19". The informed consent was discussed in its entirety with a witness present, the LAR/([facility], Faraz Carlisle). was given the opportunity to ask questions, and all questions were answered. The LAR/([facility], Faraz Carlisle). understands the alternatives, risks and benefits to and agrees to proceed with the clinical trial. An email address was requested, and the ICF was sent (via email) to the LAR/([facility], Faraz Carlisle). to be signed and returned. This witness completed the witness attestation form. A fully signed copy of the ICF will be sent to the email address requested by the LAR/([facility], Faraz Carlisle)..

## 2020-05-16 NOTE — PROGRESS NOTE ADULT - ASSESSMENT
70y Male w/ hx of lumbar spondylosis and right L4 radiculopathy with right leg weakness, s/p lumbar decompression at [facility] 1/2019 with improvement, and admitted for severe leg and back pain on 5/6. MRI T and L spine negative. COVID + EMG done 5/8 with mild progression of moderate sensorimotor polyneuropathy, and right lumbar plexopathy in comparison to a year ago. No evidence of myositis / myopathy. EEG obtained for fluctuating MS on 5/9 showed no seizures. CK elevation likely related to covid infection given lack of myopathy on EMG; and no response to fluid hydration. TOCI administered on 5/9. Patient's CK downtrending and patient's exam is improving. Neurology reconsulted on 5/16 for recommendations on proning patient due to chronic back pain and tachypnea leading to intubation.    - Patient cleared for proning from a neurological stand point   - Continue baclofen 5mg every 8 hours for leg spasms, may increase as needed. 70y Male w/ hx of lumbar spondylosis and right L4 radiculopathy with right leg weakness, s/p lumbar decompression at [facility] 1/2019 with improvement, and admitted for severe leg and back pain on 5/6. MRI T and L spine negative. COVID + EMG done 5/8 with mild progression of moderate sensorimotor polyneuropathy, and right lumbar plexopathy in comparison to a year ago. No evidence of myositis / myopathy. EEG obtained for fluctuating MS on 5/9 showed no seizures. CK elevation likely related to covid infection given lack of myopathy on EMG; and no response to fluid hydration. TOCI administered on 5/9. Patient's CK downtrending and patient's exam is improving. Neurology reconsulted on 5/16 for recommendations on proning patient due to chronic back pain and tachypnea leading to intubation.  Reviewed thoracic and lumbar films - significant foraminal stenosis in lumbar region, but no spinal canal stenosis    - Patient cleared for proning from a neurological stand point   - Continue baclofen 5mg every 8 hours for leg spasms, may increase as needed.

## 2020-05-16 NOTE — CHART NOTE - NSCHARTNOTEFT_GEN_A_CORE
DAILY ASSESSMENTS (Required on all days, including Day 1 and Discharge Day)    Day: 5    NRS cough: Please rate the cough severity by selecting the number from 0 to 10 that best describes your worst level of cough in the past 24 hours: intubated    NRS nausea: Please rate the worst severity of your nausea from 0 to 5 during the past 24 hours: intubated    Hypercapnic respiratory failure?  NO     SpO2:   96%    FiO2 or LPM: MV O2 100%, PEEP 14    Vital signs (if not already collected by nurse): temperature, blood pressure, pulse, respiratory rate, oxygen saturation  See progress note    If this is day 1 or discharge day: CXR, and EKG as noted below      < from: Xray Chest 1 View-PORTABLE IMMEDIATE (05.16.20 @ 10:48) >  EXAM:  XR CHEST PORTABLE IMMED 1V                        PROCEDURE DATE:  05/16/2020    INTERPRETATION:  Clinical history: Hypoxia    Portable examination the of the chest demonstrates progression lung infiltrates in comparison to prior examination of the chest 5/16/2020. Endotracheal tube noted with tip overlying region T3. Left internal jugular line noted with tip overlying superior vena cava.    Impression: Progression lung infiltrates    Thank you for the opportunity to participate in the care of this patient.  YADIRA ARAIZA M.D., ATTENDING RADIOLOGIST  This document has been electronically signed. May 16 2020 11:04AM        ____________________________________________________  Patient discussed with Dr. Padron.     Christy Webber MD     Graduate Physician - COVID Emergency Events   [phone]

## 2020-05-17 LAB
ALBUMIN SERPL ELPH-MCNC: 2.6 G/DL — LOW (ref 3.3–5)
ALBUMIN SERPL ELPH-MCNC: 2.6 G/DL — LOW (ref 3.3–5)
ALP SERPL-CCNC: 109 U/L — SIGNIFICANT CHANGE UP (ref 40–120)
ALP SERPL-CCNC: 149 U/L — HIGH (ref 40–120)
ALT FLD-CCNC: 48 U/L — HIGH (ref 10–45)
ALT FLD-CCNC: 48 U/L — HIGH (ref 10–45)
ANION GAP SERPL CALC-SCNC: 12 MMOL/L — SIGNIFICANT CHANGE UP (ref 5–17)
ANION GAP SERPL CALC-SCNC: 14 MMOL/L — SIGNIFICANT CHANGE UP (ref 5–17)
ANION GAP SERPL CALC-SCNC: 15 MMOL/L — SIGNIFICANT CHANGE UP (ref 5–17)
ANION GAP SERPL CALC-SCNC: 16 MMOL/L — SIGNIFICANT CHANGE UP (ref 5–17)
ANION GAP SERPL CALC-SCNC: 16 MMOL/L — SIGNIFICANT CHANGE UP (ref 5–17)
APPEARANCE UR: CLEAR — SIGNIFICANT CHANGE UP
APTT BLD: 119.3 SEC — CRITICAL HIGH (ref 27.5–36.3)
APTT BLD: 133.3 SEC — CRITICAL HIGH (ref 27.5–36.3)
APTT BLD: 149.3 SEC — CRITICAL HIGH (ref 27.5–36.3)
APTT BLD: 53.9 SEC — HIGH (ref 27.5–36.3)
APTT BLD: 66.4 SEC — HIGH (ref 27.5–36.3)
AST SERPL-CCNC: 50 U/L — HIGH (ref 10–40)
AST SERPL-CCNC: 64 U/L — HIGH (ref 10–40)
B-OH-BUTYR SERPL-SCNC: 0.5 MMOL/L — HIGH
BASE EXCESS BLDA CALC-SCNC: -10.8 MMOL/L — LOW (ref -2–3)
BASE EXCESS BLDA CALC-SCNC: -11.6 MMOL/L — LOW (ref -2–3)
BASE EXCESS BLDA CALC-SCNC: -8.4 MMOL/L — LOW (ref -2–3)
BASE EXCESS BLDA CALC-SCNC: -9.6 MMOL/L — LOW (ref -2–3)
BASE EXCESS BLDA CALC-SCNC: -9.8 MMOL/L — LOW (ref -2–3)
BASOPHILS # BLD AUTO: 0 K/UL — SIGNIFICANT CHANGE UP (ref 0–0.2)
BASOPHILS NFR BLD AUTO: 0 % — SIGNIFICANT CHANGE UP (ref 0–2)
BILIRUB SERPL-MCNC: 1.5 MG/DL — HIGH (ref 0.2–1.2)
BILIRUB SERPL-MCNC: 2 MG/DL — HIGH (ref 0.2–1.2)
BILIRUB UR-MCNC: NEGATIVE — SIGNIFICANT CHANGE UP
BUN SERPL-MCNC: 39 MG/DL — HIGH (ref 7–23)
BUN SERPL-MCNC: 40 MG/DL — HIGH (ref 7–23)
BUN SERPL-MCNC: 45 MG/DL — HIGH (ref 7–23)
BUN SERPL-MCNC: 51 MG/DL — HIGH (ref 7–23)
BUN SERPL-MCNC: 52 MG/DL — HIGH (ref 7–23)
CALCIUM SERPL-MCNC: 7.1 MG/DL — LOW (ref 8.4–10.5)
CALCIUM SERPL-MCNC: 7.3 MG/DL — LOW (ref 8.4–10.5)
CALCIUM SERPL-MCNC: 7.3 MG/DL — LOW (ref 8.4–10.5)
CALCIUM SERPL-MCNC: 7.6 MG/DL — LOW (ref 8.4–10.5)
CALCIUM SERPL-MCNC: 7.7 MG/DL — LOW (ref 8.4–10.5)
CHLORIDE SERPL-SCNC: 108 MMOL/L — SIGNIFICANT CHANGE UP (ref 96–108)
CHLORIDE SERPL-SCNC: 109 MMOL/L — HIGH (ref 96–108)
CHLORIDE SERPL-SCNC: 110 MMOL/L — HIGH (ref 96–108)
CHLORIDE SERPL-SCNC: 110 MMOL/L — HIGH (ref 96–108)
CHLORIDE SERPL-SCNC: 111 MMOL/L — HIGH (ref 96–108)
CK MB CFR SERPL CALC: 12.6 NG/ML — HIGH (ref 0–6.7)
CK MB CFR SERPL CALC: 7.6 NG/ML — HIGH (ref 0–6.7)
CK SERPL-CCNC: 183 U/L — SIGNIFICANT CHANGE UP (ref 30–200)
CK SERPL-CCNC: 894 U/L — HIGH (ref 30–200)
CO2 SERPL-SCNC: 17 MMOL/L — LOW (ref 22–31)
CO2 SERPL-SCNC: 18 MMOL/L — LOW (ref 22–31)
CO2 SERPL-SCNC: 18 MMOL/L — LOW (ref 22–31)
CO2 SERPL-SCNC: 19 MMOL/L — LOW (ref 22–31)
CO2 SERPL-SCNC: 21 MMOL/L — LOW (ref 22–31)
COLOR SPEC: YELLOW — SIGNIFICANT CHANGE UP
CREAT ?TM UR-MCNC: 42 MG/DL — SIGNIFICANT CHANGE UP
CREAT SERPL-MCNC: 2.27 MG/DL — HIGH (ref 0.5–1.3)
CREAT SERPL-MCNC: 2.45 MG/DL — HIGH (ref 0.5–1.3)
CREAT SERPL-MCNC: 2.75 MG/DL — HIGH (ref 0.5–1.3)
CREAT SERPL-MCNC: 3.09 MG/DL — HIGH (ref 0.5–1.3)
CREAT SERPL-MCNC: 3.21 MG/DL — HIGH (ref 0.5–1.3)
CRP SERPL-MCNC: 9.09 MG/DL — HIGH (ref 0–0.4)
D DIMER BLD IA.RAPID-MCNC: HIGH NG/ML DDU
DIFF PNL FLD: ABNORMAL
EOSINOPHIL # BLD AUTO: 0 K/UL — SIGNIFICANT CHANGE UP (ref 0–0.5)
EOSINOPHIL NFR BLD AUTO: 0 % — SIGNIFICANT CHANGE UP (ref 0–6)
FERRITIN SERPL-MCNC: 2284 NG/ML — HIGH (ref 30–400)
GAS PNL BLDA: SIGNIFICANT CHANGE UP
GAS PNL BLDA: SIGNIFICANT CHANGE UP
GAS PNL BLDV: SIGNIFICANT CHANGE UP
GLUCOSE BLDC GLUCOMTR-MCNC: 150 MG/DL — HIGH (ref 70–99)
GLUCOSE BLDC GLUCOMTR-MCNC: 195 MG/DL — HIGH (ref 70–99)
GLUCOSE BLDC GLUCOMTR-MCNC: 203 MG/DL — HIGH (ref 70–99)
GLUCOSE BLDC GLUCOMTR-MCNC: 203 MG/DL — HIGH (ref 70–99)
GLUCOSE SERPL-MCNC: 173 MG/DL — HIGH (ref 70–99)
GLUCOSE SERPL-MCNC: 187 MG/DL — HIGH (ref 70–99)
GLUCOSE SERPL-MCNC: 192 MG/DL — HIGH (ref 70–99)
GLUCOSE SERPL-MCNC: 217 MG/DL — HIGH (ref 70–99)
GLUCOSE SERPL-MCNC: 220 MG/DL — HIGH (ref 70–99)
GLUCOSE UR QL: NEGATIVE — SIGNIFICANT CHANGE UP
HCO3 BLDA-SCNC: 18 MMOL/L — LOW (ref 21–28)
HCO3 BLDA-SCNC: 19 MMOL/L — LOW (ref 21–28)
HCO3 BLDA-SCNC: 20 MMOL/L — LOW (ref 21–28)
HCT VFR BLD CALC: 34.5 % — LOW (ref 39–50)
HGB BLD-MCNC: 10.5 G/DL — LOW (ref 13–17)
KETONES UR-MCNC: NEGATIVE — SIGNIFICANT CHANGE UP
LACTATE SERPL-SCNC: 2.6 MMOL/L — HIGH (ref 0.5–2)
LACTATE SERPL-SCNC: 3.3 MMOL/L — HIGH (ref 0.5–2)
LACTATE SERPL-SCNC: 3.3 MMOL/L — HIGH (ref 0.5–2)
LACTATE SERPL-SCNC: 3.4 MMOL/L — HIGH (ref 0.5–2)
LACTATE SERPL-SCNC: 3.8 MMOL/L — HIGH (ref 0.5–2)
LEUKOCYTE ESTERASE UR-ACNC: NEGATIVE — SIGNIFICANT CHANGE UP
LIDOCAIN IGE QN: 43 U/L — SIGNIFICANT CHANGE UP (ref 7–60)
LYMPHOCYTES # BLD AUTO: 0.44 K/UL — LOW (ref 1–3.3)
LYMPHOCYTES # BLD AUTO: 1.7 % — LOW (ref 13–44)
MAGNESIUM SERPL-MCNC: 2.1 MG/DL — SIGNIFICANT CHANGE UP (ref 1.6–2.6)
MCHC RBC-ENTMCNC: 30.4 GM/DL — LOW (ref 32–36)
MCHC RBC-ENTMCNC: 31.1 PG — SIGNIFICANT CHANGE UP (ref 27–34)
MCV RBC AUTO: 102.1 FL — HIGH (ref 80–100)
MONOCYTES # BLD AUTO: 0.47 K/UL — SIGNIFICANT CHANGE UP (ref 0–0.9)
MONOCYTES NFR BLD AUTO: 1.8 % — LOW (ref 2–14)
NEUTROPHILS # BLD AUTO: 24.99 K/UL — HIGH (ref 1.8–7.4)
NEUTROPHILS NFR BLD AUTO: 94.7 % — HIGH (ref 43–77)
NITRITE UR-MCNC: NEGATIVE — SIGNIFICANT CHANGE UP
NRBC # BLD: SIGNIFICANT CHANGE UP /100 WBCS (ref 0–0)
PCO2 BLDA: 48 MMHG — SIGNIFICANT CHANGE UP (ref 35–48)
PCO2 BLDA: 50 MMHG — HIGH (ref 35–48)
PCO2 BLDA: 54 MMHG — HIGH (ref 35–48)
PCO2 BLDA: 55 MMHG — HIGH (ref 35–48)
PCO2 BLDA: 62 MMHG — CRITICAL HIGH (ref 35–48)
PH BLDA: 7.12 — CRITICAL LOW (ref 7.35–7.45)
PH BLDA: 7.13 — CRITICAL LOW (ref 7.35–7.45)
PH BLDA: 7.14 — CRITICAL LOW (ref 7.35–7.45)
PH BLDA: 7.2 — CRITICAL LOW (ref 7.35–7.45)
PH BLDA: 7.21 — CRITICAL LOW (ref 7.35–7.45)
PH UR: 6 — SIGNIFICANT CHANGE UP (ref 5–8)
PHOSPHATE SERPL-MCNC: 5.3 MG/DL — HIGH (ref 2.5–4.5)
PLATELET # BLD AUTO: 140 K/UL — LOW (ref 150–400)
PO2 BLDA: 62 MMHG — LOW (ref 83–108)
PO2 BLDA: 71 MMHG — LOW (ref 83–108)
PO2 BLDA: 79 MMHG — LOW (ref 83–108)
PO2 BLDA: 86 MMHG — SIGNIFICANT CHANGE UP (ref 83–108)
PO2 BLDA: 92 MMHG — SIGNIFICANT CHANGE UP (ref 83–108)
POTASSIUM SERPL-MCNC: 4.8 MMOL/L — SIGNIFICANT CHANGE UP (ref 3.5–5.3)
POTASSIUM SERPL-MCNC: 5.4 MMOL/L — HIGH (ref 3.5–5.3)
POTASSIUM SERPL-MCNC: 5.4 MMOL/L — HIGH (ref 3.5–5.3)
POTASSIUM SERPL-MCNC: 5.5 MMOL/L — HIGH (ref 3.5–5.3)
POTASSIUM SERPL-MCNC: 6.1 MMOL/L — HIGH (ref 3.5–5.3)
POTASSIUM SERPL-SCNC: 4.8 MMOL/L — SIGNIFICANT CHANGE UP (ref 3.5–5.3)
POTASSIUM SERPL-SCNC: 5.4 MMOL/L — HIGH (ref 3.5–5.3)
POTASSIUM SERPL-SCNC: 5.4 MMOL/L — HIGH (ref 3.5–5.3)
POTASSIUM SERPL-SCNC: 5.5 MMOL/L — HIGH (ref 3.5–5.3)
POTASSIUM SERPL-SCNC: 6.1 MMOL/L — HIGH (ref 3.5–5.3)
PROT SERPL-MCNC: 5.4 G/DL — LOW (ref 6–8.3)
PROT SERPL-MCNC: 5.5 G/DL — LOW (ref 6–8.3)
PROT UR-MCNC: 30 MG/DL
RBC # BLD: 3.38 M/UL — LOW (ref 4.2–5.8)
RBC # FLD: 13.2 % — SIGNIFICANT CHANGE UP (ref 10.3–14.5)
SAO2 % BLDA: 88 % — LOW (ref 95–100)
SAO2 % BLDA: 91 % — LOW (ref 95–100)
SAO2 % BLDA: 93 % — LOW (ref 95–100)
SAO2 % BLDA: 95 % — SIGNIFICANT CHANGE UP (ref 95–100)
SAO2 % BLDA: 95 % — SIGNIFICANT CHANGE UP (ref 95–100)
SODIUM SERPL-SCNC: 141 MMOL/L — SIGNIFICANT CHANGE UP (ref 135–145)
SODIUM SERPL-SCNC: 143 MMOL/L — SIGNIFICANT CHANGE UP (ref 135–145)
SODIUM SERPL-SCNC: 144 MMOL/L — SIGNIFICANT CHANGE UP (ref 135–145)
SODIUM UR-SCNC: 84 MMOL/L — SIGNIFICANT CHANGE UP
SP GR SPEC: 1.02 — SIGNIFICANT CHANGE UP (ref 1–1.03)
TROPONIN T SERPL-MCNC: 0.29 NG/ML — CRITICAL HIGH (ref 0–0.01)
TROPONIN T SERPL-MCNC: 0.35 NG/ML — CRITICAL HIGH (ref 0–0.01)
UROBILINOGEN FLD QL: 1 E.U./DL — SIGNIFICANT CHANGE UP
UUN UR-MCNC: 292 MG/DL — SIGNIFICANT CHANGE UP
WBC # BLD: 25.9 K/UL — HIGH (ref 3.8–10.5)
WBC # FLD AUTO: 25.9 K/UL — HIGH (ref 3.8–10.5)

## 2020-05-17 PROCEDURE — 99291 CRITICAL CARE FIRST HOUR: CPT

## 2020-05-17 PROCEDURE — 71045 X-RAY EXAM CHEST 1 VIEW: CPT | Mod: 26

## 2020-05-17 PROCEDURE — 76700 US EXAM ABDOM COMPLETE: CPT | Mod: 26

## 2020-05-17 RX ORDER — SODIUM BICARBONATE 1 MEQ/ML
50 SYRINGE (ML) INTRAVENOUS ONCE
Refills: 0 | Status: COMPLETED | OUTPATIENT
Start: 2020-05-17 | End: 2020-05-17

## 2020-05-17 RX ORDER — CHLORHEXIDINE GLUCONATE 213 G/1000ML
15 SOLUTION TOPICAL EVERY 12 HOURS
Refills: 0 | Status: DISCONTINUED | OUTPATIENT
Start: 2020-05-17 | End: 2020-05-20

## 2020-05-17 RX ORDER — DEXTROSE 50 % IN WATER 50 %
25 SYRINGE (ML) INTRAVENOUS ONCE
Refills: 0 | Status: COMPLETED | OUTPATIENT
Start: 2020-05-17 | End: 2020-05-17

## 2020-05-17 RX ORDER — ALBUMIN HUMAN 25 %
250 VIAL (ML) INTRAVENOUS EVERY 6 HOURS
Refills: 0 | Status: DISCONTINUED | OUTPATIENT
Start: 2020-05-17 | End: 2020-05-18

## 2020-05-17 RX ORDER — ACETAMINOPHEN 500 MG
1000 TABLET ORAL ONCE
Refills: 0 | Status: COMPLETED | OUTPATIENT
Start: 2020-05-17 | End: 2020-05-17

## 2020-05-17 RX ORDER — SODIUM CHLORIDE 9 MG/ML
2000 INJECTION INTRAMUSCULAR; INTRAVENOUS; SUBCUTANEOUS ONCE
Refills: 0 | Status: COMPLETED | OUTPATIENT
Start: 2020-05-17 | End: 2020-05-17

## 2020-05-17 RX ORDER — HEPARIN SODIUM 5000 [USP'U]/ML
1600 INJECTION INTRAVENOUS; SUBCUTANEOUS
Qty: 25000 | Refills: 0 | Status: DISCONTINUED | OUTPATIENT
Start: 2020-05-17 | End: 2020-05-19

## 2020-05-17 RX ORDER — GABAPENTIN 400 MG/1
200 CAPSULE ORAL EVERY 8 HOURS
Refills: 0 | Status: DISCONTINUED | OUTPATIENT
Start: 2020-05-17 | End: 2020-05-20

## 2020-05-17 RX ORDER — DEXTROSE 50 % IN WATER 50 %
50 SYRINGE (ML) INTRAVENOUS ONCE
Refills: 0 | Status: COMPLETED | OUTPATIENT
Start: 2020-05-17 | End: 2020-05-17

## 2020-05-17 RX ORDER — SODIUM ZIRCONIUM CYCLOSILICATE 10 G/10G
10 POWDER, FOR SUSPENSION ORAL ONCE
Refills: 0 | Status: COMPLETED | OUTPATIENT
Start: 2020-05-17 | End: 2020-05-17

## 2020-05-17 RX ORDER — INSULIN HUMAN 100 [IU]/ML
10 INJECTION, SOLUTION SUBCUTANEOUS ONCE
Refills: 0 | Status: COMPLETED | OUTPATIENT
Start: 2020-05-17 | End: 2020-05-17

## 2020-05-17 RX ORDER — BACLOFEN 100 %
2.5 POWDER (GRAM) MISCELLANEOUS EVERY 12 HOURS
Refills: 0 | Status: DISCONTINUED | OUTPATIENT
Start: 2020-05-17 | End: 2020-05-19

## 2020-05-17 RX ORDER — HEPARIN SODIUM 5000 [USP'U]/ML
1400 INJECTION INTRAVENOUS; SUBCUTANEOUS
Qty: 25000 | Refills: 0 | Status: DISCONTINUED | OUTPATIENT
Start: 2020-05-17 | End: 2020-05-17

## 2020-05-17 RX ORDER — INSULIN HUMAN 100 [IU]/ML
5 INJECTION, SOLUTION SUBCUTANEOUS ONCE
Refills: 0 | Status: COMPLETED | OUTPATIENT
Start: 2020-05-17 | End: 2020-05-17

## 2020-05-17 RX ORDER — SODIUM BICARBONATE 1 MEQ/ML
100 SYRINGE (ML) INTRAVENOUS ONCE
Refills: 0 | Status: COMPLETED | OUTPATIENT
Start: 2020-05-17 | End: 2020-05-17

## 2020-05-17 RX ORDER — CALCIUM GLUCONATE 100 MG/ML
2 VIAL (ML) INTRAVENOUS ONCE
Refills: 0 | Status: COMPLETED | OUTPATIENT
Start: 2020-05-17 | End: 2020-05-17

## 2020-05-17 RX ORDER — IOHEXOL 300 MG/ML
30 INJECTION, SOLUTION INTRAVENOUS ONCE
Refills: 0 | Status: COMPLETED | OUTPATIENT
Start: 2020-05-17 | End: 2020-05-17

## 2020-05-17 RX ADMIN — SODIUM CHLORIDE 2000 MILLILITER(S): 9 INJECTION INTRAMUSCULAR; INTRAVENOUS; SUBCUTANEOUS at 02:54

## 2020-05-17 RX ADMIN — Medication 40 MILLIGRAM(S): at 21:46

## 2020-05-17 RX ADMIN — FENTANYL CITRATE 4.77 MICROGRAM(S)/KG/HR: 50 INJECTION INTRAVENOUS at 12:00

## 2020-05-17 RX ADMIN — INSULIN HUMAN 5 UNIT(S): 100 INJECTION, SOLUTION SUBCUTANEOUS at 14:20

## 2020-05-17 RX ADMIN — LIDOCAINE 2 PATCH: 4 CREAM TOPICAL at 10:18

## 2020-05-17 RX ADMIN — Medication 8.93 MICROGRAM(S)/KG/MIN: at 19:30

## 2020-05-17 RX ADMIN — Medication 2.5 MILLIGRAM(S): at 17:08

## 2020-05-17 RX ADMIN — GABAPENTIN 400 MILLIGRAM(S): 400 CAPSULE ORAL at 05:16

## 2020-05-17 RX ADMIN — GABAPENTIN 200 MILLIGRAM(S): 400 CAPSULE ORAL at 21:47

## 2020-05-17 RX ADMIN — Medication 81 MILLIGRAM(S): at 01:02

## 2020-05-17 RX ADMIN — Medication 1 MILLIGRAM(S): at 14:19

## 2020-05-17 RX ADMIN — Medication 50 MILLIEQUIVALENT(S): at 22:51

## 2020-05-17 RX ADMIN — LIDOCAINE 2 PATCH: 4 CREAM TOPICAL at 05:41

## 2020-05-17 RX ADMIN — Medication 8.93 MICROGRAM(S)/KG/MIN: at 13:00

## 2020-05-17 RX ADMIN — PIPERACILLIN AND TAZOBACTAM 200 GRAM(S): 4; .5 INJECTION, POWDER, LYOPHILIZED, FOR SOLUTION INTRAVENOUS at 19:00

## 2020-05-17 RX ADMIN — Medication 200 GRAM(S): at 14:19

## 2020-05-17 RX ADMIN — Medication 400 MILLIGRAM(S): at 01:02

## 2020-05-17 RX ADMIN — Medication 40 MILLIGRAM(S): at 01:02

## 2020-05-17 RX ADMIN — INSULIN HUMAN 10 UNIT(S): 100 INJECTION, SOLUTION SUBCUTANEOUS at 23:37

## 2020-05-17 RX ADMIN — CHLORHEXIDINE GLUCONATE 15 MILLILITER(S): 213 SOLUTION TOPICAL at 17:08

## 2020-05-17 RX ADMIN — SENNA PLUS 2 TABLET(S): 8.6 TABLET ORAL at 01:01

## 2020-05-17 RX ADMIN — VASOPRESSIN 2.4 UNIT(S)/MIN: 20 INJECTION INTRAVENOUS at 10:30

## 2020-05-17 RX ADMIN — CISATRACURIUM BESYLATE 17.2 MICROGRAM(S)/KG/MIN: 2 INJECTION INTRAVENOUS at 14:22

## 2020-05-17 RX ADMIN — SODIUM ZIRCONIUM CYCLOSILICATE 10 GRAM(S): 10 POWDER, FOR SUSPENSION ORAL at 14:21

## 2020-05-17 RX ADMIN — SODIUM ZIRCONIUM CYCLOSILICATE 10 GRAM(S): 10 POWDER, FOR SUSPENSION ORAL at 08:02

## 2020-05-17 RX ADMIN — CHLORHEXIDINE GLUCONATE 15 MILLILITER(S): 213 SOLUTION TOPICAL at 05:41

## 2020-05-17 RX ADMIN — IOHEXOL 30 MILLILITER(S): 300 INJECTION, SOLUTION INTRAVENOUS at 19:00

## 2020-05-17 RX ADMIN — Medication 166.67 MILLIGRAM(S): at 05:17

## 2020-05-17 RX ADMIN — PROPOFOL 0.57 MICROGRAM(S)/KG/MIN: 10 INJECTION, EMULSION INTRAVENOUS at 19:30

## 2020-05-17 RX ADMIN — Medication 50 MILLILITER(S): at 23:37

## 2020-05-17 RX ADMIN — PIPERACILLIN AND TAZOBACTAM 200 GRAM(S): 4; .5 INJECTION, POWDER, LYOPHILIZED, FOR SOLUTION INTRAVENOUS at 05:16

## 2020-05-17 RX ADMIN — Medication 40 MILLIGRAM(S): at 14:00

## 2020-05-17 RX ADMIN — Medication 100 MILLIEQUIVALENT(S): at 14:21

## 2020-05-17 RX ADMIN — DORZOLAMIDE HYDROCHLORIDE TIMOLOL MALEATE 1 DROP(S): 20; 5 SOLUTION/ DROPS OPHTHALMIC at 05:41

## 2020-05-17 RX ADMIN — Medication 100 MILLIEQUIVALENT(S): at 14:00

## 2020-05-17 RX ADMIN — PIPERACILLIN AND TAZOBACTAM 200 GRAM(S): 4; .5 INJECTION, POWDER, LYOPHILIZED, FOR SOLUTION INTRAVENOUS at 13:00

## 2020-05-17 RX ADMIN — GABAPENTIN 400 MILLIGRAM(S): 400 CAPSULE ORAL at 14:19

## 2020-05-17 RX ADMIN — SODIUM ZIRCONIUM CYCLOSILICATE 10 GRAM(S): 10 POWDER, FOR SUSPENSION ORAL at 23:38

## 2020-05-17 RX ADMIN — GABAPENTIN 400 MILLIGRAM(S): 400 CAPSULE ORAL at 01:03

## 2020-05-17 RX ADMIN — Medication 4: at 14:10

## 2020-05-17 RX ADMIN — Medication 40 MILLIGRAM(S): at 05:16

## 2020-05-17 RX ADMIN — HEPARIN SODIUM 14 UNIT(S)/HR: 5000 INJECTION INTRAVENOUS; SUBCUTANEOUS at 13:30

## 2020-05-17 RX ADMIN — Medication 100 MILLIGRAM(S): at 14:21

## 2020-05-17 RX ADMIN — LATANOPROST 1 DROP(S): 0.05 SOLUTION/ DROPS OPHTHALMIC; TOPICAL at 21:47

## 2020-05-17 RX ADMIN — Medication 5 MILLIGRAM(S): at 05:35

## 2020-05-17 RX ADMIN — PROPOFOL 0.57 MICROGRAM(S)/KG/MIN: 10 INJECTION, EMULSION INTRAVENOUS at 13:00

## 2020-05-17 RX ADMIN — VASOPRESSIN 2.4 UNIT(S)/MIN: 20 INJECTION INTRAVENOUS at 05:17

## 2020-05-17 RX ADMIN — Medication 5 MILLIGRAM(S): at 14:19

## 2020-05-17 RX ADMIN — PANTOPRAZOLE SODIUM 40 MILLIGRAM(S): 20 TABLET, DELAYED RELEASE ORAL at 05:16

## 2020-05-17 NOTE — PROGRESS NOTE ADULT - SUBJECTIVE AND OBJECTIVE BOX
INTERVAL HPI/OVERNIGHT EVENTS:    SUBJECTIVE: Patient seen and examined at bedside.     OBJECTIVE:    VITAL SIGNS:  ICU Vital Signs Last 24 Hrs  T(C): 37.1 (17 May 2020 04:00), Max: 38.7 (16 May 2020 21:00)  T(F): 98.8 (17 May 2020 04:00), Max: 101.7 (16 May 2020 21:00)  HR: 86 (17 May 2020 07:00) (84 - 141)  BP: 117/68 (17 May 2020 06:00) (91/61 - 158/99)  BP(mean): 72 (16 May 2020 20:00) (70 - 85)  ABP: 119/64 (17 May 2020 07:00) (115/67 - 125/69)  ABP(mean): 85 (17 May 2020 05:00) (85 - 91)  RR: 30 (17 May 2020 07:00) (10 - 38)  SpO2: 95% (17 May 2020 07:00) (85% - 100%)    Mode: AC/ CMV (Assist Control/ Continuous Mandatory Ventilation), RR (machine): 26, TV (machine): 450, FiO2: 70, PEEP: 8, ITime: 1, MAP: 16, PIP: 26     @ : @ 07:00  --------------------------------------------------------  IN: 2124.5 mL / OUT: 2475 mL / NET: -350.5 mL     @ 07: @ 07:58  --------------------------------------------------------  IN: 128.5 mL / OUT: 0 mL / NET: 128.5 mL      CAPILLARY BLOOD GLUCOSE      POCT Blood Glucose.: 150 mg/dL (17 May 2020 05:47)      PHYSICAL EXAM:    General: NAD  HEENT: NC/AT; PERRL, clear conjunctiva  Neck: supple  Respiratory: CTA b/l  Cardiovascular: +S1/S2; RRR  Abdomen: soft, NT/ND; +BS x4  Extremities: WWP, 2+ peripheral pulses b/l; no LE edema  Skin: normal color and turgor; no rash  Neurological:    MEDICATIONS:  MEDICATIONS  (STANDING):  aspirin  chewable 81 milliGRAM(s) Oral every 24 hours  baclofen 5 milliGRAM(s) Oral every 8 hours  chlorhexidine 0.12% Liquid 15 milliLiter(s) Oral Mucosa every 12 hours  cisatracurium Infusion 3 MICROgram(s)/kG/Min (17.2 mL/Hr) IV Continuous <Continuous>  dextrose 5%. 1000 milliLiter(s) (50 mL/Hr) IV Continuous <Continuous>  dextrose 50% Injectable 12.5 Gram(s) IV Push once  dextrose 50% Injectable 25 Gram(s) IV Push once  dextrose 50% Injectable 25 Gram(s) IV Push once  dorzolamide 2%/timolol 0.5% Ophthalmic Solution 1 Drop(s) Both EYES every 12 hours  fentaNYL   Infusion. 0.5 MICROgram(s)/kG/Hr (4.77 mL/Hr) IV Continuous <Continuous>  folic acid 1 milliGRAM(s) Oral daily  gabapentin 400 milliGRAM(s) Oral three times a day  heparin  Infusion 1600 Unit(s)/Hr (16 mL/Hr) IV Continuous <Continuous>  insulin lispro (HumaLOG) corrective regimen sliding scale   SubCutaneous three times a day before meals  insulin lispro (HumaLOG) corrective regimen sliding scale   SubCutaneous at bedtime  latanoprost 0.005% Ophthalmic Solution 1 Drop(s) Both EYES at bedtime  lidocaine   Patch 2 Patch Transdermal every 24 hours  methylPREDNISolone sodium succinate Injectable 40 milliGRAM(s) IV Push every 8 hours  norepinephrine Infusion 0.05 MICROgram(s)/kG/Min (8.93 mL/Hr) IV Continuous <Continuous>  pantoprazole    Tablet 40 milliGRAM(s) Oral before breakfast  piperacillin/tazobactam IVPB.. 4.5 Gram(s) IV Intermittent every 6 hours  propofol Infusion 1 MICROgram(s)/kG/Min (0.57 mL/Hr) IV Continuous <Continuous>  remdesivir  IVPB   IV Intermittent   remdesivir  IVPB 100 milliGRAM(s) IV Intermittent every 24 hours  senna 2 Tablet(s) Oral at bedtime  sodium zirconium cyclosilicate 10 Gram(s) Oral once  thiamine 100 milliGRAM(s) Oral every 24 hours  tradipitant vs placebo ([id number]) 85 milliGRAM(s) Oral every 12 hours  vancomycin  IVPB 1250 milliGRAM(s) IV Intermittent every 12 hours  vasopressin Infusion 0.04 Unit(s)/Min (2.4 mL/Hr) IV Continuous <Continuous>    MEDICATIONS  (PRN):  acetaminophen   Tablet .. 650 milliGRAM(s) Oral every 6 hours PRN Temp greater or equal to 38.5C (101.3F)  acetaminophen   Tablet .. 650 milliGRAM(s) Oral every 6 hours PRN Mild Pain (1 - 3)  dextrose 40% Gel 15 Gram(s) Oral once PRN Blood Glucose LESS THAN 70 milliGRAM(s)/deciliter  glucagon  Injectable 1 milliGRAM(s) IntraMuscular once PRN Glucose LESS THAN 70 milligrams/deciliter  oxyCODONE    IR 5 milliGRAM(s) Oral every 4 hours PRN Moderate Pain (4 - 6)      ALLERGIES:  Allergies    No Known Allergies    Intolerances        LABS:                        10.5   25.90 )-----------( 140      ( 17 May 2020 06:33 )             34.5     05-17    143  |  110<H>  |  39<H>  ----------------------------<  187<H>  5.4<H>   |  19<L>  |  2.45<H>    Ca    7.1<L>      17 May 2020 06:33  Phos  5.3     05-  Mg     2.1         TPro  5.4<L>  /  Alb  2.6<L>  /  TBili  2.0<H>  /  DBili  x   /  AST  50<H>  /  ALT  48<H>  /  AlkPhos  109  05-17    PTT - ( 17 May 2020 06:33 )  PTT:53.9 sec  Urinalysis Basic - ( 16 May 2020 22:24 )    Color: Yellow / Appearance: Cloudy / S.025 / pH: x  Gluc: x / Ketone: NEGATIVE  / Bili: Small / Urobili: 2.0 E.U./dL   Blood: x / Protein: 30 mg/dL / Nitrite: NEGATIVE   Leuk Esterase: NEGATIVE / RBC: Many /HPF / WBC < 5 /HPF   Sq Epi: x / Non Sq Epi: 0-5 /HPF / Bacteria: Present /HPF        RADIOLOGY & ADDITIONAL TESTS: Reviewed. INTERVAL HPI/OVERNIGHT EVENTS: Patient with SETH and septic shock overnight. Given 2L NS, lactate downtrending, still making good urine. Proned overnight.     SUBJECTIVE: Patient seen and examined at bedside.     OBJECTIVE:    VITAL SIGNS:  ICU Vital Signs Last 24 Hrs  T(C): 37.1 (17 May 2020 04:00), Max: 38.7 (16 May 2020 21:00)  T(F): 98.8 (17 May 2020 04:00), Max: 101.7 (16 May 2020 21:00)  HR: 86 (17 May 2020 07:00) (84 - 141)  BP: 117/68 (17 May 2020 06:00) (91/61 - 158/99)  BP(mean): 72 (16 May 2020 20:00) (70 - 85)  ABP: 119/64 (17 May 2020 07:00) (115/67 - 125/69)  ABP(mean): 85 (17 May 2020 05:00) (85 - 91)  RR: 30 (17 May 2020 07:00) (10 - 38)  SpO2: 95% (17 May 2020 07:00) (85% - 100%)    Mode: AC/ CMV (Assist Control/ Continuous Mandatory Ventilation), RR (machine): 26, TV (machine): 450, FiO2: 70, PEEP: 8, ITime: 1, MAP: 16, PIP: 26     @ 07:  -   @ 07:00  --------------------------------------------------------  IN: 2124.5 mL / OUT: 2475 mL / NET: -350.5 mL     @ 07:01  -   @ 07:58  --------------------------------------------------------  IN: 128.5 mL / OUT: 0 mL / NET: 128.5 mL      CAPILLARY BLOOD GLUCOSE      POCT Blood Glucose.: 150 mg/dL (17 May 2020 05:47)      PHYSICAL EXAM:    GENERAL: intubated, sedated,proned  HEENT: wnl  Respiratory: intubated, sedated  Cardiovascular: NSR   Abdomen: Soft nontender, nondistended  Extremities: 2+ DP pulse b/l, 2+ radial pulse b/l   Neurological: sedated    MEDICATIONS:  MEDICATIONS  (STANDING):  aspirin  chewable 81 milliGRAM(s) Oral every 24 hours  baclofen 5 milliGRAM(s) Oral every 8 hours  chlorhexidine 0.12% Liquid 15 milliLiter(s) Oral Mucosa every 12 hours  cisatracurium Infusion 3 MICROgram(s)/kG/Min (17.2 mL/Hr) IV Continuous <Continuous>  dextrose 5%. 1000 milliLiter(s) (50 mL/Hr) IV Continuous <Continuous>  dextrose 50% Injectable 12.5 Gram(s) IV Push once  dextrose 50% Injectable 25 Gram(s) IV Push once  dextrose 50% Injectable 25 Gram(s) IV Push once  dorzolamide 2%/timolol 0.5% Ophthalmic Solution 1 Drop(s) Both EYES every 12 hours  fentaNYL   Infusion. 0.5 MICROgram(s)/kG/Hr (4.77 mL/Hr) IV Continuous <Continuous>  folic acid 1 milliGRAM(s) Oral daily  gabapentin 400 milliGRAM(s) Oral three times a day  heparin  Infusion 1600 Unit(s)/Hr (16 mL/Hr) IV Continuous <Continuous>  insulin lispro (HumaLOG) corrective regimen sliding scale   SubCutaneous three times a day before meals  insulin lispro (HumaLOG) corrective regimen sliding scale   SubCutaneous at bedtime  latanoprost 0.005% Ophthalmic Solution 1 Drop(s) Both EYES at bedtime  lidocaine   Patch 2 Patch Transdermal every 24 hours  methylPREDNISolone sodium succinate Injectable 40 milliGRAM(s) IV Push every 8 hours  norepinephrine Infusion 0.05 MICROgram(s)/kG/Min (8.93 mL/Hr) IV Continuous <Continuous>  pantoprazole    Tablet 40 milliGRAM(s) Oral before breakfast  piperacillin/tazobactam IVPB.. 4.5 Gram(s) IV Intermittent every 6 hours  propofol Infusion 1 MICROgram(s)/kG/Min (0.57 mL/Hr) IV Continuous <Continuous>  remdesivir  IVPB   IV Intermittent   remdesivir  IVPB 100 milliGRAM(s) IV Intermittent every 24 hours  senna 2 Tablet(s) Oral at bedtime  sodium zirconium cyclosilicate 10 Gram(s) Oral once  thiamine 100 milliGRAM(s) Oral every 24 hours  tradipitant vs placebo ([id number]) 85 milliGRAM(s) Oral every 12 hours  vancomycin  IVPB 1250 milliGRAM(s) IV Intermittent every 12 hours  vasopressin Infusion 0.04 Unit(s)/Min (2.4 mL/Hr) IV Continuous <Continuous>    MEDICATIONS  (PRN):  acetaminophen   Tablet .. 650 milliGRAM(s) Oral every 6 hours PRN Temp greater or equal to 38.5C (101.3F)  acetaminophen   Tablet .. 650 milliGRAM(s) Oral every 6 hours PRN Mild Pain (1 - 3)  dextrose 40% Gel 15 Gram(s) Oral once PRN Blood Glucose LESS THAN 70 milliGRAM(s)/deciliter  glucagon  Injectable 1 milliGRAM(s) IntraMuscular once PRN Glucose LESS THAN 70 milligrams/deciliter  oxyCODONE    IR 5 milliGRAM(s) Oral every 4 hours PRN Moderate Pain (4 - 6)      ALLERGIES:  Allergies    No Known Allergies    Intolerances        LABS:                        10.5   25.90 )-----------( 140      ( 17 May 2020 06:33 )             34.5     05-17    143  |  110<H>  |  39<H>  ----------------------------<  187<H>  5.4<H>   |  19<L>  |  2.45<H>    Ca    7.1<L>      17 May 2020 06:33  Phos  5.3       Mg     2.1         TPro  5.4<L>  /  Alb  2.6<L>  /  TBili  2.0<H>  /  DBili  x   /  AST  50<H>  /  ALT  48<H>  /  AlkPhos  109  05-17    PTT - ( 17 May 2020 06:33 )  PTT:53.9 sec  Urinalysis Basic - ( 16 May 2020 22:24 )    Color: Yellow / Appearance: Cloudy / S.025 / pH: x  Gluc: x / Ketone: NEGATIVE  / Bili: Small / Urobili: 2.0 E.U./dL   Blood: x / Protein: 30 mg/dL / Nitrite: NEGATIVE   Leuk Esterase: NEGATIVE / RBC: Many /HPF / WBC < 5 /HPF   Sq Epi: x / Non Sq Epi: 0-5 /HPF / Bacteria: Present /HPF        RADIOLOGY & ADDITIONAL TESTS: Reviewed.

## 2020-05-17 NOTE — PROGRESS NOTE ADULT - ASSESSMENT
70 y/o M with PMH of progressive lower extremity weakness, s/p lumbar decompression in 1/2019. 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        Neuro:   On oxycodone 5mg home med  on Baclofen increased to q8h  Tylenol for fevers or pain  Chronic back - Pt endorses pain however not different from baseline, No new associated neurologic deficits.     Neuropathy.  Plan: Has a chronic neuropathy likely secondary to disc herniation. on gabapentin 400 tid at home. Currently in worsening back pain and tingling and numbness. Follows with Dr. Brito outpatient.   - LE dopplers negative for DVT  - CPK elevated -> concern for myositis as underlying etiology   - RF 22 -> mildly elevated  - f/u AHMET, anti-dsDNA, anti-Isaura   - trend CPK   - EMG unchanged from prior   - c/w home gabapentin 400 tid  - D/C'd Zyprexa due to lethargy        Respiratory:   ·  Problem: Pneumonia due to COVID-19 virus.  Plan: Pt with sore throat and runny nose and mild diarrhea for the past few days but no SOB or cough, was found to be positive for COVID. Had fever and tachycardia in ED but no O2 desaturation. currently sating 94% on 2L. CXR clear.   COVID positive, Ferritin 2537, CRP 3.33  - Monitor O2 saturation and symptoms  - Trending inflammatory markers daily  - tylenol for fever  - symptomtic management   - contact precaustions: isolation and droplet  - avoid NSAID/ACE-I/ARB  - Odyssey trial   - Solumedrol x5 days (end 5/19)  - c/w mechanical ventillation and proning prn       #Acute Hypoxic Resp Failure 2/2 COVID-19  Patient with increased O2 requirements (79% on RA) s/p lasix 20mg. CXR without evidence of congestion. Currently on BIPAP.   - s/p tociluzumab (5/9)  - initiated solumedrol 40mg BID -> taper as per pulm (end 5/19)  - wean O2 as tolerated  - remdesivir dc'd 2/2 SETH  - intubated 5/16, c/w mechanical ventillation    Cardiovascular:    ·  Problem: HTN (hypertension).  Plan: Pt with hx of HTN on lisinopril 10 at home  Currently with symptoms of sepsis, dehydration, SETH   - holding all anti hypertensives    ID  ·  Problem: Septic Shock.  Plan: Sepsis of unknown etiology, suspected HAP.  - Blood cultures NGTD  - UA negative   - Fever management with tylenol   - Knee xray w/o evidence of septic joint   - Tylenol for fever  - COVID management as below  - s/p tocilizumab (5/9)    - CT chest obtained to evaluate for ?consolidation demonstrating basilar predominant groundglass opacities bilaterally; consistent with   - C/W Vanc and Zosyn (started 5/16, end 5/20), f/u bcx    COVID-19.   - repeat blood cultures drawn 5/11 - NGTD  - UA + pt asymptomatic, likely not source of infeciton  - Vanco discontinued due to negative cultures, Zosyn will continue for 7 days (5/11 - 5/18)  - Tx as above        ·  Problem: R/O Rhabdomyolysis.  Plan: Patient with rising CK in setting of fall and SETH, and reported hematuria prior to arrival. Also, with hypophosphatemia, concern for rhabdomyolysis. CK now within normal range.    - DC'd IVF, no longer trending CK (5/16)    ·  Problem: SETH (acute kidney injury).  Plan: Etiology pre-renal 2/2 sepsis  Pt with baseline Cr 1.1-1.2 presented with Cr 1.88 and BUN 52 likely due to prerenal azotemia likely due to dehydration following having diarrhea for few days and not eating and drinking well. Responded to IVF in past  -Now with worsening BUN/Cr up to 39/2.45  -Continue to monitor renal function, avoid nephrotoxic agents    Problem: Hyperkalemia  -Patient hyperkalemic (5.4) without EKG changes, likely in setting of SETH  -S/P Lokemba x1 5/17      Heme  Problem: DVT (deep venous thrombosis).  Plan: Pt with hx of DVT and PE, on Eliquis 5 daily at home. DVT reported in 2018, unclear indication for AC at this time.   - c/w home Eliquis 5mg BID qd       #Thrombocytopenia  Patient with baseline plt 110s, 84 on arrival and decreasing to 61 on admission. Possibly 2/2 viral infection and sepsis. No evidence of bleeding.  - Hematology consulted -> likely 2/2 COVID-19   - Hep C +  - monitor for signs of bleeding.     Endo  #Hyperbilirubinemia   Pt with bili 2.7 and elevated AST and ALT but normal Alk-p.RUQ ultrasound demonstrating gallbladder with multiple gallstones. Benign abdominal exam. Negative cage sign.  Likely 2/2 COVID infection or due to gallbladder stones. Improved with fluid therapy. Patient Hep C positive.   - monitor LFT  - Hepatitis panel negative  - Obtain new RUQ U/S 5/17, f/o any hepatobilliary cause of septic shock    ·GI  PPX famotidine   NPO w TFs    Problem: Prophylactic measure.  Plan: Fluids: none  Electrolytes-replete as needed  Nutrition - NPO w TFs  Code- Full Code  DVT ppx: Hep gtt  GI ppx: none needed  DIspo: MICU 68 y/o M with PMH of progressive lower extremity weakness, s/p lumbar decompression in 1/2019. 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        Neuro:   On oxycodone 5mg home med  on Baclofen increased to q8h  Tylenol for fevers or pain  Chronic back - Pt endorses pain however not different from baseline, No new associated neurologic deficits.     Neuropathy.  Plan: Has a chronic neuropathy likely secondary to disc herniation. on gabapentin 400 tid at home. Currently in worsening back pain and tingling and numbness. Follows with Dr. Brito outpatient.   - LE dopplers negative for DVT  - CPK elevated -> concern for myositis as underlying etiology   - RF 22 -> mildly elevated  - f/u AHMET, anti-dsDNA, anti-Isaura   - trend CPK   - EMG unchanged from prior   - c/w home gabapentin 400 tid  - D/C'd Zyprexa due to lethargy        Respiratory:   ·  Problem: Pneumonia due to COVID-19 virus.  Plan: Pt with sore throat and runny nose and mild diarrhea for the past few days but no SOB or cough, was found to be positive for COVID. Had fever and tachycardia in ED but no O2 desaturation. currently sating 94% on 2L. CXR clear.   COVID positive, Ferritin 2537, CRP 3.33  - Monitor O2 saturation and symptoms  - Trending inflammatory markers daily  - tylenol for fever  - symptomtic management   - contact precaustions: isolation and droplet  - avoid NSAID/ACE-I/ARB  - Odyssey trial   - Solumedrol x5 days (end 5/19)  - c/w mechanical ventillation and proning prn       #Acute Hypoxic Resp Failure 2/2 COVID-19  Patient with increased O2 requirements (79% on RA) s/p lasix 20mg. CXR without evidence of congestion. Currently on BIPAP.   - s/p tociluzumab (5/9)  - initiated solumedrol 40mg BID -> taper as per pulm (end 5/19)  - wean O2 as tolerated  - remdesivir dc'd 2/2 SETH  - intubated 5/16, c/w mechanical ventillation    Cardiovascular:    ·  Problem: HTN (hypertension).  Plan: Pt with hx of HTN on lisinopril 10 at home  Currently with symptoms of sepsis, dehydration, SETH   - holding all anti hypertensives    ID  ·  Problem: Septic Shock.  Plan: Sepsis of unknown etiology, suspected HAP.  - Blood cultures NGTD  - UA negative   - Fever management with tylenol   - Knee xray w/o evidence of septic joint   - Tylenol for fever  - COVID management as below  - s/p tocilizumab (5/9)    - CT chest obtained to evaluate for ?consolidation demonstrating basilar predominant groundglass opacities bilaterally; consistent with   - C/W Zosyn (started 5/16, end 5/20) + Vanc by level, f/u bcx    COVID-19.   - repeat blood cultures drawn 5/11 - NGTD  - UA + pt asymptomatic, likely not source of infeciton  - Vanco discontinued due to negative cultures, Zosyn will continue for 7 days (5/11 - 5/18)  - Tx as above        ·  Problem: R/O Rhabdomyolysis.  Plan: Patient with rising CK in setting of fall and SETH, and reported hematuria prior to arrival. Also, with hypophosphatemia, concern for rhabdomyolysis. CK now within normal range.    - DC'd IVF, no longer trending CK (5/16)    ·  Problem: SETH (acute kidney injury).  Plan: Etiology pre-renal 2/2 sepsis  Pt with baseline Cr 1.1-1.2 presented with Cr 1.88 and BUN 52 likely due to prerenal azotemia likely due to dehydration following having diarrhea for few days and not eating and drinking well. Responded to IVF in past  -Now with worsening BUN/Cr up to 39/2.45  -Continue to monitor renal function, avoid nephrotoxic agents    Problem: Hyperkalemia  -Patient hyperkalemic (5.4) without EKG changes, likely in setting of SETH  -S/P Lokemba x1 5/17      Heme  Problem: DVT (deep venous thrombosis).  Plan: Pt with hx of DVT and PE, on Eliquis 5 daily at home. DVT reported in 2018, unclear indication for AC at this time.   - c/w home Eliquis 5mg BID qd       #Thrombocytopenia  Patient with baseline plt 110s, 84 on arrival and decreasing to 61 on admission. Possibly 2/2 viral infection and sepsis. No evidence of bleeding.  - Hematology consulted -> likely 2/2 COVID-19   - Hep C +  - monitor for signs of bleeding.     Endo  #Hyperbilirubinemia   Pt with bili 2.7 and elevated AST and ALT but normal Alk-p.RUQ ultrasound demonstrating gallbladder with multiple gallstones. Benign abdominal exam. Negative cage sign.  Likely 2/2 COVID infection or due to gallbladder stones. Improved with fluid therapy. Patient Hep C positive.   - monitor LFT  - Hepatitis panel negative  - Obtain new RUQ U/S 5/17, f/o any hepatobilliary cause of septic shock    ·GI  PPX famotidine   NPO w TFs    Problem: Prophylactic measure.  Plan: Fluids: none  Electrolytes-replete as needed  Nutrition - NPO w TFs  Code- Full Code  DVT ppx: Hep gtt  GI ppx: none needed  DIspo: MICU

## 2020-05-17 NOTE — CHART NOTE - NSCHARTNOTEFT_GEN_A_CORE
DAILY ASSESSMENTS (Required on all days, including Day 1 and Discharge Day)    Day: 6    NRS cough: Please rate the cough severity by selecting the number from 0 to 10 that best describes your worst level of cough in the past 24 hours: intubated    NRS nausea: Please rate the worst severity of your nausea from 0 to 5 during the past 24 hours: intubated    Hypercapnic respiratory failure?  YES     SpO2:   94%    FiO2 or LPM: MV O2 90%, PEEP 12    Vital signs (if not already collected by nurse): temperature, blood pressure, pulse, respiratory rate, oxygen saturation  See progress note    If this is day 1 or discharge day: CXR, and EKG as noted below    ____________________________________________________  Patient discussed with Dr. Padron.     Christy Webber MD     Graduate Physician - COVID Emergency Events   [phone]

## 2020-05-17 NOTE — PROGRESS NOTE ADULT - ATTENDING COMMENTS
Hypoxic respiratory failure due to COVID-19 pneumonia, intubated, sedated and paralyzed. Compliance adequate but pt in septic shock with rising lactic acidosis. Plan for CT a/p to assess. Proning after that.  CCM time provided.

## 2020-05-18 LAB
ALBUMIN SERPL ELPH-MCNC: 2.8 G/DL — LOW (ref 3.3–5)
ALBUMIN SERPL ELPH-MCNC: 3 G/DL — LOW (ref 3.3–5)
ALBUMIN SERPL ELPH-MCNC: 3.1 G/DL — LOW (ref 3.3–5)
ALP SERPL-CCNC: 117 U/L — SIGNIFICANT CHANGE UP (ref 40–120)
ALP SERPL-CCNC: 118 U/L — SIGNIFICANT CHANGE UP (ref 40–120)
ALP SERPL-CCNC: 125 U/L — HIGH (ref 40–120)
ALT FLD-CCNC: 33 U/L — SIGNIFICANT CHANGE UP (ref 10–45)
ALT FLD-CCNC: 36 U/L — SIGNIFICANT CHANGE UP (ref 10–45)
ALT FLD-CCNC: 41 U/L — SIGNIFICANT CHANGE UP (ref 10–45)
ANION GAP SERPL CALC-SCNC: 16 MMOL/L — SIGNIFICANT CHANGE UP (ref 5–17)
ANION GAP SERPL CALC-SCNC: 17 MMOL/L — SIGNIFICANT CHANGE UP (ref 5–17)
ANION GAP SERPL CALC-SCNC: 19 MMOL/L — HIGH (ref 5–17)
APTT BLD: 105.9 SEC — HIGH (ref 27.5–36.3)
APTT BLD: 46.9 SEC — HIGH (ref 27.5–36.3)
APTT BLD: 86.1 SEC — HIGH (ref 27.5–36.3)
AST SERPL-CCNC: 53 U/L — HIGH (ref 10–40)
AST SERPL-CCNC: 53 U/L — HIGH (ref 10–40)
AST SERPL-CCNC: 55 U/L — HIGH (ref 10–40)
BASE EXCESS BLDA CALC-SCNC: -1.9 MMOL/L — SIGNIFICANT CHANGE UP (ref -2–3)
BASE EXCESS BLDA CALC-SCNC: -11 MMOL/L — LOW (ref -2–3)
BASE EXCESS BLDA CALC-SCNC: -2 MMOL/L — SIGNIFICANT CHANGE UP (ref -2–3)
BASE EXCESS BLDA CALC-SCNC: -3.5 MMOL/L — LOW (ref -2–3)
BASE EXCESS BLDA CALC-SCNC: -6 MMOL/L — LOW (ref -2–3)
BASE EXCESS BLDA CALC-SCNC: -6.4 MMOL/L — LOW (ref -2–3)
BASOPHILS # BLD AUTO: 0 K/UL — SIGNIFICANT CHANGE UP (ref 0–0.2)
BASOPHILS # BLD AUTO: 0.03 K/UL — SIGNIFICANT CHANGE UP (ref 0–0.2)
BASOPHILS NFR BLD AUTO: 0 % — SIGNIFICANT CHANGE UP (ref 0–2)
BASOPHILS NFR BLD AUTO: 0.2 % — SIGNIFICANT CHANGE UP (ref 0–2)
BILIRUB SERPL-MCNC: 1.3 MG/DL — HIGH (ref 0.2–1.2)
BILIRUB SERPL-MCNC: 1.4 MG/DL — HIGH (ref 0.2–1.2)
BILIRUB SERPL-MCNC: 1.5 MG/DL — HIGH (ref 0.2–1.2)
BUN SERPL-MCNC: 49 MG/DL — HIGH (ref 7–23)
BUN SERPL-MCNC: 50 MG/DL — HIGH (ref 7–23)
BUN SERPL-MCNC: 53 MG/DL — HIGH (ref 7–23)
CALCIUM SERPL-MCNC: 7 MG/DL — LOW (ref 8.4–10.5)
CALCIUM SERPL-MCNC: 7 MG/DL — LOW (ref 8.4–10.5)
CALCIUM SERPL-MCNC: 7.4 MG/DL — LOW (ref 8.4–10.5)
CHLORIDE SERPL-SCNC: 108 MMOL/L — SIGNIFICANT CHANGE UP (ref 96–108)
CHLORIDE SERPL-SCNC: 108 MMOL/L — SIGNIFICANT CHANGE UP (ref 96–108)
CHLORIDE SERPL-SCNC: 113 MMOL/L — HIGH (ref 96–108)
CO2 SERPL-SCNC: 22 MMOL/L — SIGNIFICANT CHANGE UP (ref 22–31)
CO2 SERPL-SCNC: 23 MMOL/L — SIGNIFICANT CHANGE UP (ref 22–31)
CO2 SERPL-SCNC: 23 MMOL/L — SIGNIFICANT CHANGE UP (ref 22–31)
CREAT SERPL-MCNC: 3.31 MG/DL — HIGH (ref 0.5–1.3)
CREAT SERPL-MCNC: 3.34 MG/DL — HIGH (ref 0.5–1.3)
CREAT SERPL-MCNC: 3.41 MG/DL — HIGH (ref 0.5–1.3)
CRP SERPL-MCNC: 14.59 MG/DL — HIGH (ref 0–0.4)
D DIMER BLD IA.RAPID-MCNC: 2276 NG/ML DDU — HIGH
EOSINOPHIL # BLD AUTO: 0 K/UL — SIGNIFICANT CHANGE UP (ref 0–0.5)
EOSINOPHIL # BLD AUTO: 0.1 K/UL — SIGNIFICANT CHANGE UP (ref 0–0.5)
EOSINOPHIL NFR BLD AUTO: 0 % — SIGNIFICANT CHANGE UP (ref 0–6)
EOSINOPHIL NFR BLD AUTO: 0.5 % — SIGNIFICANT CHANGE UP (ref 0–6)
FERRITIN SERPL-MCNC: 2040 NG/ML — HIGH (ref 30–400)
GAS PNL BLDA: SIGNIFICANT CHANGE UP
GLUCOSE BLDC GLUCOMTR-MCNC: 107 MG/DL — HIGH (ref 70–99)
GLUCOSE BLDC GLUCOMTR-MCNC: 120 MG/DL — HIGH (ref 70–99)
GLUCOSE BLDC GLUCOMTR-MCNC: 128 MG/DL — HIGH (ref 70–99)
GLUCOSE BLDC GLUCOMTR-MCNC: 14 MG/DL — CRITICAL LOW (ref 70–99)
GLUCOSE BLDC GLUCOMTR-MCNC: 15 MG/DL — CRITICAL LOW (ref 70–99)
GLUCOSE BLDC GLUCOMTR-MCNC: 245 MG/DL — HIGH (ref 70–99)
GLUCOSE BLDC GLUCOMTR-MCNC: 45 MG/DL — CRITICAL LOW (ref 70–99)
GLUCOSE BLDC GLUCOMTR-MCNC: 45 MG/DL — CRITICAL LOW (ref 70–99)
GLUCOSE BLDC GLUCOMTR-MCNC: 77 MG/DL — SIGNIFICANT CHANGE UP (ref 70–99)
GLUCOSE BLDC GLUCOMTR-MCNC: 79 MG/DL — SIGNIFICANT CHANGE UP (ref 70–99)
GLUCOSE BLDC GLUCOMTR-MCNC: 86 MG/DL — SIGNIFICANT CHANGE UP (ref 70–99)
GLUCOSE SERPL-MCNC: 100 MG/DL — HIGH (ref 70–99)
GLUCOSE SERPL-MCNC: 141 MG/DL — HIGH (ref 70–99)
GLUCOSE SERPL-MCNC: 17 MG/DL — CRITICAL LOW (ref 70–99)
HAPTOGLOB SERPL-MCNC: 109 MG/DL — SIGNIFICANT CHANGE UP (ref 34–200)
HCO3 BLDA-SCNC: 18 MMOL/L — LOW (ref 21–28)
HCO3 BLDA-SCNC: 22 MMOL/L — SIGNIFICANT CHANGE UP (ref 21–28)
HCO3 BLDA-SCNC: 23 MMOL/L — SIGNIFICANT CHANGE UP (ref 21–28)
HCO3 BLDA-SCNC: 24 MMOL/L — SIGNIFICANT CHANGE UP (ref 21–28)
HCO3 BLDA-SCNC: 24 MMOL/L — SIGNIFICANT CHANGE UP (ref 21–28)
HCO3 BLDA-SCNC: 25 MMOL/L — SIGNIFICANT CHANGE UP (ref 21–28)
HCT VFR BLD CALC: 29.7 % — LOW (ref 39–50)
HCT VFR BLD CALC: 30.3 % — LOW (ref 39–50)
HGB BLD-MCNC: 9.2 G/DL — LOW (ref 13–17)
HGB BLD-MCNC: 9.7 G/DL — LOW (ref 13–17)
IMM GRANULOCYTES NFR BLD AUTO: 3.1 % — HIGH (ref 0–1.5)
INR BLD: 1.56 — HIGH (ref 0.88–1.16)
LACTATE SERPL-SCNC: 2.7 MMOL/L — HIGH (ref 0.5–2)
LACTATE SERPL-SCNC: 3.3 MMOL/L — HIGH (ref 0.5–2)
LACTATE SERPL-SCNC: 3.6 MMOL/L — HIGH (ref 0.5–2)
LACTATE SERPL-SCNC: 3.8 MMOL/L — HIGH (ref 0.5–2)
LDH SERPL L TO P-CCNC: 738 U/L — HIGH (ref 50–242)
LIDOCAIN IGE QN: 29 U/L — SIGNIFICANT CHANGE UP (ref 7–60)
LYMPHOCYTES # BLD AUTO: 0.51 K/UL — LOW (ref 1–3.3)
LYMPHOCYTES # BLD AUTO: 1.36 K/UL — SIGNIFICANT CHANGE UP (ref 1–3.3)
LYMPHOCYTES # BLD AUTO: 2.8 % — LOW (ref 13–44)
LYMPHOCYTES # BLD AUTO: 6.2 % — LOW (ref 13–44)
MAGNESIUM SERPL-MCNC: 2 MG/DL — SIGNIFICANT CHANGE UP (ref 1.6–2.6)
MAGNESIUM SERPL-MCNC: 2.2 MG/DL — SIGNIFICANT CHANGE UP (ref 1.6–2.6)
MCHC RBC-ENTMCNC: 30.7 PG — SIGNIFICANT CHANGE UP (ref 27–34)
MCHC RBC-ENTMCNC: 30.8 PG — SIGNIFICANT CHANGE UP (ref 27–34)
MCHC RBC-ENTMCNC: 31 GM/DL — LOW (ref 32–36)
MCHC RBC-ENTMCNC: 32 GM/DL — SIGNIFICANT CHANGE UP (ref 32–36)
MCV RBC AUTO: 95.9 FL — SIGNIFICANT CHANGE UP (ref 80–100)
MCV RBC AUTO: 99.3 FL — SIGNIFICANT CHANGE UP (ref 80–100)
MONOCYTES # BLD AUTO: 0.59 K/UL — SIGNIFICANT CHANGE UP (ref 0–0.9)
MONOCYTES # BLD AUTO: 0.84 K/UL — SIGNIFICANT CHANGE UP (ref 0–0.9)
MONOCYTES NFR BLD AUTO: 2.7 % — SIGNIFICANT CHANGE UP (ref 2–14)
MONOCYTES NFR BLD AUTO: 4.6 % — SIGNIFICANT CHANGE UP (ref 2–14)
NEUTROPHILS # BLD AUTO: 16.22 K/UL — HIGH (ref 1.8–7.4)
NEUTROPHILS # BLD AUTO: 19.91 K/UL — HIGH (ref 1.8–7.4)
NEUTROPHILS NFR BLD AUTO: 88.8 % — HIGH (ref 43–77)
NEUTROPHILS NFR BLD AUTO: 90.2 % — HIGH (ref 43–77)
NRBC # BLD: 2 /100 WBCS — HIGH (ref 0–0)
NRBC # BLD: SIGNIFICANT CHANGE UP /100 WBCS (ref 0–0)
PCO2 BLDA: 46 MMHG — SIGNIFICANT CHANGE UP (ref 35–48)
PCO2 BLDA: 51 MMHG — HIGH (ref 35–48)
PCO2 BLDA: 52 MMHG — HIGH (ref 35–48)
PCO2 BLDA: 54 MMHG — HIGH (ref 35–48)
PCO2 BLDA: 54 MMHG — HIGH (ref 35–48)
PCO2 BLDA: 68 MMHG — CRITICAL HIGH (ref 35–48)
PH BLDA: 7.15 — CRITICAL LOW (ref 7.35–7.45)
PH BLDA: 7.16 — CRITICAL LOW (ref 7.35–7.45)
PH BLDA: 7.22 — CRITICAL LOW (ref 7.35–7.45)
PH BLDA: 7.26 — LOW (ref 7.35–7.45)
PH BLDA: 7.3 — LOW (ref 7.35–7.45)
PH BLDA: 7.33 — LOW (ref 7.35–7.45)
PHOSPHATE SERPL-MCNC: 3.1 MG/DL — SIGNIFICANT CHANGE UP (ref 2.5–4.5)
PLATELET # BLD AUTO: 111 K/UL — LOW (ref 150–400)
PLATELET # BLD AUTO: 95 K/UL — LOW (ref 150–400)
PO2 BLDA: 101 MMHG — SIGNIFICANT CHANGE UP (ref 83–108)
PO2 BLDA: 108 MMHG — SIGNIFICANT CHANGE UP (ref 83–108)
PO2 BLDA: 125 MMHG — HIGH (ref 83–108)
PO2 BLDA: 61 MMHG — LOW (ref 83–108)
PO2 BLDA: 66 MMHG — LOW (ref 83–108)
PO2 BLDA: 90 MMHG — SIGNIFICANT CHANGE UP (ref 83–108)
POTASSIUM SERPL-MCNC: 3.2 MMOL/L — LOW (ref 3.5–5.3)
POTASSIUM SERPL-MCNC: 3.4 MMOL/L — LOW (ref 3.5–5.3)
POTASSIUM SERPL-MCNC: 4.1 MMOL/L — SIGNIFICANT CHANGE UP (ref 3.5–5.3)
POTASSIUM SERPL-SCNC: 3.2 MMOL/L — LOW (ref 3.5–5.3)
POTASSIUM SERPL-SCNC: 3.4 MMOL/L — LOW (ref 3.5–5.3)
POTASSIUM SERPL-SCNC: 4.1 MMOL/L — SIGNIFICANT CHANGE UP (ref 3.5–5.3)
PROT SERPL-MCNC: 5.5 G/DL — LOW (ref 6–8.3)
PROT SERPL-MCNC: 5.5 G/DL — LOW (ref 6–8.3)
PROT SERPL-MCNC: 5.8 G/DL — LOW (ref 6–8.3)
PROTHROM AB SERPL-ACNC: 18 SEC — HIGH (ref 10–12.9)
RBC # BLD: 2.99 M/UL — LOW (ref 4.2–5.8)
RBC # BLD: 2.99 M/UL — LOW (ref 4.2–5.8)
RBC # BLD: 3.16 M/UL — LOW (ref 4.2–5.8)
RBC # FLD: 13.1 % — SIGNIFICANT CHANGE UP (ref 10.3–14.5)
RBC # FLD: 13.3 % — SIGNIFICANT CHANGE UP (ref 10.3–14.5)
RETICS #: 216.5 K/UL — HIGH (ref 25–125)
RETICS/RBC NFR: 7.2 % — HIGH (ref 0.5–2.5)
SAO2 % BLDA: 88 % — LOW (ref 95–100)
SAO2 % BLDA: 89 % — LOW (ref 95–100)
SAO2 % BLDA: 96 % — SIGNIFICANT CHANGE UP (ref 95–100)
SAO2 % BLDA: 97 % — SIGNIFICANT CHANGE UP (ref 95–100)
SAO2 % BLDA: 98 % — SIGNIFICANT CHANGE UP (ref 95–100)
SAO2 % BLDA: 98 % — SIGNIFICANT CHANGE UP (ref 95–100)
SARS-COV-2 RNA SPEC QL NAA+PROBE: DETECTED
SODIUM SERPL-SCNC: 148 MMOL/L — HIGH (ref 135–145)
SODIUM SERPL-SCNC: 149 MMOL/L — HIGH (ref 135–145)
SODIUM SERPL-SCNC: 152 MMOL/L — HIGH (ref 135–145)
VANCOMYCIN TROUGH SERPL-MCNC: 22.6 UG/ML — HIGH (ref 10–20)
WBC # BLD: 18.27 K/UL — HIGH (ref 3.8–10.5)
WBC # BLD: 21.86 K/UL — HIGH (ref 3.8–10.5)
WBC # FLD AUTO: 18.27 K/UL — HIGH (ref 3.8–10.5)
WBC # FLD AUTO: 21.86 K/UL — HIGH (ref 3.8–10.5)

## 2020-05-18 PROCEDURE — 71045 X-RAY EXAM CHEST 1 VIEW: CPT | Mod: 26

## 2020-05-18 PROCEDURE — 71250 CT THORAX DX C-: CPT | Mod: 26,CS

## 2020-05-18 PROCEDURE — 74176 CT ABD & PELVIS W/O CONTRAST: CPT | Mod: 26,CS

## 2020-05-18 PROCEDURE — 99291 CRITICAL CARE FIRST HOUR: CPT

## 2020-05-18 RX ORDER — FUROSEMIDE 40 MG
40 TABLET ORAL ONCE
Refills: 0 | Status: COMPLETED | OUTPATIENT
Start: 2020-05-18 | End: 2020-05-18

## 2020-05-18 RX ORDER — PIPERACILLIN AND TAZOBACTAM 4; .5 G/20ML; G/20ML
3.38 INJECTION, POWDER, LYOPHILIZED, FOR SOLUTION INTRAVENOUS EVERY 6 HOURS
Refills: 0 | Status: DISCONTINUED | OUTPATIENT
Start: 2020-05-18 | End: 2020-05-18

## 2020-05-18 RX ORDER — FUROSEMIDE 40 MG
60 TABLET ORAL ONCE
Refills: 0 | Status: COMPLETED | OUTPATIENT
Start: 2020-05-18 | End: 2020-05-18

## 2020-05-18 RX ORDER — POTASSIUM CHLORIDE 20 MEQ
20 PACKET (EA) ORAL ONCE
Refills: 0 | Status: COMPLETED | OUTPATIENT
Start: 2020-05-18 | End: 2020-05-18

## 2020-05-18 RX ORDER — DEXTROSE 50 % IN WATER 50 %
50 SYRINGE (ML) INTRAVENOUS ONCE
Refills: 0 | Status: COMPLETED | OUTPATIENT
Start: 2020-05-18 | End: 2020-05-18

## 2020-05-18 RX ORDER — DEXTROSE 50 % IN WATER 50 %
100 SYRINGE (ML) INTRAVENOUS ONCE
Refills: 0 | Status: COMPLETED | OUTPATIENT
Start: 2020-05-18 | End: 2020-05-18

## 2020-05-18 RX ORDER — SODIUM BICARBONATE 1 MEQ/ML
150 SYRINGE (ML) INTRAVENOUS ONCE
Refills: 0 | Status: COMPLETED | OUTPATIENT
Start: 2020-05-18 | End: 2020-05-18

## 2020-05-18 RX ORDER — POTASSIUM CHLORIDE 20 MEQ
40 PACKET (EA) ORAL ONCE
Refills: 0 | Status: DISCONTINUED | OUTPATIENT
Start: 2020-05-18 | End: 2020-05-18

## 2020-05-18 RX ORDER — SODIUM BICARBONATE 1 MEQ/ML
50 SYRINGE (ML) INTRAVENOUS ONCE
Refills: 0 | Status: DISCONTINUED | OUTPATIENT
Start: 2020-05-18 | End: 2020-05-18

## 2020-05-18 RX ORDER — PROPOFOL 10 MG/ML
1 INJECTION, EMULSION INTRAVENOUS
Qty: 1000 | Refills: 0 | Status: DISCONTINUED | OUTPATIENT
Start: 2020-05-18 | End: 2020-05-19

## 2020-05-18 RX ORDER — PIPERACILLIN AND TAZOBACTAM 4; .5 G/20ML; G/20ML
3.38 INJECTION, POWDER, LYOPHILIZED, FOR SOLUTION INTRAVENOUS EVERY 6 HOURS
Refills: 0 | Status: DISCONTINUED | OUTPATIENT
Start: 2020-05-18 | End: 2020-05-19

## 2020-05-18 RX ORDER — DEXTROSE 10 % IN WATER 10 %
1000 INTRAVENOUS SOLUTION INTRAVENOUS
Refills: 0 | Status: DISCONTINUED | OUTPATIENT
Start: 2020-05-18 | End: 2020-05-19

## 2020-05-18 RX ORDER — POTASSIUM CHLORIDE 20 MEQ
40 PACKET (EA) ORAL ONCE
Refills: 0 | Status: COMPLETED | OUTPATIENT
Start: 2020-05-18 | End: 2020-05-18

## 2020-05-18 RX ORDER — PANTOPRAZOLE SODIUM 20 MG/1
40 TABLET, DELAYED RELEASE ORAL EVERY 24 HOURS
Refills: 0 | Status: DISCONTINUED | OUTPATIENT
Start: 2020-05-19 | End: 2020-05-20

## 2020-05-18 RX ORDER — FENTANYL CITRATE 50 UG/ML
0.5 INJECTION INTRAVENOUS
Qty: 2500 | Refills: 0 | Status: DISCONTINUED | OUTPATIENT
Start: 2020-05-18 | End: 2020-05-19

## 2020-05-18 RX ORDER — POTASSIUM CHLORIDE 20 MEQ
20 PACKET (EA) ORAL ONCE
Refills: 0 | Status: DISCONTINUED | OUTPATIENT
Start: 2020-05-18 | End: 2020-05-18

## 2020-05-18 RX ORDER — NOREPINEPHRINE BITARTRATE/D5W 8 MG/250ML
0.05 PLASTIC BAG, INJECTION (ML) INTRAVENOUS
Qty: 16 | Refills: 0 | Status: DISCONTINUED | OUTPATIENT
Start: 2020-05-18 | End: 2020-05-19

## 2020-05-18 RX ORDER — IOHEXOL 300 MG/ML
30 INJECTION, SOLUTION INTRAVENOUS ONCE
Refills: 0 | Status: COMPLETED | OUTPATIENT
Start: 2020-05-18 | End: 2020-05-18

## 2020-05-18 RX ADMIN — DORZOLAMIDE HYDROCHLORIDE TIMOLOL MALEATE 1 DROP(S): 20; 5 SOLUTION/ DROPS OPHTHALMIC at 18:53

## 2020-05-18 RX ADMIN — DORZOLAMIDE HYDROCHLORIDE TIMOLOL MALEATE 1 DROP(S): 20; 5 SOLUTION/ DROPS OPHTHALMIC at 06:53

## 2020-05-18 RX ADMIN — CHLORHEXIDINE GLUCONATE 15 MILLILITER(S): 213 SOLUTION TOPICAL at 18:53

## 2020-05-18 RX ADMIN — Medication 60 MILLIGRAM(S): at 10:00

## 2020-05-18 RX ADMIN — Medication 150 MILLIEQUIVALENT(S): at 02:15

## 2020-05-18 RX ADMIN — LATANOPROST 1 DROP(S): 0.05 SOLUTION/ DROPS OPHTHALMIC; TOPICAL at 21:44

## 2020-05-18 RX ADMIN — Medication 100 MILLILITER(S): at 06:19

## 2020-05-18 RX ADMIN — IOHEXOL 30 MILLILITER(S): 300 INJECTION, SOLUTION INTRAVENOUS at 10:44

## 2020-05-18 RX ADMIN — Medication 2.5 MILLIGRAM(S): at 18:53

## 2020-05-18 RX ADMIN — Medication 2.5 MILLIGRAM(S): at 06:46

## 2020-05-18 RX ADMIN — Medication 40 MILLIGRAM(S): at 18:53

## 2020-05-18 RX ADMIN — PIPERACILLIN AND TAZOBACTAM 200 GRAM(S): 4; .5 INJECTION, POWDER, LYOPHILIZED, FOR SOLUTION INTRAVENOUS at 06:46

## 2020-05-18 RX ADMIN — PIPERACILLIN AND TAZOBACTAM 200 GRAM(S): 4; .5 INJECTION, POWDER, LYOPHILIZED, FOR SOLUTION INTRAVENOUS at 18:54

## 2020-05-18 RX ADMIN — Medication 50 MILLILITER(S): at 07:36

## 2020-05-18 RX ADMIN — Medication 40 MILLIEQUIVALENT(S): at 14:22

## 2020-05-18 RX ADMIN — Medication 125 MILLILITER(S): at 11:28

## 2020-05-18 RX ADMIN — PIPERACILLIN AND TAZOBACTAM 200 GRAM(S): 4; .5 INJECTION, POWDER, LYOPHILIZED, FOR SOLUTION INTRAVENOUS at 14:22

## 2020-05-18 RX ADMIN — Medication 125 MILLILITER(S): at 00:34

## 2020-05-18 RX ADMIN — LIDOCAINE 2 PATCH: 4 CREAM TOPICAL at 21:44

## 2020-05-18 RX ADMIN — GABAPENTIN 200 MILLIGRAM(S): 400 CAPSULE ORAL at 21:44

## 2020-05-18 RX ADMIN — Medication 40 MILLIGRAM(S): at 14:22

## 2020-05-18 RX ADMIN — Medication 4.47 MICROGRAM(S)/KG/MIN: at 17:24

## 2020-05-18 RX ADMIN — HEPARIN SODIUM 11 UNIT(S)/HR: 5000 INJECTION INTRAVENOUS; SUBCUTANEOUS at 20:41

## 2020-05-18 RX ADMIN — Medication 50 MILLILITER(S): at 11:27

## 2020-05-18 RX ADMIN — Medication 1 MILLIGRAM(S): at 14:22

## 2020-05-18 RX ADMIN — PIPERACILLIN AND TAZOBACTAM 200 GRAM(S): 4; .5 INJECTION, POWDER, LYOPHILIZED, FOR SOLUTION INTRAVENOUS at 00:34

## 2020-05-18 RX ADMIN — Medication 40 MILLIGRAM(S): at 21:44

## 2020-05-18 RX ADMIN — Medication 20 MILLIEQUIVALENT(S): at 06:53

## 2020-05-18 RX ADMIN — CHLORHEXIDINE GLUCONATE 15 MILLILITER(S): 213 SOLUTION TOPICAL at 06:47

## 2020-05-18 RX ADMIN — Medication 40 MILLIGRAM(S): at 06:46

## 2020-05-18 RX ADMIN — Medication 81 MILLIGRAM(S): at 00:35

## 2020-05-18 RX ADMIN — GABAPENTIN 200 MILLIGRAM(S): 400 CAPSULE ORAL at 18:54

## 2020-05-18 RX ADMIN — Medication 100 MILLIGRAM(S): at 14:56

## 2020-05-18 RX ADMIN — Medication 25 MILLILITER(S): at 07:32

## 2020-05-18 RX ADMIN — PANTOPRAZOLE SODIUM 40 MILLIGRAM(S): 20 TABLET, DELAYED RELEASE ORAL at 06:46

## 2020-05-18 RX ADMIN — Medication 50 MILLILITER(S): at 10:00

## 2020-05-18 RX ADMIN — SENNA PLUS 2 TABLET(S): 8.6 TABLET ORAL at 21:45

## 2020-05-18 RX ADMIN — Medication 81 MILLIGRAM(S): at 21:45

## 2020-05-18 RX ADMIN — GABAPENTIN 200 MILLIGRAM(S): 400 CAPSULE ORAL at 06:46

## 2020-05-18 NOTE — PROGRESS NOTE ADULT - SUBJECTIVE AND OBJECTIVE BOX
INTERVAL HPI/OVERNIGHT EVENTS:  ABG with improved CO2, still with metabolic acidosis 2/2 lactate and possible renal failure. 1 amp Bicarb. Pt still urinating 100cc/hr. K now improved to 5.4. Lactate 3.4. PTT therapeutic. K 5.5, given Insulin/D50 & Lokelma 10 (~2330). Attempted to transport to CT, patient became hypoxic and hypotensive so we returned to room. 1a ABG pH 7.16, CO2 51-still acidotic from metabolic acidosis given additional 3 amp Bicarb (~2). Lacate 3.8. Subsequent ABG pH 7.3, pCO2 52, Bicarb 25. 0/4 Train of 4 (Nimbex 3). AM lactate now 2.7 with improved metabolic acidosis. Finger Stick 15 (recehecked)- 2 Amps D50-> . Supratherapeutic vanc. AM PTT therapeutic. 20mEq K      SUBJECTIVE: Patient seen and examined at bedside. intubated and sedated.       VITAL SIGNS:  ICU Vital Signs Last 24 Hrs  T(C): 34.2 (18 May 2020 07:00), Max: 37.8 (17 May 2020 19:00)  T(F): 93.5 (18 May 2020 07:00), Max: 100.1 (17 May 2020 19:00)  HR: 61 (18 May 2020 16:33) (61 - 92)  BP: --  BP(mean): --  ABP: 162/88 (18 May 2020 16:00) (96/70 - 178/81)  ABP(mean): 115 (18 May 2020 16:00) (62 - 117)  RR: 31 (18 May 2020 16:33) (30 - 31)  SpO2: 90% (18 May 2020 16:33) (88% - 100%)    Mode: AC/ CMV (Assist Control/ Continuous Mandatory Ventilation), RR (machine): 30, TV (machine): 550, FiO2: 80, PEEP: 12, ITime: 1, MAP: 21, PIP: 33  Plateau pressure:   P/F ratio:     05-17 @ 07:01  -  -18 @ 07:00  --------------------------------------------------------  IN: 1216.5 mL / OUT: 1650 mL / NET: -433.5 mL     @ 07:01   @ 17:57  --------------------------------------------------------  IN: 2316.1 mL / OUT: 1275 mL / NET: 1041.1 mL      CAPILLARY BLOOD GLUCOSE      POCT Blood Glucose.: 86 mg/dL (18 May 2020 16:17)    ECG:    PHYSICAL EXAM:  seen and examined by attending     MEDICATIONS:  MEDICATIONS  (STANDING):  aspirin  chewable 81 milliGRAM(s) Oral every 24 hours  baclofen 2.5 milliGRAM(s) Oral every 12 hours  chlorhexidine 0.12% Liquid 15 milliLiter(s) Oral Mucosa every 12 hours  cisatracurium Infusion 3 MICROgram(s)/kG/Min (17.2 mL/Hr) IV Continuous <Continuous>  dextrose 10%. 1000 milliLiter(s) (50 mL/Hr) IV Continuous <Continuous>  dextrose 5%. 1000 milliLiter(s) (50 mL/Hr) IV Continuous <Continuous>  dextrose 50% Injectable 12.5 Gram(s) IV Push once  dextrose 50% Injectable 25 Gram(s) IV Push once  dextrose 50% Injectable 25 Gram(s) IV Push once  dorzolamide 2%/timolol 0.5% Ophthalmic Solution 1 Drop(s) Both EYES every 12 hours  fentaNYL   Infusion. 0.5 MICROgram(s)/kG/Hr (4.77 mL/Hr) IV Continuous <Continuous>  folic acid 1 milliGRAM(s) Oral daily  gabapentin 200 milliGRAM(s) Oral every 8 hours  heparin  Infusion 1600 Unit(s)/Hr (9 mL/Hr) IV Continuous <Continuous>  insulin lispro (HumaLOG) corrective regimen sliding scale   SubCutaneous three times a day before meals  insulin lispro (HumaLOG) corrective regimen sliding scale   SubCutaneous at bedtime  latanoprost 0.005% Ophthalmic Solution 1 Drop(s) Both EYES at bedtime  lidocaine   Patch 2 Patch Transdermal every 24 hours  methylPREDNISolone sodium succinate Injectable 40 milliGRAM(s) IV Push every 12 hours  norepinephrine Infusion 0.05 MICROgram(s)/kG/Min (4.47 mL/Hr) IV Continuous <Continuous>  pantoprazole    Tablet 40 milliGRAM(s) Oral before breakfast  piperacillin/tazobactam IVPB.. 3.375 Gram(s) IV Intermittent every 6 hours  propofol Infusion 1 MICROgram(s)/kG/Min (0.57 mL/Hr) IV Continuous <Continuous>  senna 2 Tablet(s) Oral at bedtime  thiamine 100 milliGRAM(s) Oral every 24 hours  tradipitant vs placebo ([id number]) 85 milliGRAM(s) Oral every 12 hours  vasopressin Infusion 0.04 Unit(s)/Min (2.4 mL/Hr) IV Continuous <Continuous>    MEDICATIONS  (PRN):  acetaminophen   Tablet .. 650 milliGRAM(s) Oral every 6 hours PRN Temp greater or equal to 38.5C (101.3F)  acetaminophen   Tablet .. 650 milliGRAM(s) Oral every 6 hours PRN Mild Pain (1 - 3)  dextrose 40% Gel 15 Gram(s) Oral once PRN Blood Glucose LESS THAN 70 milliGRAM(s)/deciliter  glucagon  Injectable 1 milliGRAM(s) IntraMuscular once PRN Glucose LESS THAN 70 milligrams/deciliter  oxyCODONE    IR 5 milliGRAM(s) Oral every 4 hours PRN Moderate Pain (4 - 6)      ALLERGIES:  Allergies    No Known Allergies    Intolerances        LABS:                        9.7    21.86 )-----------( 111      ( 18 May 2020 05:40 )             30.3     05-18    149<H>  |  108  |  50<H>  ----------------------------<  141<H>  3.4<L>   |  22  |  3.31<H>    Ca    7.0<L>      18 May 2020 12:20  Phos  3.1     05-18  Mg     2.2     05-18    TPro  5.5<L>  /  Alb  2.8<L>  /  TBili  1.4<H>  /  DBili  x   /  AST  53<H>  /  ALT  36  /  AlkPhos  118  05-18    PTT - ( 18 May 2020 12:20 )  PTT:105.9 sec  Urinalysis Basic - ( 17 May 2020 13:58 )    Color: Yellow / Appearance: Clear / S.025 / pH: x  Gluc: x / Ketone: NEGATIVE  / Bili: Negative / Urobili: 1.0 E.U./dL   Blood: x / Protein: 30 mg/dL / Nitrite: NEGATIVE   Leuk Esterase: NEGATIVE / RBC: > 10 /HPF / WBC < 5 /HPF   Sq Epi: x / Non Sq Epi: 0-5 /HPF / Bacteria: Present /HPF        RADIOLOGY & ADDITIONAL TESTS: Reviewed.    Assessment and Plan:   · Assessment	  68 y/o M with PMH of progressive lower extremity weakness, s/p lumbar decompression in 2019. 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        Neuro:   On oxycodone 5mg home med  on Baclofen increased to q8h  Tylenol for fevers or pain  Chronic back - Pt endorses pain however not different from baseline, No new associated neurologic deficits.   Currently intubated and sedated, unable to asses neurologic status    Neuropathy.  Plan: Has a chronic neuropathy likely secondary to disc herniation. on gabapentin 400 tid at home. Currently in worsening back pain and tingling and numbness. Follows with Dr. Brito outpatient.   - LE dopplers negative for DVT  - CPK elevated -> concern for myositis as underlying etiology   - RF 22 -> mildly elevated  - f/u AHMET, anti-dsDNA, anti-Isaura   - trend CPK   - EMG unchanged from prior   - c/w home gabapentin 400 tid  - D/C'd Zyprexa due to lethargy        Respiratory:   ·  Problem: Pneumonia due to COVID-19 virus.  Plan: Pt with sore throat and runny nose and mild diarrhea for the past few days but no SOB or cough, was found to be positive for COVID. Had fever and tachycardia in ED but no O2 desaturation. currently sating 94% on 2L. CXR clear.   COVID positive, Ferritin 2537, CRP 3.33  - Monitor O2 saturation and symptoms  - Trending inflammatory markers daily  - tylenol for fever  - symptomtic management   - contact precaustions: isolation and droplet  - avoid NSAID/ACE-I/ARB  - Odyssey trial   - Solumedrol x5 days (end )  - c/w mechanical ventillation and proning prn; Will be proned in evening        #Acute Hypoxic Resp Failure 2/2 COVID-19  Patient with increased O2 requirements (79% on RA) s/p lasix 20mg. CXR without evidence of congestion. Currently on BIPAP.   - s/p tociluzumab ()  - initiated solumedrol 40mg BID -> taper as per pulm (end )  - wean O2 as tolerated  - remdesivir dc'd 2/2 SETH  - intubated , c/w mechanical ventillation  - For CT this AM   - diuretics for possible pulmonary effusions  - Will continue zosyn    Cardiovascular:    ·  Problem: HTN (hypertension).  Plan: Pt with hx of HTN on lisinopril 10 at home  Currently with symptoms of sepsis, dehydration, SETH   - holding all anti hypertensives    ID  ·  Problem: Septic Shock.  Plan: Sepsis of unknown etiology, suspected HAP.  - Blood cultures NGTD  - UA negative   - Fever management with tylenol   - Knee xray w/o evidence of septic joint   - Tylenol for fever  - COVID management as below  - s/p tocilizumab ()    - CT chest obtained to evaluate for ?consolidation demonstrating basilar predominant groundglass opacities bilaterally; consistent with   - C/W Zosyn (started , end ) + Vanc by level, f/u bcx      COVID-19.   - repeat blood cultures drawn  - NGTD  - UA + pt asymptomatic, likely not source of infeciton  - Vanco discontinued due to negative cultures, Zosyn will continue for 7 days ( - )  - Tx as above        ·  Problem: R/O Rhabdomyolysis.  Plan: Patient with rising CK in setting of fall and SETH, and reported hematuria prior to arrival. Also, with hypophosphatemia, concern for rhabdomyolysis. CK now within normal range.    - DC'd IVF, no longer trending CK ()    ·  Problem: SETH (acute kidney injury).  Plan: Etiology pre-renal 2/2 sepsis  Pt with baseline Cr 1.1-1.2 presented with Cr 1.88 and BUN 52 likely due to prerenal azotemia likely due to dehydration following having diarrhea for few days and not eating and drinking well. Responded to IVF in past  -Now with worsening BUN/Cr up to 39/2.45  -Continue to monitor renal function, avoid nephrotoxic agents  - Will begin around the clock diuresis in setting of possible fluid overload and pulmonary effusion    Problem: Hyperkalemia  -Patient hyperkalemic (5.4) without EKG changes, likely in setting of SETH  -S/P Lokemba x1       Heme  Problem: DVT (deep venous thrombosis).  Plan: Pt with hx of DVT and PE, on Eliquis 5 daily at home. DVT reported in 2018, unclear indication for AC at this time.   - c/w home Eliquis 5mg BID qd   - Started on Heparin drip O/N, will trend PTTs      #Thrombocytopenia  Patient with baseline plt 110s, 84 on arrival and decreasing to 61 on admission. Possibly 2/2 viral infection and sepsis. No evidence of bleeding.  - Hematology consulted -> likely 2/2 COVID-19   - Hep C +  - monitor for signs of bleeding.     Endo  #Hyperbilirubinemia   Pt with bili 2.7 and elevated AST and ALT but normal Alk-p.RUQ ultrasound demonstrating gallbladder with multiple gallstones. Benign abdominal exam. Negative cage sign.  Likely 2/2 COVID infection or due to gallbladder stones. Improved with fluid therapy. Patient Hep C positive.   - monitor LFT  - Hepatitis panel negative  - Obtain new RUQ U/S , f/o any hepatobilliary cause of septic shock    ·GI  PPX famotidine   NPO w TFs    Problem: Prophylactic measure.  Plan: Fluids: none  Electrolytes-replete as needed  Nutrition - NPO w TFs  Code- Full Code  DVT ppx: Hep gtt  GI ppx: none needed  DIspo: MICU

## 2020-05-18 NOTE — CHART NOTE - NSCHARTNOTEFT_GEN_A_CORE
DAILY ASSESSMENTS (Required on all days, including Day 1 and Discharge Day)    Day: 7    NRS cough: Please rate the cough severity by selecting the number from 0 to 10 that best describes your worst level of cough in the past 24 hours: intubated    NRS nausea: Please rate the worst severity of your nausea from 0 to 5 during the past 24 hours: intubated    Hypercapnic respiratory failure?  YES     SpO2:   87%    FiO2 or LPM: MV O2 100%, PEEP 12    Vital signs (if not already collected by nurse): temperature, blood pressure, pulse, respiratory rate, oxygen saturation  See progress note    If this is day 1 or discharge day: CXR, and EKG as noted below    ____________________________________________________  Patient discussed with Dr. Padron.     Christy Webber MD     Graduate Physician - COVID Emergency Events   [phone]

## 2020-05-19 LAB
ALBUMIN SERPL ELPH-MCNC: 3 G/DL — LOW (ref 3.3–5)
ALP SERPL-CCNC: 127 U/L — HIGH (ref 40–120)
ALT FLD-CCNC: 30 U/L — SIGNIFICANT CHANGE UP (ref 10–45)
ANION GAP SERPL CALC-SCNC: 17 MMOL/L — SIGNIFICANT CHANGE UP (ref 5–17)
ANION GAP SERPL CALC-SCNC: 17 MMOL/L — SIGNIFICANT CHANGE UP (ref 5–17)
ANION GAP SERPL CALC-SCNC: 22 MMOL/L — HIGH (ref 5–17)
APTT BLD: 135.9 SEC — CRITICAL HIGH (ref 27.5–36.3)
APTT BLD: 20.6 SEC — LOW (ref 27.5–36.3)
APTT BLD: 21.6 SEC — LOW (ref 27.5–36.3)
AST SERPL-CCNC: 47 U/L — HIGH (ref 10–40)
BASOPHILS # BLD AUTO: 0.04 K/UL — SIGNIFICANT CHANGE UP (ref 0–0.2)
BASOPHILS NFR BLD AUTO: 0.2 % — SIGNIFICANT CHANGE UP (ref 0–2)
BILIRUB SERPL-MCNC: 1.4 MG/DL — HIGH (ref 0.2–1.2)
BUN SERPL-MCNC: 46 MG/DL — HIGH (ref 7–23)
BUN SERPL-MCNC: 52 MG/DL — HIGH (ref 7–23)
BUN SERPL-MCNC: 56 MG/DL — HIGH (ref 7–23)
CALCIUM SERPL-MCNC: 6.6 MG/DL — LOW (ref 8.4–10.5)
CALCIUM SERPL-MCNC: 6.9 MG/DL — LOW (ref 8.4–10.5)
CALCIUM SERPL-MCNC: 7 MG/DL — LOW (ref 8.4–10.5)
CHLORIDE SERPL-SCNC: 100 MMOL/L — SIGNIFICANT CHANGE UP (ref 96–108)
CHLORIDE SERPL-SCNC: 106 MMOL/L — SIGNIFICANT CHANGE UP (ref 96–108)
CHLORIDE SERPL-SCNC: 98 MMOL/L — SIGNIFICANT CHANGE UP (ref 96–108)
CK MB CFR SERPL CALC: 10.7 NG/ML — HIGH (ref 0–6.7)
CK MB CFR SERPL CALC: 12.7 NG/ML — HIGH (ref 0–6.7)
CK SERPL-CCNC: 326 U/L — HIGH (ref 30–200)
CK SERPL-CCNC: 524 U/L — HIGH (ref 30–200)
CK SERPL-CCNC: 819 U/L — HIGH (ref 30–200)
CO2 SERPL-SCNC: 14 MMOL/L — LOW (ref 22–31)
CO2 SERPL-SCNC: 19 MMOL/L — LOW (ref 22–31)
CO2 SERPL-SCNC: 22 MMOL/L — SIGNIFICANT CHANGE UP (ref 22–31)
CREAT SERPL-MCNC: 3.36 MG/DL — HIGH (ref 0.5–1.3)
CREAT SERPL-MCNC: 3.41 MG/DL — HIGH (ref 0.5–1.3)
CREAT SERPL-MCNC: 3.89 MG/DL — HIGH (ref 0.5–1.3)
EOSINOPHIL # BLD AUTO: 0.06 K/UL — SIGNIFICANT CHANGE UP (ref 0–0.5)
EOSINOPHIL NFR BLD AUTO: 0.3 % — SIGNIFICANT CHANGE UP (ref 0–6)
FERRITIN SERPL-MCNC: 1846 NG/ML — HIGH (ref 30–400)
GAS PNL BLDA: SIGNIFICANT CHANGE UP
GLUCOSE BLDC GLUCOMTR-MCNC: 186 MG/DL — HIGH (ref 70–99)
GLUCOSE BLDC GLUCOMTR-MCNC: 258 MG/DL — HIGH (ref 70–99)
GLUCOSE BLDC GLUCOMTR-MCNC: 322 MG/DL — HIGH (ref 70–99)
GLUCOSE SERPL-MCNC: 209 MG/DL — HIGH (ref 70–99)
GLUCOSE SERPL-MCNC: 235 MG/DL — HIGH (ref 70–99)
GLUCOSE SERPL-MCNC: 307 MG/DL — HIGH (ref 70–99)
GRAM STN FLD: SIGNIFICANT CHANGE UP
HCT VFR BLD CALC: 30.2 % — LOW (ref 39–50)
HCT VFR BLD CALC: 31.5 % — LOW (ref 39–50)
HGB BLD-MCNC: 9.5 G/DL — LOW (ref 13–17)
HGB BLD-MCNC: 9.5 G/DL — LOW (ref 13–17)
IMM GRANULOCYTES NFR BLD AUTO: 2.7 % — HIGH (ref 0–1.5)
INR BLD: 1.43 — HIGH (ref 0.88–1.16)
LACTATE SERPL-SCNC: 3.3 MMOL/L — HIGH (ref 0.5–2)
LACTATE SERPL-SCNC: 4.9 MMOL/L — CRITICAL HIGH (ref 0.5–2)
LACTATE SERPL-SCNC: 7.5 MMOL/L — CRITICAL HIGH (ref 0.5–2)
LYMPHOCYTES # BLD AUTO: 0.51 K/UL — LOW (ref 1–3.3)
LYMPHOCYTES # BLD AUTO: 2.5 % — LOW (ref 13–44)
MAGNESIUM SERPL-MCNC: 1.8 MG/DL — SIGNIFICANT CHANGE UP (ref 1.6–2.6)
MAGNESIUM SERPL-MCNC: 2.6 MG/DL — SIGNIFICANT CHANGE UP (ref 1.6–2.6)
MAGNESIUM SERPL-MCNC: 2.8 MG/DL — HIGH (ref 1.6–2.6)
MCHC RBC-ENTMCNC: 30.2 GM/DL — LOW (ref 32–36)
MCHC RBC-ENTMCNC: 30.7 PG — SIGNIFICANT CHANGE UP (ref 27–34)
MCHC RBC-ENTMCNC: 30.9 PG — SIGNIFICANT CHANGE UP (ref 27–34)
MCHC RBC-ENTMCNC: 31.5 GM/DL — LOW (ref 32–36)
MCV RBC AUTO: 102.6 FL — HIGH (ref 80–100)
MCV RBC AUTO: 97.7 FL — SIGNIFICANT CHANGE UP (ref 80–100)
MONOCYTES # BLD AUTO: 0.68 K/UL — SIGNIFICANT CHANGE UP (ref 0–0.9)
MONOCYTES NFR BLD AUTO: 3.3 % — SIGNIFICANT CHANGE UP (ref 2–14)
NEUTROPHILS # BLD AUTO: 18.7 K/UL — HIGH (ref 1.8–7.4)
NEUTROPHILS NFR BLD AUTO: 91 % — HIGH (ref 43–77)
NRBC # BLD: 10 /100 WBCS — HIGH (ref 0–0)
NRBC # BLD: 2 /100 WBCS — HIGH (ref 0–0)
PHOSPHATE SERPL-MCNC: 4.6 MG/DL — HIGH (ref 2.5–4.5)
PHOSPHATE SERPL-MCNC: 6.8 MG/DL — HIGH (ref 2.5–4.5)
PHOSPHATE SERPL-MCNC: 7.6 MG/DL — HIGH (ref 2.5–4.5)
PLATELET # BLD AUTO: 110 K/UL — LOW (ref 150–400)
PLATELET # BLD AUTO: 94 K/UL — LOW (ref 150–400)
POTASSIUM SERPL-MCNC: 4.6 MMOL/L — SIGNIFICANT CHANGE UP (ref 3.5–5.3)
POTASSIUM SERPL-MCNC: 5.4 MMOL/L — HIGH (ref 3.5–5.3)
POTASSIUM SERPL-MCNC: 5.8 MMOL/L — HIGH (ref 3.5–5.3)
POTASSIUM SERPL-SCNC: 4.6 MMOL/L — SIGNIFICANT CHANGE UP (ref 3.5–5.3)
POTASSIUM SERPL-SCNC: 5.4 MMOL/L — HIGH (ref 3.5–5.3)
POTASSIUM SERPL-SCNC: 5.8 MMOL/L — HIGH (ref 3.5–5.3)
PROT SERPL-MCNC: 5.5 G/DL — LOW (ref 6–8.3)
PROTHROM AB SERPL-ACNC: 16.5 SEC — HIGH (ref 10–12.9)
RBC # BLD: 3.07 M/UL — LOW (ref 4.2–5.8)
RBC # BLD: 3.09 M/UL — LOW (ref 4.2–5.8)
RBC # FLD: 13.5 % — SIGNIFICANT CHANGE UP (ref 10.3–14.5)
RBC # FLD: 13.6 % — SIGNIFICANT CHANGE UP (ref 10.3–14.5)
SODIUM SERPL-SCNC: 134 MMOL/L — LOW (ref 135–145)
SODIUM SERPL-SCNC: 136 MMOL/L — SIGNIFICANT CHANGE UP (ref 135–145)
SODIUM SERPL-SCNC: 145 MMOL/L — SIGNIFICANT CHANGE UP (ref 135–145)
SPECIMEN SOURCE: SIGNIFICANT CHANGE UP
SRP AB SERPL QL: SIGNIFICANT CHANGE UP
T4 FREE SERPL-MCNC: 0.72 NG/DL — SIGNIFICANT CHANGE UP (ref 0.7–1.48)
TROPONIN T SERPL-MCNC: 0.15 NG/ML — CRITICAL HIGH (ref 0–0.01)
TROPONIN T SERPL-MCNC: 0.25 NG/ML — CRITICAL HIGH (ref 0–0.01)
TROPONIN T SERPL-MCNC: 0.25 NG/ML — CRITICAL HIGH (ref 0–0.01)
TSH SERPL-MCNC: 0.11 UIU/ML — LOW (ref 0.35–4.94)
VANCOMYCIN TROUGH SERPL-MCNC: 18.6 UG/ML — SIGNIFICANT CHANGE UP (ref 10–20)
WBC # BLD: 20.55 K/UL — HIGH (ref 3.8–10.5)
WBC # BLD: 25.65 K/UL — HIGH (ref 3.8–10.5)
WBC # FLD AUTO: 20.55 K/UL — HIGH (ref 3.8–10.5)
WBC # FLD AUTO: 25.65 K/UL — HIGH (ref 3.8–10.5)

## 2020-05-19 PROCEDURE — 36556 INSERT NON-TUNNEL CV CATH: CPT | Mod: GC

## 2020-05-19 PROCEDURE — 71045 X-RAY EXAM CHEST 1 VIEW: CPT | Mod: 26,CS

## 2020-05-19 PROCEDURE — 99223 1ST HOSP IP/OBS HIGH 75: CPT

## 2020-05-19 PROCEDURE — 71045 X-RAY EXAM CHEST 1 VIEW: CPT | Mod: 26,77

## 2020-05-19 PROCEDURE — 99291 CRITICAL CARE FIRST HOUR: CPT | Mod: 25

## 2020-05-19 RX ORDER — HEPARIN SODIUM 5000 [USP'U]/ML
7500 INJECTION INTRAVENOUS; SUBCUTANEOUS ONCE
Refills: 0 | Status: COMPLETED | OUTPATIENT
Start: 2020-05-19 | End: 2020-05-19

## 2020-05-19 RX ORDER — FUROSEMIDE 40 MG
60 TABLET ORAL EVERY 8 HOURS
Refills: 0 | Status: DISCONTINUED | OUTPATIENT
Start: 2020-05-19 | End: 2020-05-19

## 2020-05-19 RX ORDER — MEROPENEM 1 G/30ML
500 INJECTION INTRAVENOUS ONCE
Refills: 0 | Status: COMPLETED | OUTPATIENT
Start: 2020-05-19 | End: 2020-05-19

## 2020-05-19 RX ORDER — FENTANYL CITRATE 50 UG/ML
3 INJECTION INTRAVENOUS
Qty: 5000 | Refills: 0 | Status: DISCONTINUED | OUTPATIENT
Start: 2020-05-19 | End: 2020-05-20

## 2020-05-19 RX ORDER — CASPOFUNGIN ACETATE 7 MG/ML
70 INJECTION, POWDER, LYOPHILIZED, FOR SOLUTION INTRAVENOUS ONCE
Refills: 0 | Status: COMPLETED | OUTPATIENT
Start: 2020-05-19 | End: 2020-05-19

## 2020-05-19 RX ORDER — NOREPINEPHRINE BITARTRATE/D5W 8 MG/250ML
0.05 PLASTIC BAG, INJECTION (ML) INTRAVENOUS
Qty: 32 | Refills: 0 | Status: DISCONTINUED | OUTPATIENT
Start: 2020-05-19 | End: 2020-05-20

## 2020-05-19 RX ORDER — HEPARIN SODIUM 5000 [USP'U]/ML
1100 INJECTION INTRAVENOUS; SUBCUTANEOUS
Qty: 25000 | Refills: 0 | Status: DISCONTINUED | OUTPATIENT
Start: 2020-05-19 | End: 2020-05-20

## 2020-05-19 RX ORDER — SODIUM BICARBONATE 1 MEQ/ML
100 SYRINGE (ML) INTRAVENOUS ONCE
Refills: 0 | Status: COMPLETED | OUTPATIENT
Start: 2020-05-19 | End: 2020-05-19

## 2020-05-19 RX ORDER — PROPOFOL 10 MG/ML
50 INJECTION, EMULSION INTRAVENOUS
Qty: 1000 | Refills: 0 | Status: DISCONTINUED | OUTPATIENT
Start: 2020-05-19 | End: 2020-05-20

## 2020-05-19 RX ORDER — MAGNESIUM SULFATE 500 MG/ML
2 VIAL (ML) INJECTION ONCE
Refills: 0 | Status: COMPLETED | OUTPATIENT
Start: 2020-05-19 | End: 2020-05-19

## 2020-05-19 RX ORDER — INSULIN LISPRO 100/ML
VIAL (ML) SUBCUTANEOUS EVERY 6 HOURS
Refills: 0 | Status: DISCONTINUED | OUTPATIENT
Start: 2020-05-19 | End: 2020-05-20

## 2020-05-19 RX ORDER — PROPOFOL 10 MG/ML
50 INJECTION, EMULSION INTRAVENOUS
Qty: 1000 | Refills: 0 | Status: DISCONTINUED | OUTPATIENT
Start: 2020-05-19 | End: 2020-05-19

## 2020-05-19 RX ORDER — MEROPENEM 1 G/30ML
500 INJECTION INTRAVENOUS EVERY 8 HOURS
Refills: 0 | Status: DISCONTINUED | OUTPATIENT
Start: 2020-05-19 | End: 2020-05-19

## 2020-05-19 RX ORDER — CHLORHEXIDINE GLUCONATE 213 G/1000ML
1 SOLUTION TOPICAL
Refills: 0 | Status: DISCONTINUED | OUTPATIENT
Start: 2020-05-19 | End: 2020-05-20

## 2020-05-19 RX ORDER — SODIUM BICARBONATE 1 MEQ/ML
0.19 SYRINGE (ML) INTRAVENOUS
Qty: 150 | Refills: 0 | Status: DISCONTINUED | OUTPATIENT
Start: 2020-05-19 | End: 2020-05-20

## 2020-05-19 RX ORDER — PHENYLEPHRINE HYDROCHLORIDE 10 MG/ML
0.2 INJECTION INTRAVENOUS
Qty: 40 | Refills: 0 | Status: DISCONTINUED | OUTPATIENT
Start: 2020-05-19 | End: 2020-05-20

## 2020-05-19 RX ORDER — CASPOFUNGIN ACETATE 7 MG/ML
INJECTION, POWDER, LYOPHILIZED, FOR SOLUTION INTRAVENOUS
Refills: 0 | Status: DISCONTINUED | OUTPATIENT
Start: 2020-05-19 | End: 2020-05-19

## 2020-05-19 RX ORDER — AMIODARONE HYDROCHLORIDE 400 MG/1
1 TABLET ORAL
Qty: 900 | Refills: 0 | Status: DISCONTINUED | OUTPATIENT
Start: 2020-05-19 | End: 2020-05-19

## 2020-05-19 RX ORDER — CASPOFUNGIN ACETATE 7 MG/ML
70 INJECTION, POWDER, LYOPHILIZED, FOR SOLUTION INTRAVENOUS EVERY 24 HOURS
Refills: 0 | Status: DISCONTINUED | OUTPATIENT
Start: 2020-05-19 | End: 2020-05-19

## 2020-05-19 RX ORDER — AMIODARONE HYDROCHLORIDE 400 MG/1
0.5 TABLET ORAL
Qty: 900 | Refills: 0 | Status: DISCONTINUED | OUTPATIENT
Start: 2020-05-19 | End: 2020-05-20

## 2020-05-19 RX ORDER — MAGNESIUM SULFATE 500 MG/ML
1 VIAL (ML) INJECTION ONCE
Refills: 0 | Status: COMPLETED | OUTPATIENT
Start: 2020-05-19 | End: 2020-05-19

## 2020-05-19 RX ORDER — AMIODARONE HYDROCHLORIDE 400 MG/1
300 TABLET ORAL ONCE
Refills: 0 | Status: COMPLETED | OUTPATIENT
Start: 2020-05-19 | End: 2020-05-19

## 2020-05-19 RX ORDER — CASPOFUNGIN ACETATE 7 MG/ML
50 INJECTION, POWDER, LYOPHILIZED, FOR SOLUTION INTRAVENOUS EVERY 24 HOURS
Refills: 0 | Status: DISCONTINUED | OUTPATIENT
Start: 2020-05-20 | End: 2020-05-20

## 2020-05-19 RX ORDER — AMIODARONE HYDROCHLORIDE 400 MG/1
150 TABLET ORAL ONCE
Refills: 0 | Status: COMPLETED | OUTPATIENT
Start: 2020-05-19 | End: 2020-05-19

## 2020-05-19 RX ORDER — VANCOMYCIN HCL 1 G
1250 VIAL (EA) INTRAVENOUS ONCE
Refills: 0 | Status: COMPLETED | OUTPATIENT
Start: 2020-05-19 | End: 2020-05-19

## 2020-05-19 RX ORDER — MEROPENEM 1 G/30ML
1000 INJECTION INTRAVENOUS EVERY 8 HOURS
Refills: 0 | Status: DISCONTINUED | OUTPATIENT
Start: 2020-05-19 | End: 2020-05-20

## 2020-05-19 RX ORDER — HEPARIN SODIUM 5000 [USP'U]/ML
1300 INJECTION INTRAVENOUS; SUBCUTANEOUS
Qty: 25000 | Refills: 0 | Status: DISCONTINUED | OUTPATIENT
Start: 2020-05-19 | End: 2020-05-19

## 2020-05-19 RX ORDER — BUMETANIDE 0.25 MG/ML
2 INJECTION INTRAMUSCULAR; INTRAVENOUS ONCE
Refills: 0 | Status: COMPLETED | OUTPATIENT
Start: 2020-05-19 | End: 2020-05-19

## 2020-05-19 RX ORDER — VANCOMYCIN HCL 1 G
1250 VIAL (EA) INTRAVENOUS EVERY 24 HOURS
Refills: 0 | Status: DISCONTINUED | OUTPATIENT
Start: 2020-05-19 | End: 2020-05-19

## 2020-05-19 RX ORDER — FENTANYL CITRATE 50 UG/ML
3 INJECTION INTRAVENOUS
Qty: 5000 | Refills: 0 | Status: DISCONTINUED | OUTPATIENT
Start: 2020-05-19 | End: 2020-05-19

## 2020-05-19 RX ADMIN — Medication 40 MILLIGRAM(S): at 18:57

## 2020-05-19 RX ADMIN — PROPOFOL 0.57 MICROGRAM(S)/KG/MIN: 10 INJECTION, EMULSION INTRAVENOUS at 06:31

## 2020-05-19 RX ADMIN — MEROPENEM 100 MILLIGRAM(S): 1 INJECTION INTRAVENOUS at 12:20

## 2020-05-19 RX ADMIN — HEPARIN SODIUM 13 UNIT(S)/HR: 5000 INJECTION INTRAVENOUS; SUBCUTANEOUS at 11:04

## 2020-05-19 RX ADMIN — Medication 6: at 18:46

## 2020-05-19 RX ADMIN — PIPERACILLIN AND TAZOBACTAM 200 GRAM(S): 4; .5 INJECTION, POWDER, LYOPHILIZED, FOR SOLUTION INTRAVENOUS at 01:31

## 2020-05-19 RX ADMIN — Medication 1 MILLIGRAM(S): at 12:39

## 2020-05-19 RX ADMIN — MEROPENEM 100 MILLIGRAM(S): 1 INJECTION INTRAVENOUS at 20:08

## 2020-05-19 RX ADMIN — AMIODARONE HYDROCHLORIDE 33.3 MG/MIN: 400 TABLET ORAL at 11:03

## 2020-05-19 RX ADMIN — Medication 81 MILLIGRAM(S): at 22:36

## 2020-05-19 RX ADMIN — LIDOCAINE 2 PATCH: 4 CREAM TOPICAL at 08:29

## 2020-05-19 RX ADMIN — SENNA PLUS 2 TABLET(S): 8.6 TABLET ORAL at 22:37

## 2020-05-19 RX ADMIN — CHLORHEXIDINE GLUCONATE 15 MILLILITER(S): 213 SOLUTION TOPICAL at 18:12

## 2020-05-19 RX ADMIN — Medication 8: at 11:51

## 2020-05-19 RX ADMIN — CISATRACURIUM BESYLATE 17.2 MICROGRAM(S)/KG/MIN: 2 INJECTION INTRAVENOUS at 07:03

## 2020-05-19 RX ADMIN — GABAPENTIN 200 MILLIGRAM(S): 400 CAPSULE ORAL at 16:01

## 2020-05-19 RX ADMIN — LIDOCAINE 2 PATCH: 4 CREAM TOPICAL at 22:37

## 2020-05-19 RX ADMIN — VASOPRESSIN 2.4 UNIT(S)/MIN: 20 INJECTION INTRAVENOUS at 21:41

## 2020-05-19 RX ADMIN — Medication 100 MILLIEQUIVALENT(S): at 22:37

## 2020-05-19 RX ADMIN — DORZOLAMIDE HYDROCHLORIDE TIMOLOL MALEATE 1 DROP(S): 20; 5 SOLUTION/ DROPS OPHTHALMIC at 18:48

## 2020-05-19 RX ADMIN — Medication 100 MILLIEQUIVALENT(S): at 23:56

## 2020-05-19 RX ADMIN — AMIODARONE HYDROCHLORIDE 600 MILLIGRAM(S): 400 TABLET ORAL at 11:05

## 2020-05-19 RX ADMIN — PANTOPRAZOLE SODIUM 40 MILLIGRAM(S): 20 TABLET, DELAYED RELEASE ORAL at 06:31

## 2020-05-19 RX ADMIN — CHLORHEXIDINE GLUCONATE 15 MILLILITER(S): 213 SOLUTION TOPICAL at 06:30

## 2020-05-19 RX ADMIN — Medication 2: at 05:44

## 2020-05-19 RX ADMIN — VASOPRESSIN 2.4 UNIT(S)/MIN: 20 INJECTION INTRAVENOUS at 11:04

## 2020-05-19 RX ADMIN — CHLORHEXIDINE GLUCONATE 1 APPLICATION(S): 213 SOLUTION TOPICAL at 17:47

## 2020-05-19 RX ADMIN — Medication 4.47 MICROGRAM(S)/KG/MIN: at 05:44

## 2020-05-19 RX ADMIN — DORZOLAMIDE HYDROCHLORIDE TIMOLOL MALEATE 1 DROP(S): 20; 5 SOLUTION/ DROPS OPHTHALMIC at 06:30

## 2020-05-19 RX ADMIN — GABAPENTIN 200 MILLIGRAM(S): 400 CAPSULE ORAL at 22:36

## 2020-05-19 RX ADMIN — LIDOCAINE 2 PATCH: 4 CREAM TOPICAL at 11:05

## 2020-05-19 RX ADMIN — Medication 2.5 MILLIGRAM(S): at 06:44

## 2020-05-19 RX ADMIN — Medication 4.47 MICROGRAM(S)/KG/MIN: at 11:04

## 2020-05-19 RX ADMIN — PROPOFOL 0.57 MICROGRAM(S)/KG/MIN: 10 INJECTION, EMULSION INTRAVENOUS at 00:23

## 2020-05-19 RX ADMIN — BUMETANIDE 2 MILLIGRAM(S): 0.25 INJECTION INTRAMUSCULAR; INTRAVENOUS at 13:00

## 2020-05-19 RX ADMIN — HEPARIN SODIUM 11 UNIT(S)/HR: 5000 INJECTION INTRAVENOUS; SUBCUTANEOUS at 12:43

## 2020-05-19 RX ADMIN — Medication 4.47 MICROGRAM(S)/KG/MIN: at 21:40

## 2020-05-19 RX ADMIN — Medication 40 MILLIGRAM(S): at 06:31

## 2020-05-19 RX ADMIN — HEPARIN SODIUM 9 UNIT(S)/HR: 5000 INJECTION INTRAVENOUS; SUBCUTANEOUS at 16:01

## 2020-05-19 RX ADMIN — Medication 100 MILLIGRAM(S): at 12:40

## 2020-05-19 RX ADMIN — Medication 166.67 MILLIGRAM(S): at 06:44

## 2020-05-19 RX ADMIN — LATANOPROST 1 DROP(S): 0.05 SOLUTION/ DROPS OPHTHALMIC; TOPICAL at 22:37

## 2020-05-19 RX ADMIN — Medication 60 MILLIGRAM(S): at 08:33

## 2020-05-19 RX ADMIN — Medication 100 GRAM(S): at 10:28

## 2020-05-19 RX ADMIN — CASPOFUNGIN ACETATE 260 MILLIGRAM(S): 7 INJECTION, POWDER, LYOPHILIZED, FOR SOLUTION INTRAVENOUS at 11:04

## 2020-05-19 RX ADMIN — PIPERACILLIN AND TAZOBACTAM 200 GRAM(S): 4; .5 INJECTION, POWDER, LYOPHILIZED, FOR SOLUTION INTRAVENOUS at 06:31

## 2020-05-19 RX ADMIN — Medication 50 GRAM(S): at 06:30

## 2020-05-19 RX ADMIN — HEPARIN SODIUM 13 UNIT(S)/HR: 5000 INJECTION INTRAVENOUS; SUBCUTANEOUS at 03:48

## 2020-05-19 RX ADMIN — HEPARIN SODIUM 7500 UNIT(S): 5000 INJECTION INTRAVENOUS; SUBCUTANEOUS at 03:48

## 2020-05-19 RX ADMIN — GABAPENTIN 200 MILLIGRAM(S): 400 CAPSULE ORAL at 06:30

## 2020-05-19 NOTE — PROCEDURE NOTE - NSPOSTCAREGUIDE_GEN_A_CORE
Care for catheter as per unit/ICU protocols
Care for catheter as per unit/ICU protocols/Verbal/written post procedure instructions were given to patient/caregiver
Care for catheter as per unit/ICU protocols/Verbal/written post procedure instructions were given to patient/caregiver

## 2020-05-19 NOTE — PROGRESS NOTE ADULT - SUBJECTIVE AND OBJECTIVE BOX
OVERNIGHT EVENTS:    SUBJECTIVE / INTERVAL HPI: Patient seen and examined at bedside.     VITAL SIGNS:  Vital Signs Last 24 Hrs  T(C): 37.1 (19 May 2020 11:24), Max: 37.1 (19 May 2020 11:24)  T(F): 98.7 (19 May 2020 11:24), Max: 98.7 (19 May 2020 11:24)  HR: 113 (19 May 2020 11:24) (61 - 117)  BP: --  BP(mean): --  RR: 12 (19 May 2020 11:24) (12 - 31)  SpO2: 88% (19 May 2020 11:24) (84% - 100%)    PHYSICAL EXAM:    General: WDWN  HEENT: NC/AT; PERRL, anicteric sclera; MMM  Neck: supple  Cardiovascular: +S1/S2; RRR  Respiratory: CTA B/L; no W/R/R  Gastrointestinal: soft, NT/ND; +BSx4  Extremities: WWP; no edema, clubbing or cyanosis  Vascular: 2+ radial, DP/PT pulses B/L  Neurological: AAOx3; no focal deficits    MEDICATIONS:  MEDICATIONS  (STANDING):  aMIOdarone Infusion 1 mG/Min (33.3 mL/Hr) IV Continuous <Continuous>  aspirin  chewable 81 milliGRAM(s) Oral every 24 hours  chlorhexidine 0.12% Liquid 15 milliLiter(s) Oral Mucosa every 12 hours  cisatracurium Infusion 3 MICROgram(s)/kG/Min (17.2 mL/Hr) IV Continuous <Continuous>  dextrose 5%. 1000 milliLiter(s) (50 mL/Hr) IV Continuous <Continuous>  dextrose 50% Injectable 12.5 Gram(s) IV Push once  dextrose 50% Injectable 25 Gram(s) IV Push once  dextrose 50% Injectable 25 Gram(s) IV Push once  dorzolamide 2%/timolol 0.5% Ophthalmic Solution 1 Drop(s) Both EYES every 12 hours  fentaNYL   Infusion... 3 MICROgram(s)/kG/Hr (14.3 mL/Hr) IV Continuous <Continuous>  folic acid 1 milliGRAM(s) Oral daily  furosemide   Injectable 60 milliGRAM(s) IV Push every 8 hours  gabapentin 200 milliGRAM(s) Oral every 8 hours  heparin  Infusion 1100 Unit(s)/Hr (11 mL/Hr) IV Continuous <Continuous>  insulin lispro (HumaLOG) corrective regimen sliding scale   SubCutaneous every 6 hours  latanoprost 0.005% Ophthalmic Solution 1 Drop(s) Both EYES at bedtime  lidocaine   Patch 2 Patch Transdermal every 24 hours  meropenem  IVPB 500 milliGRAM(s) IV Intermittent every 8 hours  methylPREDNISolone sodium succinate Injectable 40 milliGRAM(s) IV Push every 12 hours  norepinephrine Infusion 0.05 MICROgram(s)/kG/Min (4.47 mL/Hr) IV Continuous <Continuous>  pantoprazole   Suspension 40 milliGRAM(s) Oral every 24 hours  propofol Infusion 50 MICROgram(s)/kG/Min (28.6 mL/Hr) IV Continuous <Continuous>  senna 2 Tablet(s) Oral at bedtime  thiamine 100 milliGRAM(s) Oral every 24 hours  tradipitant vs placebo ([id number]) 85 milliGRAM(s) Oral every 12 hours  vasopressin Infusion 0.04 Unit(s)/Min (2.4 mL/Hr) IV Continuous <Continuous>    MEDICATIONS  (PRN):  acetaminophen   Tablet .. 650 milliGRAM(s) Oral every 6 hours PRN Temp greater or equal to 38.5C (101.3F)  acetaminophen   Tablet .. 650 milliGRAM(s) Oral every 6 hours PRN Mild Pain (1 - 3)  dextrose 40% Gel 15 Gram(s) Oral once PRN Blood Glucose LESS THAN 70 milliGRAM(s)/deciliter  glucagon  Injectable 1 milliGRAM(s) IntraMuscular once PRN Glucose LESS THAN 70 milligrams/deciliter  oxyCODONE    IR 5 milliGRAM(s) Oral every 4 hours PRN Moderate Pain (4 - 6)      ALLERGIES:  Allergies    No Known Allergies    Intolerances        LABS:                        9.6    27.17 )-----------( 108      ( 19 May 2020 10:06 )             31.7     05-19    137  |  100  |  55<H>  ----------------------------<  328<H>  5.2   |  20<L>  |  3.69<H>    Ca    6.6<L>      19 May 2020 10:06  Phos  4.6     05-19  Mg     1.8     -    TPro  5.3<L>  /  Alb  2.8<L>  /  TBili  1.3<H>  /  DBili  x   /  AST  48<H>  /  ALT  31  /  AlkPhos  134<H>  05-19    PT/INR - ( 19 May 2020 10:06 )   PT: 15.9 sec;   INR: 1.38          PTT - ( 19 May 2020 10:06 )  PTT:180.0 sec  Urinalysis Basic - ( 17 May 2020 13:58 )    Color: Yellow / Appearance: Clear / S.025 / pH: x  Gluc: x / Ketone: NEGATIVE  / Bili: Negative / Urobili: 1.0 E.U./dL   Blood: x / Protein: 30 mg/dL / Nitrite: NEGATIVE   Leuk Esterase: NEGATIVE / RBC: > 10 /HPF / WBC < 5 /HPF   Sq Epi: x / Non Sq Epi: 0-5 /HPF / Bacteria: Present /HPF      CAPILLARY BLOOD GLUCOSE      POCT Blood Glucose.: 322 mg/dL (19 May 2020 11:18)      RADIOLOGY & ADDITIONAL TESTS: Reviewed.    ASSESSMENT:    PLAN: OVERNIGHT EVENTS: Hypothermic to 90 F, cultured and warming blanket placed. ABG demonstrating reso acidosis. Patient HTN with change in vent settings, vent setting remained the same.     SUBJECTIVE / INTERVAL HPI: Patient seen and examined by attending physician.     VITAL SIGNS:  Vital Signs Last 24 Hrs  T(C): 37.1 (19 May 2020 11:24), Max: 37.1 (19 May 2020 11:24)  T(F): 98.7 (19 May 2020 11:24), Max: 98.7 (19 May 2020 11:24)  HR: 113 (19 May 2020 11:24) (61 - 117)  BP: --  BP(mean): --  RR: 12 (19 May 2020 11:24) (12 - 31)  SpO2: 88% (19 May 2020 11:24) (84% - 100%)    PHYSICAL EXAM:  Per critical care attending     MEDICATIONS:  MEDICATIONS  (STANDING):  aMIOdarone Infusion 1 mG/Min (33.3 mL/Hr) IV Continuous <Continuous>  aspirin  chewable 81 milliGRAM(s) Oral every 24 hours  chlorhexidine 0.12% Liquid 15 milliLiter(s) Oral Mucosa every 12 hours  cisatracurium Infusion 3 MICROgram(s)/kG/Min (17.2 mL/Hr) IV Continuous <Continuous>  dextrose 5%. 1000 milliLiter(s) (50 mL/Hr) IV Continuous <Continuous>  dextrose 50% Injectable 12.5 Gram(s) IV Push once  dextrose 50% Injectable 25 Gram(s) IV Push once  dextrose 50% Injectable 25 Gram(s) IV Push once  dorzolamide 2%/timolol 0.5% Ophthalmic Solution 1 Drop(s) Both EYES every 12 hours  fentaNYL   Infusion... 3 MICROgram(s)/kG/Hr (14.3 mL/Hr) IV Continuous <Continuous>  folic acid 1 milliGRAM(s) Oral daily  furosemide   Injectable 60 milliGRAM(s) IV Push every 8 hours  gabapentin 200 milliGRAM(s) Oral every 8 hours  heparin  Infusion 1100 Unit(s)/Hr (11 mL/Hr) IV Continuous <Continuous>  insulin lispro (HumaLOG) corrective regimen sliding scale   SubCutaneous every 6 hours  latanoprost 0.005% Ophthalmic Solution 1 Drop(s) Both EYES at bedtime  lidocaine   Patch 2 Patch Transdermal every 24 hours  meropenem  IVPB 500 milliGRAM(s) IV Intermittent every 8 hours  methylPREDNISolone sodium succinate Injectable 40 milliGRAM(s) IV Push every 12 hours  norepinephrine Infusion 0.05 MICROgram(s)/kG/Min (4.47 mL/Hr) IV Continuous <Continuous>  pantoprazole   Suspension 40 milliGRAM(s) Oral every 24 hours  propofol Infusion 50 MICROgram(s)/kG/Min (28.6 mL/Hr) IV Continuous <Continuous>  senna 2 Tablet(s) Oral at bedtime  thiamine 100 milliGRAM(s) Oral every 24 hours  tradipitant vs placebo ([id number]) 85 milliGRAM(s) Oral every 12 hours  vasopressin Infusion 0.04 Unit(s)/Min (2.4 mL/Hr) IV Continuous <Continuous>    MEDICATIONS  (PRN):  acetaminophen   Tablet .. 650 milliGRAM(s) Oral every 6 hours PRN Temp greater or equal to 38.5C (101.3F)  acetaminophen   Tablet .. 650 milliGRAM(s) Oral every 6 hours PRN Mild Pain (1 - 3)  dextrose 40% Gel 15 Gram(s) Oral once PRN Blood Glucose LESS THAN 70 milliGRAM(s)/deciliter  glucagon  Injectable 1 milliGRAM(s) IntraMuscular once PRN Glucose LESS THAN 70 milligrams/deciliter  oxyCODONE    IR 5 milliGRAM(s) Oral every 4 hours PRN Moderate Pain (4 - 6)      ALLERGIES:  Allergies    No Known Allergies    Intolerances        LABS:                        9.6    27.17 )-----------( 108      ( 19 May 2020 10:06 )             31.7     05-    137  |  100  |  55<H>  ----------------------------<  328<H>  5.2   |  20<L>  |  3.69<H>    Ca    6.6<L>      19 May 2020 10:06  Phos  4.6     05-  Mg     1.8         TPro  5.3<L>  /  Alb  2.8<L>  /  TBili  1.3<H>  /  DBili  x   /  AST  48<H>  /  ALT  31  /  AlkPhos  134<H>  05-19    PT/INR - ( 19 May 2020 10:06 )   PT: 15.9 sec;   INR: 1.38          PTT - ( 19 May 2020 10:06 )  PTT:180.0 sec  Urinalysis Basic - ( 17 May 2020 13:58 )    Color: Yellow / Appearance: Clear / S.025 / pH: x  Gluc: x / Ketone: NEGATIVE  / Bili: Negative / Urobili: 1.0 E.U./dL   Blood: x / Protein: 30 mg/dL / Nitrite: NEGATIVE   Leuk Esterase: NEGATIVE / RBC: > 10 /HPF / WBC < 5 /HPF   Sq Epi: x / Non Sq Epi: 0-5 /HPF / Bacteria: Present /HPF      CAPILLARY BLOOD GLUCOSE      POCT Blood Glucose.: 322 mg/dL (19 May 2020 11:18)      RADIOLOGY & ADDITIONAL TESTS: Reviewed.    ASSESSMENT:    PLAN:

## 2020-05-19 NOTE — PROGRESS NOTE ADULT - ASSESSMENT
68 y/o M with PMH of progressive lower extremity weakness, s/p lumbar decompression in 1/2019. 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        Neuro:   On oxycodone 5mg home med  on Baclofen increased to q8h  Tylenol for fevers or pain  Chronic back - Pt endorses pain however not different from baseline, No new associated neurologic deficits.     Neuropathy.  Plan: Has a chronic neuropathy likely secondary to disc herniation. on gabapentin 400 tid at home. Currently in worsening back pain and tingling and numbness. Follows with Dr. Brito outpatient.   - LE dopplers negative for DVT  - CPK elevated -> concern for myositis as underlying etiology   - RF 22 -> mildly elevated  - f/u AHMET, anti-dsDNA, anti-Isaura   - trend CPK   - EMG unchanged from prior   - c/w home gabapentin 400 tid  - D/C'd Zyprexa due to lethargy        Respiratory:   ·  Problem: Pneumonia due to COVID-19 virus.  Plan: Pt with sore throat and runny nose and mild diarrhea for the past few days but no SOB or cough, was found to be positive for COVID. Had fever and tachycardia in ED but no O2 desaturation. currently sating 94% on 2L. CXR clear.   COVID positive, Ferritin 2537, CRP 3.33  - Monitor O2 saturation and symptoms  - Trending inflammatory markers daily  - tylenol for fever  - symptomtic management   - contact precaustions: isolation and droplet  - avoid NSAID/ACE-I/ARB  - Odyssey trial   - Solumedrol x5 days (end 5/19)  - c/w mechanical ventillation and proning prn       #Acute Hypoxic Resp Failure 2/2 COVID-19  Patient with increased O2 requirements (79% on RA) s/p lasix 20mg. CXR without evidence of congestion. Currently on BIPAP.   - s/p tociluzumab (5/9)  - initiated solumedrol 40mg BID -> taper as per pulm (end 5/19)  - wean O2 as tolerated  - remdesivir dc'd 2/2 SETH  - intubated 5/16, c/w mechanical ventillation -> PRONING     Cardiovascular:    ·  Problem: HTN (hypertension).  Plan: Pt with hx of HTN on lisinopril 10 at home  Currently with symptoms of sepsis, dehydration, SETH   - holding all anti hypertensives    ID  #Septic Shock  Patient hypothermic to 90F. CXR with worsening right upper lobe infilitrate.   - initiated caspofungin and meropenem  - ID consulted, appreciate recs.   - Blood cultures NGTD  - UA negative   - Knee xray w/o evidence of septic joint   - Tylenol for fever  - COVID management as below  - s/p tocilizumab (5/9)    - CT chest obtained to evaluate for ?consolidation demonstrating basilar predominant groundglass opacities bilaterally; consistent with     COVID-19.   - repeat blood cultures drawn 5/11 - NGTD  - UA + pt asymptomatic, likely not source of infeciton  - Vanco discontinued due to negative cultures, Zosyn will continue for 7 days (5/11 - 5/18)   - Tx as above        ·  Problem: R/O Rhabdomyolysis.  Plan: Patient with rising CK in setting of fall and SETH, and reported hematuria prior to arrival. Also, with hypophosphatemia, concern for rhabdomyolysis. CK now within normal range.    - DC'd IVF, no longer trending CK (5/16)     SETH (acute kidney injury).  Plan: Etiology pre-renal 2/2 sepsis  Pt with baseline Cr 1.1-1.2 presented with Cr 1.88 and BUN 52 likely due to prerenal azotemia likely due to dehydration following having diarrhea for few days and not eating and drinking well. Responded to IVF in past  -Now with worsening BUN/Cr up to 39/2.45  -Continue to monitor renal function, avoid nephrotoxic agents     Hyperkalemia  -Patient hyperkalemic (5.4) without EKG changes, likely in setting of SETH  -S/P Lokemba x1 5/17      Heme  Problem: DVT (deep venous thrombosis).  Plan: Pt with hx of DVT and PE, on Eliquis 5 daily at home. DVT reported in 2018, unclear indication for AC at this time.   - c/w home Eliquis 5mg BID qd       #Thrombocytopenia  Patient with baseline plt 110s, 84 on arrival and decreasing to 61 on admission. Possibly 2/2 viral infection and sepsis. No evidence of bleeding.  - Hematology consulted -> likely 2/2 COVID-19   - Hep C +  - monitor for signs of bleeding.     Endo  #Hyperbilirubinemia   Pt with bili 2.7 and elevated AST and ALT but normal Alk-p.RUQ ultrasound demonstrating gallbladder with multiple gallstones. Benign abdominal exam. Negative cage sign.  Likely 2/2 COVID infection or due to gallbladder stones. Improved with fluid therapy. Patient Hep C positive.   - monitor LFT  - Hepatitis panel negative  - Obtain new RUQ U/S 5/17, f/o any hepatobilliary cause of septic shock    ·GI  PPX famotidine   NPO w TFs    Problem: Prophylactic measure.  Plan: Fluids: none  Electrolytes-replete as needed  Nutrition - NPO w TFs  Code- Full Code  DVT ppx: Hep gtt  GI ppx: none needed  DIspo: MICU

## 2020-05-19 NOTE — CHART NOTE - NSCHARTNOTEFT_GEN_A_CORE
DAILY ASSESSMENTS (Required on all days, including Day 1 and Discharge Day)    Day: 7    NRS cough: Please rate the cough severity by selecting the number from 0 to 10 that best describes your worst level of cough in the past 24 hours: intubated    NRS nausea: Please rate the worst severity of your nausea from 0 to 5 during the past 24 hours: intubated    Hypercapnic respiratory failure?  YES     SpO2:   89%    FiO2 or LPM: MV O2 100%, PEEP 32    Vital signs (if not already collected by nurse): temperature, blood pressure, pulse, respiratory rate, oxygen saturation  See progress note    If this is day 1 or discharge day: CXR, and EKG as noted below      < from: Xray Chest 1 View- PORTABLE-Urgent (05.19.20 @ 08:21) >  EXAM:  XR CHEST PORTABLE URGENT 1V                        PROCEDURE DATE:  05/19/2020    INTERPRETATION:  PORTABLE CHEST X-RAY   HISTORY: covid 19  PRIOR STUDIES: 5/18/2020    FINDINGS: Progression of extensive bilateral upper, mid and lower zone lung opacification/consolidation. Endotracheal tube tip 3.1 cm proximal to edward. NG tube with tip overlying the gastric fundus.. No large pleural effusion. No visualized pneumothorax. No definite pneumomediastinum. Left jugular venous line with tip overlying the left innominate vein/SVC junction.. Lower cervical spine ACDF and posterior fusion.    IMPRESSION:  Progression of lung findings.    Thank you for the opportunity to participate in the care of this patient.  ARMINDA MTZ M.D., ATTENDING RADIOLOGIST  This document has been electronically signed. May 19 2020  8:23AM        ____________________________________________________  Christy Webber MD     Graduate Physician - COVID Emergency Events   [phone]

## 2020-05-19 NOTE — CONSULT NOTE ADULT - SUBJECTIVE AND OBJECTIVE BOX
HPI:  A 69 years old M with PMH of progressive lower extremity weakness, s/p lumbar decompression in 1/2019. 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 Pt states that on the weekend while he was walking with his walker, his leg gave away and he fell and since then he has a severe pain in his low back with radiation to both legs. Pt has chronic back pain and is following with a pain specialist and Dr. Brito neurologist as outpt and is taking several pain medications but since fall the pain has been worsening. Pt also states that it has been few weeks that he cannot control his urine and gets soaked and wet without noticing but denies bowel incontinence. He also complains that the weakness of the lower extremities is worse mainly in the right leg and he is not able to move the right leg both because of pain and because of weakness. His tingling and numbness in the LEs is also worsening.     Pt denied headache, dizziness, nausea, vomiting, abdominal pain, rash, fever, chills,. But stated that it has been more than a week that he has sore throat and runny nose as well as few days of diarrhea few times a day. He also complains of pain in his right knee but does not know when the pain in the knee started. Complains of bloody urine and slight burning with urination. denied recent travel or sick contact.     In the ED his Temp was 101.9,  that increased to 121, /71, RR 19 with 93% saturation on RA that then increased to 98% on RA. WBC 4.08, Hb 15.3, Plt 84, Na 144, K 4.9, BUN 52 and Cr 1.88 (baseline 1.1-1.2), INR 2.57, Bili 2.7, AST 54, ALT 35, Alk 102, lactate 2.7 that then cleared to 1.8, UA showed a large blood otherwise clear, CT abd/pelvis showed two non-obstructing tiny left renal calculi. US of abdomen showed Gallbladder full of gallstones.MRI of the spine showed no cord compression but multiple disc herniation. COVID test was positive.     On admission he was worked up for back pain with MRI and EMG.  He received tocilizumab on 5/9.  On 5/11 he had worsening SOB and was then started non-invasive positive pressure ventilation and transferred to MICU.  He was started on vancomycin and zosyn.  He continued on CPAP the next several days and was started on the Odyssey trial on 5/15.  Patient's respiratory status declined and he was intubated on 5/16      PAST MEDICAL & SURGICAL HISTORY:  Mitral valve insufficiency  Cardiomegaly  Glaucoma  Neuropathy  DVT (deep venous thrombosis): left leg, 2018  Pulmonary embolism  HTN (hypertension)  History of appendectomy  History of tonsillectomy  History of lumbar laminectomy: 1/2019  Degeneration of intervertebral disc of thoracic region: 2015  History of foot surgery: right foot  H/O cervical spine surgery: with hardware        REVIEW OF SYSTEMS:    unable to obtain     ANTIBIOTICS:  MEDICATIONS  (STANDING):  aMIOdarone Infusion 0.5 mG/Min (16.7 mL/Hr) IV Continuous <Continuous>  aspirin  chewable 81 milliGRAM(s) Oral every 24 hours  chlorhexidine 0.12% Liquid 15 milliLiter(s) Oral Mucosa every 12 hours  chlorhexidine 2% Cloths 1 Application(s) Topical <User Schedule>  cisatracurium Infusion 3 MICROgram(s)/kG/Min (17.2 mL/Hr) IV Continuous <Continuous>  CRRT Treatment    <Continuous>  dextrose 5%. 1000 milliLiter(s) (50 mL/Hr) IV Continuous <Continuous>  dextrose 50% Injectable 12.5 Gram(s) IV Push once  dextrose 50% Injectable 25 Gram(s) IV Push once  dextrose 50% Injectable 25 Gram(s) IV Push once  dorzolamide 2%/timolol 0.5% Ophthalmic Solution 1 Drop(s) Both EYES every 12 hours  fentaNYL   Infusion... 3 MICROgram(s)/kG/Hr (14.3 mL/Hr) IV Continuous <Continuous>  folic acid 1 milliGRAM(s) Oral daily  gabapentin 200 milliGRAM(s) Oral every 8 hours  heparin  Infusion 1100 Unit(s)/Hr (9 mL/Hr) IV Continuous <Continuous>  insulin lispro (HumaLOG) corrective regimen sliding scale   SubCutaneous every 6 hours  latanoprost 0.005% Ophthalmic Solution 1 Drop(s) Both EYES at bedtime  lidocaine   Patch 2 Patch Transdermal every 24 hours  meropenem  IVPB 1000 milliGRAM(s) IV Intermittent every 8 hours  methylPREDNISolone sodium succinate Injectable 40 milliGRAM(s) IV Push every 12 hours  norepinephrine Infusion 0.05 MICROgram(s)/kG/Min (4.47 mL/Hr) IV Continuous <Continuous>  pantoprazole   Suspension 40 milliGRAM(s) Oral every 24 hours  propofol Infusion 50 MICROgram(s)/kG/Min (28.6 mL/Hr) IV Continuous <Continuous>  PureFlow Dialysate RFP-400 (K 2 / Ca 3) 5000 milliLiter(s) (2500 mL/Hr) CRRT <Continuous>  senna 2 Tablet(s) Oral at bedtime  thiamine 100 milliGRAM(s) Oral every 24 hours  tradipitant vs placebo ([id number]) 85 milliGRAM(s) Oral every 12 hours  vasopressin Infusion 0.04 Unit(s)/Min (2.4 mL/Hr) IV Continuous <Continuous>    MEDICATIONS  (PRN):  acetaminophen   Tablet .. 650 milliGRAM(s) Oral every 6 hours PRN Temp greater or equal to 38.5C (101.3F)  acetaminophen   Tablet .. 650 milliGRAM(s) Oral every 6 hours PRN Mild Pain (1 - 3)  dextrose 40% Gel 15 Gram(s) Oral once PRN Blood Glucose LESS THAN 70 milliGRAM(s)/deciliter  glucagon  Injectable 1 milliGRAM(s) IntraMuscular once PRN Glucose LESS THAN 70 milligrams/deciliter  oxyCODONE    IR 5 milliGRAM(s) Oral every 4 hours PRN Moderate Pain (4 - 6)      Allergies    No Known Allergies    Intolerances        SOCIAL HISTORY:    FAMILY HISTORY:      Vital Signs Last 24 Hrs  T(C): 36.9 (19 May 2020 14:20), Max: 37.1 (19 May 2020 11:24)  T(F): 98.4 (19 May 2020 14:20), Max: 98.7 (19 May 2020 11:24)  HR: 98 (19 May 2020 14:20) (69 - 117)  BP: --  BP(mean): --  RR: 12 (19 May 2020 11:24) (12 - 30)  SpO2: 90% (19 May 2020 14:20) (84% - 111%)    PHYSICAL EXAM:  Constitutional:  proned position, intubated  Eyes: no icterus   Ear/Nose/Throat:  intubated  Neck:  supple  Respiratory: moving air well bilaterally  Cardiovascular: no murmurs  Gastrointestinal  unable to examine  Extremities:no edema  Vascular: DP Pulse:	right normal; left normal            LABS:                        9.5    25.65 )-----------( 110      ( 19 May 2020 13:25 )             31.5     05-19    136  |  100  |  56<H>  ----------------------------<  307<H>  5.4<H>   |  19<L>  |  3.89<H>    Ca    6.6<L>      19 May 2020 13:25  Phos  7.6     05-19  Mg     2.8     05-19    TPro  5.3<L>  /  Alb  2.8<L>  /  TBili  1.3<H>  /  DBili  x   /  AST  48<H>  /  ALT  31  /  AlkPhos  134<H>  05-19    PT/INR - ( 19 May 2020 13:25 )   PT: 16.5 sec;   INR: 1.43          PTT - ( 19 May 2020 13:25 )  PTT:135.9 sec      MICROBIOLOGY:    Culture - Blood (05.19.20 @ 03:59)    Specimen Source: .Blood Blood    Culture Results:   No growth at 12 hours    Culture - Blood (05.19.20 @ 03:59)    Specimen Source: .Blood Blood    Culture Results:   No growth at 12 hours    Culture - Blood (05.16.20 @ 22:44)    Specimen Source: .Blood Blood    Culture Results:   No growth at 2 days.      RADIOLOGY & ADDITIONAL STUDIES:    < from: Xray Chest 1 View-PORTABLE IMMEDIATE (05.19.20 @ 17:06) >  IMPRESSION:  1.  Significant interval improvement of bilateral patchy/hazy opacities.  2.  Interval placement of a right-sided central line with its tip projecting over the SVC. No pneumothorax.  3.  Endotracheal tube is noted with its tip projecting at the level of the clavicles, 6.0 cm above the edward.     < end of copied text >

## 2020-05-19 NOTE — PROCEDURE NOTE - NSINDICATIONS_GEN_A_CORE
critical illness/dialysis/CRRT/emergency venous access
critical illness/emergency venous access/dialysis/CRRT
critical patient/arterial puncture to obtain ABG's/monitoring purposes

## 2020-05-19 NOTE — CHART NOTE - NSCHARTNOTEFT_GEN_A_CORE
Admitting Diagnosis:   Patient is a 70y old  Male who presents with a chief complaint of back pain (19 May 2020 12:33)      PAST MEDICAL & SURGICAL HISTORY:  Mitral valve insufficiency  Cardiomegaly  Glaucoma  Neuropathy  DVT (deep venous thrombosis): left leg, 2018  Pulmonary embolism  HTN (hypertension)  History of appendectomy  History of tonsillectomy  History of lumbar laminectomy: 1/2019  Degeneration of intervertebral disc of thoracic region: 2015  History of foot surgery: right foot  H/O cervical spine surgery: with hardware    Current Nutrition Order:   Trickle feeds of Vital 1.5 @10cc/hr   *Not infusing. Plan to reinitiate tomorrow per MD. See recs below for goal rate.  *Of note, pt has been ordered for trickle feeds x3 days now.    PO Intake: Excellent (%) [   ]  Good (50-75%) [   ]  Fair (25-50%) [   ]  Poor (<25%) [   ] N/A    GI Issues:   +1 BM 5/16  Ordered for pepcid  Ordered for senna    Pain:   Tylenol PRN  fentanyl gtt    Skin Integrity:   Stage 2 PU b/l buttocks  Stage 2 PU intergluteal cleft  Arsenio score 9      Labs:   05-19    136  |  100  |  56<H>  ----------------------------<  307<H>  5.4<H>   |  19<L>  |  3.89<H>    Ca    6.6<L>      19 May 2020 13:25  Phos  7.6     05-19  Mg     2.8     05-19    TPro  5.3<L>  /  Alb  2.8<L>  /  TBili  1.3<H>  /  DBili  x   /  AST  48<H>  /  ALT  31  /  AlkPhos  134<H>  05-19    CAPILLARY BLOOD GLUCOSE      POCT Blood Glucose.: 322 mg/dL (19 May 2020 11:18)  POCT Blood Glucose.: 186 mg/dL (19 May 2020 05:26)  POCT Blood Glucose.: 128 mg/dL (18 May 2020 21:06)  POCT Blood Glucose.: 86 mg/dL (18 May 2020 16:17)      Medications:  MEDICATIONS  (STANDING):  aMIOdarone Infusion 1 mG/Min (33.3 mL/Hr) IV Continuous <Continuous>  aspirin  chewable 81 milliGRAM(s) Oral every 24 hours  chlorhexidine 0.12% Liquid 15 milliLiter(s) Oral Mucosa every 12 hours  cisatracurium Infusion 3 MICROgram(s)/kG/Min (17.2 mL/Hr) IV Continuous <Continuous>  dextrose 5%. 1000 milliLiter(s) (50 mL/Hr) IV Continuous <Continuous>  dextrose 50% Injectable 12.5 Gram(s) IV Push once  dextrose 50% Injectable 25 Gram(s) IV Push once  dextrose 50% Injectable 25 Gram(s) IV Push once  dorzolamide 2%/timolol 0.5% Ophthalmic Solution 1 Drop(s) Both EYES every 12 hours  fentaNYL   Infusion... 3 MICROgram(s)/kG/Hr (14.3 mL/Hr) IV Continuous <Continuous>  folic acid 1 milliGRAM(s) Oral daily  gabapentin 200 milliGRAM(s) Oral every 8 hours  heparin  Infusion 1100 Unit(s)/Hr (11 mL/Hr) IV Continuous <Continuous>  insulin lispro (HumaLOG) corrective regimen sliding scale   SubCutaneous every 6 hours  latanoprost 0.005% Ophthalmic Solution 1 Drop(s) Both EYES at bedtime  lidocaine   Patch 2 Patch Transdermal every 24 hours  meropenem  IVPB 500 milliGRAM(s) IV Intermittent every 8 hours  methylPREDNISolone sodium succinate Injectable 40 milliGRAM(s) IV Push every 12 hours  norepinephrine Infusion 0.05 MICROgram(s)/kG/Min (4.47 mL/Hr) IV Continuous <Continuous>  pantoprazole   Suspension 40 milliGRAM(s) Oral every 24 hours  propofol Infusion 50 MICROgram(s)/kG/Min (28.6 mL/Hr) IV Continuous <Continuous>  senna 2 Tablet(s) Oral at bedtime  thiamine 100 milliGRAM(s) Oral every 24 hours  tradipitant vs placebo ([id number]) 85 milliGRAM(s) Oral every 12 hours  vasopressin Infusion 0.04 Unit(s)/Min (2.4 mL/Hr) IV Continuous <Continuous>    MEDICATIONS  (PRN):  acetaminophen   Tablet .. 650 milliGRAM(s) Oral every 6 hours PRN Temp greater or equal to 38.5C (101.3F)  acetaminophen   Tablet .. 650 milliGRAM(s) Oral every 6 hours PRN Mild Pain (1 - 3)  dextrose 40% Gel 15 Gram(s) Oral once PRN Blood Glucose LESS THAN 70 milliGRAM(s)/deciliter  glucagon  Injectable 1 milliGRAM(s) IntraMuscular once PRN Glucose LESS THAN 70 milligrams/deciliter  oxyCODONE    IR 5 milliGRAM(s) Oral every 4 hours PRN Moderate Pain (4 - 6)      Admitted Anthropometrics:  Height: 6'2", IBW +/- 10% 190lbs, 111% IBW, BMI 26.9  Weight 210lbs (5/6)    Weight Change:   No known wt changes PTA. Please obtain updated weight for trending.  Per last admission 8/2019 pt had reported UBW was 212lbs +/- 5lbs.    Nutrition Focused Physical Exam: Completed [   ]  Unable to complete [  x ]  Muscle Wasting: N/A  Subcutaneous Fat Wasting: N/A  Unable to conduct a face to face interview or nutrition-focused physical exam due to limited contact restrictions related to the pt's medical condition and isolation precautions.     Estimated energy needs:   ABW (86.3kg) used for calculations as pt is >100% IBW and vent dependant   Nutrient needs based on [facility] standards of care for maintenance in older adults.   Needs adjusted based on age and on hypermetabolic state due to viral infection and vent demands  2159-2590kcal/day (25-30kcal/kg)  120-138g pro/day (1.4-1.6g pro/kg)- Will be more consdervative w/ protein until renal function improves  Fluids per team.    Subjective:   70yo M with PMH of progressive lower extremity weakness, s/p lumbar decompression in 1/2019. 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 On 5/10 pt had increasing O2 requirements requiring 2-6L in the setting of fluid overload. Was upgraded to BiPAP 5/15. On 5/16 pt became tachypneic on BIPAP requiring intubation. Developed a fever o/n 5/17 concerning for septic shock. Pt with persistent lactice acidosis and concern for gut necrosis vs cholecystitis. On last day of Solumedrol round (5/19). Remedesivir discontinued d/t SETH. Being diuresed for possible pulmonary effusions; ordered for zosyn.  Unable to conduct a face to face interview or nutrition-focused physical exam due to limited contact restrictions related to the pt's medical condition and isolation precautions. Currently intubated on APRV mode for hypoxia; proning PRN. Paralyzed on nimbex. Sedated on fentanyl, with propofol infusing @17.2ml/hr (providing 454kcal from lipids/day). MAP 67- requiring levo and vaso for BP support. Ordered for trickle feeds however being held d/t instability; plan to resume tomorrow am per MD. No apparent GI distress at present; US demonstrating gallbladder w/ mutliple stones (improved w/ fluid therapy). Notable labs: POCT , 128, 86, 107, BUN/Cr 52/3.41 (uptrending), phos 4.6. Will utilize conservative protein needs pending improvement in renal function. See EN recs below. RD to follow.     Previous Nutrition Diagnosis:  Increased nutrient needs (kcal/pro) r/t increased demand for nutrients AEB COVID+/infection/inflammation.  Active [ x  ]  Resolved [   ]    If resolved, new PES:     Goal: Pt to consistently meet at least 75% estimated nutrient needs     Recommendations:  1. With continued intubation and at current rate of propofol recommend Vital 1.5 @ 42mL/hr x24hrs + prostat 3x/day via NGT. Provides: 1008mL TV, 1812kcal (w/o propofol), 2266kcal (w/ propofol), 113g pro, 1.2g/kg IBW protein, 770mL free H2O, 101% RDI.   Recommend starting at 15mL/hr and advancing by [id number] to goal as tolerated. Additional free H2O per team. Monitor for s/s intolerance; maintain aspiration precautions at all times.  2. Will adjust goal rate pending propofol titration and changes in renal function   3. Once extubated, recommend dysphagia screen and initiation of diet as appropriate    Of note, pt prefers Halal, Vegetarian diet  4. Monitor lytes and replete prn.   5. Pain and bowel regimens per team.   *discussed recs w/ team. Pt is to remain NPO at this time.     Education: N/A intubated/sedated    Risk Level: High [ x  ] Moderate [   ] Low [   ].

## 2020-05-19 NOTE — CONSULT NOTE ADULT - ATTENDING COMMENTS
SETH due to hypoperfusion, likely ATN   hyperkalemia, met acidosis  severe hypoxic respiratory failure on 100% FIO2  hypervolemia  plan CVVHD for clearance and UF as tolerates

## 2020-05-19 NOTE — CONSULT NOTE ADULT - ASSESSMENT
IMPRESSION:  Severe COVID-19 infection with clinical worsening requiring intubation.  Concern for VAP as WBC rises vs other infection    Recommend:  1.  Agree with meropenem.  Can dose at 1 gram IV q8hrs given CVVHD  2.  Continue Caspofungin 50 mg IV daily   3.  Check sputum culture  4.  Follow up blood culture  5. Check serum aspergillus galactomannen and serum fungitel    ID team 1 will follow

## 2020-05-19 NOTE — PROGRESS NOTE ADULT - ATTENDING COMMENTS
Hypoxic respiratory failure secondary to covid19 disease requiring proning.  Continue MV, difficult to ventilate due to diffuse dense infiltrate and probably new superimposed aspiration to right lung.  Starting Eve, continue proning, ARDSnet guidelines vent setting.  Abx for PNA adjusted.  Continue empiric ac ( no signs of RV failure on POCUS, RV not enlarged).  Diuresing.  Overall poor prognosis.

## 2020-05-19 NOTE — CONSULT NOTE ADULT - SUBJECTIVE AND OBJECTIVE BOX
Renal consult  Pt is a 69 y o Male with pmh op HTN, MR, glaucoma who was admitted for covid 19 infection.  Nephrology consult is placed on 05/19 in regards to Axel.   bun/CR ON admission@ 52/1.88 compared to 19/0.97 on 05/14----> 56/3.89 on 05/19  Patient is currently on high dose pressors x 2.   lactate has been high-   Despite last 24 hr urine output @ 2600 cc.         PAST MEDICAL & SURGICAL HISTORY:  Mitral valve insufficiency  Cardiomegaly  Glaucoma  Neuropathy  DVT (deep venous thrombosis): left leg, 2018  Pulmonary embolism  HTN (hypertension)  History of appendectomy  History of tonsillectomy  History of lumbar laminectomy: 1/2019  Degeneration of intervertebral disc of thoracic region: 2015  History of foot surgery: right foot  H/O cervical spine surgery: with hardware      Allergies and Intolerances:        Allergies:  	No Known Allergies:     Home Medications:   * Patient Currently Takes Medications as of 28-Aug-2019 13:28 documented in Structured Notes  · 	collagenase 250 units/g topical ointment: Apply topically to affected area once a day   · 	amoxicillin-clavulanate 875 mg-125 mg oral tablet: 1 tab(s) orally every 12 hours  · 	aspirin 81 mg oral tablet, chewable: 1 tab(s) orally once a day  · 	gabapentin 400 mg oral capsule: 1 cap(s) orally 3 times a day  · 	apixaban 5 mg oral tablet: 2 tab(s) orally every 12 hours  · 	oxyCODONE 5 mg oral tablet: 1 tab(s) orally every 4-6hours, As Needed -Moderate Pain (4 - 6) MDD:6  · 	folic acid 1 mg oral tablet: 1 tab(s) orally once a day  · 	lisinopril 10 mg oral tablet: 1 tab(s) orally once a day  · 	alfuzosin 10 mg oral tablet, extended release: 1 tab(s) orally once a day  · 	Dorzolamide Hydrochloride-Timolol Maleate: 1 drop(s) to each affected eye 2 times a day BOTH EYES  · 	latanoprost 0.005% ophthalmic solution: 1 drop(s) to each affected eye once a day (in the evening) BOTH EYES  · 	Nucynta  mg oral tablet, extended release: 1 tab(s) orally 2 times a day        FAMILY HISTORY: NA      SOCIAL HISTORY:Denied smoking, alcohol or drug use  	Lives at his home alone   walks with walker    MEDICATIONS  (STANDING):  aMIOdarone Infusion 1 mG/Min (33.3 mL/Hr) IV Continuous <Continuous>  aspirin  chewable 81 milliGRAM(s) Oral every 24 hours  chlorhexidine 0.12% Liquid 15 milliLiter(s) Oral Mucosa every 12 hours  cisatracurium Infusion 3 MICROgram(s)/kG/Min (17.2 mL/Hr) IV Continuous <Continuous>  CRRT Treatment    <Continuous>  dextrose 5%. 1000 milliLiter(s) (50 mL/Hr) IV Continuous <Continuous>  dextrose 50% Injectable 12.5 Gram(s) IV Push once  dextrose 50% Injectable 25 Gram(s) IV Push once  dextrose 50% Injectable 25 Gram(s) IV Push once  dorzolamide 2%/timolol 0.5% Ophthalmic Solution 1 Drop(s) Both EYES every 12 hours  fentaNYL   Infusion... 3 MICROgram(s)/kG/Hr (14.3 mL/Hr) IV Continuous <Continuous>  folic acid 1 milliGRAM(s) Oral daily  gabapentin 200 milliGRAM(s) Oral every 8 hours  heparin  Infusion 1100 Unit(s)/Hr (9 mL/Hr) IV Continuous <Continuous>  insulin lispro (HumaLOG) corrective regimen sliding scale   SubCutaneous every 6 hours  latanoprost 0.005% Ophthalmic Solution 1 Drop(s) Both EYES at bedtime  lidocaine   Patch 2 Patch Transdermal every 24 hours  meropenem  IVPB 500 milliGRAM(s) IV Intermittent every 8 hours  methylPREDNISolone sodium succinate Injectable 40 milliGRAM(s) IV Push every 12 hours  norepinephrine Infusion 0.05 MICROgram(s)/kG/Min (4.47 mL/Hr) IV Continuous <Continuous>  pantoprazole   Suspension 40 milliGRAM(s) Oral every 24 hours  propofol Infusion 50 MICROgram(s)/kG/Min (28.6 mL/Hr) IV Continuous <Continuous>  PureFlow Dialysate RFP-400 (K 2 / Ca 3) 5000 milliLiter(s) (2500 mL/Hr) CRRT <Continuous>  senna 2 Tablet(s) Oral at bedtime  thiamine 100 milliGRAM(s) Oral every 24 hours  tradipitant vs placebo ([id number]) 85 milliGRAM(s) Oral every 12 hours  vasopressin Infusion 0.04 Unit(s)/Min (2.4 mL/Hr) IV Continuous <Continuous>    MEDICATIONS  (PRN):  acetaminophen   Tablet .. 650 milliGRAM(s) Oral every 6 hours PRN Temp greater or equal to 38.5C (101.3F)  acetaminophen   Tablet .. 650 milliGRAM(s) Oral every 6 hours PRN Mild Pain (1 - 3)  dextrose 40% Gel 15 Gram(s) Oral once PRN Blood Glucose LESS THAN 70 milliGRAM(s)/deciliter  glucagon  Injectable 1 milliGRAM(s) IntraMuscular once PRN Glucose LESS THAN 70 milligrams/deciliter  oxyCODONE    IR 5 milliGRAM(s) Oral every 4 hours PRN Moderate Pain (4 - 6)      REVIEW OF SYSTEMS:  n/A        PHYSICAL EXAM: t 98.4, hr 98, bp 109/62, o2 90 %  %  DEFERRED DUE TO COVID 19      CAPILLARY BLOOD GLUCOSE      POCT Blood Glucose.: 322 mg/dL (19 May 2020 11:18)  POCT Blood Glucose.: 186 mg/dL (19 May 2020 05:26)  POCT Blood Glucose.: 128 mg/dL (18 May 2020 21:06)      I&O's Summary    18 May 2020 07:01  -  19 May 2020 07:00  --------------------------------------------------------  IN: 4459.7 mL / OUT: 2650 mL / NET: 1809.7 mL    19 May 2020 07:01  -  19 May 2020 16:25  --------------------------------------------------------  IN: 1440.3 mL / OUT: 495 mL / NET: 945.3 mL          LABS:                            9.5    25.65 )-----------( 110      ( 19 May 2020 13:25 )             31.5       PT/INR - ( 19 May 2020 13:25 )   PT: 16.5 sec;   INR: 1.43          PTT - ( 19 May 2020 13:25 )  PTT:135.9 sec  CARDIAC MARKERS ( 19 May 2020 13:25 )  x     / 0.25 ng/mL / 524 U/L / x     / 10.7 ng/mL  CARDIAC MARKERS ( 19 May 2020 10:06 )  x     / 0.20 ng/mL / 350 U/L / x     / 9.7 ng/mL  CARDIAC MARKERS ( 19 May 2020 03:58 )  x     / 0.15 ng/mL / 326 U/L / x     / x              RADIOLOGY & ADDITIONAL TESTS:    EKG/Telemetry: Reviewed Renal consult  Pt is a 69 y o Male with pmh op HTN, MR, glaucoma who was admitted for covid 19 infection.  Nephrology consult is placed on 05/19 in regards to Axel.   bun/CR ON admission@ 52/1.88 compared to 19/0.97 on 05/14----> 56/3.89 on 05/19  Patient is currently on high dose pressors x 2.   lactate has been high-   Despite last 24 hr urine output @ 2600 cc.   oliguric though today   met acidosis , rising K         PAST MEDICAL & SURGICAL HISTORY:  Mitral valve insufficiency  Cardiomegaly  Glaucoma  Neuropathy  DVT (deep venous thrombosis): left leg, 2018  Pulmonary embolism  HTN (hypertension)  History of appendectomy  History of tonsillectomy  History of lumbar laminectomy: 1/2019  Degeneration of intervertebral disc of thoracic region: 2015  History of foot surgery: right foot  H/O cervical spine surgery: with hardware      Allergies and Intolerances:        Allergies:  	No Known Allergies:     Home Medications:   * Patient Currently Takes Medications as of 28-Aug-2019 13:28 documented in Structured Notes  · 	collagenase 250 units/g topical ointment: Apply topically to affected area once a day   · 	amoxicillin-clavulanate 875 mg-125 mg oral tablet: 1 tab(s) orally every 12 hours  · 	aspirin 81 mg oral tablet, chewable: 1 tab(s) orally once a day  · 	gabapentin 400 mg oral capsule: 1 cap(s) orally 3 times a day  · 	apixaban 5 mg oral tablet: 2 tab(s) orally every 12 hours  · 	oxyCODONE 5 mg oral tablet: 1 tab(s) orally every 4-6hours, As Needed -Moderate Pain (4 - 6) MDD:6  · 	folic acid 1 mg oral tablet: 1 tab(s) orally once a day  · 	lisinopril 10 mg oral tablet: 1 tab(s) orally once a day  · 	alfuzosin 10 mg oral tablet, extended release: 1 tab(s) orally once a day  · 	Dorzolamide Hydrochloride-Timolol Maleate: 1 drop(s) to each affected eye 2 times a day BOTH EYES  · 	latanoprost 0.005% ophthalmic solution: 1 drop(s) to each affected eye once a day (in the evening) BOTH EYES  · 	Nucynta  mg oral tablet, extended release: 1 tab(s) orally 2 times a day        FAMILY HISTORY: NA      SOCIAL HISTORY:Denied smoking, alcohol or drug use  	Lives at his home alone   walks with walker    MEDICATIONS  (STANDING):  aMIOdarone Infusion 1 mG/Min (33.3 mL/Hr) IV Continuous <Continuous>  aspirin  chewable 81 milliGRAM(s) Oral every 24 hours  chlorhexidine 0.12% Liquid 15 milliLiter(s) Oral Mucosa every 12 hours  cisatracurium Infusion 3 MICROgram(s)/kG/Min (17.2 mL/Hr) IV Continuous <Continuous>  CRRT Treatment    <Continuous>  dextrose 5%. 1000 milliLiter(s) (50 mL/Hr) IV Continuous <Continuous>  dextrose 50% Injectable 12.5 Gram(s) IV Push once  dextrose 50% Injectable 25 Gram(s) IV Push once  dextrose 50% Injectable 25 Gram(s) IV Push once  dorzolamide 2%/timolol 0.5% Ophthalmic Solution 1 Drop(s) Both EYES every 12 hours  fentaNYL   Infusion... 3 MICROgram(s)/kG/Hr (14.3 mL/Hr) IV Continuous <Continuous>  folic acid 1 milliGRAM(s) Oral daily  gabapentin 200 milliGRAM(s) Oral every 8 hours  heparin  Infusion 1100 Unit(s)/Hr (9 mL/Hr) IV Continuous <Continuous>  insulin lispro (HumaLOG) corrective regimen sliding scale   SubCutaneous every 6 hours  latanoprost 0.005% Ophthalmic Solution 1 Drop(s) Both EYES at bedtime  lidocaine   Patch 2 Patch Transdermal every 24 hours  meropenem  IVPB 500 milliGRAM(s) IV Intermittent every 8 hours  methylPREDNISolone sodium succinate Injectable 40 milliGRAM(s) IV Push every 12 hours  norepinephrine Infusion 0.05 MICROgram(s)/kG/Min (4.47 mL/Hr) IV Continuous <Continuous>  pantoprazole   Suspension 40 milliGRAM(s) Oral every 24 hours  propofol Infusion 50 MICROgram(s)/kG/Min (28.6 mL/Hr) IV Continuous <Continuous>  PureFlow Dialysate RFP-400 (K 2 / Ca 3) 5000 milliLiter(s) (2500 mL/Hr) CRRT <Continuous>  senna 2 Tablet(s) Oral at bedtime  thiamine 100 milliGRAM(s) Oral every 24 hours  tradipitant vs placebo ([id number]) 85 milliGRAM(s) Oral every 12 hours  vasopressin Infusion 0.04 Unit(s)/Min (2.4 mL/Hr) IV Continuous <Continuous>    MEDICATIONS  (PRN):  acetaminophen   Tablet .. 650 milliGRAM(s) Oral every 6 hours PRN Temp greater or equal to 38.5C (101.3F)  acetaminophen   Tablet .. 650 milliGRAM(s) Oral every 6 hours PRN Mild Pain (1 - 3)  dextrose 40% Gel 15 Gram(s) Oral once PRN Blood Glucose LESS THAN 70 milliGRAM(s)/deciliter  glucagon  Injectable 1 milliGRAM(s) IntraMuscular once PRN Glucose LESS THAN 70 milligrams/deciliter  oxyCODONE    IR 5 milliGRAM(s) Oral every 4 hours PRN Moderate Pain (4 - 6)      REVIEW OF SYSTEMS:  n/A        PHYSICAL EXAM: t 98.4, hr 98, bp 109/62, o2 90 %  %  limited DUE TO COVID 19  prone, intubated sedated  heart RRR  abd soft NT  ext 1+ edema b/l LE  no rash       CAPILLARY BLOOD GLUCOSE      POCT Blood Glucose.: 322 mg/dL (19 May 2020 11:18)  POCT Blood Glucose.: 186 mg/dL (19 May 2020 05:26)  POCT Blood Glucose.: 128 mg/dL (18 May 2020 21:06)      I&O's Summary    18 May 2020 07:01  -  19 May 2020 07:00  --------------------------------------------------------  IN: 4459.7 mL / OUT: 2650 mL / NET: 1809.7 mL    19 May 2020 07:01  -  19 May 2020 16:25  --------------------------------------------------------  IN: 1440.3 mL / OUT: 495 mL / NET: 945.3 mL          LABS:                            9.5    25.65 )-----------( 110      ( 19 May 2020 13:25 )             31.5       PT/INR - ( 19 May 2020 13:25 )   PT: 16.5 sec;   INR: 1.43          PTT - ( 19 May 2020 13:25 )  PTT:135.9 sec  CARDIAC MARKERS ( 19 May 2020 13:25 )  x     / 0.25 ng/mL / 524 U/L / x     / 10.7 ng/mL  CARDIAC MARKERS ( 19 May 2020 10:06 )  x     / 0.20 ng/mL / 350 U/L / x     / 9.7 ng/mL  CARDIAC MARKERS ( 19 May 2020 03:58 )  x     / 0.15 ng/mL / 326 U/L / x     / x              RADIOLOGY & ADDITIONAL TESTS:    EKG/Telemetry: Reviewed

## 2020-05-19 NOTE — CONSULT NOTE ADULT - ASSESSMENT
Pt is a 69 y o Male with pmh op HTN, MR, glaucoma who was admitted for covid 19 infection.  Nephrology consult is placed on 05/19 in regards to Axel.   bun/CR ON admission@ 52/1.88 compared to 19/0.97 on 05/14----> 56/3.89 on 05/19      Axel   non oliguric  likely 2nd to ATN   electrolytes noted---> mild hyperkalemia   volume status noted===> wet, despite last 24 hr urine output   given high dose pressors and hemodynamic instability, will proceed with CVVHD with UFR to reach net negative 100 cc per hr  Will titrate UFR as tolerated  monitor bmp, mag, phos q 6hrs while on CVVHD   will adjust CVVHD settings as needed  renally dose abx  case discussed with attending    Resp acidosis  can give sodium bicarbonate pushes  expected bicarbonate based on the latest ABg--> 26.5 compared to 19  renal bone disease      renal bone disease  calcium and phos noted--> 7.56 and 7 respectively  on CVVHD  will continue monitoring  if calcium remains low, recommend iV calcium

## 2020-05-19 NOTE — PROCEDURE NOTE - NSPROCDETAILS_GEN_ALL_CORE
lumen(s) aspirated and flushed/ultrasound guidance/guidewire recovered/sterile technique, catheter placed/sterile dressing applied
Confirmed guidewire within lumen of left IJ prior to dilatation./sterile dressing applied/sterile technique, catheter placed/ultrasound guidance/guidewire recovered/lumen(s) aspirated and flushed
location identified, draped/prepped, sterile technique used, needle inserted/introduced/Seldinger technique/positive blood return obtained via catheter/sutured in place/connected to a pressurized flush line

## 2020-05-19 NOTE — PROCEDURE NOTE - NSPOSTPRCRAD_GEN_A_CORE
central line located in the superior vena cava/post-procedure radiography performed
no pneumothorax/post-procedure radiography performed/central line located in the superior vena cava/Satisfactory position within SVC.

## 2020-05-20 LAB
ALBUMIN SERPL ELPH-MCNC: 2.1 G/DL — LOW (ref 3.3–5)
ALBUMIN SERPL ELPH-MCNC: 2.6 G/DL — LOW (ref 3.3–5)
ALP SERPL-CCNC: 136 U/L — HIGH (ref 40–120)
ALP SERPL-CCNC: 144 U/L — HIGH (ref 40–120)
ALT FLD-CCNC: 129 U/L — HIGH (ref 10–45)
ALT FLD-CCNC: 88 U/L — HIGH (ref 10–45)
ANION GAP SERPL CALC-SCNC: 27 MMOL/L — HIGH (ref 5–17)
ANION GAP SERPL CALC-SCNC: 28 MMOL/L — HIGH (ref 5–17)
ANISOCYTOSIS BLD QL: SLIGHT — SIGNIFICANT CHANGE UP
AST SERPL-CCNC: 161 U/L — HIGH (ref 10–40)
AST SERPL-CCNC: 240 U/L — HIGH (ref 10–40)
BASE EXCESS BLDA CALC-SCNC: -16.5 MMOL/L — LOW (ref -2–3)
BASE EXCESS BLDA CALC-SCNC: -17.8 MMOL/L — LOW (ref -2–3)
BASE EXCESS BLDA CALC-SCNC: -22.5 MMOL/L — LOW (ref -2–3)
BASE EXCESS BLDCOV CALC-SCNC: -20.2 MMOL/L — LOW (ref -9.3–0.3)
BASOPHILS # BLD AUTO: 0 K/UL — SIGNIFICANT CHANGE UP (ref 0–0.2)
BASOPHILS NFR BLD AUTO: 0 % — SIGNIFICANT CHANGE UP (ref 0–2)
BILIRUB SERPL-MCNC: 2 MG/DL — HIGH (ref 0.2–1.2)
BILIRUB SERPL-MCNC: 2.3 MG/DL — HIGH (ref 0.2–1.2)
BLD GP AB SCN SERPL QL: NEGATIVE — SIGNIFICANT CHANGE UP
BUN SERPL-MCNC: 32 MG/DL — HIGH (ref 7–23)
BUN SERPL-MCNC: 39 MG/DL — HIGH (ref 7–23)
BURR CELLS BLD QL SMEAR: PRESENT — SIGNIFICANT CHANGE UP
CALCIUM SERPL-MCNC: 6.5 MG/DL — CRITICAL LOW (ref 8.4–10.5)
CALCIUM SERPL-MCNC: 6.6 MG/DL — LOW (ref 8.4–10.5)
CHLORIDE SERPL-SCNC: 100 MMOL/L — SIGNIFICANT CHANGE UP (ref 96–108)
CHLORIDE SERPL-SCNC: 103 MMOL/L — SIGNIFICANT CHANGE UP (ref 96–108)
CO2 SERPL-SCNC: 11 MMOL/L — LOW (ref 22–31)
CO2 SERPL-SCNC: 13 MMOL/L — LOW (ref 22–31)
CREAT SERPL-MCNC: 2.86 MG/DL — HIGH (ref 0.5–1.3)
CREAT SERPL-MCNC: 3.05 MG/DL — HIGH (ref 0.5–1.3)
EOSINOPHIL # BLD AUTO: 0 K/UL — SIGNIFICANT CHANGE UP (ref 0–0.5)
EOSINOPHIL NFR BLD AUTO: 0 % — SIGNIFICANT CHANGE UP (ref 0–6)
GAS PNL BLDA: SIGNIFICANT CHANGE UP
GAS PNL BLDA: SIGNIFICANT CHANGE UP
GAS PNL BLDCOV: 6.93 — CRITICAL LOW (ref 7.25–7.45)
GIANT PLATELETS BLD QL SMEAR: PRESENT — SIGNIFICANT CHANGE UP
GLUCOSE BLDC GLUCOMTR-MCNC: 148 MG/DL — HIGH (ref 70–99)
GLUCOSE BLDC GLUCOMTR-MCNC: 97 MG/DL — SIGNIFICANT CHANGE UP (ref 70–99)
GLUCOSE SERPL-MCNC: 162 MG/DL — HIGH (ref 70–99)
GLUCOSE SERPL-MCNC: 177 MG/DL — HIGH (ref 70–99)
HCO3 BLDA-SCNC: 11 MMOL/L — LOW (ref 21–28)
HCO3 BLDA-SCNC: 12 MMOL/L — LOW (ref 21–28)
HCO3 BLDA-SCNC: 13 MMOL/L — LOW (ref 21–28)
HCO3 BLDCOV-SCNC: 11.2 MMOL/L — SIGNIFICANT CHANGE UP
HCT VFR BLD CALC: 21 % — CRITICAL LOW (ref 39–50)
HCT VFR BLD CALC: 26.6 % — LOW (ref 39–50)
HGB BLD-MCNC: 6.2 G/DL — CRITICAL LOW (ref 13–17)
HGB BLD-MCNC: 8.1 G/DL — LOW (ref 13–17)
HYPOCHROMIA BLD QL: SLIGHT — SIGNIFICANT CHANGE UP
LACTATE SERPL-SCNC: 13.9 MMOL/L — CRITICAL HIGH (ref 0.5–2)
LACTATE SERPL-SCNC: 18 MMOL/L — CRITICAL HIGH (ref 0.5–2)
LYMPHOCYTES # BLD AUTO: 1.77 K/UL — SIGNIFICANT CHANGE UP (ref 1–3.3)
LYMPHOCYTES # BLD AUTO: 6.6 % — LOW (ref 13–44)
MACROCYTES BLD QL: SLIGHT — SIGNIFICANT CHANGE UP
MAGNESIUM SERPL-MCNC: 2.4 MG/DL — SIGNIFICANT CHANGE UP (ref 1.6–2.6)
MAGNESIUM SERPL-MCNC: 2.5 MG/DL — SIGNIFICANT CHANGE UP (ref 1.6–2.6)
MANUAL SMEAR VERIFICATION: SIGNIFICANT CHANGE UP
MCHC RBC-ENTMCNC: 29.5 GM/DL — LOW (ref 32–36)
MCHC RBC-ENTMCNC: 30.5 GM/DL — LOW (ref 32–36)
MCHC RBC-ENTMCNC: 31.5 PG — SIGNIFICANT CHANGE UP (ref 27–34)
MCHC RBC-ENTMCNC: 31.9 PG — SIGNIFICANT CHANGE UP (ref 27–34)
MCV RBC AUTO: 104.7 FL — HIGH (ref 80–100)
MCV RBC AUTO: 106.6 FL — HIGH (ref 80–100)
METAMYELOCYTES # FLD: 0.9 % — HIGH (ref 0–0)
MONOCYTES # BLD AUTO: 1.75 K/UL — HIGH (ref 0–0.9)
MONOCYTES NFR BLD AUTO: 6.5 % — SIGNIFICANT CHANGE UP (ref 2–14)
NEUTROPHILS # BLD AUTO: 22.87 K/UL — HIGH (ref 1.8–7.4)
NEUTROPHILS NFR BLD AUTO: 83.2 % — HIGH (ref 43–77)
NEUTS BAND # BLD: 1.9 % — SIGNIFICANT CHANGE UP (ref 0–8)
NRBC # BLD: 25 /100 WBCS — HIGH (ref 0–0)
NRBC # BLD: 57 /100 — HIGH (ref 0–0)
NRBC # BLD: SIGNIFICANT CHANGE UP /100 WBCS (ref 0–0)
OVALOCYTES BLD QL SMEAR: SLIGHT — SIGNIFICANT CHANGE UP
PCO2 BLDA: 44 MMHG — SIGNIFICANT CHANGE UP (ref 35–48)
PCO2 BLDA: 46 MMHG — SIGNIFICANT CHANGE UP (ref 35–48)
PCO2 BLDA: 54 MMHG — HIGH (ref 35–48)
PCO2 BLDCOV: 56 MMHG — HIGH (ref 27–49)
PH BLDA: 6.91 — CRITICAL LOW (ref 7.35–7.45)
PH BLDA: 7.02 — CRITICAL LOW (ref 7.35–7.45)
PH BLDA: 7.08 — CRITICAL LOW (ref 7.35–7.45)
PHOSPHATE SERPL-MCNC: 7.2 MG/DL — HIGH (ref 2.5–4.5)
PHOSPHATE SERPL-MCNC: 7.2 MG/DL — HIGH (ref 2.5–4.5)
PLAT MORPH BLD: ABNORMAL
PLATELET # BLD AUTO: 64 K/UL — LOW (ref 150–400)
PLATELET # BLD AUTO: 79 K/UL — LOW (ref 150–400)
PO2 BLDA: 207 MMHG — HIGH (ref 83–108)
PO2 BLDA: 227 MMHG — HIGH (ref 83–108)
PO2 BLDA: 58 MMHG — CRITICAL LOW (ref 83–108)
PO2 BLDCOA: 95 MMHG — HIGH (ref 17–41)
POIKILOCYTOSIS BLD QL AUTO: SIGNIFICANT CHANGE UP
POLYCHROMASIA BLD QL SMEAR: SLIGHT — SIGNIFICANT CHANGE UP
POTASSIUM SERPL-MCNC: 4.7 MMOL/L — SIGNIFICANT CHANGE UP (ref 3.5–5.3)
POTASSIUM SERPL-MCNC: 5 MMOL/L — SIGNIFICANT CHANGE UP (ref 3.5–5.3)
POTASSIUM SERPL-SCNC: 4.7 MMOL/L — SIGNIFICANT CHANGE UP (ref 3.5–5.3)
POTASSIUM SERPL-SCNC: 5 MMOL/L — SIGNIFICANT CHANGE UP (ref 3.5–5.3)
PROT SERPL-MCNC: 3.9 G/DL — LOW (ref 6–8.3)
PROT SERPL-MCNC: 4.8 G/DL — LOW (ref 6–8.3)
RBC # BLD: 1.97 M/UL — LOW (ref 4.2–5.8)
RBC # BLD: 2.54 M/UL — LOW (ref 4.2–5.8)
RBC # FLD: 13.8 % — SIGNIFICANT CHANGE UP (ref 10.3–14.5)
RBC # FLD: 14.1 % — SIGNIFICANT CHANGE UP (ref 10.3–14.5)
RBC BLD AUTO: ABNORMAL
RH IG SCN BLD-IMP: POSITIVE — SIGNIFICANT CHANGE UP
SAO2 % BLDA: 100 % — SIGNIFICANT CHANGE UP (ref 95–100)
SAO2 % BLDA: 69 % — LOW (ref 95–100)
SAO2 % BLDA: 99 % — SIGNIFICANT CHANGE UP (ref 95–100)
SAO2 % BLDCOV: 94 % — SIGNIFICANT CHANGE UP
SMUDGE CELLS # BLD: PRESENT — SIGNIFICANT CHANGE UP
SODIUM SERPL-SCNC: 140 MMOL/L — SIGNIFICANT CHANGE UP (ref 135–145)
SODIUM SERPL-SCNC: 142 MMOL/L — SIGNIFICANT CHANGE UP (ref 135–145)
TROPONIN T SERPL-MCNC: 0.27 NG/ML — CRITICAL HIGH (ref 0–0.01)
VANCOMYCIN FLD-MCNC: 14.2 UG/ML — SIGNIFICANT CHANGE UP
VARIANT LYMPHS # BLD: 0.9 % — SIGNIFICANT CHANGE UP (ref 0–6)
WBC # BLD: 26.87 K/UL — HIGH (ref 3.8–10.5)
WBC # BLD: 32.2 K/UL — HIGH (ref 3.8–10.5)
WBC # FLD AUTO: 26.87 K/UL — HIGH (ref 3.8–10.5)
WBC # FLD AUTO: 32.2 K/UL — HIGH (ref 3.8–10.5)

## 2020-05-20 PROCEDURE — 99291 CRITICAL CARE FIRST HOUR: CPT

## 2020-05-20 PROCEDURE — 71045 X-RAY EXAM CHEST 1 VIEW: CPT | Mod: 26

## 2020-05-20 RX ORDER — MIDAZOLAM HYDROCHLORIDE 1 MG/ML
0.02 INJECTION, SOLUTION INTRAMUSCULAR; INTRAVENOUS
Qty: 100 | Refills: 0 | Status: DISCONTINUED | OUTPATIENT
Start: 2020-05-20 | End: 2020-05-20

## 2020-05-20 RX ORDER — SODIUM BICARBONATE 1 MEQ/ML
100 SYRINGE (ML) INTRAVENOUS ONCE
Refills: 0 | Status: COMPLETED | OUTPATIENT
Start: 2020-05-20 | End: 2020-05-20

## 2020-05-20 RX ORDER — CALCIUM GLUCONATE 100 MG/ML
1 VIAL (ML) INTRAVENOUS ONCE
Refills: 0 | Status: COMPLETED | OUTPATIENT
Start: 2020-05-20 | End: 2020-05-20

## 2020-05-20 RX ORDER — SODIUM BICARBONATE 1 MEQ/ML
50 SYRINGE (ML) INTRAVENOUS
Refills: 0 | Status: DISCONTINUED | OUTPATIENT
Start: 2020-05-20 | End: 2020-05-20

## 2020-05-20 RX ADMIN — DORZOLAMIDE HYDROCHLORIDE TIMOLOL MALEATE 1 DROP(S): 20; 5 SOLUTION/ DROPS OPHTHALMIC at 06:30

## 2020-05-20 RX ADMIN — LIDOCAINE 2 PATCH: 4 CREAM TOPICAL at 06:32

## 2020-05-20 RX ADMIN — PANTOPRAZOLE SODIUM 40 MILLIGRAM(S): 20 TABLET, DELAYED RELEASE ORAL at 06:31

## 2020-05-20 RX ADMIN — MEROPENEM 100 MILLIGRAM(S): 1 INJECTION INTRAVENOUS at 05:20

## 2020-05-20 RX ADMIN — Medication 100 MEQ/KG/HR: at 01:24

## 2020-05-20 RX ADMIN — CHLORHEXIDINE GLUCONATE 15 MILLILITER(S): 213 SOLUTION TOPICAL at 06:30

## 2020-05-20 RX ADMIN — Medication 100 GRAM(S): at 02:26

## 2020-05-20 RX ADMIN — PHENYLEPHRINE HYDROCHLORIDE 7.15 MICROGRAM(S)/KG/MIN: 10 INJECTION INTRAVENOUS at 01:24

## 2020-05-20 RX ADMIN — GABAPENTIN 200 MILLIGRAM(S): 400 CAPSULE ORAL at 06:31

## 2020-05-20 RX ADMIN — Medication 40 MILLIGRAM(S): at 06:31

## 2020-05-20 RX ADMIN — CHLORHEXIDINE GLUCONATE 1 APPLICATION(S): 213 SOLUTION TOPICAL at 06:30

## 2020-05-20 RX ADMIN — Medication 100 MILLIEQUIVALENT(S): at 06:31

## 2020-05-20 NOTE — CHART NOTE - NSCHARTNOTEFT_GEN_A_CORE
Spoke to Patient's Imam, Faraz Carlisle and Aunt, Josephine Walker [phone]. I have explained to them his current deteriorating clinical condition, despite maximal medical therapy. I have expressed concerns of him going through CPR, and mentioned that performing CPR may cause more harm for him than benefit. Both his Imam and Aunt agreed, and decision was made to make him DNR. Current treatment is to continue otherwise.

## 2020-05-20 NOTE — DISCHARGE NOTE FOR THE EXPIRED PATIENT - HOSPITAL COURSE
70 y/o male with chronic pain, progressive lower extremity weakness and atrophy, DVT/PE, HTN and mitral regurgitation sent in from Dr. Brito's office s/p fall for fever and tachypnea found to be COVID positive, saturating well on RA on admission. Patient reports presentation 2/2 to fall with hematuria prior to admission, found to have SETH.  Patient also with uptrending CK, troponin minimally elevated, trended to peak. EKG sinus tachycardia, cardiology consulted with concern for cardiomyopathy, ruled out in setting of minimally elevated troponins in comparison to rise in CK. Concern for rhabdomyolysis given fall, reported hematuria and SETH in setting of uptrending CK. Patient started on LR 200ml/hr. EMG ruled out myositis. EEG placed for increasing lethargy with generalized slowing. Patient remained febrile, received tociluzumab (5/9). Patient with increasing oxygen requirements 5/10, requiring 2-6L in setting of fluid overload. Received lasix 20mg. Patient found to be saturating 79% on RA -> 81& 6L NC -> 84 - 85% 15L NRB, with increased work of breathing. Received additional Lasix 20mg IV and morphine 1mg for pain. CAUSE OF DEATH: CARDIOPULMONARY ARREST 2/2 METABOIC ACIDOSIS DUE TO SETH 2/2 SEPSIS OF UNKNOWN SOURCE IN SETTING OF COVID-19    70 y/o male with chronic pain, progressive lower extremity weakness and atrophy, DVT/PE, HTN and mitral regurgitation sent in from neurologist office s/p fall for fever and tachypnea found to be COVID positive, saturating well on RA on admission. Patient reports presentation 2/2 to fall with hematuria prior to admission, found to have SETH.  Patient also with uptrending CK, troponin minimally elevated, trended to peak. EKG sinus tachycardia, cardiology consulted with concern for cardiomyopathy, ruled out in setting of minimally elevated troponins in comparison to rise in CK. Concern for rhabdomyolysis given fall, reported hematuria and SETH in setting of uptrending CK. Patient started on LR 200ml/hr. EMG ruled out myositis. EEG placed for increasing lethargy with generalized slowing. Patient remained febrile, received tociluzumab (5/9). Patient with increasing oxygen requirements 5/10, requiring 2-6L in setting of fluid overload. Received lasix 20mg. Patient found to be saturating 79% on RA -> 81& 6L NC -> 84 - 85% 15L NRB, with increased work of breathing, transferred to ICU for HFNC/BiPAP and high risk of intubation. In ICU patient detoxicating with worsening respiratory status and kidney function, requiring intubation and CVVHD. However patient remained acidotic 2/2 to acute renal failure. CT chest with worsening COVID-19 fibrosis. Patient made DNR by HCP in setting of sepsis of unknown source and uptrending lactate. Called to see patient for unresponsiveness. On exam the patient did not respond to verbal or physical stimuli. No spontaneous respirations. Absent peripheral pulses. Pupils are fixed and dilated. Patient pronounced dead at 11:00.Dr. Lagunas present and aware. Next of kin, Josephine Walker and Faraz Carlisle notified. CAUSE OF DEATH: CARDIOPULMONARY ARREST 2/2 METABOIC ACIDOSIS DUE TO SETH 2/2 SEPSIS OF UNKNOWN SOURCE IN SETTING OF COVID-19    70 y/o male with chronic pain, progressive lower extremity weakness and atrophy, DVT/PE, HTN and mitral regurgitation sent in from neurologist office s/p fall for fever and tachypnea found to be COVID positive, saturating well on RA on admission. Patient reports presentation 2/2 to fall with hematuria prior to admission, found to have SETH.  Patient also with uptrending CK, troponin minimally elevated, trended to peak. EKG sinus tachycardia, cardiology consulted with concern for cardiomyopathy, ruled out in setting of minimally elevated troponins in comparison to rise in CK. Concern for rhabdomyolysis given fall, reported hematuria and SETH in setting of uptrending CK. Patient started on LR 200ml/hr. EMG ruled out myositis. EEG placed for increasing lethargy with generalized slowing. Patient remained febrile, received tociluzumab (5/9). Patient with increasing oxygen requirements 5/10, requiring 2-6L in setting of fluid overload. Received lasix 20mg. Patient found to be saturating 79% on RA -> 81& 6L NC -> 84 - 85% 15L NRB, with increased work of breathing, transferred to ICU for HFNC/BiPAP and high risk of intubation. In ICU patient deteriorating with worsening respiratory status and kidney function, requiring intubation. patient continued to be hypoxic difficult to ventilate, proning initiated. Antibiotics were adjusted superimposed pna and possible cholecystis was suspected, unstable for go for HIDA scan for confirmation. Started on CVVHD, however remained acidotic.  Patient made DNR by HCP in setting of sepsis, SETH, sever acute lung injury related to COVID19 disease. Patient continued to deteriorate despite maximal ventilator support. CVVH and pressor support. BP continued to decrease and he became more bradycardic followed by asystole arrest.   On exam patient did not respond to verbal or physical stimuli. No spontaneous respirations. Absent peripheral pulses. Pupils are fixed and dilated. Patient pronounced dead at 11:00.Dr. Lagunas present and aware. Next of kin, Josephine Walker and Faraz Carlisle notified.

## 2020-05-20 NOTE — PROGRESS NOTE ADULT - SUBJECTIVE AND OBJECTIVE BOX
OVERNIGHT EVENTS:    SUBJECTIVE / INTERVAL HPI: Patient seen and examined at bedside.     VITAL SIGNS:  Vital Signs Last 24 Hrs  T(C): 34.1 (20 May 2020 07:00), Max: 37.1 (19 May 2020 11:24)  T(F): 93.4 (20 May 2020 07:00), Max: 98.7 (19 May 2020 11:24)  HR: 80 (20 May 2020 07:00) (80 - 140)  BP: --  BP(mean): --  RR: 34 (20 May 2020 07:00) (12 - 34)  SpO2: 97% (20 May 2020 03:44) (88% - 99%)    PHYSICAL EXAM:    General: WDWN  HEENT: NC/AT; PERRL, anicteric sclera; MMM  Neck: supple  Cardiovascular: +S1/S2; RRR  Respiratory: CTA B/L; no W/R/R  Gastrointestinal: soft, NT/ND; +BSx4  Extremities: WWP; no edema, clubbing or cyanosis  Vascular: 2+ radial, DP/PT pulses B/L  Neurological: AAOx3; no focal deficits    MEDICATIONS:  MEDICATIONS  (STANDING):  aMIOdarone Infusion 0.5 mG/Min (16.7 mL/Hr) IV Continuous <Continuous>  aspirin  chewable 81 milliGRAM(s) Oral every 24 hours  caspofungin IVPB 50 milliGRAM(s) IV Intermittent every 24 hours  chlorhexidine 0.12% Liquid 15 milliLiter(s) Oral Mucosa every 12 hours  chlorhexidine 2% Cloths 1 Application(s) Topical <User Schedule>  cisatracurium Infusion 3 MICROgram(s)/kG/Min (17.2 mL/Hr) IV Continuous <Continuous>  CRRT Treatment    <Continuous>  dextrose 5%. 1000 milliLiter(s) (50 mL/Hr) IV Continuous <Continuous>  dextrose 50% Injectable 12.5 Gram(s) IV Push once  dextrose 50% Injectable 25 Gram(s) IV Push once  dextrose 50% Injectable 25 Gram(s) IV Push once  dorzolamide 2%/timolol 0.5% Ophthalmic Solution 1 Drop(s) Both EYES every 12 hours  fentaNYL   Infusion... 3 MICROgram(s)/kG/Hr (14.3 mL/Hr) IV Continuous <Continuous>  gabapentin 200 milliGRAM(s) Oral every 8 hours  heparin  Infusion 1100 Unit(s)/Hr (9 mL/Hr) IV Continuous <Continuous>  insulin lispro (HumaLOG) corrective regimen sliding scale   SubCutaneous every 6 hours  latanoprost 0.005% Ophthalmic Solution 1 Drop(s) Both EYES at bedtime  meropenem  IVPB 1000 milliGRAM(s) IV Intermittent every 8 hours  midazolam Infusion 0.02 mG/kG/Hr (1.91 mL/Hr) IV Continuous <Continuous>  norepinephrine Infusion 0.05 MICROgram(s)/kG/Min (4.47 mL/Hr) IV Continuous <Continuous>  pantoprazole   Suspension 40 milliGRAM(s) Oral every 24 hours  phenylephrine    Infusion 0.2 MICROgram(s)/kG/Min (7.15 mL/Hr) IV Continuous <Continuous>  PureFlow Dialysate RFP-400 (K 2 / Ca 3) 5000 milliLiter(s) (2500 mL/Hr) CRRT <Continuous>  sodium bicarbonate  Infusion 0.189 mEq/kG/Hr (120 mL/Hr) IV Continuous <Continuous>  sodium bicarbonate  Injectable 50 milliEquivalent(s) IV Push every 5 minutes  tradipitant vs placebo ([id number]) 85 milliGRAM(s) Oral every 12 hours    MEDICATIONS  (PRN):  acetaminophen   Tablet .. 650 milliGRAM(s) Oral every 6 hours PRN Temp greater or equal to 38.5C (101.3F)  acetaminophen   Tablet .. 650 milliGRAM(s) Oral every 6 hours PRN Mild Pain (1 - 3)  dextrose 40% Gel 15 Gram(s) Oral once PRN Blood Glucose LESS THAN 70 milliGRAM(s)/deciliter  glucagon  Injectable 1 milliGRAM(s) IntraMuscular once PRN Glucose LESS THAN 70 milligrams/deciliter      ALLERGIES:  Allergies    No Known Allergies    Intolerances        LABS:                        6.2    26.87 )-----------( 64       ( 20 May 2020 06:03 )             21.0     05-20    142  |  103  |  32<H>  ----------------------------<  177<H>  4.7   |  11<L>  |  2.86<H>    Ca    6.6<L>      20 May 2020 06:03  Phos  7.2     05-20  Mg     2.4     05-20    TPro  3.9<L>  /  Alb  2.1<L>  /  TBili  2.3<H>  /  DBili  x   /  AST  240<H>  /  ALT  129<H>  /  AlkPhos  144<H>  05-20    PT/INR - ( 19 May 2020 13:25 )   PT: 16.5 sec;   INR: 1.43          PTT - ( 19 May 2020 22:17 )  PTT:95.8 sec    CAPILLARY BLOOD GLUCOSE      POCT Blood Glucose.: 148 mg/dL (20 May 2020 07:30)      RADIOLOGY & ADDITIONAL TESTS: Reviewed.    ASSESSMENT:    PLAN:

## 2020-05-20 NOTE — PROGRESS NOTE ADULT - NSHPATTENDINGPLANDISCUSS_GEN_ALL_CORE
COVID MICU team
hs, patient
icu team
HS, patient

## 2020-05-20 NOTE — PROGRESS NOTE ADULT - ATTENDING COMMENTS
Hypoxic respiratory failure secondary to covid19 disease requiring proning, clinically worsening with SETH and severe metabolic acidosis.  Continue MV, difficult to ventilate due to diffuse dense infiltrate and probably new superimposed aspiration to right lung.  Continue Eve, continue proning, ARDSnet guidelines vent setting.  Abx for PNA adjusted and possible acalculos cholecystitis, unable to go for HIDA (cardio-pulmonary unstable to travel)  Continue empiric ac ( no signs of RV failure on POCUS, RV not enlarged).  Continue CVVH.  Overall poor prognosis discussed with HCP, as per family wishes patient is not to be resuscitated in event of cardiopulmonary arrest. Hypoxic respiratory failure secondary to covid19 disease requiring proning, clinically worsening with SETH and severe metabolic acidosis.  Continue MV, difficult to ventilate due to diffuse dense infiltrate and probably new superimposed aspiration to right lung.  Continue Eve, continue proning, ARDSnet guidelines vent setting.  Abx for PNA adjusted and possible acalculous cholecystitis, unable to go for HIDA (cardio-pulmonary unstable to travel)  Continue empiric ac ( no signs of RV failure on POCUS, RV not enlarged).  Continue CVVH.  Overall poor prognosis discussed with HCP, as per family wishes patient is not to be resuscitated in event of cardiopulmonary arrest.

## 2020-05-21 LAB
CULTURE RESULTS: SIGNIFICANT CHANGE UP
FUNGITELL: 38 PG/ML — SIGNIFICANT CHANGE UP
SPECIMEN SOURCE: SIGNIFICANT CHANGE UP

## 2020-05-22 LAB — GALACTOMANNAN AG SERPL-ACNC: <0.5 INDEX — SIGNIFICANT CHANGE UP

## 2020-05-23 LAB — MI2 AB SER QL: NEGATIVE — SIGNIFICANT CHANGE UP

## 2020-05-24 LAB
CULTURE RESULTS: SIGNIFICANT CHANGE UP
SPECIMEN SOURCE: SIGNIFICANT CHANGE UP

## 2020-05-28 DIAGNOSIS — M47.26 OTHER SPONDYLOSIS WITH RADICULOPATHY, LUMBAR REGION: ICD-10-CM

## 2020-05-28 DIAGNOSIS — J12.89 OTHER VIRAL PNEUMONIA: ICD-10-CM

## 2020-05-28 DIAGNOSIS — G62.9 POLYNEUROPATHY, UNSPECIFIED: ICD-10-CM

## 2020-05-28 DIAGNOSIS — L89.312 PRESSURE ULCER OF RIGHT BUTTOCK, STAGE 2: ICD-10-CM

## 2020-05-28 DIAGNOSIS — Z86.718 PERSONAL HISTORY OF OTHER VENOUS THROMBOSIS AND EMBOLISM: ICD-10-CM

## 2020-05-28 DIAGNOSIS — Z00.6 ENCOUNTER FOR EXAMINATION FOR NORMAL COMPARISON AND CONTROL IN CLINICAL RESEARCH PROGRAM: ICD-10-CM

## 2020-05-28 DIAGNOSIS — E87.5 HYPERKALEMIA: ICD-10-CM

## 2020-05-28 DIAGNOSIS — R65.21 SEVERE SEPSIS WITH SEPTIC SHOCK: ICD-10-CM

## 2020-05-28 DIAGNOSIS — T79.6XXA TRAUMATIC ISCHEMIA OF MUSCLE, INITIAL ENCOUNTER: ICD-10-CM

## 2020-05-28 DIAGNOSIS — E83.39 OTHER DISORDERS OF PHOSPHORUS METABOLISM: ICD-10-CM

## 2020-05-28 DIAGNOSIS — L89.322 PRESSURE ULCER OF LEFT BUTTOCK, STAGE 2: ICD-10-CM

## 2020-05-28 DIAGNOSIS — J96.01 ACUTE RESPIRATORY FAILURE WITH HYPOXIA: ICD-10-CM

## 2020-05-28 DIAGNOSIS — R32 UNSPECIFIED URINARY INCONTINENCE: ICD-10-CM
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
## 2020-05-28 DIAGNOSIS — E87.2 ACIDOSIS: ICD-10-CM

## 2020-05-28 DIAGNOSIS — E86.0 DEHYDRATION: ICD-10-CM

## 2020-05-28 DIAGNOSIS — U07.1 COVID-19: ICD-10-CM

## 2020-05-28 DIAGNOSIS — N17.0 ACUTE KIDNEY FAILURE WITH TUBULAR NECROSIS: ICD-10-CM

## 2020-05-28 DIAGNOSIS — H40.9 UNSPECIFIED GLAUCOMA: ICD-10-CM

## 2020-05-28 DIAGNOSIS — E80.7 DISORDER OF BILIRUBIN METABOLISM, UNSPECIFIED: ICD-10-CM
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
## 2020-05-28 DIAGNOSIS — I34.0 NONRHEUMATIC MITRAL (VALVE) INSUFFICIENCY: ICD-10-CM

## 2020-05-28 DIAGNOSIS — M48.00 SPINAL STENOSIS, SITE UNSPECIFIED: ICD-10-CM

## 2020-05-28 DIAGNOSIS — M54.9 DORSALGIA, UNSPECIFIED: ICD-10-CM

## 2020-05-28 DIAGNOSIS — J96.02 ACUTE RESPIRATORY FAILURE WITH HYPERCAPNIA: ICD-10-CM

## 2020-05-28 DIAGNOSIS — R79.89 OTHER SPECIFIED ABNORMAL FINDINGS OF BLOOD CHEMISTRY: ICD-10-CM
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
## 2020-05-28 DIAGNOSIS — Z66 DO NOT RESUSCITATE: ICD-10-CM

## 2020-05-28 DIAGNOSIS — R41.82 ALTERED MENTAL STATUS, UNSPECIFIED: ICD-10-CM

## 2020-05-28 DIAGNOSIS — Z79.82 LONG TERM (CURRENT) USE OF ASPIRIN: ICD-10-CM

## 2020-05-28 DIAGNOSIS — R53.83 OTHER FATIGUE: ICD-10-CM

## 2020-05-28 DIAGNOSIS — R31.9 HEMATURIA, UNSPECIFIED: ICD-10-CM

## 2020-05-28 DIAGNOSIS — I10 ESSENTIAL (PRIMARY) HYPERTENSION: ICD-10-CM

## 2020-05-28 DIAGNOSIS — Z86.711 PERSONAL HISTORY OF PULMONARY EMBOLISM: ICD-10-CM

## 2020-05-28 DIAGNOSIS — A41.89 OTHER SPECIFIED SEPSIS: ICD-10-CM

## 2020-05-28 DIAGNOSIS — D69.6 THROMBOCYTOPENIA, UNSPECIFIED: ICD-10-CM

## 2020-05-28 DIAGNOSIS — E87.0 HYPEROSMOLALITY AND HYPERNATREMIA: ICD-10-CM
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
## 2020-05-28 DIAGNOSIS — L89.892 PRESSURE ULCER OF OTHER SITE, STAGE 2: ICD-10-CM

## 2020-06-02 PROCEDURE — 85730 THROMBOPLASTIN TIME PARTIAL: CPT

## 2020-06-02 PROCEDURE — 82553 CREATINE MB FRACTION: CPT

## 2020-06-02 PROCEDURE — 97161 PT EVAL LOW COMPLEX 20 MIN: CPT

## 2020-06-02 PROCEDURE — 86235 NUCLEAR ANTIGEN ANTIBODY: CPT

## 2020-06-02 PROCEDURE — 82010 KETONE BODYS QUAN: CPT

## 2020-06-02 PROCEDURE — 84439 ASSAY OF FREE THYROXINE: CPT

## 2020-06-02 PROCEDURE — 83735 ASSAY OF MAGNESIUM: CPT

## 2020-06-02 PROCEDURE — 72146 MRI CHEST SPINE W/O DYE: CPT

## 2020-06-02 PROCEDURE — 36415 COLL VENOUS BLD VENIPUNCTURE: CPT

## 2020-06-02 PROCEDURE — 85379 FIBRIN DEGRADATION QUANT: CPT

## 2020-06-02 PROCEDURE — 82247 BILIRUBIN TOTAL: CPT

## 2020-06-02 PROCEDURE — 86140 C-REACTIVE PROTEIN: CPT

## 2020-06-02 PROCEDURE — P9016: CPT

## 2020-06-02 PROCEDURE — 85025 COMPLETE CBC W/AUTO DIFF WBC: CPT

## 2020-06-02 PROCEDURE — 86038 ANTINUCLEAR ANTIBODIES: CPT

## 2020-06-02 PROCEDURE — 83880 ASSAY OF NATRIURETIC PEPTIDE: CPT

## 2020-06-02 PROCEDURE — 74176 CT ABD & PELVIS W/O CONTRAST: CPT

## 2020-06-02 PROCEDURE — 97535 SELF CARE MNGMENT TRAINING: CPT

## 2020-06-02 PROCEDURE — 84484 ASSAY OF TROPONIN QUANT: CPT

## 2020-06-02 PROCEDURE — 83516 IMMUNOASSAY NONANTIBODY: CPT

## 2020-06-02 PROCEDURE — 84443 ASSAY THYROID STIM HORMONE: CPT

## 2020-06-02 PROCEDURE — 87635 SARS-COV-2 COVID-19 AMP PRB: CPT

## 2020-06-02 PROCEDURE — 76705 ECHO EXAM OF ABDOMEN: CPT

## 2020-06-02 PROCEDURE — 71250 CT THORAX DX C-: CPT

## 2020-06-02 PROCEDURE — 80053 COMPREHEN METABOLIC PANEL: CPT

## 2020-06-02 PROCEDURE — P9045: CPT

## 2020-06-02 PROCEDURE — 84300 ASSAY OF URINE SODIUM: CPT

## 2020-06-02 PROCEDURE — 82085 ASSAY OF ALDOLASE: CPT

## 2020-06-02 PROCEDURE — 85045 AUTOMATED RETICULOCYTE COUNT: CPT

## 2020-06-02 PROCEDURE — 85610 PROTHROMBIN TIME: CPT

## 2020-06-02 PROCEDURE — 87521 HEPATITIS C PROBE&RVRS TRNSC: CPT

## 2020-06-02 PROCEDURE — 99285 EMERGENCY DEPT VISIT HI MDM: CPT | Mod: 25

## 2020-06-02 PROCEDURE — 86850 RBC ANTIBODY SCREEN: CPT

## 2020-06-02 PROCEDURE — 83010 ASSAY OF HAPTOGLOBIN QUANT: CPT

## 2020-06-02 PROCEDURE — 96374 THER/PROPH/DIAG INJ IV PUSH: CPT

## 2020-06-02 PROCEDURE — 72148 MRI LUMBAR SPINE W/O DYE: CPT

## 2020-06-02 PROCEDURE — 93005 ELECTROCARDIOGRAM TRACING: CPT

## 2020-06-02 PROCEDURE — 86803 HEPATITIS C AB TEST: CPT

## 2020-06-02 PROCEDURE — 71045 X-RAY EXAM CHEST 1 VIEW: CPT

## 2020-06-02 PROCEDURE — 84540 ASSAY OF URINE/UREA-N: CPT

## 2020-06-02 PROCEDURE — 82746 ASSAY OF FOLIC ACID SERUM: CPT

## 2020-06-02 PROCEDURE — 86901 BLOOD TYPING SEROLOGIC RH(D): CPT

## 2020-06-02 PROCEDURE — 84145 PROCALCITONIN (PCT): CPT

## 2020-06-02 PROCEDURE — 84132 ASSAY OF SERUM POTASSIUM: CPT

## 2020-06-02 PROCEDURE — 86225 DNA ANTIBODY NATIVE: CPT

## 2020-06-02 PROCEDURE — 81001 URINALYSIS AUTO W/SCOPE: CPT

## 2020-06-02 PROCEDURE — 84550 ASSAY OF BLOOD/URIC ACID: CPT

## 2020-06-02 PROCEDURE — 95714 VEEG EA 12-26 HR UNMNTR: CPT

## 2020-06-02 PROCEDURE — 84425 ASSAY OF VITAMIN B-1: CPT

## 2020-06-02 PROCEDURE — 87070 CULTURE OTHR SPECIMN AEROBIC: CPT

## 2020-06-02 PROCEDURE — 82607 VITAMIN B-12: CPT

## 2020-06-02 PROCEDURE — 95700 EEG CONT REC W/VID EEG TECH: CPT

## 2020-06-02 PROCEDURE — 97116 GAIT TRAINING THERAPY: CPT

## 2020-06-02 PROCEDURE — 36430 TRANSFUSION BLD/BLD COMPNT: CPT

## 2020-06-02 PROCEDURE — 85027 COMPLETE CBC AUTOMATED: CPT

## 2020-06-02 PROCEDURE — 86431 RHEUMATOID FACTOR QUANT: CPT

## 2020-06-02 PROCEDURE — 87641 MR-STAPH DNA AMP PROBE: CPT

## 2020-06-02 PROCEDURE — 82728 ASSAY OF FERRITIN: CPT

## 2020-06-02 PROCEDURE — 82803 BLOOD GASES ANY COMBINATION: CPT

## 2020-06-02 PROCEDURE — 83690 ASSAY OF LIPASE: CPT

## 2020-06-02 PROCEDURE — 85652 RBC SED RATE AUTOMATED: CPT

## 2020-06-02 PROCEDURE — 83036 HEMOGLOBIN GLYCOSYLATED A1C: CPT

## 2020-06-02 PROCEDURE — 73560 X-RAY EXAM OF KNEE 1 OR 2: CPT

## 2020-06-02 PROCEDURE — 86923 COMPATIBILITY TEST ELECTRIC: CPT

## 2020-06-02 PROCEDURE — 93970 EXTREMITY STUDY: CPT

## 2020-06-02 PROCEDURE — 84295 ASSAY OF SERUM SODIUM: CPT

## 2020-06-02 PROCEDURE — 83935 ASSAY OF URINE OSMOLALITY: CPT

## 2020-06-02 PROCEDURE — 96361 HYDRATE IV INFUSION ADD-ON: CPT

## 2020-06-02 PROCEDURE — 82330 ASSAY OF CALCIUM: CPT

## 2020-06-02 PROCEDURE — 80048 BASIC METABOLIC PNL TOTAL CA: CPT

## 2020-06-02 PROCEDURE — 97110 THERAPEUTIC EXERCISES: CPT

## 2020-06-02 PROCEDURE — 82550 ASSAY OF CK (CPK): CPT

## 2020-06-02 PROCEDURE — 82962 GLUCOSE BLOOD TEST: CPT

## 2020-06-02 PROCEDURE — 82570 ASSAY OF URINE CREATININE: CPT

## 2020-06-02 PROCEDURE — 92610 EVALUATE SWALLOWING FUNCTION: CPT

## 2020-06-02 PROCEDURE — 84100 ASSAY OF PHOSPHORUS: CPT

## 2020-06-02 PROCEDURE — 97530 THERAPEUTIC ACTIVITIES: CPT

## 2020-06-02 PROCEDURE — 80202 ASSAY OF VANCOMYCIN: CPT

## 2020-06-02 PROCEDURE — 82248 BILIRUBIN DIRECT: CPT

## 2020-06-02 PROCEDURE — 83605 ASSAY OF LACTIC ACID: CPT

## 2020-06-02 PROCEDURE — 76700 US EXAM ABDOM COMPLETE: CPT

## 2020-06-02 PROCEDURE — 87040 BLOOD CULTURE FOR BACTERIA: CPT

## 2020-06-02 PROCEDURE — 83615 LACTATE (LD) (LDH) ENZYME: CPT

## 2020-06-02 NOTE — PROGRESS NOTE ADULT - PROVIDER SPECIALTY LIST ADULT
Infectious Disease
Neurosurgery
Plastic Surgery
Pulmonology
Neurosurgery

## 2020-06-18 ENCOUNTER — APPOINTMENT (OUTPATIENT)
Dept: HEART AND VASCULAR | Facility: CLINIC | Age: 70
End: 2020-06-18

## 2020-07-20 NOTE — END OF VISIT
[>50% of Time Spent on Counseling and Coordination of Care for  ___] : Greater than 50% of the encounter time was spent on counseling and coordination of care for [unfilled] [Time Spent: ___ minutes] : I have spent [unfilled] minutes of face to face time with the patient acetaminophen 325 mg oral tablet: 2 tab(s) orally every 6 hours, As needed, Temp greater or equal to 38.5C (101.3F), Moderate Pain (4 - 6)  atorvastatin 40 mg oral tablet: 1 tab(s) orally once a day  Basaglar KwikPen 100 units/mL subcutaneous solution: 15 unit(s) subcutaneous once a day  clopidogrel 75 mg oral tablet: 1 tab(s) orally once a day  Janumet 50 mg-1000 mg oral tablet: 1 tab(s) orally 2 times a day  latanoprost 0.005% ophthalmic solution: 1 drop(s) to each affected eye once a day (at bedtime)  metoprolol succinate 25 mg oral capsule, extended release: 1 cap(s) orally once a day (at bedtime)  timolol maleate 0.5% ophthalmic solution: 1 drop(s) to each affected eye once a day  venlafaxine 150 mg oral capsule, extended release: 1 cap(s) orally once a day acetaminophen 325 mg oral tablet: 2 tab(s) orally every 6 hours, As needed, Temp greater or equal to 38.5C (101.3F), Moderate Pain (4 - 6)  atorvastatin 40 mg oral tablet: 1 tab(s) orally once a day  Basaglar KwikPen 100 units/mL subcutaneous solution: 15 unit(s) subcutaneous once a day  clopidogrel 75 mg oral tablet: 1 tab(s) orally once a day  Janumet 50 mg-1000 mg oral tablet: 1 tab(s) orally 2 times a day  latanoprost 0.005% ophthalmic solution: 1 drop(s) to each affected eye once a day (at bedtime)  metoprolol succinate 25 mg oral capsule, extended release: 1 cap(s) orally once a day (at bedtime)  polyethylene glycol 3350 oral powder for reconstitution: 17 gram(s) orally once  senna oral tablet: 2 tab(s) orally once a day (at bedtime)  timolol maleate 0.5% ophthalmic solution: 1 drop(s) to each affected eye once a day  venlafaxine 150 mg oral capsule, extended release: 1 cap(s) orally once a day

## 2020-07-23 ENCOUNTER — APPOINTMENT (OUTPATIENT)
Dept: OPHTHALMOLOGY | Facility: CLINIC | Age: 70
End: 2020-07-23

## 2020-08-19 NOTE — CHART NOTE - NSCHARTNOTEFT_GEN_A_CORE
patient was consented for the enrollment in the ODESSEY  trial.  The benefits and risks explained.  Potential side effects explained including but not limited to fatigue and smnolence.  The study information was explained and he was given ample time to review and sign the consent form.  I answered all his questions.  Patient has a signed copy of the consent form and copy will be placed in the chart as part of his medical records.  The patient was consented on DATE. (If a legally authorized representative was used please also note that and the name of the LAR). 5/12  The patient’s primary language spoken is English.       Inclusion Criteria:  Age above 18  COVID -19 R-PCR positive  COVID pneumonia on CXR  Oral temperature: 39 on 5/11  PaO2/FiO2 ratio: 62/1 (on % flow 15l/min)= 62  Nausea Scale:0  Cough scale: 2    Exclusion Criteria Not Met:  1.	Inability to provide informed consent or to have an authorized relative or designated person provide informed consent or to comply with the protocol requirements  2.	Known allergy to tradipitant or other neurokinin-1 antagonists  3.	Pregnancy  4.	Known HIV, HBV, or HCV  5.	Malignant tumor, other serious systemic diseases and psychosis  6.	Enrollment in another clinical trial of an investigational therapy  7.	Alanine aminotransferase > 5X Upper Limit of Normal or Creatinine clearance < 50 ml / min.    a.	Provide Creatinine Clearance value as calculated with the Cockcroft-Gault equation: 79.4        MD Vito  addendum

## 2020-10-13 NOTE — PROGRESS NOTE ADULT - REASON FOR ADMISSION
Wound dehiscence
(4) rarely moist

## 2020-10-23 NOTE — INPATIENT CERTIFICATION FOR MEDICARE PATIENTS - PHYSICIAN CONCUR
I concur with the Admission Order and I certify that services are provided in accordance with Section 42 CFR § 412.3 DISPLAY PLAN FREE TEXT DISPLAY PLAN FREE TEXT

## 2021-03-05 NOTE — ED PROVIDER NOTE - CPE EDP SKIN NORM
[Negative] : Gastrointestinal [de-identified] : under the care of wound management for decub ulcers. normal...

## 2021-05-03 NOTE — PATIENT PROFILE ADULT - NSPROEDALEARNPREF_GEN_A_NUR
This is a chronic condition uncontrolled  A1c at his last visit in February was over 11 is now up to 13.8  Reports that he consistently takes his medication including Trulicity 1.5 mg once a week, Metformin 1000 mg twice daily and long-acting insulin at 20 units a day  Reports to me that his diet has not changed  He does agree to further testing, will verify if patient is perhaps type I  He will have kim 65 and C-peptide, he will be notified of results and further actions if needed  He does understand that if there is indication that he has type 1 diabetes he will need to be referred to endocrinology for further management  He is to continue on his oral medications as prescribed and will increase his long-acting insulin to 24 units  He is appropriately on ACE inhibitor as well as statin  
written material/verbal instruction

## 2021-05-24 NOTE — ED PROVIDER NOTE - MDM ORDERS SUBMITTED SELECTION
----- Message from Dona Johnson MD sent at 5/23/2021  3:31 PM EDT -----  Please advise the patient had no arrhythmias. Just rare early heartbeats from the upper or lower chambers of the heart which we all have and are not dangerous or concerning. If doing well, proceed with healthy diet and exercise, and follow-up in 1 year. Imaging Studies/EKG/Labs

## 2021-11-17 NOTE — PHYSICAL EXAM
And to add, he does need to drink more water, work on eating as well.  Call for issues, Thanks [General Appearance - Well Developed] : well developed [Normal Appearance] : normal appearance [Well Groomed] : well groomed [General Appearance - Well Nourished] : well nourished [No Deformities] : no deformities [General Appearance - In No Acute Distress] : no acute distress [Normal Conjunctiva] : the conjunctiva exhibited no abnormalities [] : no respiratory distress [Respiration, Rhythm And Depth] : normal respiratory rhythm and effort [Exaggerated Use Of Accessory Muscles For Inspiration] : no accessory muscle use [Auscultation Breath Sounds / Voice Sounds] : lungs were clear to auscultation bilaterally [Heart Sounds] : normal S1 and S2 [Abdomen Soft] : soft [FreeTextEntry1] : no edema [Skin Turgor] : normal skin turgor [Oriented To Time, Place, And Person] : oriented to person, place, and time [Affect] : the affect was normal [Mood] : the mood was normal [No Anxiety] : not feeling anxious

## 2021-11-30 NOTE — ED ADULT NURSE NOTE - PSH
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

## 2021-12-27 NOTE — PROVIDER CONTACT NOTE (CRITICAL VALUE NOTIFICATION) - ACTION/TREATMENT ORDERED:
Your case has been Approved. Provider Name: DR. Blake Maldonado [id number] Contact: Elle Bernal Provider Address: [address] Phone Number: [phone] Fax Number: [phone]     Patient Name: Alvino Hernández Patient Id: [id number] Insurance C
Fio2 increased. Bicarb given
1L NS
Md Abdullahi and MD Peters aware.
Md Fran Stein made aware.,

## 2022-07-19 PROBLEM — H40.003 GLAUCOMA SUSPECT OF BOTH EYES: Status: ACTIVE | Noted: 2018-07-25

## 2022-09-22 NOTE — HISTORY OF PRESENT ILLNESS
[de-identified] : 69 year old male with progressive  lower extremity weakness, s/p lumbar decompression in 1/2019. Improved back and radiating leg pain after surgery but continues to  have lower extremity weakness and atrophy. Follows with Dr. Brito where extensive workup has been thus far unrevealing. \par \par S/p gastroc muscle/sural nerve biopsy 7/25/19.\par Complains of localized pain that at times wakes him from sleep. Describes it is burning and a dull aching. Reports taking his Neurontin as scheduled but is not currently taking any OTC medications for pain. Denies fever, chills, wound drainage.  16

## 2022-10-27 NOTE — H&P ADULT - NSTOBACCOSCREENHP_GEN_A_NCS
Sepsis Sepsis Sepsis Sepsis Sepsis Sepsis Sepsis Sepsis Sepsis Sepsis Sepsis Sepsis Sepsis Sepsis Sepsis Sepsis Sepsis Sepsis Sepsis Sepsis Sepsis Sepsis Sepsis Sepsis Sepsis No

## 2023-01-18 NOTE — PHYSICAL THERAPY INITIAL EVALUATION ADULT - TRANSFER SKILLS, REHAB EVAL
[Patient] : Patient [Home] : at home, [unfilled] , at the time of the visit. [Other Location: e.g. Home (Enter Location, City,State)___] : at [unfilled] needs device

## 2023-06-29 NOTE — ED ADULT TRIAGE NOTE - WEIGHT METHOD
Consent: The risks of pain and injection site reactions were reviewed with the patient prior to the injection. stated

## 2023-08-30 NOTE — PRE-OP CHECKLIST - NSBLOODTRANS_GEN_A_CORE_SIUH
Iesha Wu is a 59 year old female here for  Chief Complaint   Patient presents with   • Cancer     Denies latex allergy or sensitivity.    Medication verified, no changes.  PCP and Pharmacy verified.    Social History     Tobacco Use   Smoking Status Every Day   • Current packs/day: 0.50   • Average packs/day: 0.5 packs/day for 46.7 years (23.3 ttl pk-yrs)   • Types: Cigarettes   • Start date: 1977   Smokeless Tobacco Never     Advance Directives Filed: No    ECOG:   ECOG   ECOG Performance Status        Vitals:    There were no vitals taken for this visit.    These vital signs are:  Within defined parameters (Per Reference \"Defined Limits Hospital Outpatient Department (HOD)\")    Height: No.  Ht Readings from Last 1 Encounters:   04/05/23 5' 6.34\" (1.685 m)     Weight:Yes, shoes off.  Wt Readings from Last 3 Encounters:   08/16/23 61.8 kg (136 lb 3.9 oz)   08/09/23 63.4 kg (139 lb 11.2 oz)   07/26/23 65.2 kg (143 lb 11.8 oz)       BMI: There is no height or weight on file to calculate BMI.    REVIEW OF SYSTEMS  GENERAL:  Patient denies headache, fevers, chills, night sweats, excessive fatigue, change in appetite, weight loss, dizziness, but complains of: night sweats, excessive fatigue and change in appetite  ALLERGIC/IMMUNOLOGIC: Verified allergies: Yes  EYES:  Patient denies significant visual difficulties, double vision, blurred vision, but complains of: significant visual difficulties  ENT/MOUTH: Patient denies problems with hearing, sore throat, sinus drainage, mouth sores  ENDOCRINE:  Patient denies diabetes, thyroid disease, hormone replacement, hot flashes  HEMATOLOGIC/LYMPHATIC: Patient denies easy bruising, bleeding, tender lymph nodes, swollen lymph nodes  BREASTS: Patient denies abnormal masses of breast, nipple discharge, pain  RESPIRATORY:  Patient denies lung pain with breathing, cough, coughing up blood, shortness of breath, but complains of: shortness of breath  CARDIOVASCULAR:  Patient  denies anginal chest pain, palpitations, shortness of breath when lying flat, peripheral edema  GASTROINTESTINAL: Patient denies abdominal pain , nausea, vomiting, diarrhea, GI bleeding, constipation, change in bowel habits, heartburn, sensation of feeling full, difficulty swallowing, but complains of: nausea and vomiting  : Patient denies abnormal genital masses, blood in the urine, frequency, urgency, burning with urination, hesitancy, incontinence, vaginal bleeding, discharge  MUSCULOSKELETAL:  Patient denies joint pain, bone pain, joint swelling, redness, decreased range of motion, but complains of: joint pain, pain ratin, location: back  SKIN:  Patient denies chronic rashes, inflammation, ulcerations, skin changes, itching  NEUROLOGIC:  Patient denies loss of balance, areas of focal weakness, abnormal gait, sensory problems, numbness, tingling, but complains of: loss of balance, areas of focal weakness, abnormal gait and numbness feet  PSYCHIATRIC: Patient denies insomnia, depression, anxiety, but complains of: depression and anxiety    This patient reported abnormal symptoms that needed immediate verbal communication: No   no...

## 2024-01-26 NOTE — PHYSICAL THERAPY INITIAL EVALUATION ADULT - PHYSICAL ASSIST/NONPHYSICAL ASSIST: GAIT, REHAB EVAL
[de-identified] : 1/25/24 AV paced 2 person assist/nonverbal cues (demo/gestures)/verbal cues/set-up required

## 2024-02-05 NOTE — PATIENT PROFILE ADULT - NSASFALLNEEDSASSIST_GEN_A_NUR
Health Maintenance Due   Topic Date Due    Cervical Cancer Screening  Never done    Lung Cancer Screening  Never done    Hepatitis B Vaccine (3 of 3 - 19+ 3-dose series) 10/19/2020    DTaP/Tdap/Td Vaccine (6 - Td or Tdap) 09/29/2021    Diabetes Eye Exam  02/01/2023    Diabetes A1C  05/25/2023    Influenza Vaccine (1) 09/01/2023    COVID-19 Vaccine (4 - 2023-24 season) 09/01/2023    Medicare Advantage- Medicare Wellness Visit  01/01/2024    Diabetes Foot Exam  03/27/2024       Patient is due for topics listed above, she wishes to proceed with Immunization(s) COVID-19 and Influenza, Diabetes A1C, Diabetes Eye Exam, Diabetes Foot Exam, Lung Cancer Screening, and MWV (Medicare Wellness Visit), but is not proceeding with Immunization(s) Dtap/Tdap/Td and Hep B and Cervical cancer screening at this time.      Recent PHQ 2/9 Score    PHQ 2:  PHQ 2 Score Adult PHQ 2 Score Adult PHQ 2 Interpretation Little interest or pleasure in activity?   2/5/2024   3:13 PM 3 Further screening needed 2       PHQ 9:  PHQ 9 Score Adult PHQ 9 Score Adult PHQ 9 Interpretation   2/5/2024   3:13 PM 11 Moderate Depression     PHQ-2/9 Depression Screening  Little interest or pleasure in activity?: More than half the days  Feeling down, depressed or hopeless?: Several days  Initial depression screening score:: 3  PHQ2 Interpretation: Further screening needed  Trouble falling or staying asleep or sleeping all the time?: Nearly every day  Feeling tired or having little energy?: Nearly every day  Poor appetite or overeating?: Several days  Feeling bad about yourself or that you are a failure or have let yourself or family down?: Several days  Trouble concentrating on things such as reading the newspaper or watching TV?: Not at all  Moving or speaking slowly that other people have noticed or the opposite - being so fidgety or restless that you have been moving around a lot more than usual?: Not at all  Thoughts that you would be better off dead or of  hurting yourself in some way?: Not at all  Total depressive symptoms score (PHQ9): : 11  PHQ9 Interpretation: Moderate Depression     yes

## 2024-03-26 NOTE — ED ADULT NURSE NOTE - NSSUSCREENINGQ5_ED_ALL_ED
I called and left a voice message for patient informing him OK to proceed with Pet/ct scan per KYLAH Jha./ LEIDA      No

## 2024-10-05 NOTE — PATIENT PROFILE ADULT - ANY SIGNIFICANT CHANGE IN ABILITY TO PERFORM THE FOLLOWING ADL SINCE THE ONSET OF PRESENT ILLNESS?
[facility] Neurology   IN-PATIENT SERVICE      NEUROLOGY CONSULT  NOTE            Date:   10/5/2024  Patient name:  Frank Lisa  Date of admission:  10/4/2024  YOB: 1960      Chief Complaint:     Chief Complaint   Patient presents with    Altered Mental Status     Had back surgery on 10/2/24  3 days ago       Reason for Consult:      AMS    History of Present Illness:     The patient is a 64 y.o. male who presents with Altered Mental Status (Had back surgery on 10/2/24  3 days ago)  . The patient was seen and examined and the chart was reviewed.     This is a 64 year old male known to our Neurology service from both inpatient and outpatient evaluations. Follows with Dr. Fay outpatient and was last seen on 6/2024.     From neuro notes:  The patient states that for last at least 1 year, he has been having burning pain involving his both lower extremities, upper extremities, mainly involving fourth and five digits.  Additionally, he notices pain and weakness in extremities, difficulty in hand dexterity.  The patient previously has had carpal tunnel surgery about 6 years ago.  He also has a history of C5-C6, C6-C7 ACDF.  The patient has a history of alcoholism, cervical carcinoma status post chemoradiation therapy in remission  Previously patient had EMG nerve conduction study that showed multilevel cervical radiculopathy  MRI scan of the cervical spine November 2023: Spinal canal stenosis C5-C6, C6-C7 and central disc protrusion at C2-C3 2 mm  EMG nerve conduction study November 2015 consistent with carpal tunnel syndrome    He was seen inpatient on 5/2024 for LOC episode. He was also seen the month prior for AMS which was thought to be metabolic with ETOH withdrawals. He had extensive evaluation including MRI brain and spine. Please refer to past notes. MARYSOL and double-stranded DNA antibodies were positive. He was referred to outpatient rheumatology.     Patient's ex-wife went to check on patient 
no
